# Patient Record
Sex: MALE | Race: OTHER | NOT HISPANIC OR LATINO
[De-identification: names, ages, dates, MRNs, and addresses within clinical notes are randomized per-mention and may not be internally consistent; named-entity substitution may affect disease eponyms.]

---

## 2017-01-06 ENCOUNTER — APPOINTMENT (OUTPATIENT)
Dept: INTERNAL MEDICINE | Facility: CLINIC | Age: 32
End: 2017-01-06

## 2017-01-06 VITALS
DIASTOLIC BLOOD PRESSURE: 60 MMHG | OXYGEN SATURATION: 99 % | HEART RATE: 63 BPM | HEIGHT: 71 IN | BODY MASS INDEX: 26.46 KG/M2 | WEIGHT: 189 LBS | SYSTOLIC BLOOD PRESSURE: 110 MMHG

## 2017-01-09 LAB
ALBUMIN SERPL ELPH-MCNC: 4.9 G/DL
ALP BLD-CCNC: 58 U/L
ALT SERPL-CCNC: 13 U/L
ANION GAP SERPL CALC-SCNC: 12 MMOL/L
AST SERPL-CCNC: 19 U/L
BILIRUB SERPL-MCNC: 0.3 MG/DL
BUN SERPL-MCNC: 16 MG/DL
CALCIUM SERPL-MCNC: 10 MG/DL
CHLORIDE SERPL-SCNC: 100 MMOL/L
CHOLEST SERPL-MCNC: 217 MG/DL
CHOLEST/HDLC SERPL: 4.1 RATIO
CO2 SERPL-SCNC: 26 MMOL/L
CREAT SERPL-MCNC: 0.76 MG/DL
GLUCOSE SERPL-MCNC: 85 MG/DL
HBA1C MFR BLD HPLC: 5.8 %
HDLC SERPL-MCNC: 53 MG/DL
LDLC SERPL CALC-MCNC: 122 MG/DL
POTASSIUM SERPL-SCNC: 4.4 MMOL/L
PROT SERPL-MCNC: 8.5 G/DL
SODIUM SERPL-SCNC: 138 MMOL/L
TRIGL SERPL-MCNC: 209 MG/DL

## 2017-01-18 ENCOUNTER — APPOINTMENT (OUTPATIENT)
Dept: GASTROENTEROLOGY | Facility: CLINIC | Age: 32
End: 2017-01-18

## 2017-02-08 ENCOUNTER — APPOINTMENT (OUTPATIENT)
Dept: GASTROENTEROLOGY | Facility: CLINIC | Age: 32
End: 2017-02-08

## 2017-02-08 VITALS
RESPIRATION RATE: 14 BRPM | DIASTOLIC BLOOD PRESSURE: 80 MMHG | TEMPERATURE: 98.4 F | BODY MASS INDEX: 26.6 KG/M2 | SYSTOLIC BLOOD PRESSURE: 130 MMHG | WEIGHT: 190 LBS | HEART RATE: 82 BPM | OXYGEN SATURATION: 98 % | HEIGHT: 71 IN

## 2017-03-30 ENCOUNTER — RX RENEWAL (OUTPATIENT)
Age: 32
End: 2017-03-30

## 2017-06-06 ENCOUNTER — APPOINTMENT (OUTPATIENT)
Dept: INTERNAL MEDICINE | Facility: CLINIC | Age: 32
End: 2017-06-06

## 2017-06-06 VITALS
HEIGHT: 71 IN | OXYGEN SATURATION: 98 % | BODY MASS INDEX: 25.76 KG/M2 | HEART RATE: 80 BPM | WEIGHT: 184 LBS | DIASTOLIC BLOOD PRESSURE: 60 MMHG | SYSTOLIC BLOOD PRESSURE: 110 MMHG

## 2017-06-12 LAB
ALBUMIN SERPL ELPH-MCNC: 4.9 G/DL
ALP BLD-CCNC: 48 U/L
ALT SERPL-CCNC: 14 U/L
AMYLASE/CREAT SERPL: 73 U/L
ANION GAP SERPL CALC-SCNC: 22 MMOL/L
AST SERPL-CCNC: 18 U/L
BASOPHILS # BLD AUTO: 0.02 K/UL
BASOPHILS NFR BLD AUTO: 0.3 %
BILIRUB SERPL-MCNC: 0.5 MG/DL
BUN SERPL-MCNC: 13 MG/DL
CALCIUM SERPL-MCNC: 10.2 MG/DL
CHLORIDE SERPL-SCNC: 97 MMOL/L
CHOLEST SERPL-MCNC: 255 MG/DL
CHOLEST/HDLC SERPL: 4.5 RATIO
CO2 SERPL-SCNC: 22 MMOL/L
CREAT SERPL-MCNC: 0.86 MG/DL
EOSINOPHIL # BLD AUTO: 0.12 K/UL
EOSINOPHIL NFR BLD AUTO: 2.1 %
GLUCOSE SERPL-MCNC: 87 MG/DL
HBA1C MFR BLD HPLC: 6 %
HCT VFR BLD CALC: 45.3 %
HDLC SERPL-MCNC: 57 MG/DL
HGB BLD-MCNC: 14.8 G/DL
IMM GRANULOCYTES NFR BLD AUTO: 0.2 %
LDLC SERPL CALC-MCNC: 146 MG/DL
LPL SERPL-CCNC: 28 U/L
LYMPHOCYTES # BLD AUTO: 1.52 K/UL
LYMPHOCYTES NFR BLD AUTO: 26.5 %
MAN DIFF?: NORMAL
MCHC RBC-ENTMCNC: 28.6 PG
MCHC RBC-ENTMCNC: 32.7 GM/DL
MCV RBC AUTO: 87.6 FL
MONOCYTES # BLD AUTO: 0.55 K/UL
MONOCYTES NFR BLD AUTO: 9.6 %
NEUTROPHILS # BLD AUTO: 3.51 K/UL
NEUTROPHILS NFR BLD AUTO: 61.3 %
PLATELET # BLD AUTO: 177 K/UL
POTASSIUM SERPL-SCNC: 5.1 MMOL/L
PROT SERPL-MCNC: 8.6 G/DL
RBC # BLD: 5.17 M/UL
RBC # FLD: 14 %
SODIUM SERPL-SCNC: 141 MMOL/L
TRIGL SERPL-MCNC: 261 MG/DL
TSH SERPL-ACNC: 2.06 UIU/ML
WBC # FLD AUTO: 5.73 K/UL

## 2017-06-14 ENCOUNTER — APPOINTMENT (OUTPATIENT)
Dept: INTERNAL MEDICINE | Facility: CLINIC | Age: 32
End: 2017-06-14

## 2017-10-30 ENCOUNTER — APPOINTMENT (OUTPATIENT)
Dept: ENDOCRINOLOGY | Facility: CLINIC | Age: 32
End: 2017-10-30

## 2017-12-26 ENCOUNTER — APPOINTMENT (OUTPATIENT)
Dept: INTERNAL MEDICINE | Facility: CLINIC | Age: 32
End: 2017-12-26
Payer: COMMERCIAL

## 2017-12-26 VITALS
WEIGHT: 194 LBS | DIASTOLIC BLOOD PRESSURE: 60 MMHG | HEART RATE: 82 BPM | BODY MASS INDEX: 27.16 KG/M2 | OXYGEN SATURATION: 98 % | SYSTOLIC BLOOD PRESSURE: 110 MMHG | HEIGHT: 71 IN

## 2017-12-26 PROCEDURE — 90686 IIV4 VACC NO PRSV 0.5 ML IM: CPT

## 2017-12-26 PROCEDURE — G0008: CPT

## 2017-12-26 PROCEDURE — 99395 PREV VISIT EST AGE 18-39: CPT | Mod: 25

## 2017-12-28 ENCOUNTER — RX RENEWAL (OUTPATIENT)
Age: 32
End: 2017-12-28

## 2017-12-29 ENCOUNTER — RX RENEWAL (OUTPATIENT)
Age: 32
End: 2017-12-29

## 2018-01-02 LAB
25(OH)D3 SERPL-MCNC: 19.3 NG/ML
ALBUMIN SERPL ELPH-MCNC: 4.6 G/DL
ALP BLD-CCNC: 47 U/L
ALT SERPL-CCNC: 18 U/L
AMYLASE/CREAT SERPL: 90 U/L
ANION GAP SERPL CALC-SCNC: 12 MMOL/L
AST SERPL-CCNC: 21 U/L
BASOPHILS # BLD AUTO: 0.03 K/UL
BASOPHILS NFR BLD AUTO: 0.6 %
BILIRUB SERPL-MCNC: 0.4 MG/DL
BUN SERPL-MCNC: 17 MG/DL
CALCIUM SERPL-MCNC: 9.9 MG/DL
CANCER AG19-9 SERPL-ACNC: 9.7 U/ML
CHLORIDE SERPL-SCNC: 100 MMOL/L
CHOLEST SERPL-MCNC: 306 MG/DL
CHOLEST/HDLC SERPL: 6.8 RATIO
CO2 SERPL-SCNC: 28 MMOL/L
CREAT SERPL-MCNC: 0.69 MG/DL
EOSINOPHIL # BLD AUTO: 0.15 K/UL
EOSINOPHIL NFR BLD AUTO: 2.8 %
GLUCOSE SERPL-MCNC: 99 MG/DL
HBA1C MFR BLD HPLC: 5.8 %
HBV SURFACE AB SER QL: REACTIVE
HBV SURFACE AG SER QL: NONREACTIVE
HCT VFR BLD CALC: 48.2 %
HCV AB SER QL: NONREACTIVE
HCV S/CO RATIO: 0.18 S/CO
HDLC SERPL-MCNC: 45 MG/DL
HGB BLD-MCNC: 16.1 G/DL
HIV1+2 AB SPEC QL IA.RAPID: NONREACTIVE
IMM GRANULOCYTES NFR BLD AUTO: 0.2 %
LDLC SERPL CALC-MCNC: NORMAL
LPL SERPL-CCNC: 32 U/L
LYMPHOCYTES # BLD AUTO: 1.64 K/UL
LYMPHOCYTES NFR BLD AUTO: 30.9 %
MAN DIFF?: NORMAL
MCHC RBC-ENTMCNC: 28.8 PG
MCHC RBC-ENTMCNC: 33.4 GM/DL
MCV RBC AUTO: 86.1 FL
MONOCYTES # BLD AUTO: 0.56 K/UL
MONOCYTES NFR BLD AUTO: 10.6 %
NEUTROPHILS # BLD AUTO: 2.91 K/UL
NEUTROPHILS NFR BLD AUTO: 54.9 %
PLATELET # BLD AUTO: 187 K/UL
POTASSIUM SERPL-SCNC: 4.7 MMOL/L
PROT SERPL-MCNC: 8.6 G/DL
RBC # BLD: 5.6 M/UL
RBC # FLD: 13.9 %
SODIUM SERPL-SCNC: 140 MMOL/L
TRIGL SERPL-MCNC: 590 MG/DL
TSH SERPL-ACNC: 1.93 UIU/ML
WBC # FLD AUTO: 5.3 K/UL

## 2018-01-14 ENCOUNTER — EMERGENCY (EMERGENCY)
Facility: HOSPITAL | Age: 33
LOS: 1 days | Discharge: HOME | End: 2018-01-14
Attending: EMERGENCY MEDICINE | Admitting: EMERGENCY MEDICINE
Payer: COMMERCIAL

## 2018-01-14 VITALS
DIASTOLIC BLOOD PRESSURE: 89 MMHG | SYSTOLIC BLOOD PRESSURE: 126 MMHG | HEART RATE: 71 BPM | OXYGEN SATURATION: 99 % | RESPIRATION RATE: 18 BRPM

## 2018-01-14 VITALS
HEART RATE: 65 BPM | DIASTOLIC BLOOD PRESSURE: 75 MMHG | TEMPERATURE: 99 F | RESPIRATION RATE: 17 BRPM | OXYGEN SATURATION: 98 % | SYSTOLIC BLOOD PRESSURE: 140 MMHG

## 2018-01-14 PROCEDURE — 93005 ELECTROCARDIOGRAM TRACING: CPT

## 2018-01-14 PROCEDURE — 99284 EMERGENCY DEPT VISIT MOD MDM: CPT | Mod: 25

## 2018-01-14 PROCEDURE — 93010 ELECTROCARDIOGRAM REPORT: CPT

## 2018-01-14 PROCEDURE — 99283 EMERGENCY DEPT VISIT LOW MDM: CPT | Mod: 25

## 2018-01-14 PROCEDURE — 90715 TDAP VACCINE 7 YRS/> IM: CPT

## 2018-01-14 PROCEDURE — 90471 IMMUNIZATION ADMIN: CPT

## 2018-01-14 PROCEDURE — 71046 X-RAY EXAM CHEST 2 VIEWS: CPT | Mod: 26

## 2018-01-14 PROCEDURE — 71046 X-RAY EXAM CHEST 2 VIEWS: CPT

## 2018-01-14 RX ORDER — IBUPROFEN 200 MG
600 TABLET ORAL ONCE
Qty: 0 | Refills: 0 | Status: COMPLETED | OUTPATIENT
Start: 2018-01-14 | End: 2018-01-14

## 2018-01-14 RX ORDER — TETANUS TOXOID, REDUCED DIPHTHERIA TOXOID AND ACELLULAR PERTUSSIS VACCINE, ADSORBED 5; 2.5; 8; 8; 2.5 [IU]/.5ML; [IU]/.5ML; UG/.5ML; UG/.5ML; UG/.5ML
0.5 SUSPENSION INTRAMUSCULAR ONCE
Qty: 0 | Refills: 0 | Status: COMPLETED | OUTPATIENT
Start: 2018-01-14 | End: 2018-01-14

## 2018-01-14 RX ORDER — ACETAMINOPHEN 500 MG
975 TABLET ORAL ONCE
Qty: 0 | Refills: 0 | Status: COMPLETED | OUTPATIENT
Start: 2018-01-14 | End: 2018-01-14

## 2018-01-14 RX ADMIN — Medication 600 MILLIGRAM(S): at 22:59

## 2018-01-14 RX ADMIN — Medication 600 MILLIGRAM(S): at 21:33

## 2018-01-14 RX ADMIN — TETANUS TOXOID, REDUCED DIPHTHERIA TOXOID AND ACELLULAR PERTUSSIS VACCINE, ADSORBED 0.5 MILLILITER(S): 5; 2.5; 8; 8; 2.5 SUSPENSION INTRAMUSCULAR at 22:58

## 2018-01-14 RX ADMIN — Medication 975 MILLIGRAM(S): at 21:33

## 2018-01-14 NOTE — ED PROVIDER NOTE - NS ED ROS FT
Constitutional: no fevers, chills  HEENT: no visual changes, no sore throat, no rhinorrhea  CV: + cp  Resp: no sob  GI: no abd pain, n/v, diarrhea/constipation  : no dysuria, hematuria  MSK: no joint pains  skin: +L thumb pain  neuro: no HA, no confusion  psych: no SI/HI

## 2018-01-14 NOTE — ED PROVIDER NOTE - SKIN [+], MLM
**ATTENDING ADDENDUM (Dr. Sg Manzano): partially-healed laceration of the right thumb (old, NO prior treatment)./LACERATION

## 2018-01-14 NOTE — ED PROVIDER NOTE - PHYSICAL EXAMINATION
Vitals: WNL  Gen: laying comfortably in NAD  Head: NCAT  ENT: sclerae white, anicterus, moist mucous membranes. No exudates. Neck supple  CV: RRR. Audible S1 and S2. No murmurs, rubs, gallops, S3, nor S4, 2+ radial and DP pulses   Pulm: Clear to auscultation bilaterally. No wheezes, rales, or rhonchi  Abd: soft, normoactive BS x4, NTND, no rebound, no guarding, no rashes  Musculoskeletal:  No peripheral edema  Skin: 2cm lac on R ventral aspect of thumb with dried blood, tender to touch  Neurologic: AAOx3

## 2018-01-14 NOTE — ED PROVIDER NOTE - PLAN OF CARE
1) Your chest xray is normal. Please follow-up with your primary care doctor within the next 3 days.  Please call today or tomorrow for an appointment.  If you cannot follow-up with your doctor(s), please return to the ED for any urgent issues.  2) If you have any worsening of symptoms or any other concerns please return to the ED immediately.  3) Please continue taking your home medications as directed.  4) You may have been given a copy of your labs and/or imaging.  Please go over these with your primary care doctor. 1) Your chest xray and EKG are normal. Please follow-up with your primary care doctor within the next 3 days.  Please call today or tomorrow for an appointment.  If you cannot follow-up with your doctor(s), please return to the ED for any urgent issues.  2) If you have any worsening of symptoms or any other concerns please return to the ED immediately.  3) Please continue taking your home medications as directed.  4) You may have been given a copy of your labs and/or imaging.  Please go over these with your primary care doctor.

## 2018-01-14 NOTE — ED ADULT NURSE NOTE - OBJECTIVE STATEMENT
Received patient awake and alert x 4, presenting with chest pain x 3 days, non radiating CP. States pain is sharp in nature and has been on and off for the past 3 days. Took 2 aspirin today which didn't help with the pain. Denies any SOB, no n/v/d. Breathing unlabored with no S/S of distress noted, caregiver at bedside, safety maintained, will continue to monitor.

## 2018-01-14 NOTE — ED PROVIDER NOTE - GASTROINTESTINAL, MLM
Abdomen soft, non-tender, non-distended **ATTENDING ADDENDUM (Dr. Sg Manzano): NO guarding, rebound, or rigidity. NO pulsatile or non-pulsatile masses. NO hernias. NO obvious hepatosplenomegaly.

## 2018-01-14 NOTE — ED PROVIDER NOTE - SKIN, MLM
Skin normal color for race, warm, dry and intact. No evidence of rash. **ATTENDING ADDENDUM (Dr. Sg Manzano): POSITIVE old thumb laceration, approximately 2 cm, on right thumb (partially-healed)

## 2018-01-14 NOTE — ED PROVIDER NOTE - CHPI ED SYMPTOMS NEG
no chills/no fever/no vomiting/no syncope/no back pain/no nausea/no cough/no diaphoresis/no shortness of breath/no dizziness

## 2018-01-14 NOTE — ED PROVIDER NOTE - OBJECTIVE STATEMENT
33yo M, h/o pancreatitis, p/w L CP that started last night. CP is sharp in nature, lasts 1 second, not associated with diaphoresis or sob. Nothing makes it better or worse. Had two episodes yesterday and two episodes today while driving. Took two asa today which didn't' help. Has never had it before. Denies trauma to the area, recent illnesses, f/c, cough, peripheral edema, h/o long travel/plane/car rides, h/o cancer.    Also complaining of pain to R thumb after he cut himself with a blade yesterday which he cleaned and put bacitracin on. Doesn't remember last time he got tdap. 33yo M, h/o pancreatitis, hypertriglyceridemia, p/w L CP that started last night. CP is sharp in nature, lasts 1 second, not associated with diaphoresis or sob. Nothing makes it better or worse. Had two episodes yesterday and two episodes today while driving. Took two asa today which didn't' help. Has never had it before. Denies trauma to the area, recent illnesses, f/c, cough, peripheral edema, h/o long travel/plane/car rides, h/o cancer.    Also complaining of pain to R thumb after he cut himself with a blade yesterday which he cleaned and put bacitracin on. Doesn't remember last time he got tdap. 33yo M, h/o pancreatitis, hypertriglyceridemia, p/w L CP that started last night. CP is sharp in nature, lasts 1 second, not associated with diaphoresis or sob. Nothing makes it better or worse. Had two episodes yesterday and two episodes today while driving. Took two asa today which didn't' help. Has never had it before. Denies trauma to the area, recent illnesses, f/c, cough, peripheral edema, h/o long travel/plane/car rides, h/o cancer.  Also complaining of pain to R thumb after he cut himself with a blade yesterday which he cleaned and put bacitracin on. Doesn't remember last time he got tdap.  **ATTENDING ADDENDUM (Dr. Sg Manzano): I attest that I have directly and personally interviewed and examined this patient and elicited a comparable history of present illness and review of systems as documented, along with my EM resident. I attest that I have made significant contributions to the documentation where necessary and as noted in the EMR.

## 2018-01-14 NOTE — ED PROVIDER NOTE - CHIEF COMPLAINT
The patient is a 32y Male complaining of chest pain. The patient is a 32-year-old man with history of pancreatitis and hyperlipidemia now presenting with two days of intermittent, sharp chest pain without radiation or associated symptoms (nausea, vomiting, neck/arm/shoulder/back radiation, diaphoresis, syncope or near-syncope).

## 2018-01-14 NOTE — ED PROVIDER NOTE - PROGRESS NOTE DETAILS
Nancy Kevin MD PGY1: cleaned out R thumb lac with saline. Still with dried blood. Pt refuses anymore cleaning 2/2 pain. Sutures not indicated as pt has had lac open for over 24h. Dressing applied. Nancy Kevin MD PGY1: cleaned out R thumb lac with saline. Still with dried blood. Pt refuses anymore cleaning 2/2 pain. Sutures not indicated as pt has had lac open for over 24h. Dressing applied.  **ATTENDING ADDENDUM (Dr. Sg Manzano): agree with above notation. Agree with goals/plan of care related to wound. In addition, regarding patient's chest pain, there is NO evidence suggesting acute coronary syndrome, arrhythmia, pulmonary embolism/deep venous thrombosis, pneumothorax, serious bacterial infection or sepsis/severe sepsis e.g. pneumonia, vascular cause e.g. AAA or equivalent, or other worrisome etiology at this time. Anticipatory guidance provided. Agree with discharge home with close outpatient followup with primary care physician/provider and with medications (if appropriate, if clinically indicated, and as prescribed, as noted in EMR).

## 2018-01-14 NOTE — ED PROVIDER NOTE - CARDIOVASCULAR [-], MLM
**ATTENDING ADDENDUM (Dr. Sg Manzano): NO near-syncope./no palpitations/no peripheral edema/no syncope

## 2018-01-14 NOTE — ED PROVIDER NOTE - MUSCULOSKELETAL, MLM
Spine appears normal, range of motion is not limited, no muscle or joint tenderness **ATTENDING ADDENDUM (Dr. Sg Manzano): NO cords, soft-tissue swelling, or calf tenderness in the bilateral lower extremities.

## 2018-01-14 NOTE — ED PROVIDER NOTE - ATTENDING CONTRIBUTION TO CARE
**ATTENDING ADDENDUM (Dr. Sg Manzano): I attest that I have directly examined this patient and reviewed and formulated the diagnostic and therapeutic management plan in collaboration with the EM resident. Please see MDM note and remainder of EMR for findings from CC, HPI, ROS, and PE. (Ho)

## 2018-01-14 NOTE — ED PROVIDER NOTE - MUSCULOSKELETAL NEGATIVE STATEMENT, MLM
no back pain, no gout, no musculoskeletal pain, no neck pain, and no weakness. **ATTENDING ADDENDUM (Dr. Sg Manzano): NO cords, soft-tissue swelling, or calf tenderness in the bilateral lower extremities.

## 2018-01-14 NOTE — ED PROVIDER NOTE - CARE PLAN
Principal Discharge DX:	Chest pain  Instructions for follow-up, activity and diet:	1) Your chest xray is normal. Please follow-up with your primary care doctor within the next 3 days.  Please call today or tomorrow for an appointment.  If you cannot follow-up with your doctor(s), please return to the ED for any urgent issues.  2) If you have any worsening of symptoms or any other concerns please return to the ED immediately.  3) Please continue taking your home medications as directed.  4) You may have been given a copy of your labs and/or imaging.  Please go over these with your primary care doctor. Principal Discharge DX:	Chest pain  Instructions for follow-up, activity and diet:	1) Your chest xray and EKG are normal. Please follow-up with your primary care doctor within the next 3 days.  Please call today or tomorrow for an appointment.  If you cannot follow-up with your doctor(s), please return to the ED for any urgent issues.  2) If you have any worsening of symptoms or any other concerns please return to the ED immediately.  3) Please continue taking your home medications as directed.  4) You may have been given a copy of your labs and/or imaging.  Please go over these with your primary care doctor. Principal Discharge DX:	Chest pain  Assessment and plan of treatment:	1) Your chest xray and EKG are normal. Please follow-up with your primary care doctor within the next 3 days.  Please call today or tomorrow for an appointment.  If you cannot follow-up with your doctor(s), please return to the ED for any urgent issues.  2) If you have any worsening of symptoms or any other concerns please return to the ED immediately.  3) Please continue taking your home medications as directed.  4) You may have been given a copy of your labs and/or imaging.  Please go over these with your primary care doctor.

## 2018-01-14 NOTE — ED PROVIDER NOTE - EYES, MLM
**ATTENDING ADDENDUM (Dr. Sg Manzano): Extraocular muscle movements intact. Clear corneas bilaterally, pupils equal and round.

## 2018-01-14 NOTE — ED PROVIDER NOTE - MEDICAL DECISION MAKING DETAILS
31yo M, h/o pancreatitis, p/w L CP that started last night. Will 31yo M, h/o pancreatitis, hypertriglyceridemia p/w L CP that started last night. Pt low risk for ACS with Heart score 2. CP likely MSK in nature. Will eval with cxr, ekg, pain meds, anticipate d/c with pcp f/u. 33yo M, h/o pancreatitis, hypertriglyceridemia p/w L CP that started last night. Pt low risk for ACS with Heart score 2. CP likely MSK in nature. Will eval with cxr, ekg, pain meds, anticipate d/c with pcp f/u.  **ATTENDING MEDICAL DECISION MAKING/SYNTHESIS (Dr. Sg Manzano): I have reviewed the Chief Complaint, the HPI, the ROS, and have directly performed and confirmed the findings on the Physical Examination. I have reviewed the medical decision making with all providers, as applicable. The PROBLEM REPRESENTATION at this time is: 32-year-old man with history of pancreatitis and hyperlipidemia now presenting with two days of intermittent, sharp left-sided parasternal chest pain without radiation or associated symptoms (nausea, vomiting, neck/arm/shoulder/back radiation, diaphoresis, syncope or near-syncope, shortness of breath, or dyspnea on exertion). The MOST LIKELY DIAGNOSIS, and the LIST OF DIFFERENTIAL DIAGNOSES, includes (but is not limited to) the following: acute coronary syndrome (unlikely), arrhythmia (NO evidence), pulmonary embolism/deep venous thrombosis (NO evidence), serious bacterial infection or sepsis/severe sepsis e.g. pneumonia (NO evidence), musculoskeletal (possible), vascular cause e.g. AAA or equivalent, pneumothorax (unlikely given presentation and clinical findings), inflammatory e.g. pleurisy or equivalent (possible), dehydration, electrolyte-metabolic-endocrine derangements. The likelihood of each of these diagnoses has been appropriately considered in the context of this patient's presentation and my evaluation. PLAN: as described in EMR, including diagnostics, therapeutics and consultation as clinically warranted. I will continue to reevaluate the patient, including the results of all testing, and monitor response to therapy throughout the patient's course in the ED.

## 2018-03-20 ENCOUNTER — FORM ENCOUNTER (OUTPATIENT)
Age: 33
End: 2018-03-20

## 2018-03-21 ENCOUNTER — APPOINTMENT (OUTPATIENT)
Dept: CT IMAGING | Facility: IMAGING CENTER | Age: 33
End: 2018-03-21
Payer: COMMERCIAL

## 2018-03-21 ENCOUNTER — OUTPATIENT (OUTPATIENT)
Dept: OUTPATIENT SERVICES | Facility: HOSPITAL | Age: 33
LOS: 1 days | End: 2018-03-21
Payer: COMMERCIAL

## 2018-03-21 DIAGNOSIS — K86.89 OTHER SPECIFIED DISEASES OF PANCREAS: ICD-10-CM

## 2018-03-21 PROCEDURE — 74177 CT ABD & PELVIS W/CONTRAST: CPT

## 2018-03-21 PROCEDURE — 74177 CT ABD & PELVIS W/CONTRAST: CPT | Mod: 26

## 2018-06-20 ENCOUNTER — APPOINTMENT (OUTPATIENT)
Dept: GASTROENTEROLOGY | Facility: CLINIC | Age: 33
End: 2018-06-20
Payer: COMMERCIAL

## 2018-06-20 VITALS
BODY MASS INDEX: 28.28 KG/M2 | SYSTOLIC BLOOD PRESSURE: 118 MMHG | OXYGEN SATURATION: 98 % | HEIGHT: 71 IN | DIASTOLIC BLOOD PRESSURE: 74 MMHG | RESPIRATION RATE: 15 BRPM | HEART RATE: 62 BPM | WEIGHT: 202 LBS | TEMPERATURE: 97.6 F

## 2018-06-20 PROCEDURE — 99214 OFFICE O/P EST MOD 30 MIN: CPT

## 2018-12-24 ENCOUNTER — RX RENEWAL (OUTPATIENT)
Age: 33
End: 2018-12-24

## 2019-01-28 ENCOUNTER — APPOINTMENT (OUTPATIENT)
Dept: INTERNAL MEDICINE | Facility: CLINIC | Age: 34
End: 2019-01-28

## 2019-05-01 ENCOUNTER — APPOINTMENT (OUTPATIENT)
Dept: INTERNAL MEDICINE | Facility: CLINIC | Age: 34
End: 2019-05-01
Payer: COMMERCIAL

## 2019-05-01 VITALS
BODY MASS INDEX: 29.82 KG/M2 | SYSTOLIC BLOOD PRESSURE: 130 MMHG | DIASTOLIC BLOOD PRESSURE: 78 MMHG | OXYGEN SATURATION: 99 % | HEIGHT: 71 IN | WEIGHT: 213 LBS | HEART RATE: 80 BPM | TEMPERATURE: 98.7 F

## 2019-05-01 PROCEDURE — 99214 OFFICE O/P EST MOD 30 MIN: CPT

## 2019-05-01 NOTE — REVIEW OF SYSTEMS
[Recent Change In Weight] : ~T recent weight change [Negative] : Psychiatric [FreeTextEntry2] : gained 25 lb

## 2019-05-01 NOTE — ASSESSMENT
[FreeTextEntry1] : Probable TMJ on the right side.\par Advised seeing his dentist for possible panorex film;  mouth guard, use tylenol, warm compresses.\par Check labs when fasting.\par advised avoid meat and other fatty foods, lose 25 lb.\par Compliance w Lopid stressed, bid.\par f/u for CPE in the next mo.

## 2019-05-01 NOTE — HISTORY OF PRESENT ILLNESS
[FreeTextEntry1] : f/u\par last here 2 yrs ago [de-identified] : 32 yo man w h/o hypertriglyceridemia (familial) and necrotizing pancreatitis in 2015.\par Chr walled-off pancreatic necrosis, no longer advising regular imaging unless sx.\par He reports feeling well, aware he has gained wt and not always following healthy diet..taking Lopid but not always bid. Last TGs 590, didn't f/u.\par c/o right ear/jaw discomfort w chewing, baylee meats and other hard foods such as bagels.\par Has gone to urg care a few times and " always getting abx". Sees dentist, not having any dental pain/abscess. not awre of teeth grinding.\par

## 2019-05-14 LAB
ALBUMIN SERPL ELPH-MCNC: 4.9 G/DL
ALP BLD-CCNC: 57 U/L
ALT SERPL-CCNC: 17 U/L
ANION GAP SERPL CALC-SCNC: 13 MMOL/L
AST SERPL-CCNC: 26 U/L
BASOPHILS # BLD AUTO: 0.06 K/UL
BASOPHILS NFR BLD AUTO: 1.2 %
BILIRUB SERPL-MCNC: 0.3 MG/DL
BUN SERPL-MCNC: 12 MG/DL
CALCIUM SERPL-MCNC: 10.1 MG/DL
CHLORIDE SERPL-SCNC: 94 MMOL/L
CHOLEST SERPL-MCNC: 619 MG/DL
CHOLEST/HDLC SERPL: 28.1 RATIO
CO2 SERPL-SCNC: 27 MMOL/L
CREAT SERPL-MCNC: 0.76 MG/DL
EOSINOPHIL # BLD AUTO: 0.24 K/UL
EOSINOPHIL NFR BLD AUTO: 4.7 %
ESTIMATED AVERAGE GLUCOSE: 166 MG/DL
GLUCOSE SERPL-MCNC: 117 MG/DL
HBA1C MFR BLD HPLC: 7.4 %
HCT VFR BLD CALC: 48.3 %
HCV AB SER QL: NONREACTIVE
HCV S/CO RATIO: 0.16 S/CO
HDLC SERPL-MCNC: 22 MG/DL
HGB BLD-MCNC: 15.6 G/DL
HIV1+2 AB SPEC QL IA.RAPID: NONREACTIVE
IMM GRANULOCYTES NFR BLD AUTO: 0.4 %
LDLC SERPL CALC-MCNC: NORMAL MG/DL
LPL SERPL-CCNC: 30 U/L
LYMPHOCYTES # BLD AUTO: 1.72 K/UL
LYMPHOCYTES NFR BLD AUTO: 33.5 %
MAN DIFF?: NORMAL
MCHC RBC-ENTMCNC: 27.9 PG
MCHC RBC-ENTMCNC: 32.3 GM/DL
MCV RBC AUTO: 86.4 FL
MONOCYTES # BLD AUTO: 0.52 K/UL
MONOCYTES NFR BLD AUTO: 10.1 %
NEUTROPHILS # BLD AUTO: 2.57 K/UL
NEUTROPHILS NFR BLD AUTO: 50.1 %
PLATELET # BLD AUTO: 194 K/UL
POTASSIUM SERPL-SCNC: 4.3 MMOL/L
PROT SERPL-MCNC: 8.1 G/DL
RBC # BLD: 5.59 M/UL
RBC # FLD: 15.2 %
SODIUM SERPL-SCNC: 134 MMOL/L
TRIGL SERPL-MCNC: 2399 MG/DL
TSH SERPL-ACNC: 2.26 UIU/ML
WBC # FLD AUTO: 5.13 K/UL

## 2019-05-15 ENCOUNTER — TRANSCRIPTION ENCOUNTER (OUTPATIENT)
Age: 34
End: 2019-05-15

## 2019-05-28 LAB
ALBUMIN SERPL ELPH-MCNC: 5 G/DL
ALP BLD-CCNC: 44 U/L
ALT SERPL-CCNC: 25 U/L
ANION GAP SERPL CALC-SCNC: 13 MMOL/L
AST SERPL-CCNC: 19 U/L
BILIRUB SERPL-MCNC: 0.5 MG/DL
BUN SERPL-MCNC: 14 MG/DL
CALCIUM SERPL-MCNC: 9.5 MG/DL
CHLORIDE SERPL-SCNC: 104 MMOL/L
CHOLEST SERPL-MCNC: 185 MG/DL
CHOLEST/HDLC SERPL: 3.5 RATIO
CO2 SERPL-SCNC: 22 MMOL/L
CREAT SERPL-MCNC: 0.9 MG/DL
GLUCOSE SERPL-MCNC: 91 MG/DL
HDLC SERPL-MCNC: 53 MG/DL
LDLC SERPL CALC-MCNC: 111 MG/DL
LPL SERPL-CCNC: 37 U/L
POTASSIUM SERPL-SCNC: 4.3 MMOL/L
PROT SERPL-MCNC: 7.7 G/DL
SODIUM SERPL-SCNC: 139 MMOL/L
TRIGL SERPL-MCNC: 106 MG/DL

## 2019-10-22 ENCOUNTER — RX RENEWAL (OUTPATIENT)
Age: 34
End: 2019-10-22

## 2019-12-23 ENCOUNTER — RX RENEWAL (OUTPATIENT)
Age: 34
End: 2019-12-23

## 2020-02-24 ENCOUNTER — TRANSCRIPTION ENCOUNTER (OUTPATIENT)
Age: 35
End: 2020-02-24

## 2020-03-31 ENCOUNTER — APPOINTMENT (OUTPATIENT)
Dept: INTERNAL MEDICINE | Facility: CLINIC | Age: 35
End: 2020-03-31

## 2020-04-21 ENCOUNTER — APPOINTMENT (OUTPATIENT)
Dept: INTERNAL MEDICINE | Facility: CLINIC | Age: 35
End: 2020-04-21
Payer: COMMERCIAL

## 2020-04-21 PROCEDURE — 99441: CPT

## 2020-04-21 NOTE — HISTORY OF PRESENT ILLNESS
[FreeTextEntry1] : Patient requesting tell a visit regarding his diabetes pancreatitis patient was at home I was in my office in [place] patient instructed this was a billable encounter patient gave verbal permission\par \par Follow-up for overweight and type II diabetes [de-identified] : Patient is a 34-year-old male with history of overweight, hypercholesterol/hypertriglyceridemia with secondary pancreatitis, type II diabetes/prediabetes who asked for tell a visit for follow-up. Patient stop metformin on his own denies polyuria polydipsia abdominal pain nausea vomiting fever chills feels well

## 2020-04-21 NOTE — ASSESSMENT
[FreeTextEntry1] : Patient is a 34-year-old male with history of type II diabetes, hypertriglyceridemia with secondary pancreatitis who presents for tell a visit for follow-up of medical condition\par \par Assessment/plan:\par \par 1. Type II diabetes\par check fasting glucose and hemoglobin A1c city.\par Counseled on diet and exercise\par \par 2 overweight\par counseled on diet and exercise\par \par 3 hypertriglyceridemia\par continue gemfibrozil and lipid\par check lipid profile fasting will check labs at [facility]\par \par 4 history of pancreatitis\par asymptomatic check lipase level.\par \par Follow-up in one week after labs \par appointment started at 329 pm ended at 3:37 PM\par greater than 50% spent counseling on diet exercise and importance of social distancing

## 2020-04-27 LAB
ALBUMIN SERPL ELPH-MCNC: 5.1 G/DL
ALP BLD-CCNC: 48 U/L
ALT SERPL-CCNC: 28 U/L
ANION GAP SERPL CALC-SCNC: 12 MMOL/L
AST SERPL-CCNC: 24 U/L
BASOPHILS # BLD AUTO: 0.04 K/UL
BASOPHILS NFR BLD AUTO: 0.8 %
BILIRUB SERPL-MCNC: 0.4 MG/DL
BUN SERPL-MCNC: 13 MG/DL
CALCIUM SERPL-MCNC: 9.9 MG/DL
CHLORIDE SERPL-SCNC: 100 MMOL/L
CHOLEST SERPL-MCNC: 377 MG/DL
CHOLEST/HDLC SERPL: 10 RATIO
CO2 SERPL-SCNC: 27 MMOL/L
CREAT SERPL-MCNC: 0.85 MG/DL
EOSINOPHIL # BLD AUTO: 0.11 K/UL
EOSINOPHIL NFR BLD AUTO: 2.3 %
ESTIMATED AVERAGE GLUCOSE: 137 MG/DL
GLUCOSE SERPL-MCNC: 97 MG/DL
HBA1C MFR BLD HPLC: 6.4 %
HCT VFR BLD CALC: 49.1 %
HDLC SERPL-MCNC: 38 MG/DL
HGB BLD-MCNC: 15.8 G/DL
IMM GRANULOCYTES NFR BLD AUTO: 0.4 %
LDLC SERPL CALC-MCNC: NORMAL MG/DL
LPL SERPL-CCNC: 25 U/L
LYMPHOCYTES # BLD AUTO: 1.8 K/UL
LYMPHOCYTES NFR BLD AUTO: 37.4 %
MAN DIFF?: NORMAL
MCHC RBC-ENTMCNC: 27.9 PG
MCHC RBC-ENTMCNC: 32.2 GM/DL
MCV RBC AUTO: 86.6 FL
MONOCYTES # BLD AUTO: 0.46 K/UL
MONOCYTES NFR BLD AUTO: 9.6 %
NEUTROPHILS # BLD AUTO: 2.38 K/UL
NEUTROPHILS NFR BLD AUTO: 49.5 %
PLATELET # BLD AUTO: 183 K/UL
POTASSIUM SERPL-SCNC: 4.2 MMOL/L
PROT SERPL-MCNC: 8.1 G/DL
RBC # BLD: 5.67 M/UL
RBC # FLD: 13.8 %
SODIUM SERPL-SCNC: 140 MMOL/L
TRIGL SERPL-MCNC: 741 MG/DL
WBC # FLD AUTO: 4.81 K/UL

## 2020-04-28 ENCOUNTER — APPOINTMENT (OUTPATIENT)
Dept: INTERNAL MEDICINE | Facility: CLINIC | Age: 35
End: 2020-04-28
Payer: COMMERCIAL

## 2020-04-28 DIAGNOSIS — K85.91 ACUTE PANCREATITIS WITH UNINFECTED NECROSIS, UNSPECIFIED: ICD-10-CM

## 2020-04-28 PROCEDURE — 99213 OFFICE O/P EST LOW 20 MIN: CPT | Mod: 95

## 2020-04-28 NOTE — ASSESSMENT
[FreeTextEntry1] : Patient is a 34-year-old male with history of overweight, type II diabetes, hypertriglyceridemia history of pancreatitis secondary to hypertriglyceridemia who presents for follow-up\par \par 1. Type II diabetes\par never started metformin\par hold metformin now counseled on diet and exercise\par hemoglobin A1c down to 6.4\par \par 2 hypertriglyceridemia\par continue gemfibrozil one tablet 600 mg BID\par counseled on diet low called no alcohol exercise\par recheck in 6 to 8 weeks consider adding back omega-3 fatty acids\par \par 3 history of hypertriglyceridemia induced pancreatitis\par counseled on diet and exercise continue gemfibrozil consider adding omega-3 check lipid profile in six weeks\par lipase baseline normal.\par \par 4 overweight\par counseled on diet and exercise\par \par Appointment started at 3:40 PM ended at 3:52 PM\par greater than 50% counseled on diet and exercise.

## 2020-04-28 NOTE — HISTORY OF PRESENT ILLNESS
[FreeTextEntry1] : Patient called requesting a tele-video visit regarding follow-up for his diabetes hypertriglyceridemia. Patient was at home I was in my office in [place]. Patient instructed us a billable visit patient gave verbal consent\par \par Follow-up for type II diabetes and hypertriglyceridemia [de-identified] : The patient is a 34-year-old male with history of type II diabetes formally prescribed metformin never started, pancreatitis secondary to Hydro hypertriglyceridemia, hypertriglyceridemia who presents for follow-up. Patient has been following diet denies chest pain, shortness of breath abdominal pain nausea vomiting has lost several pounds since last seen.

## 2020-07-15 ENCOUNTER — TRANSCRIPTION ENCOUNTER (OUTPATIENT)
Age: 35
End: 2020-07-15

## 2020-08-28 ENCOUNTER — APPOINTMENT (OUTPATIENT)
Dept: INTERNAL MEDICINE | Facility: CLINIC | Age: 35
End: 2020-08-28
Payer: COMMERCIAL

## 2020-08-28 PROCEDURE — 99213 OFFICE O/P EST LOW 20 MIN: CPT | Mod: 95

## 2020-08-28 NOTE — HISTORY OF PRESENT ILLNESS
[Medical Office: ([facility])___] : at the medical office located in  [Other Location: e.g. School (Enter Location, City,State)___] : at [unfilled], at the time of the visit. [Verbal consent obtained from patient] : the patient, [unfilled] [FreeTextEntry1] : f/u [de-identified] : 33 yo man w familial Hypertriglyceridemia, IFG/DM2, overwt\par Necrotizing pancreatitis in '15 due to elev TGs\par currently only taking Gemfibrozil- Tricor was too $$, also d/cd Metf when it ran out.\par Didn't see Dr. Rdz, Pradip re lipids (forgot.)\par Feeling well, trying to stick to the diet although "could be less starchy and more greens".\par Tested Covid neg this wk.

## 2020-08-28 NOTE — ASSESSMENT
[FreeTextEntry1] : Pt w h/o hypertriglyceridemia, DM2\par currently on Gemfibrozil and diet\par Last A1c was improved to 6.4 but lipids were still up.\par P: check fasting labs in am\par renew Gemfib\par Further decisions based on upcoming labwork.

## 2020-11-25 LAB
25(OH)D3 SERPL-MCNC: 22.1 NG/ML
ALBUMIN SERPL ELPH-MCNC: 5.3 G/DL
ALP BLD-CCNC: 61 U/L
ALT SERPL-CCNC: 30 U/L
ANION GAP SERPL CALC-SCNC: 17 MMOL/L
AST SERPL-CCNC: 20 U/L
BASOPHILS # BLD AUTO: 0.04 K/UL
BASOPHILS NFR BLD AUTO: 0.8 %
BILIRUB SERPL-MCNC: 0.3 MG/DL
BUN SERPL-MCNC: 19 MG/DL
CALCIUM SERPL-MCNC: 10.5 MG/DL
CHLORIDE SERPL-SCNC: 100 MMOL/L
CHOLEST SERPL-MCNC: 294 MG/DL
CO2 SERPL-SCNC: 22 MMOL/L
CREAT SERPL-MCNC: 0.77 MG/DL
EOSINOPHIL # BLD AUTO: 0.09 K/UL
EOSINOPHIL NFR BLD AUTO: 1.9 %
ESTIMATED AVERAGE GLUCOSE: 160 MG/DL
GLUCOSE SERPL-MCNC: 135 MG/DL
HBA1C MFR BLD HPLC: 7.2 %
HCT VFR BLD CALC: 48.6 %
HDLC SERPL-MCNC: 51 MG/DL
HGB BLD-MCNC: 15.7 G/DL
IMM GRANULOCYTES NFR BLD AUTO: 0.4 %
LDLC SERPL CALC-MCNC: 177 MG/DL
LPL SERPL-CCNC: 30 U/L
LYMPHOCYTES # BLD AUTO: 1.56 K/UL
LYMPHOCYTES NFR BLD AUTO: 32.8 %
MAN DIFF?: NORMAL
MCHC RBC-ENTMCNC: 28.3 PG
MCHC RBC-ENTMCNC: 32.3 GM/DL
MCV RBC AUTO: 87.7 FL
MONOCYTES # BLD AUTO: 0.41 K/UL
MONOCYTES NFR BLD AUTO: 8.6 %
NEUTROPHILS # BLD AUTO: 2.63 K/UL
NEUTROPHILS NFR BLD AUTO: 55.5 %
NONHDLC SERPL-MCNC: 243 MG/DL
PLATELET # BLD AUTO: 212 K/UL
POTASSIUM SERPL-SCNC: 5.1 MMOL/L
PROT SERPL-MCNC: 8.2 G/DL
RBC # BLD: 5.54 M/UL
RBC # FLD: 13.7 %
SODIUM SERPL-SCNC: 139 MMOL/L
TRIGL SERPL-MCNC: 328 MG/DL
TSH SERPL-ACNC: 1.86 UIU/ML
WBC # FLD AUTO: 4.75 K/UL

## 2021-06-30 NOTE — ED PROVIDER NOTE - RESPIRATORY, MLM
Consent: Written consent obtained, risks reviewed including but not limited to crusting, scabbing, blistering, scarring, darker or lighter pigmentary change, incidental hair removal, bruising, and/or incomplete removal. Breath sounds clear and equal bilaterally. **ATTENDING ADDENDUM (Dr. Sg Manzano): NO wheezing, rales, rhonchi, crackles, stridor, drooling, retractions, nasal flaring, or tripoding.

## 2021-07-03 ENCOUNTER — EMERGENCY (EMERGENCY)
Facility: HOSPITAL | Age: 36
LOS: 1 days | Discharge: HOME | End: 2021-07-03
Attending: STUDENT IN AN ORGANIZED HEALTH CARE EDUCATION/TRAINING PROGRAM
Payer: COMMERCIAL

## 2021-07-03 VITALS
HEIGHT: 71 IN | DIASTOLIC BLOOD PRESSURE: 70 MMHG | OXYGEN SATURATION: 97 % | RESPIRATION RATE: 18 BRPM | SYSTOLIC BLOOD PRESSURE: 116 MMHG | WEIGHT: 205.03 LBS | HEART RATE: 76 BPM | TEMPERATURE: 99 F

## 2021-07-03 PROCEDURE — 99284 EMERGENCY DEPT VISIT MOD MDM: CPT

## 2021-07-03 PROCEDURE — 96372 THER/PROPH/DIAG INJ SC/IM: CPT

## 2021-07-03 PROCEDURE — 99283 EMERGENCY DEPT VISIT LOW MDM: CPT | Mod: 25

## 2021-07-03 RX ORDER — KETOROLAC TROMETHAMINE 30 MG/ML
30 SYRINGE (ML) INJECTION ONCE
Refills: 0 | Status: DISCONTINUED | OUTPATIENT
Start: 2021-07-03 | End: 2021-07-03

## 2021-07-03 RX ORDER — ACETAMINOPHEN 500 MG
975 TABLET ORAL ONCE
Refills: 0 | Status: COMPLETED | OUTPATIENT
Start: 2021-07-03 | End: 2021-07-03

## 2021-07-03 RX ORDER — LIDOCAINE 4 G/100G
1 CREAM TOPICAL ONCE
Refills: 0 | Status: COMPLETED | OUTPATIENT
Start: 2021-07-03 | End: 2021-07-03

## 2021-07-03 RX ADMIN — LIDOCAINE 1 PATCH: 4 CREAM TOPICAL at 22:44

## 2021-07-03 RX ADMIN — Medication 975 MILLIGRAM(S): at 22:45

## 2021-07-03 RX ADMIN — Medication 30 MILLIGRAM(S): at 22:45

## 2021-07-03 NOTE — ED PROVIDER NOTE - PATIENT PORTAL LINK FT
You can access the FollowMyHealth Patient Portal offered by [facility] by registering at the following website: [email or web address]. By joining [employer]’s FollowMyHealth portal, you will also be able to view your health information using other applications (apps) compatible with our system.

## 2021-07-03 NOTE — ED PROVIDER NOTE - PHYSICAL EXAMINATION
Gen: Alert and oriented. Answering questions appropriately  HEENT: extra occular movements intact, no nasal discharge, mucous membranes moist  CV: Regular rate and rhythm, +S1/S2, no murmurs/rubs/gallops,   Resp: Clear to ausculation bilaterally, no wheezes/rhonchi/rales  GI: Abdomen soft non-distended, non tender to palpation, no masses  : No CVA tenderness  MSK: Point Tenderness above spine, Para vertebral tenderness  Neuro:  A&Ox4, following commands,   Pos straight leg raise bl  No saddle anesthesia, rectal tone intact.   No sensory deficits at LEs bilaterally.   Strength: Hip flexors/extendors 5/5 bilaterally, knee flexion/extension 5/5 bilaterally, dosiflexion/plantarflexion of foot 5/5 bilaterally, ambulating independently Gen: Alert and oriented. Answering questions appropriately  HEENT: extra occular movements intact, no nasal discharge, mucous membranes moist  CV: Regular rate and rhythm, +S1/S2, no murmurs/rubs/gallops,   Resp: Clear to ausculation bilaterally, no wheezes/rhonchi/rales  GI: Abdomen soft non-distended, non tender to palpation, no masses  : No CVA tenderness  MSK: TTP lateral to L3. No point tenderness  Neuro:  A&Ox4, following commands,   Pos straight leg raise bl  No saddle anesthesia, rectal tone intact.   No sensory deficits at LEs bilaterally.   Strength: Hip flexors/extendors 5/5 bilaterally, knee flexion/extension 5/5 bilaterally, dosiflexion/plantarflexion of foot 5/5 bilaterally, ambulating independently

## 2021-07-03 NOTE — ED ADULT NURSE NOTE - OBJECTIVE STATEMENT
35 year old male presents to the ED complaining of lumbar back pain radiating more to the left from the center spine that he noticed when waking up, denies trauma or injury, ambulatory, denies numbness or tingling of lower extremities. Pt is A&O x 4, VSS, afebrile, ambulating independently. Pt denies fever, chills, NVD, SOB, or chest pain.

## 2021-07-03 NOTE — ED PROVIDER NOTE - NSFOLLOWUPINSTRUCTIONS_ED_ALL_ED_FT
Back Pain    You were seen in the emergency department (ED) today for back pain. Acute back pain is the second most common reason for a physician visit and affects 80% to 85% of people over their lifetime. Most episodes of back pain are not serious and resolve within weeks with conservative therapy.    There are many causes of back pain. Most of the time, the pain is caused by conditions such as a muscle strain, inflammation or a bulging disc that cannot be identified on an X-ray or CT scan. Diagnostic imaging does not accurately identify the cause of most low back pain and therefore does not help guide therapy or improve the time to recovery.    Back pain is very common in adults. The cause of back pain is rarely dangerous and the pain often gets better over time. The cause of your back pain may not be known and may include strain of muscles or ligaments, degeneration of the spinal disks, or arthritis. Occasionally the pain may radiate down your leg(s). Over-the-counter medicines to reduce pain and inflammation are often the most helpful. Stretching and remaining active frequently helps the healing process.    Your provider today has determined that you are not exhibiting any of these worrisome symptoms or physical exam findings. The American College of Emergency Physicians, American College of Physicians, American Society of Anesthesiologists, and the American College of Radiology have all independently advised that acute imaging studies in patients with musculoskeletal low back pain are usually inappropriate and not necessary.    Your provider today has determined that you do not need an emergent MRI. This does not mean that you may not require an MRI as an outpatient in the future if your pain persists or if you develop additional neurologic symptoms.    It is extremely important for you to follow up with your outpatient provider for further examination and discussion regarding treatment and imaging, if necessary.    If you develop any of the following symptoms, return to the ED immediately for re-evaluation:    •Significant trauma or fall, especially if you are over age 65 or have osteoporosis  •Sudden, acute onset of urinary retention or incontinence  •Fecal incontinence  •Loss of sensation (anesthesia) restricted to the area of the buttocks, perineum and inner surfaces of the thighs  •Weakness in the lower limbs

## 2021-07-03 NOTE — ED PROVIDER NOTE - NS ED ROS FT
Gen: Denies fever, weight loss  CV: Denies chest pain, palpitations  Skin: Denies rash, erythema, color changes  Resp: Denies SOB, cough  GI: Denies constipation, nausea, vomiting, bowel incontinence  Msk: + back pain, denies LE swelling, extremity pain  : Denies dysuria, increased frequency, urinary retention, urinary incontinence  Neuro: Denies LOC, weakness

## 2021-07-03 NOTE — ED PROVIDER NOTE - CLINICAL SUMMARY MEDICAL DECISION MAKING FREE TEXT BOX
Joseph Frankel PGY3: 34 yo M with low back pain. VSS. Patient looks well and is non toxic appearing. PE as above. Most likely radiculopathy. No red flag signs. Will treat symptomatically and most likely dc with ortho follow up. Will reassess.

## 2021-07-03 NOTE — ED PROVIDER NOTE - PRINCIPAL DIAGNOSIS
Discharge Instructions : Care After Your Coiling Procedure Using the Leg Site     Activity   For the next 48 hours: Follow these guidelines related to the puncture site in your leg.   · No heavy lifting (over 10 lbs)   · Avoid pushing or pulling motions, such as vacuuming, mowing, snow blowing or shoveling.   · No exercise, sports, or sexual activity.   · Use stairs only when needed; if using stairs, lead with the unaffected leg.   · Return to your daily routine (except for the restrictions listed above) and do not favor the leg used for the procedure.   · If you do heavy physical work on your job, follow your doctor's instructions about when you may return to work.   · When you cough, sneeze or strain (as with a bowel movement), hold pressure on the leg puncture site to lessen the chance of bleeding.   Care of the procedure site   · Keep the dressing in place for 24 hours.   · You may remove the dressing and shower after 24 hours.   · Cleanse the site gently with soap and water, using a clean washcloth, then pat dry; Apply no lotion, ointments or creams.   · Apply band aid and change daily for 2 days.   · Do not sit in the bathtub, hot tub or a pool of water until the wound has completely healed.   Common side effects you may notice   · Soreness at puncture site.   · Slight bruising at the site for several days to several weeks.   · Lump the size of a pea or marble at the puncture site for a few weeks.   Diet/Fluids   · Resume diet as prior to admission.   · If you had an angiogram, catheterization, or stent, drink plenty of liquids to help flush the dye used for your procedure out of your body (unless you're on a fluid restriction ordered by your physician).   Medication   Take mild pain reliever such as Tylenol/Acetaminophen as needed for pain.   Call your doctor if you have   · Significant bleeding from the procedure site. If bleeding occurs or there is a growing lump at your site, lie down and apply firm    SUBJECTIVE:                                                    Collin Frank is a 70 year old male who presents to clinic today for the following health issues:      Patient has been having a cough for about 2 weeks ago. Does not cough with sputum but feels like there is sputum in there.  Patient has also been having night sweats since Saturday.    Quit smoking     He is not having sob     Father  at 101 in the last week   He was by his bedside and he caught pneumonia from him in the past     Father had lymphoma     Past Medical History:   Diagnosis Date     Long term current use of anticoagulant 10/16/2012       Past Surgical History:   Procedure Laterality Date     COLONOSCOPY  08    Normal. Repeat in 10 years     rt knee ORIF      [facility]       Family History   Problem Relation Age of Onset     Alzheimer Disease Mother      HEART DISEASE Father      Prostate Cancer Father      CANCER Brother 65     pancreatic        Prostate Cancer Brother        Social History   Substance Use Topics     Smoking status: Former Smoker     Types: Cigarettes     Quit date: 2011     Smokeless tobacco: Never Used     Alcohol use No       dvt in the past     Patient  has factor v leiden    Ros: no chest pain, chest tightness   No sob   No leg edema   No abdominal pain     Denies fever or chills   Weight stable               O: /80 (BP Location: Right arm, Patient Position: Chair, Cuff Size: Adult Regular)  Pulse 58  Temp 97.8  F (36.6  C) (Oral)  Wt 199 lb (90.3 kg)  SpO2 98%  BMI 29.82 kg/m2    Head: Normocephalic, atraumatic.  Eyes: Conjunctiva clear, non icteric. PERRLA.  Ears: External ears and TMs normal BL.  Nose: Septum midline, nasal mucosa pink and moist. No discharge.  Mouth / Throat: Normal dentition.  No oral lesions. Pharynx non erythematous, tonsils without hypertrophy.  Neck: Supple, no enlarged LN, trachea midline.    Chest wall normal to inspection and palpation.  Good excursion bilaterally. Lungs clear to auscultation. Good air movement bilaterally without rales, wheezes, or rhonchi.   Regular rate and  rhythm. S1 and S2 normal, no murmurs, clicks, gallops or rubs. No edema or JVD.    The abdomen is soft without tenderness, guarding, mass, rebound or organomegaly. Bowel sounds are normal. No CVA tenderness or inguinal adenopathy noted.    No scattered lymphadenopathy   No unusual rashes     No palpable masses anywhere       ICD-10-CM    1. Night sweats R61 CBC with platelets differential     If patient gets more specific complaints we will follow through   If not better in 1 week follow up   Would start with a chest xray and precede from there    pressure at the site where your dressing is. Call 911 and keep pressure on the site.   · Numbness, tingling or color change in the leg used for puncture site.   · Increasing pain and firmness near the puncture site.   · A temperature greater than 101 degrees.   - Redness or drainage around site.     Back pain

## 2021-07-03 NOTE — ED PROVIDER NOTE - ATTENDING CONTRIBUTION TO CARE
I, Lewis Valdez, performed a history and physical exam of the patient and discussed their management with the resident and/or advanced care provider. I reviewed the resident and/or advanced care provider's note and agree with the documented findings and plan of care. I was present and available for all procedures.    36 yo M with pmh of pancreatitis presenting with 3 days of atraumatic left lower back pain. Shooting in nature. Moderate to severe. Worse with movement. Has been ambulating on his own. Has not taken anything for pain. Denies saddle anesthesia, weakness or sensory changes in lower extremities, bowel/urinary retention or incontinence.     Well appearing and in NAD, head normal appearing atraumatic, trachea midline, no respiratory distress, lungs cta bilaterally, mild tachycardia no murmurs, soft NT ND abdomen, no visible extremity deformities, mild left lower lumbosacral ttp, normal buttocks sensation and normal rom and neurovasc intact to bilateral le's. Alert and oriented, non focal neuro exam, skin warm and dry, normal affect and mood    The patient presents with acute onset of back pain. I believe this patient can be ruled out for serious pathology given there is a completely normal neurological exam, no history of malignancy, immunocompromised state, history of IV drug use, weight loss, saddle anesthesia, bowel or bladder incontinence, trauma to spine, fevers, chills, diaphoresis, acute bony tenderness, morning stiffness lasting >30 minutes. Patient was able to ambulate without assistance. Will give analgesia, reassess. less likely pancreatitis with normal abd exam.

## 2021-07-03 NOTE — ED PROVIDER NOTE - PROGRESS NOTE DETAILS
Patient feels well, tolerating PO, ambulatory without assistance. Discussed findings with patient. Patient feels comfortable going home. Gave home care and follow up instructions. Discussed which symptoms to look out for and when to return to the ED for further evaluation. Patient given opportunity to ask questions about their medical condition and had all questions answered. wife will pickup from ed

## 2021-07-03 NOTE — ED PROVIDER NOTE - OBJECTIVE STATEMENT
36 yo M with pmh of pancreatitis presenting with 3 days of atraumatic left lower back pain. Shooting in nature. Moderate to severe. Worse with movement. Has been ambulating on his own. Has not taken anything for pain. Denies saddle anesthesia, weakness or sensory changes in lower extremities, bowel/urinary retention or incontinence. Denies nausea, vomiting, chills, fevers. Denies urinary frequency, urgency, or dysuria. Denies recent trauma. Denies history of cancer, IV drug use, or immunosuppression. Denies smoking. Denies history of hypocoagulability.

## 2021-07-04 RX ORDER — ACETAMINOPHEN 500 MG
2 TABLET ORAL
Qty: 42 | Refills: 0
Start: 2021-07-04 | End: 2021-07-10

## 2021-07-04 RX ORDER — IBUPROFEN 200 MG
1 TABLET ORAL
Qty: 28 | Refills: 0
Start: 2021-07-04 | End: 2021-07-10

## 2021-07-09 ENCOUNTER — LABORATORY RESULT (OUTPATIENT)
Age: 36
End: 2021-07-09

## 2021-07-09 ENCOUNTER — APPOINTMENT (OUTPATIENT)
Dept: INTERNAL MEDICINE | Facility: CLINIC | Age: 36
End: 2021-07-09
Payer: COMMERCIAL

## 2021-07-09 VITALS
DIASTOLIC BLOOD PRESSURE: 71 MMHG | SYSTOLIC BLOOD PRESSURE: 112 MMHG | WEIGHT: 200 LBS | HEART RATE: 76 BPM | TEMPERATURE: 97 F | BODY MASS INDEX: 28 KG/M2 | OXYGEN SATURATION: 98 % | HEIGHT: 71 IN

## 2021-07-09 DIAGNOSIS — Z23 ENCOUNTER FOR IMMUNIZATION: ICD-10-CM

## 2021-07-09 DIAGNOSIS — M54.5 LOW BACK PAIN: ICD-10-CM

## 2021-07-09 PROCEDURE — 99072 ADDL SUPL MATRL&STAF TM PHE: CPT

## 2021-07-09 PROCEDURE — 90732 PPSV23 VACC 2 YRS+ SUBQ/IM: CPT

## 2021-07-09 PROCEDURE — 99395 PREV VISIT EST AGE 18-39: CPT | Mod: 25

## 2021-07-09 PROCEDURE — G0009: CPT

## 2021-07-12 LAB
25(OH)D3 SERPL-MCNC: 8.8 NG/ML
ALBUMIN SERPL ELPH-MCNC: 4.8 G/DL
ALP BLD-CCNC: 71 U/L
ALT SERPL-CCNC: 40 U/L
ANION GAP SERPL CALC-SCNC: 13 MMOL/L
AST SERPL-CCNC: 26 U/L
BASOPHILS # BLD AUTO: 0.05 K/UL
BASOPHILS NFR BLD AUTO: 1.1 %
BILIRUB SERPL-MCNC: 0.3 MG/DL
BUN SERPL-MCNC: 13 MG/DL
CALCIUM SERPL-MCNC: 9.4 MG/DL
CHLORIDE SERPL-SCNC: 94 MMOL/L
CHOLEST SERPL-MCNC: 765 MG/DL
CO2 SERPL-SCNC: 24 MMOL/L
CREAT SERPL-MCNC: 0.7 MG/DL
EOSINOPHIL # BLD AUTO: 0.1 K/UL
EOSINOPHIL NFR BLD AUTO: 2.1 %
ESTIMATED AVERAGE GLUCOSE: 220 MG/DL
GLUCOSE SERPL-MCNC: 154 MG/DL
HBA1C MFR BLD HPLC: 9.3 %
HCT VFR BLD CALC: 45.9 %
HDLC SERPL-MCNC: 19 MG/DL
HGB BLD-MCNC: 15.8 G/DL
IMM GRANULOCYTES NFR BLD AUTO: 0.4 %
LDLC SERPL CALC-MCNC: NORMAL MG/DL
LPL SERPL-CCNC: 26 U/L
LYMPHOCYTES # BLD AUTO: 1.46 K/UL
LYMPHOCYTES NFR BLD AUTO: 31.1 %
MAN DIFF?: NORMAL
MCHC RBC-ENTMCNC: 29.5 PG
MCHC RBC-ENTMCNC: 34.4 GM/DL
MCV RBC AUTO: 85.6 FL
MONOCYTES # BLD AUTO: 0.42 K/UL
MONOCYTES NFR BLD AUTO: 9 %
NEUTROPHILS # BLD AUTO: 2.64 K/UL
NEUTROPHILS NFR BLD AUTO: 56.3 %
NONHDLC SERPL-MCNC: 745 MG/DL
PLATELET # BLD AUTO: 178 K/UL
POTASSIUM SERPL-SCNC: 4.1 MMOL/L
PROT SERPL-MCNC: 7.7 G/DL
RBC # BLD: 5.36 M/UL
RBC # FLD: 14.6 %
SODIUM SERPL-SCNC: 130 MMOL/L
TRIGL SERPL-MCNC: 3230 MG/DL
TSH SERPL-ACNC: 2.36 UIU/ML
WBC # FLD AUTO: 4.69 K/UL

## 2021-07-12 NOTE — HISTORY OF PRESENT ILLNESS
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

## 2021-07-12 NOTE — ASSESSMENT
[FreeTextEntry1] : Pt w hypertriglyceridemia and DM2, off most of his meds.\par Long discussion about need to control the TGs and glu.\par Switch to PT from Chiro.\par Will check fasting labs\par Pneumovax 23 today.\par f/u one mo

## 2021-07-12 NOTE — HEALTH RISK ASSESSMENT
[] :  [# Of Children ___] : has [unfilled] children [Sexually Active] : sexually active [Fully functional (bathing, dressing, toileting, transferring, walking, feeding)] : Fully functional (bathing, dressing, toileting, transferring, walking, feeding) [Fully functional (using the telephone, shopping, preparing meals, housekeeping, doing laundry, using] : Fully functional and needs no help or supervision to perform IADLs (using the telephone, shopping, preparing meals, housekeeping, doing laundry, using transportation, managing medications and managing finances) [Smoke Detector] : smoke detector [Safety elements used in home] : safety elements used in home [Seat Belt] :  uses seat belt [High Risk Behavior] : no high risk behavior [Reports changes in hearing] : Reports no changes in hearing [Reports changes in vision] : Reports no changes in vision [Reports changes in dental health] : Reports no changes in dental health

## 2021-07-13 ENCOUNTER — APPOINTMENT (OUTPATIENT)
Dept: CARDIOLOGY | Facility: CLINIC | Age: 36
End: 2021-07-13
Payer: COMMERCIAL

## 2021-07-13 VITALS
WEIGHT: 200 LBS | RESPIRATION RATE: 14 BRPM | DIASTOLIC BLOOD PRESSURE: 85 MMHG | HEART RATE: 77 BPM | SYSTOLIC BLOOD PRESSURE: 120 MMHG | HEIGHT: 71 IN | OXYGEN SATURATION: 98 % | BODY MASS INDEX: 28 KG/M2

## 2021-07-13 DIAGNOSIS — E66.3 OVERWEIGHT: ICD-10-CM

## 2021-07-13 PROCEDURE — 99072 ADDL SUPL MATRL&STAF TM PHE: CPT

## 2021-07-13 PROCEDURE — 99205 OFFICE O/P NEW HI 60 MIN: CPT

## 2021-08-13 ENCOUNTER — APPOINTMENT (OUTPATIENT)
Dept: INTERNAL MEDICINE | Facility: CLINIC | Age: 36
End: 2021-08-13
Payer: COMMERCIAL

## 2021-08-13 VITALS
DIASTOLIC BLOOD PRESSURE: 67 MMHG | HEIGHT: 71 IN | SYSTOLIC BLOOD PRESSURE: 105 MMHG | HEART RATE: 69 BPM | BODY MASS INDEX: 27.3 KG/M2 | WEIGHT: 195 LBS | TEMPERATURE: 97.3 F | OXYGEN SATURATION: 98 %

## 2021-08-13 DIAGNOSIS — R73.09 OTHER ABNORMAL GLUCOSE: ICD-10-CM

## 2021-08-13 PROCEDURE — 99213 OFFICE O/P EST LOW 20 MIN: CPT

## 2021-08-13 NOTE — ASSESSMENT
[FreeTextEntry1] : Pt is doing well. Motivated to remain adherent since seeing Dr. Rdz.\par Will repeat labs, fasting today.\par Clarify approval on abdo CT which is due (h/o necr panc)\par f/u 3 mos

## 2021-08-13 NOTE — HISTORY OF PRESENT ILLNESS
[FreeTextEntry1] : f/u [de-identified] : Pt saw Dr. Rdz, Lipidologist, as noted.\par Now on all his meds incl metf bid, vascepa, gemfib, fenofibrate.\par Following good diet.\par Will see nutr thru Dr. CAMPOS office.\par \par Abdo CT- approval pending? I need to clarify

## 2021-08-18 LAB
ALBUMIN SERPL ELPH-MCNC: 5.4 G/DL
ALP BLD-CCNC: 52 U/L
ALT SERPL-CCNC: 40 U/L
ANION GAP SERPL CALC-SCNC: 16 MMOL/L
AST SERPL-CCNC: 33 U/L
BILIRUB SERPL-MCNC: 0.5 MG/DL
BUN SERPL-MCNC: 18 MG/DL
CALCIUM SERPL-MCNC: 10.1 MG/DL
CHLORIDE SERPL-SCNC: 100 MMOL/L
CHOLEST SERPL-MCNC: 202 MG/DL
CO2 SERPL-SCNC: 24 MMOL/L
CREAT SERPL-MCNC: 1.01 MG/DL
ESTIMATED AVERAGE GLUCOSE: 183 MG/DL
GLUCOSE SERPL-MCNC: 117 MG/DL
HBA1C MFR BLD HPLC: 8 %
HDLC SERPL-MCNC: 62 MG/DL
LDLC SERPL CALC-MCNC: 108 MG/DL
LPL SERPL-CCNC: 28 U/L
NONHDLC SERPL-MCNC: 139 MG/DL
POTASSIUM SERPL-SCNC: 4.4 MMOL/L
PROT SERPL-MCNC: 8.1 G/DL
SODIUM SERPL-SCNC: 140 MMOL/L
TRIGL SERPL-MCNC: 157 MG/DL

## 2021-10-15 ENCOUNTER — TRANSCRIPTION ENCOUNTER (OUTPATIENT)
Age: 36
End: 2021-10-15

## 2022-05-05 ENCOUNTER — APPOINTMENT (OUTPATIENT)
Dept: INTERNAL MEDICINE | Facility: CLINIC | Age: 37
End: 2022-05-05

## 2022-05-06 ENCOUNTER — NON-APPOINTMENT (OUTPATIENT)
Age: 37
End: 2022-05-06

## 2022-05-09 LAB
25(OH)D3 SERPL-MCNC: 9.7 NG/ML
ALBUMIN SERPL ELPH-MCNC: 5.2 G/DL
ALP BLD-CCNC: 62 U/L
ALT SERPL-CCNC: 30 U/L
ANION GAP SERPL CALC-SCNC: 14 MMOL/L
AST SERPL-CCNC: 24 U/L
BASOPHILS # BLD AUTO: 0.05 K/UL
BASOPHILS NFR BLD AUTO: 0.8 %
BILIRUB SERPL-MCNC: 0.4 MG/DL
BUN SERPL-MCNC: 15 MG/DL
CALCIUM SERPL-MCNC: 9.8 MG/DL
CHLORIDE SERPL-SCNC: 96 MMOL/L
CHOLEST SERPL-MCNC: 508 MG/DL
CO2 SERPL-SCNC: 24 MMOL/L
CREAT SERPL-MCNC: 0.82 MG/DL
EGFR: 117 ML/MIN/1.73M2
EOSINOPHIL # BLD AUTO: 0.1 K/UL
EOSINOPHIL NFR BLD AUTO: 1.7 %
ESTIMATED AVERAGE GLUCOSE: 269 MG/DL
GLUCOSE SERPL-MCNC: 187 MG/DL
HBA1C MFR BLD HPLC: 11 %
HCT VFR BLD CALC: 48.1 %
HDLC SERPL-MCNC: 34 MG/DL
HGB BLD-MCNC: 15.6 G/DL
IMM GRANULOCYTES NFR BLD AUTO: 0.3 %
LDLC SERPL CALC-MCNC: NORMAL MG/DL
LPL SERPL-CCNC: 28 U/L
LYMPHOCYTES # BLD AUTO: 1.46 K/UL
LYMPHOCYTES NFR BLD AUTO: 24.5 %
MAN DIFF?: NORMAL
MCHC RBC-ENTMCNC: 27.1 PG
MCHC RBC-ENTMCNC: 32.4 GM/DL
MCV RBC AUTO: 83.5 FL
MONOCYTES # BLD AUTO: 0.62 K/UL
MONOCYTES NFR BLD AUTO: 10.4 %
NEUTROPHILS # BLD AUTO: 3.71 K/UL
NEUTROPHILS NFR BLD AUTO: 62.3 %
NONHDLC SERPL-MCNC: 474 MG/DL
PLATELET # BLD AUTO: 200 K/UL
POTASSIUM SERPL-SCNC: 4.3 MMOL/L
PROT SERPL-MCNC: 8 G/DL
RBC # BLD: 5.76 M/UL
RBC # FLD: 14.3 %
SODIUM SERPL-SCNC: 135 MMOL/L
TRIGL SERPL-MCNC: 1101 MG/DL
TSH SERPL-ACNC: 1.78 UIU/ML
WBC # FLD AUTO: 5.96 K/UL

## 2022-05-09 RX ORDER — ICOSAPENT ETHYL 1 G/1
1 CAPSULE ORAL TWICE DAILY
Qty: 360 | Refills: 3 | Status: DISCONTINUED | COMMUNITY
Start: 2019-05-15 | End: 2022-05-09

## 2022-05-15 ENCOUNTER — TRANSCRIPTION ENCOUNTER (OUTPATIENT)
Age: 37
End: 2022-05-15

## 2022-05-16 ENCOUNTER — NON-APPOINTMENT (OUTPATIENT)
Age: 37
End: 2022-05-16

## 2022-05-16 ENCOUNTER — APPOINTMENT (OUTPATIENT)
Dept: INTERNAL MEDICINE | Facility: CLINIC | Age: 37
End: 2022-05-16
Payer: COMMERCIAL

## 2022-05-16 VITALS
TEMPERATURE: 97.7 F | HEIGHT: 71 IN | HEART RATE: 81 BPM | BODY MASS INDEX: 27.44 KG/M2 | OXYGEN SATURATION: 97 % | SYSTOLIC BLOOD PRESSURE: 132 MMHG | DIASTOLIC BLOOD PRESSURE: 83 MMHG | WEIGHT: 196 LBS

## 2022-05-16 PROCEDURE — 99395 PREV VISIT EST AGE 18-39: CPT | Mod: 25

## 2022-05-16 PROCEDURE — 93000 ELECTROCARDIOGRAM COMPLETE: CPT

## 2022-05-16 RX ORDER — METFORMIN ER 500 MG 500 MG/1
500 TABLET ORAL
Qty: 180 | Refills: 3 | Status: DISCONTINUED | COMMUNITY
Start: 2022-05-09 | End: 2022-05-16

## 2022-05-16 NOTE — HISTORY OF PRESENT ILLNESS
[FreeTextEntry1] : 37 yo man for CPE.\par h/o elev TGs/familial, DM2, acute pancreatitis '15\par \par h/o necrotizing pancreatitis in '15 when TGs were 1400 (MICU, intubation, pressors, insulin )\par Walled-off panc necrosis 12 x 12 cm,  also panc divisum nonpainful.\par  Last MRI  was 3/18, last GI visit '18;\par Saw Endo x 1 Dr. Milton. in '15.\par Saw Lipidology Dr. Rdz 7/21 but hasn't f/u.\par f/h Hypertriglyceridemia, brothe marcel same meds.\par \par Ran out of meds about 3 mos ago (Feno, Metf). Missed appointmt last wk  but labs checked and A1c 11, TGs over 1000. Is urinating a lot. Yeast infec x 4urethral, Urg care. (not in system).\par He admits to eating a lot of cheese, butter, puckett. 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

## 2022-05-16 NOTE — ASSESSMENT
[FreeTextEntry1] : Pt as outlined. He is very sens to dietary changes, admits to hi fat diet lately.\par Dietary strategies discussed baylee d/c puckett, butter, cheese.\par Tends to run out of meds, importance of staying on meds stressed.\par Will add back Metformin 500 2 tabs bid, (other formulations not covered) and Trulicity (somehwat amenable..), can't use SGLT2 due to already having yeast infecs.\par Add back Fenofibrate and Gemfib\par check labs today (not a1c)\par needs f/u w Endo and Lipidology\par needs nutirionist thru lipids\par f/u one mo

## 2022-05-16 NOTE — HEALTH RISK ASSESSMENT
[Good] : ~his/her~  mood as  good [No] : No [0] : 2) Feeling down, depressed, or hopeless: Not at all (0) [With Family] : lives with family [Employed] : employed [] :  [Sexually Active] : sexually active [Fully functional (bathing, dressing, toileting, transferring, walking, feeding)] : Fully functional (bathing, dressing, toileting, transferring, walking, feeding) [Fully functional (using the telephone, shopping, preparing meals, housekeeping, doing laundry, using] : Fully functional and needs no help or supervision to perform IADLs (using the telephone, shopping, preparing meals, housekeeping, doing laundry, using transportation, managing medications and managing finances) [Smoke Detector] : smoke detector [Carbon Monoxide Detector] : carbon monoxide detector [Safety elements used in home] : safety elements used in home [Seat Belt] :  uses seat belt [Sunscreen] : uses sunscreen [de-identified] : recently started [Change in mental status noted] : No change in mental status noted [Language] : denies difficulty with language [High Risk Behavior] : no high risk behavior [Reports changes in hearing] : Reports no changes in hearing [Reports changes in vision] : Reports no changes in vision [Reports changes in dental health] : Reports no changes in dental health

## 2022-05-18 LAB
ALBUMIN SERPL ELPH-MCNC: 5.1 G/DL
ALP BLD-CCNC: 60 U/L
ALT SERPL-CCNC: 23 U/L
ANION GAP SERPL CALC-SCNC: 14 MMOL/L
AST SERPL-CCNC: 18 U/L
BILIRUB SERPL-MCNC: 0.4 MG/DL
BUN SERPL-MCNC: 15 MG/DL
CALCIUM SERPL-MCNC: 9.8 MG/DL
CHLORIDE SERPL-SCNC: 102 MMOL/L
CHOLEST SERPL-MCNC: 258 MG/DL
CO2 SERPL-SCNC: 24 MMOL/L
CREAT SERPL-MCNC: 0.71 MG/DL
EGFR: 123 ML/MIN/1.73M2
GLUCOSE SERPL-MCNC: 167 MG/DL
HDLC SERPL-MCNC: 46 MG/DL
LDLC SERPL CALC-MCNC: 132 MG/DL
NONHDLC SERPL-MCNC: 212 MG/DL
POTASSIUM SERPL-SCNC: 3.9 MMOL/L
PROT SERPL-MCNC: 7.7 G/DL
SODIUM SERPL-SCNC: 140 MMOL/L
TRIGL SERPL-MCNC: 396 MG/DL

## 2022-07-12 ENCOUNTER — APPOINTMENT (OUTPATIENT)
Dept: INTERNAL MEDICINE | Facility: CLINIC | Age: 37
End: 2022-07-12

## 2022-12-12 ENCOUNTER — TRANSCRIPTION ENCOUNTER (OUTPATIENT)
Age: 37
End: 2022-12-12

## 2022-12-12 ENCOUNTER — RX RENEWAL (OUTPATIENT)
Age: 37
End: 2022-12-12

## 2023-01-06 ENCOUNTER — NON-APPOINTMENT (OUTPATIENT)
Age: 38
End: 2023-01-06

## 2023-02-07 ENCOUNTER — APPOINTMENT (OUTPATIENT)
Dept: INTERNAL MEDICINE | Facility: CLINIC | Age: 38
End: 2023-02-07

## 2023-07-31 ENCOUNTER — INPATIENT (INPATIENT)
Facility: HOSPITAL | Age: 38
LOS: 7 days | Discharge: HOME | DRG: 439 | End: 2023-08-08
Attending: STUDENT IN AN ORGANIZED HEALTH CARE EDUCATION/TRAINING PROGRAM | Admitting: STUDENT IN AN ORGANIZED HEALTH CARE EDUCATION/TRAINING PROGRAM
Payer: COMMERCIAL

## 2023-07-31 VITALS
HEART RATE: 84 BPM | DIASTOLIC BLOOD PRESSURE: 79 MMHG | TEMPERATURE: 98 F | SYSTOLIC BLOOD PRESSURE: 121 MMHG | WEIGHT: 190.04 LBS | HEIGHT: 71 IN | RESPIRATION RATE: 20 BRPM | OXYGEN SATURATION: 98 %

## 2023-07-31 DIAGNOSIS — K85.90 ACUTE PANCREATITIS WITHOUT NECROSIS OR INFECTION, UNSPECIFIED: ICD-10-CM

## 2023-07-31 LAB
ALBUMIN SERPL ELPH-MCNC: 4.7 G/DL — SIGNIFICANT CHANGE UP (ref 3.3–5)
ALP SERPL-CCNC: 81 U/L — SIGNIFICANT CHANGE UP (ref 40–120)
ALT FLD-CCNC: 16 U/L — SIGNIFICANT CHANGE UP (ref 10–45)
ANION GAP SERPL CALC-SCNC: 21 MMOL/L — HIGH (ref 5–17)
AST SERPL-CCNC: 14 U/L — SIGNIFICANT CHANGE UP (ref 10–40)
BASE EXCESS BLDV CALC-SCNC: -1.4 MMOL/L — SIGNIFICANT CHANGE UP (ref -2–3)
BASOPHILS # BLD AUTO: 0.05 K/UL — SIGNIFICANT CHANGE UP (ref 0–0.2)
BASOPHILS NFR BLD AUTO: 0.4 % — SIGNIFICANT CHANGE UP (ref 0–2)
BILIRUB SERPL-MCNC: 0.7 MG/DL — SIGNIFICANT CHANGE UP (ref 0.2–1.2)
BUN SERPL-MCNC: 11 MG/DL — SIGNIFICANT CHANGE UP (ref 7–23)
CA-I SERPL-SCNC: 1.22 MMOL/L — SIGNIFICANT CHANGE UP (ref 1.15–1.33)
CALCIUM SERPL-MCNC: 9.8 MG/DL — SIGNIFICANT CHANGE UP (ref 8.4–10.5)
CHLORIDE BLDV-SCNC: 100 MMOL/L — SIGNIFICANT CHANGE UP (ref 96–108)
CHLORIDE SERPL-SCNC: 98 MMOL/L — SIGNIFICANT CHANGE UP (ref 96–108)
CHOLEST SERPL-MCNC: 963 MG/DL — HIGH
CO2 BLDV-SCNC: 26 MMOL/L — SIGNIFICANT CHANGE UP (ref 22–26)
CO2 SERPL-SCNC: 19 MMOL/L — LOW (ref 22–31)
CREAT SERPL-MCNC: 0.68 MG/DL — SIGNIFICANT CHANGE UP (ref 0.5–1.3)
EGFR: 124 ML/MIN/1.73M2 — SIGNIFICANT CHANGE UP
EOSINOPHIL # BLD AUTO: 0.02 K/UL — SIGNIFICANT CHANGE UP (ref 0–0.5)
EOSINOPHIL NFR BLD AUTO: 0.2 % — SIGNIFICANT CHANGE UP (ref 0–6)
GAS PNL BLDV: 132 MMOL/L — LOW (ref 136–145)
GAS PNL BLDV: SIGNIFICANT CHANGE UP
GAS PNL BLDV: SIGNIFICANT CHANGE UP
GLUCOSE BLDV-MCNC: 284 MG/DL — HIGH (ref 70–99)
GLUCOSE SERPL-MCNC: 265 MG/DL — HIGH (ref 70–99)
HCO3 BLDV-SCNC: 25 MMOL/L — SIGNIFICANT CHANGE UP (ref 22–29)
HCT VFR BLD CALC: 48.2 % — SIGNIFICANT CHANGE UP (ref 39–50)
HCT VFR BLDA CALC: 50 % — SIGNIFICANT CHANGE UP (ref 39–51)
HGB BLD CALC-MCNC: SIGNIFICANT CHANGE UP G/DL (ref 12.6–17.4)
HGB BLD-MCNC: 17.3 G/DL — HIGH (ref 13–17)
IMM GRANULOCYTES NFR BLD AUTO: 0.6 % — SIGNIFICANT CHANGE UP (ref 0–0.9)
LACTATE BLDV-MCNC: 1.9 MMOL/L — SIGNIFICANT CHANGE UP (ref 0.5–2)
LIDOCAIN IGE QN: 2908 U/L — HIGH (ref 7–60)
LYMPHOCYTES # BLD AUTO: 0.84 K/UL — LOW (ref 1–3.3)
LYMPHOCYTES # BLD AUTO: 6.9 % — LOW (ref 13–44)
MCHC RBC-ENTMCNC: 29.5 PG — SIGNIFICANT CHANGE UP (ref 27–34)
MCHC RBC-ENTMCNC: 35.9 GM/DL — SIGNIFICANT CHANGE UP (ref 32–36)
MCV RBC AUTO: 82.1 FL — SIGNIFICANT CHANGE UP (ref 80–100)
MONOCYTES # BLD AUTO: 0.91 K/UL — HIGH (ref 0–0.9)
MONOCYTES NFR BLD AUTO: 7.5 % — SIGNIFICANT CHANGE UP (ref 2–14)
NEUTROPHILS # BLD AUTO: 10.23 K/UL — HIGH (ref 1.8–7.4)
NEUTROPHILS NFR BLD AUTO: 84.4 % — HIGH (ref 43–77)
NRBC # BLD: 0 /100 WBCS — SIGNIFICANT CHANGE UP (ref 0–0)
OTHER CELLS CSF MANUAL: 5.7 ML/DL — LOW (ref 18–22)
PCO2 BLDV: 46 MMHG — SIGNIFICANT CHANGE UP (ref 42–55)
PH BLDV: 7.34 — SIGNIFICANT CHANGE UP (ref 7.32–7.43)
PLATELET # BLD AUTO: 208 K/UL — SIGNIFICANT CHANGE UP (ref 150–400)
PO2 BLDV: 19 MMHG — LOW (ref 25–45)
POTASSIUM BLDV-SCNC: 4.3 MMOL/L — SIGNIFICANT CHANGE UP (ref 3.5–5.1)
POTASSIUM SERPL-MCNC: 4.7 MMOL/L — SIGNIFICANT CHANGE UP (ref 3.5–5.3)
POTASSIUM SERPL-SCNC: 4.7 MMOL/L — SIGNIFICANT CHANGE UP (ref 3.5–5.3)
PROT SERPL-MCNC: 8.8 G/DL — HIGH (ref 6–8.3)
RBC # BLD: 5.87 M/UL — HIGH (ref 4.2–5.8)
RBC # FLD: 14.5 % — SIGNIFICANT CHANGE UP (ref 10.3–14.5)
SAO2 % BLDV: SIGNIFICANT CHANGE UP % (ref 67–88)
SODIUM SERPL-SCNC: 138 MMOL/L — SIGNIFICANT CHANGE UP (ref 135–145)
TRIGL SERPL-MCNC: 4742 MG/DL — HIGH
WBC # BLD: 12.12 K/UL — HIGH (ref 3.8–10.5)
WBC # FLD AUTO: 12.12 K/UL — HIGH (ref 3.8–10.5)

## 2023-07-31 PROCEDURE — 99291 CRITICAL CARE FIRST HOUR: CPT | Mod: GC

## 2023-07-31 PROCEDURE — 99285 EMERGENCY DEPT VISIT HI MDM: CPT

## 2023-07-31 PROCEDURE — 74177 CT ABD & PELVIS W/CONTRAST: CPT | Mod: 26,MA

## 2023-07-31 RX ORDER — ONDANSETRON 8 MG/1
4 TABLET, FILM COATED ORAL ONCE
Refills: 0 | Status: COMPLETED | OUTPATIENT
Start: 2023-07-31 | End: 2023-07-31

## 2023-07-31 RX ORDER — INSULIN HUMAN 100 [IU]/ML
2 INJECTION, SOLUTION SUBCUTANEOUS
Qty: 100 | Refills: 0 | Status: DISCONTINUED | OUTPATIENT
Start: 2023-07-31 | End: 2023-08-03

## 2023-07-31 RX ORDER — SODIUM CHLORIDE 9 MG/ML
1000 INJECTION, SOLUTION INTRAVENOUS ONCE
Refills: 0 | Status: COMPLETED | OUTPATIENT
Start: 2023-07-31 | End: 2023-07-31

## 2023-07-31 RX ORDER — MORPHINE SULFATE 50 MG/1
4 CAPSULE, EXTENDED RELEASE ORAL ONCE
Refills: 0 | Status: DISCONTINUED | OUTPATIENT
Start: 2023-07-31 | End: 2023-08-01

## 2023-07-31 RX ORDER — INSULIN HUMAN 100 [IU]/ML
6.5 INJECTION, SOLUTION SUBCUTANEOUS
Qty: 50 | Refills: 0 | Status: DISCONTINUED | OUTPATIENT
Start: 2023-07-31 | End: 2023-07-31

## 2023-07-31 RX ORDER — SODIUM CHLORIDE 9 MG/ML
1000 INJECTION INTRAMUSCULAR; INTRAVENOUS; SUBCUTANEOUS ONCE
Refills: 0 | Status: COMPLETED | OUTPATIENT
Start: 2023-07-31 | End: 2023-07-31

## 2023-07-31 RX ORDER — MORPHINE SULFATE 50 MG/1
4 CAPSULE, EXTENDED RELEASE ORAL ONCE
Refills: 0 | Status: DISCONTINUED | OUTPATIENT
Start: 2023-07-31 | End: 2023-07-31

## 2023-07-31 RX ADMIN — SODIUM CHLORIDE 1000 MILLILITER(S): 9 INJECTION INTRAMUSCULAR; INTRAVENOUS; SUBCUTANEOUS at 19:32

## 2023-07-31 RX ADMIN — SODIUM CHLORIDE 1000 MILLILITER(S): 9 INJECTION, SOLUTION INTRAVENOUS at 14:04

## 2023-07-31 RX ADMIN — MORPHINE SULFATE 4 MILLIGRAM(S): 50 CAPSULE, EXTENDED RELEASE ORAL at 19:32

## 2023-07-31 RX ADMIN — INSULIN HUMAN 8.6 UNIT(S)/HR: 100 INJECTION, SOLUTION SUBCUTANEOUS at 23:47

## 2023-07-31 RX ADMIN — ONDANSETRON 4 MILLIGRAM(S): 8 TABLET, FILM COATED ORAL at 14:04

## 2023-07-31 RX ADMIN — ONDANSETRON 4 MILLIGRAM(S): 8 TABLET, FILM COATED ORAL at 19:30

## 2023-07-31 RX ADMIN — MORPHINE SULFATE 4 MILLIGRAM(S): 50 CAPSULE, EXTENDED RELEASE ORAL at 14:04

## 2023-07-31 NOTE — H&P ADULT - ATTENDING COMMENTS
care provided 7/31/23    Patient seen and examined.  Agree with resident note as above.  Patient with hx as noted including hypertriglyceridemia on gemfibrozil and previous severe pancreatitis due to that 2015 requiring long MICU admit with trach/vent/prolonged stay and subsequently decannulated and fully recovered.  Patient is now admitted with n/v/abd pain and found to have acute pancreatitis based on exam/labs/CT.  Patient states that he thought "i'd be OK with just exercise" and has also been grilling more due to summertime.  Patient also found to have triglyceride level of 4700 and labs showing hemoconcentration.  Patient admitted to MICU for insulin gtt and further management of acute pancreatitis.    On arrival to MICU patient is alert with mild sinus tachycardia.  Complains of abd pain, but readily improves with  morphine.  Will start insulin gtt at 0.1U/kg and give hydration with dextrose as well.  Trend TG q12h.  Pain control.  Will alert Dr. Michelle/endo/GI.  Unclear if any management is necessary for enlarging fluid collection.  Lovenox DVT ppx.  FULL CODE.  Patient identifies wife as surrogate decision maker.    Rest of plan as above and I have edited as appropriate.

## 2023-07-31 NOTE — H&P ADULT - NSHPLABSRESULTS_GEN_ALL_CORE
LABS:                           17.3   12.12 )-----------( 208      ( 31 Jul 2023 12:18 )             48.2     07-31    138  |  98  |  11  ----------------------------<  265<H>  4.7   |  19<L>  |  0.68    Ca    9.8      31 Jul 2023 12:18    TPro  8.8<H>  /  Alb  4.7  /  TBili  0.7  /  DBili  x   /  AST  14  /  ALT  16  /  AlkPhos  81  07-31              Urinalysis Basic - ( 31 Jul 2023 12:18 )    Color: x / Appearance: x / SG: x / pH: x  Gluc: 265 mg/dL / Ketone: x  / Bili: x / Urobili: x   Blood: x / Protein: x / Nitrite: x   Leuk Esterase: x / RBC: x / WBC x   Sq Epi: x / Non Sq Epi: x / Bacteria: x        LIVER FUNCTIONS - ( 31 Jul 2023 12:18 )  Alb: 4.7 g/dL / Pro: 8.8 g/dL / ALK PHOS: 81 U/L / ALT: 16 U/L / AST: 14 U/L / GGT: x             Blood Gas Profile w/Lytes - Venous: Performed In Lab (07-31-23 @ 11:57)    Blood Gas Profile w/Lytes - Venous: Performed In Lab (07-31-23 @ 11:57)    I&O's Summary         /     CAPILLARY BLOOD GLUCOSE      POCT Blood Glucose.: 188 mg/dL (01 Aug 2023 00:31)            MICRO:          CT abd IMPRESSION:  Peripancreatic infiltration and confluent ill-defined fluid, compatible   with acute pancreatitis.  Increased size of a chronic peripancreatic tail collection since 2018. LABS:                           17.3   12.12 )-----------( 208      ( 31 Jul 2023 12:18 )             48.2     07-31    138  |  98  |  11  ----------------------------<  265<H>  4.7   |  19<L>  |  0.68    Ca    9.8      31 Jul 2023 12:18    TPro  8.8<H>  /  Alb  4.7  /  TBili  0.7  /  DBili  x   /  AST  14  /  ALT  16  /  AlkPhos  81  07-31              Urinalysis Basic - ( 31 Jul 2023 12:18 )    Color: x / Appearance: x / SG: x / pH: x  Gluc: 265 mg/dL / Ketone: x  / Bili: x / Urobili: x   Blood: x / Protein: x / Nitrite: x   Leuk Esterase: x / RBC: x / WBC x   Sq Epi: x / Non Sq Epi: x / Bacteria: x        LIVER FUNCTIONS - ( 31 Jul 2023 12:18 )  Alb: 4.7 g/dL / Pro: 8.8 g/dL / ALK PHOS: 81 U/L / ALT: 16 U/L / AST: 14 U/L / GGT: x             Blood Gas Profile w/Lytes - Venous: Performed In Lab (07-31-23 @ 11:57)    Blood Gas Profile w/Lytes - Venous: Performed In Lab (07-31-23 @ 11:57)    I&O's Summary         /     CAPILLARY BLOOD GLUCOSE      POCT Blood Glucose.: 188 mg/dL (01 Aug 2023 00:31)          CT abd IMPRESSION:  Peripancreatic infiltration and confluent ill-defined fluid, compatible   with acute pancreatitis.  Increased size of a chronic peripancreatic tail collection since 2018.

## 2023-07-31 NOTE — H&P ADULT - ASSESSMENT
37-year-old male patient PMH of pre-DM, hypertriglyceridemia (follows with Dr. Michelle), multiple admissions of pancreatitis  (last adm 2015 for necrotizing pancreatitis), pancreatic pseudocyst who p/w left sided abdominal pain x 2 days ago associated with nausea and vomiting admitted to MICU for acute pancreatitis.     ===NEURO===  -AOx3   -ctm    ===CARDIAC===  Hypertriglyceridemia  -see plan below     ===PULM===  -no issues at this time    ===RENAL===  -no issues at this time    ===GI===  #acute pancreatitis   -hx of hospitalizations for pancreatitis  -hx of hypertriglyceridemia off gembrozil  -lipase = 2908  -CT abd: Peripancreatic infiltration and confluent ill-defined fluid, compatible with acute pancreatitis  -Plan:             -c/s GI for enlarging pseudocysts             -CMP q4 to monitor lytes             -hourly glucose POCT for goal <180             -IVF: D5 LR 150cc/hr    Diet  -NPO, advance as tolerated    ===HEME/ONC===  Leukocytosis   -afebrile  -likely 2/2 hemoconcentration 2/2 third spacing from pancreatitis   -ctm    ===ID===  -no issues at this time     ===ENDO===  #hypertriglyceridemia  -c/s endo, appreciate recs     pre-DM   -f/u a1c     DVT  -lovenox  37-year-old male patient PMH of pre-DM, hypertriglyceridemia (follows with Dr. Michelle), multiple admissions of pancreatitis  (last adm 2015 for necrotizing pancreatitis), pancreatic pseudocyst who p/w left sided abdominal pain x 2 days ago associated with nausea and vomiting admitted to MICU for acute pancreatitis.     ===NEURO===  -AOx3   -ctm    ===CARDIAC===  Hypertriglyceridemia  -see plan below     ===PULM===  -no issues at this time    ===RENAL===  -no issues at this time    ===GI===  #acute pancreatitis   -hx of hospitalizations for pancreatitis  -hx of hypertriglyceridemia off gembrozil  -lipase = 2908  -CT abd: Peripancreatic infiltration and confluent ill-defined fluid, compatible with acute pancreatitis  -Plan:             -c/s GI for enlarging pseudocysts             -CMP q4 to monitor lytes             -hourly glucose POCT for goal <180             -IVF: D5 LR 150cc/hr    Diet  -NPO for possible GI intervention in the AM     ===HEME/ONC===  Leukocytosis   -afebrile  -likely 2/2 hemoconcentration 2/2 third spacing from pancreatitis   -resolved     ===ID===  -no issues at this time     ===ENDO===  #hypertriglyceridemia  -c/s endo, appreciate recs     pre-DM   -f/u a1c     DVT  -lovenox  37-year-old male patient PMH of pre-DM, hypertriglyceridemia (follows with Dr. Michelle), multiple admissions of pancreatitis  (last adm 2015 for necrotizing pancreatitis), pancreatic pseudocyst who p/w left sided abdominal pain x 2 days ago associated with nausea and vomiting admitted to MICU for acute pancreatitis.     ===NEURO===  -AOx3   -ctm    ===CARDIAC===  Hypertriglyceridemia  -see plan below     ===PULM===  -no issues at this time    ===RENAL===  -no issues at this time    ===GI===  #acute pancreatitis   -hx of hospitalizations for pancreatitis  -hx of hypertriglyceridemia off gembrozil  -lipase = 2908  -CT abd: Peripancreatic infiltration and confluent ill-defined fluid, compatible with acute pancreatitis  -Plan:             -insulin 8.6U gtt              -CMP q4 to monitor lytes             -hourly glucose POCT for goal <180             -IVF: D5 LR 150cc/hr             -c/s GI for enlarging pseudocysts    Diet  -NPO for possible GI intervention in the AM     ===HEME/ONC===  Leukocytosis   -afebrile  -likely 2/2 hemoconcentration 2/2 third spacing from pancreatitis   -resolved     ===ID===  -no issues at this time     ===ENDO===  #hypertriglyceridemia  -c/s endo, appreciate recs     pre-DM   -f/u a1c     DVT  -lovenox  37-year-old male patient PMH of pre-DM, hypertriglyceridemia (follows with Dr. Michelle), multiple admissions of pancreatitis  (last adm 2015 for necrotizing pancreatitis), pancreatic pseudocyst who p/w left sided abdominal pain x 2 days ago associated with nausea and vomiting admitted to MICU for acute pancreatitis.     ===NEURO===  -AOx3   -ctm    ===CARDIAC===  Tachycardia  -HR 110s  -may be reflexive     ===PULM===  -no issues at this time    ===RENAL===  -no issues at this time    ===GI===  #acute pancreatitis   -hx of hospitalizations for pancreatitis  -hx of hypertriglyceridemia off gembrozil  -lipase = 2908  -CT abd: Peripancreatic infiltration and confluent ill-defined fluid, compatible with acute pancreatitis  -Plan:             -insulin 8.6U gtt              -triglycerides q12, will d/c insulin gtt when trigly goes below 1000               -BMP q6 to monitor lytes             -hourly glucose POCT for goal <180             -IVF: D5 LR 150cc/hr             -c/s GI for enlarging pseudocysts    Diet  -NPO for possible GI intervention in the AM     ===HEME/ONC===  Leukocytosis   -afebrile  -likely 2/2 hemoconcentration 2/2 third spacing from pancreatitis   -resolved     ===ID===  -no issues at this time     ===ENDO===  #hypertriglyceridemia  -c/s endo  -triglycerides q12, will d/c insulin gtt when trigly goes below 1000      pre-DM   -f/u a1c     DVT  -lovenox subq 37-year-old male patient PMH of pre-DM, hypertriglyceridemia (follows with Dr. Michelle), multiple admissions of pancreatitis  (last adm 2015 for necrotizing pancreatitis), pancreatic pseudocyst who p/w left sided abdominal pain x 2 days ago associated with nausea and vomiting admitted to MICU for acute pancreatitis.     ===NEURO===  -AOx3   -ctm    ===CARDIAC===  Tachycardia  -HR 110s  -may be reflexive     ===PULM===  -no issues at this time    ===RENAL===  -no issues at this time    ===GI===  #acute pancreatitis   -hx of hospitalizations for pancreatitis  -hx of hypertriglyceridemia off gembrozil  -lipase = 2908  -CT abd: Peripancreatic infiltration and confluent ill-defined fluid, compatible with acute pancreatitis  -Plan:             -insulin 8.6U gtt              -triglycerides q12, will d/c insulin gtt when trigly goes below 1000               -BMP q6 to monitor lytes             -hourly glucose POCT for goal <180             -IVF: D5 LR 150cc/hr             -c/s GI for enlarging pseudocysts, GI e-mailed    Diet  -NPO for possible GI intervention in the AM     ===HEME/ONC===  Leukocytosis   -afebrile  -likely 2/2 hemoconcentration 2/2 third spacing from pancreatitis   -resolved     ===ID===  -no issues at this time     ===ENDO===  #hypertriglyceridemia  -c/s endo  -triglycerides q12, will d/c insulin gtt when trigly goes below 1000  -Endocrine consultation e-mailed    pre-DM   -f/u a1c     DVT  -lovenox subq 37-year-old male patient PMH of pre-DM, hypertriglyceridemia (follows with Dr. Michelle), multiple admissions of pancreatitis  (last adm 2015 for necrotizing pancreatitis), pancreatic pseudocyst who p/w left sided abdominal pain x 2 days ago associated with nausea and vomiting admitted to MICU for acute pancreatitis.     ===NEURO===  -AOx3   -ctm    ===CARDIAC===  Tachycardia  -HR 110s  -may be reflexive     ===PULM===  -no issues at this time    ===RENAL===  -no issues at this time    ===GI===  #acute pancreatitis   -hx of hospitalizations for pancreatitis  -hx of hypertriglyceridemia off gembrozil  -lipase = 2908  -CT abd: Peripancreatic infiltration and confluent ill-defined fluid, compatible with acute pancreatitis  -Plan:             -insulin 8.6U gtt              -triglycerides q12, will d/c insulin gtt when trigly goes below 1000               -BMP q6 to monitor lytes             -hourly glucose POCT for goal <180             -IVF: D5 LR 150cc/hr             -c/s GI for enlarging pseudocysts, GI e-mailed             -Pain regimen in place w/ morphine IV    Diet  -NPO for possible GI intervention in the AM     ===HEME/ONC===  Leukocytosis   -afebrile  -likely 2/2 hemoconcentration 2/2 third spacing from pancreatitis   -resolved     ===ID===  -no issues at this time     ===ENDO===  #hypertriglyceridemia  -c/s endo  -triglycerides q12, will d/c insulin gtt when trigly goes below 1000  -Endocrine consultation e-mailed    pre-DM   -f/u a1c     DVT  -lovenox subq 37-year-old male patient PMH of pre-DM, hypertriglyceridemia (follows with Dr. Michelle), multiple admissions of pancreatitis  (last adm 2015 for necrotizing pancreatitis), pancreatic pseudocyst who p/w left sided abdominal pain x 2 days ago associated with nausea and vomiting admitted to MICU for acute pancreatitis.     ===NEURO===  -AOx3   -ctm    ===CARDIAC===  Tachycardia  -HR 110s  -may be reflexive     ===PULM===  -no issues at this time    ===RENAL===  -no issues at this time    ===GI===  #acute pancreatitis   -hx of hospitalizations for pancreatitis  -hx of hypertriglyceridemia off gembrozil  -lipase = 2908  -CT abd: Peripancreatic infiltration and confluent ill-defined fluid, compatible with acute pancreatitis  -Plan:             -insulin 8.6U gtt              -triglycerides q12, will d/c insulin gtt when trigly goes below 1000               -BMP q6 to monitor lytes             -hourly glucose POCT for goal <180             -IVF: D5 LR 150cc/hr             -c/s GI for enlarging pseudocysts, GI e-mailed             -Pain regimen in place w/ morphine IV    Diet  -NPO for possible GI intervention in the AM     ===HEME/ONC===  Leukocytosis   -afebrile  -likely 2/2 hemoconcentration 2/2 third spacing from pancreatitis   -resolved     ===ID===  -no issues at this time     ===ENDO===  #hypertriglyceridemia  -c/s endo  -triglycerides q12, will d/c insulin gtt when trigly goes below 1000  -Endocrine consultation e-mailed    pre-DM   -f/u a1c     DVT  -lovenox subq    FULL CODE  wife is surrogate decision maker

## 2023-07-31 NOTE — ED ADULT NURSE NOTE - CAS TRG GENERAL NORM CIRC DET
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  We will send the avulsion slots for a second opinion to review it at [facility] otherwise nothing further to do at this time she is healing well I reviewed with her scar management treatment. 
Strong peripheral pulses/Capillary refill less/equal to 2 seconds

## 2023-07-31 NOTE — ED PROVIDER NOTE - CARE PLAN
23 1 Principal Discharge DX:	Pancreatitis  Secondary Diagnosis:	Elevated triglycerides with high cholesterol

## 2023-07-31 NOTE — ED ADULT NURSE NOTE - NSFALLUNIVINTERV_ED_ALL_ED
Bed/Stretcher in lowest position, wheels locked, appropriate side rails in place/Call bell, personal items and telephone in reach/Instruct patient to call for assistance before getting out of bed/chair/stretcher/Non-slip footwear applied when patient is off stretcher/[place] to call system/Physically safe environment - no spills, clutter or unnecessary equipment/Purposeful proactive rounding/Room/bathroom lighting operational, light cord in reach

## 2023-07-31 NOTE — ED PROVIDER NOTE - NSICDXPASTMEDICALHX_GEN_ALL_CORE_FT
PAST MEDICAL HISTORY:  Hypertriglyceridemia     Necrotizing pancreatitis     Pancreatic pseudocyst     Pancreatitis

## 2023-07-31 NOTE — ED PROVIDER NOTE - RAPID ASSESSMENT
Attending MD Sharif:     PMD Dr. Marina Ball (has not seen in years) Dr. Govind Milton    37M with PMH/PSH including preDM (reports 2 mo ago self-discontinued Metformin and Gemfibrozil), pancreatitis, pancreatic pseudocysts, presents to the ED with abdominal pain.  Reports pain started two days, reports initially felt like constipation.  Reports then had a small BM.  Denies fevers.  Reports today started having emesis, "gold, cream".  Reports has had pancreatitis secondary to hypertriglyceridemia in past (from EMR dx'ed 8/2014), reports was hospitalized for two months.  Denies ETOH.  Reports feels similar to previous pancreatitis episodes.  Denies colonoscopy.  Denies bloody or black.  Denies dysuria, hematuria, change in urinary habits including frequency, urgency.       Attending MD Sharif: This patient was seen and orders were placed in the waiting room as per our department's QDoc model.  Patient was to be sent to main ED for full medical evaluation and receiving team was to follow up on any labs, analgesia, clinical imaging ordered.  Any reassessment and disposition decisions were to be made by receiving team as clinically indicated, all decisions regarding the progression of care to be made at their discretion.  I did not perform a comprehensive history and physical on this patient.

## 2023-07-31 NOTE — ED PROVIDER NOTE - OBJECTIVE STATEMENT
37-year-old male patient past medical history of hypertriglyceridemia, pancreatitis who presents to the emergency department for abdominal pain that began 2 days ago now associated with nausea and vomiting since today.  Feels similar to his prior pancreatitis episode.  Denies diarrhea, fevers, alcohol use.  Patient stopped his gemfibrozil and metformin at home since 2 months ago.

## 2023-07-31 NOTE — ED PROVIDER NOTE - SHIFT CHANGE DETAILS
[facility] -- 1530 - received sign out from Dr. Valladares pending triglycerides, CT, and admission acute pancreatitis.    In short, this is a 38yo M with PMHx preDM and familial hypertriglyceridemia, noncompliant with medications found to have acute pancreatitis with lipase elevated >2900 and mild leukocytosis to 12. Pending CT and TG level, if elevated may require insulin drip and MICU consult

## 2023-07-31 NOTE — ED PROVIDER NOTE - NSICDXFAMILYHX_GEN_ALL_CORE_FT
FAMILY HISTORY:  Father  Still living? Unknown  Family history of diabetes mellitus, Age at diagnosis: Age Unknown    Mother  Still living? Unknown  Family history of diabetes mellitus, Age at diagnosis: Age Unknown  Family history of endogenous hypertriglyceridemia, Age at diagnosis: Age Unknown    Sibling  Still living? Unknown  Family history of endogenous hypertriglyceridemia, Age at diagnosis: Age Unknown    Uncle  Still living? Unknown  Family history of endogenous hypertriglyceridemia, Age at diagnosis: Age Unknown

## 2023-07-31 NOTE — H&P ADULT - HISTORY OF PRESENT ILLNESS
37M with PMH/PSH including preDM (reports 2 mo ago self-discontinued Metformin and Gemfibrozil), pancreatitis, pancreatic pseudocysts, presents to the ED with abdominal pain.  Reports pain started two days, reports initially felt like constipation.  Reports then had a small BM.  Denies fevers.  Reports today started having emesis, "gold, cream".  Reports has had pancreatitis secondary to hypertriglyceridemia in past (from EMR dx'ed 8/2014), reports was hospitalized for two months.  Denies ETOH.  Reports feels similar to previous pancreatitis episodes.  Denies colonoscopy.  Denies bloody or black.  Denies dysuria, hematuria, change in urinary habits including frequency, urgency.       37-year-old male patient PMH of hypertriglyceridemia follows with Dr. Michelle, pancreatitis who presents to the emergency department for left sided abdominal pain that began 2 days ago now associated with nausea and vomiting since today.  1 day prior to admission was having yellow bilious vomiting and no BMs since the last 2 days. Not tolerating PO, nauseous when he eats. Feels similar to his prior pancreatitis episode.  Denies diarrhea, fevers, alcohol use.  Patient stopped his gemfibrozil and metformin at home since 2 months ago. Admitted for MICU for hyperglyceridemia induced pancreatitis.    37M with PMH/PSH including preDM (reports 2 mo ago self-discontinued Metformin and Gemfibrozil), pancreatitis, pancreatic pseudocysts, presents to the ED with abdominal pain.  Reports pain started two days, reports initially felt like constipation.  Reports then had a small BM.  Denies fevers.  Reports today started having emesis, "gold, cream".  Reports has had pancreatitis secondary to hypertriglyceridemia in past (from EMR dx'ed 8/2014), reports was hospitalized for two months.  Denies ETOH.  Reports feels similar to previous pancreatitis episodes.  Denies colonoscopy.  Denies bloody or black.  Denies dysuria, hematuria, change in urinary habits including frequency, urgency.       37-year-old male patient PMH of pre-DM, hypertriglyceridemia (follows with Dr. Michelle), multiple admissions of pancreatitis  (last adm 2015 for necrotizing pancreatits), pancreatic pseudocyst who presents to the emergency department for left sided abdominal pain that began 2 days ago now associated with nausea and vomiting since today.  1 day prior to admission was having yellow bilious vomiting and no BMs since the last 2 days. Not tolerating PO, nauseous when he eats. Feels similar to his prior pancreatitis episode.  Denies diarrhea, fevers, alcohol use.  Patient stopped his gemfibrozil and metformin at home since 2 months ago. Admitted for MICU for hyperglyceridemia induced pancreatitis.    37-year-old male patient PMH of pre-DM, hypertriglyceridemia (follows with Dr. Michelle), multiple admissions of pancreatitis  (last adm 2015 for necrotizing pancreatitis), pancreatic pseudocyst who presents to the emergency department for left sided abdominal pain that began 2 days ago now associated with nausea and vomiting since today.  1 day prior to admission was having yellow bilious vomiting and no BMs since the last 2 days. Feels similar to his prior pancreatitis episode. Not tolerating PO, nauseous when he eats.  Denies diarrhea, fevers, alcohol use.  Patient stopped his gemfibrozil and metformin at home since 2 months ago. Admitted for MICU for hyperglyceridemia induced pancreatitis.     ED course significant for , wbc 12.12, triglycerides 4742, cholesterol 963,    37-year-old male patient PMH of pre-DM, hypertriglyceridemia (follows with Dr. Michelle), multiple admissions of pancreatitis  (last adm 2015 for necrotizing pancreatitis), pancreatic pseudocyst who presents to the emergency department for abdominal pain x  2 days. The pain is sharp, started suddenly, is left sided, radiates towards the back b/l, 10/10. Since this morning, pt has been having nausea and unable to tolerate PO. He has vomited 8x since this morning "cortes, creme" yellow in color, no blood.  He has had no BMs in the past 2 days due to decreased PO. Feels similar to his prior pancreatitis episode. Denies HA, CP, palpitations, SOB, diarrhea, fevers, alcohol use. Of note, pt stopped taking gemfibrozil and metformin since 2 mo ago and he tried to control his medical issues with diet alone since he has been well controlled for the past 8 years.   Admitted for MICU for hyperglyceridemia induced pancreatitis.     ED course significant for triglycerides 4742, cholesterol 963,    37-year-old male patient PMH of pre-DM, hypertriglyceridemia (follows with Dr. Michelle), multiple admissions of pancreatitis  (last adm 2015 for necrotizing pancreatitis), pancreatic pseudocyst who presents to the emergency department for abdominal pain x  2 days. The pain is sharp, started suddenly, is left sided, radiates towards the back b/l, 10/10. Since this morning, pt has been having nausea and unable to tolerate PO. He has vomited 8x since this morning "cortes, creme" yellow in color, no blood.  He has had no BMs in the past 2 days due to decreased PO. Feels similar to his prior pancreatitis episode. Denies HA, CP, palpitations, SOB, diarrhea, fevers, alcohol use. Of note, pt stopped taking gemfibrozil and metformin since 2 mo ago and he tried to control his medical issues with diet alone since he has been well controlled for the past 8 years.   Admitted for MICU for hyperglyceridemia induced pancreatitis.     ED course significant for triglycerides 4742, cholesterol 963, lipase 2908. Pt admitted to MICU for management of acute pancreatitis.    37-year-old male patient PMH of pre-DM, hypertriglyceridemia (follows with Dr. Michelle), multiple admissions of pancreatitis  (last adm 2015 for necrotizing pancreatitis), pancreatic pseudocyst who presents to the emergency department for abdominal pain x  2 days. The pain is sharp, started suddenly, is left sided, radiates towards the back b/l, 10/10. Since this morning, pt has been having nausea and unable to tolerate PO. He has vomited 8x since this morning "cortes, creme" yellow in color, no blood.  He has had no BMs in the past 2 days due to decreased PO. Feels similar to his prior pancreatitis episode. Denies HA, CP, palpitations, SOB, diarrhea, fevers, alcohol use. Of note, pt stopped taking gemfibrozil and metformin since 2 mo ago and he tried to control his medical issues with diet alone since he has been well controlled for the past 8 years.      ED course significant for triglycerides 4742, cholesterol 963, lipase 2908. Pt admitted to MICU for management of acute pancreatitis.    37-year-old male patient PMH of pre-DM, hypertriglyceridemia (follows with Dr. Michelle), multiple admissions of pancreatitis  (last adm 2015 for necrotizing pancreatitis), pancreatic pseudocyst who presents to the emergency department for abdominal pain x  2 days. The pain is sharp, started suddenly, is left sided, radiates towards the back b/l, 10/10. Since this morning, pt has been having nausea and unable to tolerate PO. He has vomited 8x since this morning "cortes, creme" yellow in color, no blood. He has had no BMs in the past 2 days due to decreased PO. States the pain feels similar to his prior pancreatitis episode. Denies HA, CP, palpitations, SOB, diarrhea, fevers, alcohol use. Of note, pt stopped taking gemfibrozil and metformin 2 mo ago to try to control his medical issues with diet alone since he has been well controlled for the past 8 years.      ED course significant for triglycerides 4742, cholesterol 963, lipase 2908. Pt admitted to MICU for management of acute pancreatitis.

## 2023-07-31 NOTE — ED ADULT NURSE NOTE - OBJECTIVE STATEMENT
38 yo M presents to ED A+Ox3 c/o abdominal pain. Patient reports worsening LLQ over the last two days. States this morning he had multiple episodes of "vomiting bile." Reports hx of pancreatitis in 2016, states the pain feels similar to that time. Denies fever, chills. Patient appears uncomfortable, sitting up in stretcher. Breathing spontaneous and unlabored on room air. Skin warm pink and dry. Reports 10/10 abdominal pain, did not take any medication for pain prior to arrival. Patient updated on plan of care, pending CT scan.

## 2023-07-31 NOTE — H&P ADULT - NSHPSOCIALHISTORY_GEN_ALL_CORE
lives at home with wife  has 3 kids  no toxic habits  works in [employer] and [employer], has been at home due to strike  frequent exercise

## 2023-07-31 NOTE — ED PROVIDER NOTE - CLINICAL SUMMARY MEDICAL DECISION MAKING FREE TEXT BOX
37M hx of pre DM, high TG, prior pancreatitis episodes here w epigastric abd pain ,radiating to back assoc w [facility] [place]. Pt recently non compliant w his meds, Reporst pain sx are similar to prior bouts of pancreatitis. No CP, SOB. No weakness, numbness, tingling. No fevers. PE signif for uncomfortable appearing M w epigastric TTP no grr. Most likely acute pancreatitis due to elevated TG. No RUQ TTP. However gallstone pancreatitis is a possibility. Denies EtOH use. Given hx of pseudocyst, pt will need CTAP. Will FU addtl labs, CTAP, pain control, IV hydration and plan for admission.

## 2023-07-31 NOTE — ED PROVIDER NOTE - PHYSICAL EXAMINATION
GENERAL: Awake, alert, uncomfortable secondary to pain  HEENT: NC/AT, dry mucous membranes, EOMI  LUNGS: CTAB, no wheezes or crackles   CARDIAC: RRR, no m/r/g  ABDOMEN: Soft, umbilical tenderness to palpation.  Nondistended, no rebound.  EXT: No edema, no calf tenderness, no deformities.  NEURO: A&Ox3. Moving all extremities.  SKIN: Warm and dry. No rash.  PSYCH: Normal affect.

## 2023-07-31 NOTE — H&P ADULT - NSHPPHYSICALEXAM_GEN_ALL_CORE
VITALS:   T(C): 36.9 (07-31-23 @ 23:25), Max: 36.9 (07-31-23 @ 17:37)  HR: 110 (07-31-23 @ 23:25) (84 - 110)  BP: 131/81 (07-31-23 @ 23:25) (121/79 - 136/82)  RR: 17 (07-31-23 @ 23:25) (17 - 20)  SpO2: 95% (07-31-23 @ 23:25) (95% - 99%)    GENERAL: NAD, lying in bed comfortably  HEAD:  Atraumatic, normocephalic  EYES: EOMI, PERRLA, conjunctiva and sclera clear  ENT: Moist mucous membranes  NECK: Supple, no JVD  HEART: Regular rate and rhythm, no murmurs, rubs, or gallops  LUNGS: Unlabored respirations.  Clear to auscultation bilaterally, no crackles, wheezing, or rhonchi  ABDOMEN: Soft, nontender, nondistended, +BS  EXTREMITIES: 2+ peripheral pulses bilaterally. No clubbing, cyanosis, or edema  NERVOUS SYSTEM:  A&Ox3, no focal deficits   SKIN: No rashes or lesions VITALS:   T(C): 36.9 (07-31-23 @ 23:25), Max: 36.9 (07-31-23 @ 17:37)  HR: 110 (07-31-23 @ 23:25) (84 - 110)  BP: 131/81 (07-31-23 @ 23:25) (121/79 - 136/82)  RR: 17 (07-31-23 @ 23:25) (17 - 20)  SpO2: 95% (07-31-23 @ 23:25) (95% - 99%)    GENERAL: NAD, lying in bed comfortably  HEAD:  Atraumatic, normocephalic  EYES: EOMI, PERRLA, conjunctiva and sclera clear  ENT: Moist mucous membranes  NECK: Supple, no JVD  HEART: Regular rate and rhythm, no murmurs, rubs, or gallops  LUNGS: Unlabored respirations.  Clear to auscultation bilaterally, no crackles, wheezing, or rhonchi  ABDOMEN: No guarding. Tenderness to light and deep palpation in all 4 quadrants worst in the LLQ radiates to the back, -fluid wave, -HSM,  +Pryor sign, -CVA tenderness   EXTREMITIES: 2+ peripheral pulses bilaterally. No clubbing, cyanosis, or edema  NERVOUS SYSTEM:  A&Ox3, no focal deficits   SKIN: No rashes or lesions

## 2023-07-31 NOTE — ED ADULT NURSE NOTE - NS ED NURSE TRANSPORT WITH
Insulin drip; transported with provider and RN/Cardiac Monitor/Defib/ACLS/Rescue Kit/O2/BVM/pulse ox Insulin drip; transported with provider, EDT and RN/Cardiac Monitor/Defib/ACLS/Rescue Kit/O2/BVM/pulse ox

## 2023-07-31 NOTE — ED ADULT TRIAGE NOTE - NS ED TRIAGE AVPU SCALE
Discussion/Summary   White blood cell count is back to normal -  no further evaluation is needed !! Verified Results  (1) CBC/PLT/DIFF [id number] 11:39AM Robina Sierra   TW Order Number: [id number]     Test Name Result Flag Reference   WBC COUNT 7 08 Thousand/uL  4 31-10 16   RBC COUNT 4 26 Million/uL  3 88-5 62   HEMOGLOBIN 12 7 g/dL  12 0-17 0   HEMATOCRIT 37 6 %  36 5-49 3   MCV 88 fL  82-98   MCH 29 8 pg  26 8-34 3   MCHC 33 8 g/dL  31 4-37 4   RDW 13 6 %  11 6-15 1   MPV 12 1 fL  8 9-12 7   PLATELET COUNT 757 Thousands/uL  149-390   nRBC AUTOMATED 0 /100 WBCs     NEUTROPHILS RELATIVE PERCENT 54 %  43-75   LYMPHOCYTES RELATIVE PERCENT 32 %  14-44   MONOCYTES RELATIVE PERCENT 9 %  4-12   EOSINOPHILS RELATIVE PERCENT 5 %  0-6   BASOPHILS RELATIVE PERCENT 0 %  0-1   NEUTROPHILS ABSOLUTE COUNT 3 77 Thousands/? ??L  1 85-7 62   LYMPHOCYTES ABSOLUTE COUNT 2 23 Thousands/? ??L  0 60-4 47   MONOCYTES ABSOLUTE COUNT 0 65 Thousand/? ??L  0 17-1 22   EOSINOPHILS ABSOLUTE COUNT 0 38 Thousand/? ??L  0 00-0 61   BASOPHILS ABSOLUTE COUNT 0 03 Thousands/? ??L  0 00-0 10
Alert-The patient is alert, awake and responds to voice. The patient is oriented to time, place, and person. The triage nurse is able to obtain subjective information.

## 2023-08-01 LAB
A1C WITH ESTIMATED AVERAGE GLUCOSE RESULT: 11.6 % — HIGH (ref 4–5.6)
ALBUMIN SERPL ELPH-MCNC: 3.2 G/DL — LOW (ref 3.3–5)
ALBUMIN SERPL ELPH-MCNC: 3.4 G/DL — SIGNIFICANT CHANGE UP (ref 3.3–5)
ALBUMIN SERPL ELPH-MCNC: 3.7 G/DL — SIGNIFICANT CHANGE UP (ref 3.3–5)
ALBUMIN SERPL ELPH-MCNC: 3.8 G/DL — SIGNIFICANT CHANGE UP (ref 3.3–5)
ALP SERPL-CCNC: 46 U/L — SIGNIFICANT CHANGE UP (ref 40–120)
ALP SERPL-CCNC: 47 U/L — SIGNIFICANT CHANGE UP (ref 40–120)
ALP SERPL-CCNC: 51 U/L — SIGNIFICANT CHANGE UP (ref 40–120)
ALP SERPL-CCNC: 52 U/L — SIGNIFICANT CHANGE UP (ref 40–120)
ALT FLD-CCNC: 14 U/L — SIGNIFICANT CHANGE UP (ref 10–45)
ALT FLD-CCNC: 15 U/L — SIGNIFICANT CHANGE UP (ref 10–45)
ALT FLD-CCNC: 8 U/L — LOW (ref 10–45)
ALT FLD-CCNC: <5 U/L — LOW (ref 10–45)
ANION GAP SERPL CALC-SCNC: 12 MMOL/L — SIGNIFICANT CHANGE UP (ref 5–17)
ANION GAP SERPL CALC-SCNC: 12 MMOL/L — SIGNIFICANT CHANGE UP (ref 5–17)
ANION GAP SERPL CALC-SCNC: 13 MMOL/L — SIGNIFICANT CHANGE UP (ref 5–17)
ANION GAP SERPL CALC-SCNC: 14 MMOL/L — SIGNIFICANT CHANGE UP (ref 5–17)
AST SERPL-CCNC: 33 U/L — SIGNIFICANT CHANGE UP (ref 10–40)
AST SERPL-CCNC: 44 U/L — HIGH (ref 10–40)
AST SERPL-CCNC: 7 U/L — LOW (ref 10–40)
AST SERPL-CCNC: 77 U/L — HIGH (ref 10–40)
BASE EXCESS BLDV CALC-SCNC: -3.4 MMOL/L — LOW (ref -2–3)
BASE EXCESS BLDV CALC-SCNC: 1.6 MMOL/L — SIGNIFICANT CHANGE UP (ref -2–3)
BILIRUB SERPL-MCNC: 0.3 MG/DL — SIGNIFICANT CHANGE UP (ref 0.2–1.2)
BILIRUB SERPL-MCNC: 0.3 MG/DL — SIGNIFICANT CHANGE UP (ref 0.2–1.2)
BILIRUB SERPL-MCNC: 0.4 MG/DL — SIGNIFICANT CHANGE UP (ref 0.2–1.2)
BILIRUB SERPL-MCNC: 0.6 MG/DL — SIGNIFICANT CHANGE UP (ref 0.2–1.2)
BUN SERPL-MCNC: 10 MG/DL — SIGNIFICANT CHANGE UP (ref 7–23)
CA-I SERPL-SCNC: 1.19 MMOL/L — SIGNIFICANT CHANGE UP (ref 1.15–1.33)
CA-I SERPL-SCNC: 1.22 MMOL/L — SIGNIFICANT CHANGE UP (ref 1.15–1.33)
CALCIUM SERPL-MCNC: 8.8 MG/DL — SIGNIFICANT CHANGE UP (ref 8.4–10.5)
CALCIUM SERPL-MCNC: 8.9 MG/DL — SIGNIFICANT CHANGE UP (ref 8.4–10.5)
CALCIUM SERPL-MCNC: 9 MG/DL — SIGNIFICANT CHANGE UP (ref 8.4–10.5)
CALCIUM SERPL-MCNC: 9.2 MG/DL — SIGNIFICANT CHANGE UP (ref 8.4–10.5)
CHLORIDE BLDV-SCNC: 104 MMOL/L — SIGNIFICANT CHANGE UP (ref 96–108)
CHLORIDE BLDV-SCNC: 104 MMOL/L — SIGNIFICANT CHANGE UP (ref 96–108)
CHLORIDE SERPL-SCNC: 100 MMOL/L — SIGNIFICANT CHANGE UP (ref 96–108)
CHLORIDE SERPL-SCNC: 98 MMOL/L — SIGNIFICANT CHANGE UP (ref 96–108)
CHLORIDE SERPL-SCNC: 98 MMOL/L — SIGNIFICANT CHANGE UP (ref 96–108)
CHLORIDE SERPL-SCNC: 99 MMOL/L — SIGNIFICANT CHANGE UP (ref 96–108)
CHOLEST SERPL-MCNC: 605 MG/DL — HIGH
CHOLEST SERPL-MCNC: 768 MG/DL — HIGH
CO2 BLDV-SCNC: 21 MMOL/L — LOW (ref 22–26)
CO2 BLDV-SCNC: 27 MMOL/L — HIGH (ref 22–26)
CO2 SERPL-SCNC: 18 MMOL/L — LOW (ref 22–31)
CO2 SERPL-SCNC: 19 MMOL/L — LOW (ref 22–31)
CO2 SERPL-SCNC: 21 MMOL/L — LOW (ref 22–31)
CO2 SERPL-SCNC: 22 MMOL/L — SIGNIFICANT CHANGE UP (ref 22–31)
CREAT SERPL-MCNC: 0.4 MG/DL — LOW (ref 0.5–1.3)
CREAT SERPL-MCNC: 0.49 MG/DL — LOW (ref 0.5–1.3)
CREAT SERPL-MCNC: 0.54 MG/DL — SIGNIFICANT CHANGE UP (ref 0.5–1.3)
CREAT SERPL-MCNC: 0.59 MG/DL — SIGNIFICANT CHANGE UP (ref 0.5–1.3)
EGFR: 129 ML/MIN/1.73M2 — SIGNIFICANT CHANGE UP
EGFR: 132 ML/MIN/1.73M2 — SIGNIFICANT CHANGE UP
EGFR: 136 ML/MIN/1.73M2 — SIGNIFICANT CHANGE UP
EGFR: 145 ML/MIN/1.73M2 — SIGNIFICANT CHANGE UP
ESTIMATED AVERAGE GLUCOSE: 286 MG/DL — HIGH (ref 68–114)
GAS PNL BLDV: 133 MMOL/L — LOW (ref 136–145)
GAS PNL BLDV: 134 MMOL/L — LOW (ref 136–145)
GAS PNL BLDV: SIGNIFICANT CHANGE UP
GAS PNL BLDV: SIGNIFICANT CHANGE UP
GLUCOSE BLDC GLUCOMTR-MCNC: 109 MG/DL — HIGH (ref 70–99)
GLUCOSE BLDC GLUCOMTR-MCNC: 114 MG/DL — HIGH (ref 70–99)
GLUCOSE BLDC GLUCOMTR-MCNC: 115 MG/DL — HIGH (ref 70–99)
GLUCOSE BLDC GLUCOMTR-MCNC: 120 MG/DL — HIGH (ref 70–99)
GLUCOSE BLDC GLUCOMTR-MCNC: 120 MG/DL — HIGH (ref 70–99)
GLUCOSE BLDC GLUCOMTR-MCNC: 123 MG/DL — HIGH (ref 70–99)
GLUCOSE BLDC GLUCOMTR-MCNC: 124 MG/DL — HIGH (ref 70–99)
GLUCOSE BLDC GLUCOMTR-MCNC: 131 MG/DL — HIGH (ref 70–99)
GLUCOSE BLDC GLUCOMTR-MCNC: 142 MG/DL — HIGH (ref 70–99)
GLUCOSE BLDC GLUCOMTR-MCNC: 145 MG/DL — HIGH (ref 70–99)
GLUCOSE BLDC GLUCOMTR-MCNC: 147 MG/DL — HIGH (ref 70–99)
GLUCOSE BLDC GLUCOMTR-MCNC: 150 MG/DL — HIGH (ref 70–99)
GLUCOSE BLDC GLUCOMTR-MCNC: 155 MG/DL — HIGH (ref 70–99)
GLUCOSE BLDC GLUCOMTR-MCNC: 156 MG/DL — HIGH (ref 70–99)
GLUCOSE BLDC GLUCOMTR-MCNC: 172 MG/DL — HIGH (ref 70–99)
GLUCOSE BLDC GLUCOMTR-MCNC: 181 MG/DL — HIGH (ref 70–99)
GLUCOSE BLDC GLUCOMTR-MCNC: 184 MG/DL — HIGH (ref 70–99)
GLUCOSE BLDC GLUCOMTR-MCNC: 188 MG/DL — HIGH (ref 70–99)
GLUCOSE BLDC GLUCOMTR-MCNC: 194 MG/DL — HIGH (ref 70–99)
GLUCOSE BLDC GLUCOMTR-MCNC: 195 MG/DL — HIGH (ref 70–99)
GLUCOSE BLDC GLUCOMTR-MCNC: 209 MG/DL — HIGH (ref 70–99)
GLUCOSE BLDC GLUCOMTR-MCNC: 212 MG/DL — HIGH (ref 70–99)
GLUCOSE BLDC GLUCOMTR-MCNC: 214 MG/DL — HIGH (ref 70–99)
GLUCOSE BLDC GLUCOMTR-MCNC: 228 MG/DL — HIGH (ref 70–99)
GLUCOSE BLDC GLUCOMTR-MCNC: 99 MG/DL — SIGNIFICANT CHANGE UP (ref 70–99)
GLUCOSE BLDC GLUCOMTR-MCNC: 99 MG/DL — SIGNIFICANT CHANGE UP (ref 70–99)
GLUCOSE BLDV-MCNC: 191 MG/DL — HIGH (ref 70–99)
GLUCOSE BLDV-MCNC: 246 MG/DL — HIGH (ref 70–99)
GLUCOSE SERPL-MCNC: 156 MG/DL — HIGH (ref 70–99)
GLUCOSE SERPL-MCNC: 173 MG/DL — HIGH (ref 70–99)
GLUCOSE SERPL-MCNC: 207 MG/DL — HIGH (ref 70–99)
GLUCOSE SERPL-MCNC: 208 MG/DL — HIGH (ref 70–99)
HCO3 BLDV-SCNC: 20 MMOL/L — LOW (ref 22–29)
HCO3 BLDV-SCNC: 26 MMOL/L — SIGNIFICANT CHANGE UP (ref 22–29)
HCT VFR BLD CALC: 44.8 % — SIGNIFICANT CHANGE UP (ref 39–50)
HCT VFR BLD CALC: 46.4 % — SIGNIFICANT CHANGE UP (ref 39–50)
HCT VFR BLDA CALC: 47 % — SIGNIFICANT CHANGE UP (ref 39–51)
HCT VFR BLDA CALC: 48 % — SIGNIFICANT CHANGE UP (ref 39–51)
HGB BLD CALC-MCNC: 15.8 G/DL — SIGNIFICANT CHANGE UP (ref 12.6–17.4)
HGB BLD CALC-MCNC: 15.9 G/DL — SIGNIFICANT CHANGE UP (ref 12.6–17.4)
HGB BLD-MCNC: 15.6 G/DL — SIGNIFICANT CHANGE UP (ref 13–17)
HGB BLD-MCNC: 16.6 G/DL — SIGNIFICANT CHANGE UP (ref 13–17)
HOROWITZ INDEX BLDV+IHG-RTO: 21 — SIGNIFICANT CHANGE UP
HOROWITZ INDEX BLDV+IHG-RTO: 21 — SIGNIFICANT CHANGE UP
LACTATE BLDV-MCNC: 1.3 MMOL/L — SIGNIFICANT CHANGE UP (ref 0.5–2)
LACTATE BLDV-MCNC: 1.7 MMOL/L — SIGNIFICANT CHANGE UP (ref 0.5–2)
LIDOCAIN IGE QN: 735 U/L — HIGH (ref 7–60)
LIDOCAIN IGE QN: 881 U/L — HIGH (ref 7–60)
MAGNESIUM SERPL-MCNC: 1.9 MG/DL — SIGNIFICANT CHANGE UP (ref 1.6–2.6)
MAGNESIUM SERPL-MCNC: 2.1 MG/DL — SIGNIFICANT CHANGE UP (ref 1.6–2.6)
MCHC RBC-ENTMCNC: 28 PG — SIGNIFICANT CHANGE UP (ref 27–34)
MCHC RBC-ENTMCNC: 28.7 PG — SIGNIFICANT CHANGE UP (ref 27–34)
MCHC RBC-ENTMCNC: 34.8 GM/DL — SIGNIFICANT CHANGE UP (ref 32–36)
MCHC RBC-ENTMCNC: 35.8 GM/DL — SIGNIFICANT CHANGE UP (ref 32–36)
MCV RBC AUTO: 80.1 FL — SIGNIFICANT CHANGE UP (ref 80–100)
MCV RBC AUTO: 80.4 FL — SIGNIFICANT CHANGE UP (ref 80–100)
NRBC # BLD: 0 /100 WBCS — SIGNIFICANT CHANGE UP (ref 0–0)
NRBC # BLD: 0 /100 WBCS — SIGNIFICANT CHANGE UP (ref 0–0)
PCO2 BLDV: 33 MMHG — LOW (ref 42–55)
PCO2 BLDV: 39 MMHG — LOW (ref 42–55)
PH BLDV: 7.4 — SIGNIFICANT CHANGE UP (ref 7.32–7.43)
PH BLDV: 7.43 — SIGNIFICANT CHANGE UP (ref 7.32–7.43)
PHOSPHATE SERPL-MCNC: 2.4 MG/DL — LOW (ref 2.5–4.5)
PHOSPHATE SERPL-MCNC: 2.9 MG/DL — SIGNIFICANT CHANGE UP (ref 2.5–4.5)
PHOSPHATE SERPL-MCNC: 3.2 MG/DL — SIGNIFICANT CHANGE UP (ref 2.5–4.5)
PHOSPHATE SERPL-MCNC: 3.9 MG/DL — SIGNIFICANT CHANGE UP (ref 2.5–4.5)
PLATELET # BLD AUTO: 264 K/UL — SIGNIFICANT CHANGE UP (ref 150–400)
PLATELET # BLD AUTO: 267 K/UL — SIGNIFICANT CHANGE UP (ref 150–400)
PO2 BLDV: 59 MMHG — HIGH (ref 25–45)
PO2 BLDV: 75 MMHG — HIGH (ref 25–45)
POTASSIUM BLDV-SCNC: 3.8 MMOL/L — SIGNIFICANT CHANGE UP (ref 3.5–5.1)
POTASSIUM BLDV-SCNC: 4 MMOL/L — SIGNIFICANT CHANGE UP (ref 3.5–5.1)
POTASSIUM SERPL-MCNC: 4 MMOL/L — SIGNIFICANT CHANGE UP (ref 3.5–5.3)
POTASSIUM SERPL-MCNC: 4.3 MMOL/L — SIGNIFICANT CHANGE UP (ref 3.5–5.3)
POTASSIUM SERPL-MCNC: 6.3 MMOL/L — CRITICAL HIGH (ref 3.5–5.3)
POTASSIUM SERPL-MCNC: 6.9 MMOL/L — CRITICAL HIGH (ref 3.5–5.3)
POTASSIUM SERPL-SCNC: 4 MMOL/L — SIGNIFICANT CHANGE UP (ref 3.5–5.3)
POTASSIUM SERPL-SCNC: 4.3 MMOL/L — SIGNIFICANT CHANGE UP (ref 3.5–5.3)
POTASSIUM SERPL-SCNC: 6.3 MMOL/L — CRITICAL HIGH (ref 3.5–5.3)
POTASSIUM SERPL-SCNC: 6.9 MMOL/L — CRITICAL HIGH (ref 3.5–5.3)
PROT SERPL-MCNC: 6.8 G/DL — SIGNIFICANT CHANGE UP (ref 6–8.3)
PROT SERPL-MCNC: 6.9 G/DL — SIGNIFICANT CHANGE UP (ref 6–8.3)
PROT SERPL-MCNC: 7.9 G/DL — SIGNIFICANT CHANGE UP (ref 6–8.3)
PROT SERPL-MCNC: 8 G/DL — SIGNIFICANT CHANGE UP (ref 6–8.3)
RBC # BLD: 5.57 M/UL — SIGNIFICANT CHANGE UP (ref 4.2–5.8)
RBC # BLD: 5.79 M/UL — SIGNIFICANT CHANGE UP (ref 4.2–5.8)
RBC # FLD: 15 % — HIGH (ref 10.3–14.5)
RBC # FLD: 16.1 % — HIGH (ref 10.3–14.5)
SAO2 % BLDV: 89.5 % — HIGH (ref 67–88)
SAO2 % BLDV: 96 % — HIGH (ref 67–88)
SODIUM SERPL-SCNC: 130 MMOL/L — LOW (ref 135–145)
SODIUM SERPL-SCNC: 131 MMOL/L — LOW (ref 135–145)
SODIUM SERPL-SCNC: 133 MMOL/L — LOW (ref 135–145)
SODIUM SERPL-SCNC: 134 MMOL/L — LOW (ref 135–145)
TRIGL SERPL-MCNC: 1263 MG/DL — HIGH
TRIGL SERPL-MCNC: 1611 MG/DL — HIGH
TRIGL SERPL-MCNC: 2515 MG/DL — HIGH
WBC # BLD: 10.04 K/UL — SIGNIFICANT CHANGE UP (ref 3.8–10.5)
WBC # BLD: 8.21 K/UL — SIGNIFICANT CHANGE UP (ref 3.8–10.5)
WBC # FLD AUTO: 10.04 K/UL — SIGNIFICANT CHANGE UP (ref 3.8–10.5)
WBC # FLD AUTO: 8.21 K/UL — SIGNIFICANT CHANGE UP (ref 3.8–10.5)

## 2023-08-01 PROCEDURE — 99291 CRITICAL CARE FIRST HOUR: CPT | Mod: GC

## 2023-08-01 PROCEDURE — 99223 1ST HOSP IP/OBS HIGH 75: CPT | Mod: GC

## 2023-08-01 PROCEDURE — 99223 1ST HOSP IP/OBS HIGH 75: CPT

## 2023-08-01 RX ORDER — ATORVASTATIN CALCIUM 80 MG/1
80 TABLET, FILM COATED ORAL AT BEDTIME
Refills: 0 | Status: DISCONTINUED | OUTPATIENT
Start: 2023-08-01 | End: 2023-08-08

## 2023-08-01 RX ORDER — NALOXONE HYDROCHLORIDE 4 MG/.1ML
0.4 SPRAY NASAL ONCE
Refills: 0 | Status: DISCONTINUED | OUTPATIENT
Start: 2023-08-01 | End: 2023-08-07

## 2023-08-01 RX ORDER — ACETAMINOPHEN 500 MG
1000 TABLET ORAL EVERY 8 HOURS
Refills: 0 | Status: DISCONTINUED | OUTPATIENT
Start: 2023-08-01 | End: 2023-08-08

## 2023-08-01 RX ORDER — MAGNESIUM SULFATE 500 MG/ML
1 VIAL (ML) INJECTION ONCE
Refills: 0 | Status: COMPLETED | OUTPATIENT
Start: 2023-08-01 | End: 2023-08-01

## 2023-08-01 RX ORDER — SIMETHICONE 80 MG/1
80 TABLET, CHEWABLE ORAL ONCE
Refills: 0 | Status: COMPLETED | OUTPATIENT
Start: 2023-08-01 | End: 2023-08-01

## 2023-08-01 RX ORDER — ACETAMINOPHEN 500 MG
1000 TABLET ORAL EVERY 8 HOURS
Refills: 0 | Status: DISCONTINUED | OUTPATIENT
Start: 2023-08-01 | End: 2023-08-01

## 2023-08-01 RX ORDER — ONDANSETRON 8 MG/1
4 TABLET, FILM COATED ORAL ONCE
Refills: 0 | Status: COMPLETED | OUTPATIENT
Start: 2023-08-01 | End: 2023-08-01

## 2023-08-01 RX ORDER — OMEGA-3 ACID ETHYL ESTERS 1 G
2 CAPSULE ORAL
Refills: 0 | Status: DISCONTINUED | OUTPATIENT
Start: 2023-08-01 | End: 2023-08-08

## 2023-08-01 RX ORDER — MORPHINE SULFATE 50 MG/1
4 CAPSULE, EXTENDED RELEASE ORAL EVERY 4 HOURS
Refills: 0 | Status: DISCONTINUED | OUTPATIENT
Start: 2023-08-01 | End: 2023-08-07

## 2023-08-01 RX ORDER — SODIUM CHLORIDE 9 MG/ML
1000 INJECTION, SOLUTION INTRAVENOUS
Refills: 0 | Status: DISCONTINUED | OUTPATIENT
Start: 2023-08-01 | End: 2023-08-02

## 2023-08-01 RX ORDER — ONDANSETRON 8 MG/1
4 TABLET, FILM COATED ORAL ONCE
Refills: 0 | Status: DISCONTINUED | OUTPATIENT
Start: 2023-08-01 | End: 2023-08-08

## 2023-08-01 RX ORDER — SIMETHICONE 80 MG/1
80 TABLET, CHEWABLE ORAL EVERY 6 HOURS
Refills: 0 | Status: DISCONTINUED | OUTPATIENT
Start: 2023-08-01 | End: 2023-08-08

## 2023-08-01 RX ORDER — ENOXAPARIN SODIUM 100 MG/ML
40 INJECTION SUBCUTANEOUS EVERY 24 HOURS
Refills: 0 | Status: DISCONTINUED | OUTPATIENT
Start: 2023-07-31 | End: 2023-08-08

## 2023-08-01 RX ORDER — GEMFIBROZIL 600 MG
600 TABLET ORAL
Refills: 0 | Status: DISCONTINUED | OUTPATIENT
Start: 2023-08-01 | End: 2023-08-01

## 2023-08-01 RX ORDER — CHLORHEXIDINE GLUCONATE 213 G/1000ML
1 SOLUTION TOPICAL
Refills: 0 | Status: DISCONTINUED | OUTPATIENT
Start: 2023-07-31 | End: 2023-08-08

## 2023-08-01 RX ORDER — MORPHINE SULFATE 50 MG/1
2 CAPSULE, EXTENDED RELEASE ORAL EVERY 4 HOURS
Refills: 0 | Status: DISCONTINUED | OUTPATIENT
Start: 2023-08-01 | End: 2023-08-07

## 2023-08-01 RX ORDER — FENOFIBRATE,MICRONIZED 130 MG
145 CAPSULE ORAL DAILY
Refills: 0 | Status: DISCONTINUED | OUTPATIENT
Start: 2023-08-01 | End: 2023-08-08

## 2023-08-01 RX ADMIN — SIMETHICONE 80 MILLIGRAM(S): 80 TABLET, CHEWABLE ORAL at 18:29

## 2023-08-01 RX ADMIN — SIMETHICONE 80 MILLIGRAM(S): 80 TABLET, CHEWABLE ORAL at 14:19

## 2023-08-01 RX ADMIN — Medication 1000 MILLIGRAM(S): at 23:00

## 2023-08-01 RX ADMIN — ATORVASTATIN CALCIUM 80 MILLIGRAM(S): 80 TABLET, FILM COATED ORAL at 21:48

## 2023-08-01 RX ADMIN — Medication 1000 MILLIGRAM(S): at 22:21

## 2023-08-01 RX ADMIN — MORPHINE SULFATE 4 MILLIGRAM(S): 50 CAPSULE, EXTENDED RELEASE ORAL at 00:47

## 2023-08-01 RX ADMIN — INSULIN HUMAN 6 UNIT(S)/HR: 100 INJECTION, SOLUTION SUBCUTANEOUS at 22:21

## 2023-08-01 RX ADMIN — Medication 100 GRAM(S): at 14:19

## 2023-08-01 RX ADMIN — Medication 85 MILLIMOLE(S): at 14:29

## 2023-08-01 RX ADMIN — Medication 145 MILLIGRAM(S): at 18:29

## 2023-08-01 RX ADMIN — CHLORHEXIDINE GLUCONATE 1 APPLICATION(S): 213 SOLUTION TOPICAL at 06:10

## 2023-08-01 RX ADMIN — INSULIN HUMAN 8.6 UNIT(S)/HR: 100 INJECTION, SOLUTION SUBCUTANEOUS at 05:47

## 2023-08-01 RX ADMIN — Medication 600 MILLIGRAM(S): at 09:39

## 2023-08-01 RX ADMIN — ENOXAPARIN SODIUM 40 MILLIGRAM(S): 100 INJECTION SUBCUTANEOUS at 05:46

## 2023-08-01 RX ADMIN — ONDANSETRON 4 MILLIGRAM(S): 8 TABLET, FILM COATED ORAL at 00:47

## 2023-08-01 RX ADMIN — MORPHINE SULFATE 4 MILLIGRAM(S): 50 CAPSULE, EXTENDED RELEASE ORAL at 01:22

## 2023-08-01 RX ADMIN — SODIUM CHLORIDE 150 MILLILITER(S): 9 INJECTION, SOLUTION INTRAVENOUS at 01:00

## 2023-08-01 NOTE — CONSULT NOTE ADULT - SUBJECTIVE AND OBJECTIVE BOX
NOTE INCOMPLETE/ IN PROGRESS  *Please wait for attending attestation for official recommendations.     HPI:  37-year-old male patient PMH of pre-DM (diagnosed 2 years ago), hypertriglyceridemia (follows with Dr. Michelle), multiple admissions of pancreatitis (last adm 2015 for necrotizing pancreatitis), pancreatic cyst/collectio who presents to the emergency department for abdominal pain, nausea x  2 days. Found to have acute pancreatitis secondary to elevated triglyceride. A1c was 11.6%. Patient reports he was prescribed metformin (don't know dose) BID and gemfibrozil 600mg BID but stopped taking in 2 months ago because he felt that he could try to control with his diet.    Consulted for: Hypertriglyceridemia induced pancreatitis    Endocrine History:  Hypertriglyceridemia:  Diagnosed  Has family hx of hyperTG; uncle and brother  Was prescribed        PAST MEDICAL & SURGICAL HISTORY:  Pancreatitis      Pancreatic pseudocyst      Necrotizing pancreatitis      Hypertriglyceridemia      No significant past surgical history          FAMILY HISTORY:  Family history of endogenous hypertriglyceridemia (Mother, Sibling, Uncle)    Family history of diabetes mellitus (Mother, Father)        Social History:    Outpatient Medications:    MEDICATIONS  (STANDING):  chlorhexidine 4% Liquid 1 Application(s) Topical <User Schedule>  dextrose 5% + lactated ringers. 1000 milliLiter(s) (150 mL/Hr) IV Continuous <Continuous>  enoxaparin Injectable 40 milliGRAM(s) SubCutaneous every 24 hours  gemfibrozil 600 milliGRAM(s) Oral two times a day  insulin regular Infusion 8 Unit(s)/Hr (8 mL/Hr) IV Continuous <Continuous>  naloxone Injectable 0.4 milliGRAM(s) IV Push once  ondansetron Injectable 4 milliGRAM(s) IV Push once  sodium phosphate 30 milliMole(s)/500 mL IVPB 30 milliMole(s) IV Intermittent once    MEDICATIONS  (PRN):  acetaminophen     Tablet .. 1000 milliGRAM(s) Oral every 8 hours PRN Mild Pain (1 - 3)  morphine  - Injectable 2 milliGRAM(s) IV Push every 4 hours PRN Moderate Pain (4 - 6)  morphine  - Injectable 4 milliGRAM(s) IV Push every 4 hours PRN Severe Pain (7 - 10)      Allergies    No Known Allergies    Intolerances      Review of Systems:  Constitutional: No fever  Eyes: No blurry vision  Neuro: No tremors  HEENT: No pain  Cardiovascular: No chest pain, palpitations  Respiratory: No SOB, no cough  GI: No nausea, vomiting, abdominal pain  : No dysuria  Skin: no rash  Psych: no depression  Endocrine: no polyuria, polydipsia  Hem/lymph: no swelling  Osteoporosis: no fractures    ALL OTHER SYSTEMS REVIEWED AND NEGATIVE    UNABLE TO OBTAIN    PHYSICAL EXAM:  VITALS: T(C): 36.8 (08-01-23 @ 12:00)  T(F): 98.3 (08-01-23 @ 12:00), Max: 99.5 (08-01-23 @ 00:30)  HR: 113 (08-01-23 @ 14:00) (91 - 121)  BP: 111/78 (08-01-23 @ 14:00) (104/71 - 136/87)  RR:  (16 - 21)  SpO2:  (95% - 98%)  Wt(kg): --  GENERAL: NAD, well-groomed, well-developed  EYES: No proptosis, no lid lag, anicteric  HEENT:  Atraumatic, Normocephalic, moist mucous membranes  THYROID: Normal size, no palpable nodules  RESPIRATORY: Clear to auscultation bilaterally; No rales, rhonchi, wheezing  CARDIOVASCULAR: Regular rate and rhythm; No murmurs; no peripheral edema  GI: Soft, nontender, non distended, normal bowel sounds  SKIN: Dry, intact, No rashes or lesions  MUSCULOSKELETAL: Full range of motion, normal strength  NEURO: sensation intact, extraocular movements intact, no tremor  PSYCH: Alert and oriented x 3, normal affect, normal mood  CUSHING'S SIGNS: no striae      CAPILLARY BLOOD GLUCOSE      POCT Blood Glucose.: 99 mg/dL (01 Aug 2023 15:02)  POCT Blood Glucose.: 99 mg/dL (01 Aug 2023 14:16)  POCT Blood Glucose.: 109 mg/dL (01 Aug 2023 13:10)  POCT Blood Glucose.: 156 mg/dL (01 Aug 2023 12:07)  POCT Blood Glucose.: 214 mg/dL (01 Aug 2023 11:09)  POCT Blood Glucose.: 184 mg/dL (01 Aug 2023 10:00)  POCT Blood Glucose.: 115 mg/dL (01 Aug 2023 09:04)  POCT Blood Glucose.: 131 mg/dL (01 Aug 2023 07:51)  POCT Blood Glucose.: 155 mg/dL (01 Aug 2023 06:52)  POCT Blood Glucose.: 172 mg/dL (01 Aug 2023 06:02)  POCT Blood Glucose.: 181 mg/dL (01 Aug 2023 04:59)  POCT Blood Glucose.: 195 mg/dL (01 Aug 2023 04:01)  POCT Blood Glucose.: 209 mg/dL (01 Aug 2023 02:56)  POCT Blood Glucose.: 212 mg/dL (01 Aug 2023 02:05)  POCT Blood Glucose.: 194 mg/dL (01 Aug 2023 01:01)  POCT Blood Glucose.: 188 mg/dL (01 Aug 2023 00:31)  POCT Blood Glucose.: 228 mg/dL (31 Jul 2023 23:34)                            15.6   8.21  )-----------( 267      ( 01 Aug 2023 06:23 )             44.8       08-01    134<L>  |  100  |  10  ----------------------------<  208<H>  4.3   |  22  |  0.54    eGFR: 132    Ca    9.0      08-01  Mg     1.9     08-01  Phos  2.4     08-01    TPro  6.8  /  Alb  3.2<L>  /  TBili  0.3  /  DBili  x   /  AST  33  /  ALT  15  /  AlkPhos  52  08-01      Thyroid Function Tests:      A1C with Estimated Average Glucose Result: 11.6 % (08-01-23 @ 06:23)      08-01 Chol -- Direct LDL -- LDL calculated -- HDL -- Trig 1263<H>, 08-01 Chol 605<H> Direct LDL -- LDL calculated -- HDL -- Trig 1611<H>, 08-01 Chol 768<H> Direct LDL -- LDL calculated -- HDL -- Trig 2515<H>, 07-31 Chol 963<H> Direct LDL -- LDL calculated -- HDL -- Trig 4742<H>    Radiology:              NOTE INCOMPLETE/ IN PROGRESS  *Please wait for attending attestation for official recommendations.     HPI:  37-year-old male patient PMH of pre-DM (diagnosed 2 years ago), hypertriglyceridemia (follows with Dr. Michelle), multiple admissions of pancreatitis (last adm 2015 for necrotizing pancreatitis), pancreatic cyst/collectio who presents to the emergency department for abdominal pain, nausea x  2 days. Found to have acute pancreatitis secondary to elevated triglyceride. A1c was 11.6%. Patient reports he was prescribed metformin (don't know dose) BID and gemfibrozil 600mg BID but stopped taking in 2 months ago because he felt that he could try to control with his diet.    Consulted for: Hypertriglyceridemia induced pancreatitis    Endocrine History:  Hypertriglyceridemia:  - Has family hx of hyperTG; uncle, mother, brother  - Was prescribed gemfibrozil 600mg BID but stopped taking 2 months ago to try to control with diet.    Diabetes:  - Diagnosed with prediabetes 2 years ago. Has not had check up in a while.  - Was taking metformin (doesn't know dose) BID until he stopped 2 months ago  - Family hx: DM in grandmother, father  - Denies polydipsia, polyuria, blurry vision, numbness/tingling in feet    SHx: no smoking, no alcohol use      PAST MEDICAL & SURGICAL HISTORY:  Pancreatitis      Pancreatic pseudocyst      Necrotizing pancreatitis      Hypertriglyceridemia      No significant past surgical history          FAMILY HISTORY:  Family history of endogenous hypertriglyceridemia (Mother, Sibling, Uncle)    Family history of diabetes mellitus (Mother, Father)        Social History:    Outpatient Medications:    MEDICATIONS  (STANDING):  chlorhexidine 4% Liquid 1 Application(s) Topical <User Schedule>  dextrose 5% + lactated ringers. 1000 milliLiter(s) (150 mL/Hr) IV Continuous <Continuous>  enoxaparin Injectable 40 milliGRAM(s) SubCutaneous every 24 hours  gemfibrozil 600 milliGRAM(s) Oral two times a day  insulin regular Infusion 8 Unit(s)/Hr (8 mL/Hr) IV Continuous <Continuous>  naloxone Injectable 0.4 milliGRAM(s) IV Push once  ondansetron Injectable 4 milliGRAM(s) IV Push once  sodium phosphate 30 milliMole(s)/500 mL IVPB 30 milliMole(s) IV Intermittent once    MEDICATIONS  (PRN):  acetaminophen     Tablet .. 1000 milliGRAM(s) Oral every 8 hours PRN Mild Pain (1 - 3)  morphine  - Injectable 2 milliGRAM(s) IV Push every 4 hours PRN Moderate Pain (4 - 6)  morphine  - Injectable 4 milliGRAM(s) IV Push every 4 hours PRN Severe Pain (7 - 10)      Allergies    No Known Allergies    Intolerances      Review of Systems:  Constitutional: No fever  Eyes: No blurry vision  Neuro: No tremors  HEENT: No pain  Cardiovascular: No chest pain, palpitations  Respiratory: No SOB, no cough  GI: + nausea, vomiting, abdominal pain  : No dysuria  Skin: no rash  Psych: no depression  Endocrine: no polyuria, polydipsia  Hem/lymph: no swelling  Osteoporosis: no fractures    ALL OTHER SYSTEMS REVIEWED AND NEGATIVE    UNABLE TO OBTAIN    PHYSICAL EXAM:  VITALS: T(C): 36.8 (08-01-23 @ 12:00)  T(F): 98.3 (08-01-23 @ 12:00), Max: 99.5 (08-01-23 @ 00:30)  HR: 113 (08-01-23 @ 14:00) (91 - 121)  BP: 111/78 (08-01-23 @ 14:00) (104/71 - 136/87)  RR:  (16 - 21)  SpO2:  (95% - 98%)  Wt(kg): --  GENERAL: NAD, well-groomed, well-developed  EYES: No proptosis, no lid lag, anicteric  HEENT:  Atraumatic, Normocephalic, moist mucous membranes  THYROID: Normal size, no palpable nodules  RESPIRATORY: Clear to auscultation bilaterally; No rales, rhonchi, wheezing  CARDIOVASCULAR: Regular rate and rhythm; No murmurs; no peripheral edema  GI: Soft. Tender mostly in LLQ, RLQ. nondistended.  SKIN: Dry, intact, No rashes or lesions  MUSCULOSKELETAL: Full range of motion, normal strength  NEURO: sensation intact, extraocular movements intact, no tremor  PSYCH: Alert and oriented x 3, normal affect, normal mood  CUSHING'S SIGNS: no striae      CAPILLARY BLOOD GLUCOSE      POCT Blood Glucose.: 99 mg/dL (01 Aug 2023 15:02)  POCT Blood Glucose.: 99 mg/dL (01 Aug 2023 14:16)  POCT Blood Glucose.: 109 mg/dL (01 Aug 2023 13:10)  POCT Blood Glucose.: 156 mg/dL (01 Aug 2023 12:07)  POCT Blood Glucose.: 214 mg/dL (01 Aug 2023 11:09)  POCT Blood Glucose.: 184 mg/dL (01 Aug 2023 10:00)  POCT Blood Glucose.: 115 mg/dL (01 Aug 2023 09:04)  POCT Blood Glucose.: 131 mg/dL (01 Aug 2023 07:51)  POCT Blood Glucose.: 155 mg/dL (01 Aug 2023 06:52)  POCT Blood Glucose.: 172 mg/dL (01 Aug 2023 06:02)  POCT Blood Glucose.: 181 mg/dL (01 Aug 2023 04:59)  POCT Blood Glucose.: 195 mg/dL (01 Aug 2023 04:01)  POCT Blood Glucose.: 209 mg/dL (01 Aug 2023 02:56)  POCT Blood Glucose.: 212 mg/dL (01 Aug 2023 02:05)  POCT Blood Glucose.: 194 mg/dL (01 Aug 2023 01:01)  POCT Blood Glucose.: 188 mg/dL (01 Aug 2023 00:31)  POCT Blood Glucose.: 228 mg/dL (31 Jul 2023 23:34)                            15.6   8.21  )-----------( 267      ( 01 Aug 2023 06:23 )             44.8       08-01    134<L>  |  100  |  10  ----------------------------<  208<H>  4.3   |  22  |  0.54    eGFR: 132    Ca    9.0      08-01  Mg     1.9     08-01  Phos  2.4     08-01    TPro  6.8  /  Alb  3.2<L>  /  TBili  0.3  /  DBili  x   /  AST  33  /  ALT  15  /  AlkPhos  52  08-01      Thyroid Function Tests:      A1C with Estimated Average Glucose Result: 11.6 % (08-01-23 @ 06:23)      08-01 Chol -- Direct LDL -- LDL calculated -- HDL -- Trig 1263<H>, 08-01 Chol 605<H> Direct LDL -- LDL calculated -- HDL -- Trig 1611<H>, 08-01 Chol 768<H> Direct LDL -- LDL calculated -- HDL -- Trig 2515<H>, 07-31 Chol 963<H> Direct LDL -- LDL calculated -- HDL -- Trig 4742<H>    Radiology:

## 2023-08-01 NOTE — PROGRESS NOTE ADULT - ATTENDING COMMENTS
43M w/ hypertriglyceridemia, previous severe pancreatitis w/ prolonged ICU stay required trach/vent subsequently decannulated and known pseudocyst. Admitted w/ hypertriglycedemia and acute pancreatitis w/ slight increase in pseudocyst size. On IVF and insulin gtt.     Pt improving overall. States pain is improved and has an appetite this morning. Remains mildly tachycardic, monitor closely. Continue mgmt of pancreatitis w/ IVF (initially 1.5 mg/kg for 1st 24-48 hrs), insulin gtt and pain control PRN. Start clear liquid diet as pt has an appetite and advance from there as tolerated. GI following for possible drainage of pseudocyst. Monitor BMP q8, lipids q12. Start gemfibrozil since able to take PO. Continue dextrose while on insulin gtt and FS q1. Lovenox for DVT ppx. Full code. Pt remains in ICU on insulin gtt - prognosis guarded.     Plan of care discussed w/ pt and all questions answered

## 2023-08-01 NOTE — PROGRESS NOTE ADULT - SUBJECTIVE AND OBJECTIVE BOX
***************************************************************  [facility] ([facility] PGY2  Internal Medicine MICU  ***************************************************************    VALERIE VALERA  37y  MRN: [id number]    Patient is a 37y old  Male who presents with a chief complaint of Abdominal Pain (31 Jul 2023 23:39)      Subjective: no events ON. Denies fever, CP, SOB, abn pain, N/V, dysuria. Tolerating diet.      MEDICATIONS  (STANDING):  chlorhexidine 4% Liquid 1 Application(s) Topical <User Schedule>  dextrose 5% + lactated ringers. 1000 milliLiter(s) (150 mL/Hr) IV Continuous <Continuous>  enoxaparin Injectable 40 milliGRAM(s) SubCutaneous every 24 hours  insulin regular Infusion 8.6 Unit(s)/Hr (8.6 mL/Hr) IV Continuous <Continuous>  naloxone Injectable 0.4 milliGRAM(s) IV Push once    MEDICATIONS  (PRN):  acetaminophen     Tablet .. 1000 milliGRAM(s) Oral every 8 hours PRN Mild Pain (1 - 3)  morphine  - Injectable 4 milliGRAM(s) IV Push every 4 hours PRN Severe Pain (7 - 10)  morphine  - Injectable 2 milliGRAM(s) IV Push every 4 hours PRN Moderate Pain (4 - 6)      Objective:    Vitals: Vital Signs Last 24 Hrs  T(C): 37.1 (08-01-23 @ 04:00), Max: 37.5 (08-01-23 @ 00:30)  T(F): 98.7 (08-01-23 @ 04:00), Max: 99.5 (08-01-23 @ 00:30)  HR: 117 (08-01-23 @ 04:00) (84 - 117)  BP: 107/73 (08-01-23 @ 04:00) (107/73 - 136/87)  BP(mean): 85 (08-01-23 @ 04:00) (85 - 107)  RR: 19 (08-01-23 @ 04:00) (17 - 20)  SpO2: 96% (08-01-23 @ 04:00) (95% - 99%)            I&O's Summary    31 Jul 2023 07:01  -  01 Aug 2023 05:13  --------------------------------------------------------  IN: 793 mL / OUT: 0 mL / NET: 793 mL        PHYSICAL EXAM:  GENERAL: NAD  HEAD:  Atraumatic, Normocephalic  EYES: EOMI, conjunctiva and sclera clear  CHEST/LUNG: Clear to auscultation bilaterally; No rales, rhonchi, wheezing, or rubs  HEART: Regular rate and rhythm; No murmurs, rubs, or gallops  ABDOMEN: Soft, Nontender, Nondistended;   SKIN: No rashes or lesions  NERVOUS SYSTEM:  Alert & Oriented X3, no focal deficits    LABS:  08-01    130<L>  |  98  |  10  ----------------------------<  207<H>  6.9<HH>   |  18<L>  |  0.40<L>  07-31    138  |  98  |  11  ----------------------------<  265<H>  4.7   |  19<L>  |  0.68    Ca    8.8      01 Aug 2023 00:56  Ca    9.8      31 Jul 2023 12:18  Phos  3.9     08-01  Mg     2.1     08-01    TPro  8.0  /  Alb  3.8  /  TBili  0.6  /  DBili  x   /  AST  77<H>  /  ALT  8<L>  /  AlkPhos  47  08-01  TPro  8.8<H>  /  Alb  4.7  /  TBili  0.7  /  DBili  x   /  AST  14  /  ALT  16  /  AlkPhos  81  07-31                    Urinalysis Basic - ( 01 Aug 2023 00:56 )    Color: x / Appearance: x / SG: x / pH: x  Gluc: 207 mg/dL / Ketone: x  / Bili: x / Urobili: x   Blood: x / Protein: x / Nitrite: x   Leuk Esterase: x / RBC: x / WBC x   Sq Epi: x / Non Sq Epi: x / Bacteria: x                              16.6   10.04 )-----------( 264      ( 01 Aug 2023 00:56 )             46.4                         17.3   12.12 )-----------( 208      ( 31 Jul 2023 12:18 )             48.2     CAPILLARY BLOOD GLUCOSE      POCT Blood Glucose.: 181 mg/dL (01 Aug 2023 04:59)  POCT Blood Glucose.: 195 mg/dL (01 Aug 2023 04:01)  POCT Blood Glucose.: 209 mg/dL (01 Aug 2023 02:56)  POCT Blood Glucose.: 212 mg/dL (01 Aug 2023 02:05)  POCT Blood Glucose.: 194 mg/dL (01 Aug 2023 01:01)  POCT Blood Glucose.: 188 mg/dL (01 Aug 2023 00:31)      RADIOLOGY & ADDITIONAL TESTS:    Imaging Personally Reviewed:  [x ] YES  [ ] NO    Consultants involved in case:   Consultant(s) Notes Reviewed:  [ x] YES  [ ] NO:   Care Discussed with Consultants/Other Providers [x ] YES  [ ] NO

## 2023-08-01 NOTE — CONSULT NOTE ADULT - REASON FOR ADMISSION
Abdominal Pain
Abdominal Pain
Bed/Stretcher in lowest position, wheels locked, appropriate side rails in place/Call bell, personal items and telephone in reach/Instruct patient to call for assistance before getting out of bed/chair/stretcher/Non-slip footwear applied when patient is off stretcher/[place] to call system/Physically safe environment - no spills, clutter or unnecessary equipment/Purposeful proactive rounding/Room/bathroom lighting operational, light cord in reach

## 2023-08-01 NOTE — PROGRESS NOTE ADULT - ASSESSMENT
37-year-old male patient PMH of pre-DM, hypertriglyceridemia (follows with Dr. Michelle), multiple admissions of pancreatitis  (last adm 2015 for necrotizing pancreatitis), pancreatic pseudocyst who p/w left sided abdominal pain x 2 days ago associated with nausea and vomiting admitted to MICU for acute pancreatitis.     ===NEURO===  -AOx3   -ctm    ===CARDIAC===  Tachycardia  -HR 110s  -may be reflexive     ===PULM===  -no issues at this time    ===RENAL===  -no issues at this time    ===GI===  #acute pancreatitis   -hx of hospitalizations for pancreatitis  -hx of hypertriglyceridemia off gembrozil  -lipase = 2908  -CT abd: Peripancreatic infiltration and confluent ill-defined fluid, compatible with acute pancreatitis  -Plan:             -insulin 8.6U gtt              -triglycerides q12, will d/c insulin gtt when trigly goes below 1000               -BMP q6 to monitor lytes             -hourly glucose POCT for goal <180             -IVF: D5 LR 150cc/hr             -c/s GI for enlarging pseudocysts, GI e-mailed             -Pain regimen in place w/ morphine IV    Diet  -NPO for possible GI intervention in the AM     ===HEME/ONC===  Leukocytosis   -afebrile  -likely 2/2 hemoconcentration 2/2 third spacing from pancreatitis   -resolved     ===ID===  -no issues at this time     ===ENDO===  #hypertriglyceridemia  -c/s endo  -triglycerides q12, will d/c insulin gtt when trigly goes below 1000  -Endocrine consultation e-mailed    pre-DM   -f/u a1c     DVT  -lovenox subq

## 2023-08-01 NOTE — CONSULT NOTE ADULT - ASSESSMENT
.37-year-old male patient PMH of pre-DM (diagnosed 2 years ago), hypertriglyceridemia (follows with Dr. Michelle), multiple admissions of pancreatitis (last adm  for necrotizing pancreatitis), pancreatic cyst/collection who presents to the emergency department for abdominal pain, nausea x 2 days. Found to have acute pancreatitis secondary to elevated triglycerides. A1c was 11.6%.    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 Hx of recurrent pancreatitis and pancreatic cyst/collection.  Lipase 2908, T.   TG down to 1200s with insulin gtt.  - Continue insulin gtt until triglyceride level <500  - Check triglyceride level q6h  - Recommend stopping gemfibrozil. Start fenofibrate 145mg daily, atorvastatin 80mg QHS, and lovaza 2mg BID.  - RD consult    #T2DM  Hx of prediabetes.  A1c found to be 11.6%, new diagnosis of T2DM  Home regimen: Metformin ?dose BID, stopped 2 months ago.  - When insulin gtt is discontinued (when TG<500), please transition to Lantus 22 units QHS and Admelog 7 units TIDAC, with low dose correction scale TIDAC and low dose correction QHS  - For discharge, likely basal/bolus insulin vs. basal insulin + metformin. TBD.  - Please begin insulin pen teaching, glucometer teaching  - Please send prescriptions for diabetes supplies (glucometer, test strips, lancets, alcohol swabs, insulin pen needles).    Discussed with Dr. Gaurav Cardenas MD  Endocrine Fellow  Can be reached via teams. For follow up questions, discharge recommendations, or new consults, please call answering service at [phone] (weekdays); [phone] (nights/weekends). .37-year-old male patient PMH of pre-DM (diagnosed 2 years ago), hypertriglyceridemia (follows with Dr. Michelle), multiple admissions of pancreatitis (last adm  for necrotizing pancreatitis), pancreatic cyst/collection who presents to the emergency department for abdominal pain, nausea x 2 days. Found to have acute pancreatitis secondary to elevated triglycerides. A1c was 11.6%.    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 Hx of recurrent pancreatitis and pancreatic cyst/collection.  Lipase 2908, T.   TG down to 1200s with insulin gtt.  - Continue insulin gtt until triglyceride level <500  - Check triglyceride level q6h  - Recommend stopping gemfibrozil. Start fenofibrate 145mg daily, atorvastatin 80mg QHS, and lovaza 2mg BID.  - RD consult    #T2DM  Hx of prediabetes.  A1c found to be 11.6%, new diagnosis of T2DM  Home regimen: Metformin ?dose BID, stopped 2 months ago.  - Please check C-peptide with BMP or CMP tomorrow AM (fasting)  - When insulin gtt is discontinued (when TG<500), please transition to Lantus 22 units QHS and Admelog 7 units TIDAC, with low dose correction scale TIDAC and low dose correction QHS  - For discharge, likely basal/bolus insulin vs. basal insulin + metformin. TBD.  - Please begin insulin pen teaching, glucometer teaching  - Please send prescriptions for diabetes supplies (glucometer, test strips, lancets, alcohol swabs, insulin pen needles).    Discussed with Dr. Gaurav Cardenas MD  Endocrine Fellow  Can be reached via teams. For follow up questions, discharge recommendations, or new consults, please call answering service at [phone] (weekdays); [phone] (nights/weekends). 37-year-old male patient PMH of pre-DM (diagnosed 2 years ago), hypertriglyceridemia (follows with Dr. Michelle), multiple admissions of pancreatitis (last adm  for necrotizing pancreatitis), pancreatic cyst/collection who presents to the emergency department for abdominal pain, nausea x 2 days. Found to have acute pancreatitis secondary to elevated triglycerides. A1c was 11.6%.    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 Hx of recurrent pancreatitis and pancreatic cyst/collection.  Lipase 2908, T.   TG down to 1200s with insulin gtt.  - Continue insulin gtt until triglyceride level <500  - Check triglyceride level q6h  - Recommend stopping gemfibrozil. Start fenofibrate 145mg daily, atorvastatin 80mg QHS, and lovaza 2mg BID.  - RD consult  - Low fat/ CC diet     #T2DM  Hx of prediabetes.  A1c found to be 11.6%, new diagnosis of T2DM  Home regimen: Metformin ?dose BID, stopped 2 months ago.  - Please check C-peptide with BMP or CMP tomorrow AM (fasting)  - When insulin gtt is discontinued (when TG<500), please transition to Lantus 22 units QHS and Admelog 7 units TIDAC, with low dose correction scale TIDAC and low dose correction QHS  - For discharge, likely basal/bolus insulin vs. basal insulin + metformin. TBD.  - Please begin insulin pen teaching, glucometer teaching  - Please send prescriptions for diabetes supplies (glucometer, test strips, lancets, alcohol swabs, insulin pen needles).    Discussed with Dr. Gaurav Cardenas MD  Endocrine Fellow  Can be reached via teams. For follow up questions, discharge recommendations, or new consults, please call answering service at [phone] (weekdays); [phone] (nights/weekends).

## 2023-08-01 NOTE — CONSULT NOTE ADULT - ASSESSMENT
37-year-old male patient PMH of pre-DM, hypertriglyceridemia (follows with Dr. Michelle) c/b multiple admissions of pancreatitis (last adm 2015 for necrotizing pancreatitis) c/b pancreatic pseudocyst who presents to the emergency department for L sided abdominal pain with radiation to back x  2 days a/w N/V and decreased PO. States the pain feels similar to his prior pancreatitis episode. Denies HA, CP, palpitations, SOB, diarrhea, fevers, alcohol use. Of note, pt stopped taking gemfibrozil and metformin 2 mo ago to try to control his medical issues with diet alone since he has been well controlled for the past 8 years. Labs notable for WBC 12, triglycerides 4742, lipase 2908. 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 [facility] consulted for management of chronic peripancreatic fluid collection.    #acute pancreatitis likely 2/2 hyperTG  #interval increase of chronic peripancreatic tail collection with mass effect along stomach (~7.1 x 5.2 cm) w/o e/o pancreatic necrosis      Recs:  - management of hyperTG per primary team/endocrinology  - IVFs for acute pancreatitis 1.5 cc/kg/hr per Waterfall trial  - CLD for now, can advance as tolerated to low fat diet  - avoidance of ETOH for now  - pending review of imaging with radiology will determine if fluid collection is mature enough for EUS - guided drainage    **THIS NOTE IS NOT FINALIZED UNTIL SIGNED BY THE ATTENDING**    Nayeli Cabrera MD  GI Fellow, PGY-6  Available via [employer] Teams    NON-URGENT CONSULTS:  Please email [email or web address]  [email or web address]  AT NIGHT AND ON WEEKENDS:  Contact on-call GI fellow via answering service ([phone]) from 5pm-8am and on weekends/holidays  MONDAY-FRIDAY 8AM-5PM:  Pager# [phone] ([facility]) or [phone] ([facility])   37-year-old male patient PMH of pre-DM, familial hypertriglyceridemia (follows with Dr. Michelle) c/b multiple admissions of pancreatitis (last adm 2015 for necrotizing pancreatitis) c/b pancreatic pseudocyst who presents to the emergency department for L sided abdominal pain with radiation to back x  2 days a/w N/V and decreased PO. States the pain feels similar to his prior pancreatitis episode. Denies HA, CP, palpitations, SOB, diarrhea, fevers, alcohol use. Of note, pt stopped taking gemfibrozil and metformin 2 mo ago to try to control his medical issues with diet alone since he has been well controlled for the past 8 years. Labs notable for WBC 12, triglycerides 4742, lipase 2908. 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 [facility] consulted for management of chronic peripancreatic fluid collection.    #acute pancreatitis likely 2/2 familial hyperTG  #interval increase of chronic peripancreatic tail collection with mass effect along stomach (~7.1 x 5.2 cm) w/o e/o pancreatic necrosis      Recs:  - management of hyperTG per primary team/endocrinology- may potentially need apheresis for severe hyperTG i/s/o pancreatitis  - IVFs for acute pancreatitis 1.5 cc/kg/hr per Waterfall trial  - CLD for now, can advance as tolerated to low fat diet  - avoidance of ETOH for now  - pending review of imaging with radiology will determine if fluid collection is mature enough for EUS - guided drainage    **THIS NOTE IS NOT FINALIZED UNTIL SIGNED BY THE ATTENDING**    Nayeli Cabrera MD  GI Fellow, PGY-6  Available via [employer] Teams    NON-URGENT CONSULTS:  Please email [email or web address]  [email or web address]  AT NIGHT AND ON WEEKENDS:  Contact on-call GI fellow via answering service ([phone]) from 5pm-8am and on weekends/holidays  MONDAY-FRIDAY 8AM-5PM:  Pager# [phone] ([facility]) or [phone] ([facility])

## 2023-08-01 NOTE — CONSULT NOTE ADULT - ATTENDING COMMENTS
Agree with Dr. Cardenas's assessment and plan as outlined above. Reviewed all pertinent labs, and imaging studies. Modifications made as indicated above. 37 M with history of prediabetes, hyperTG presented with hyperTG induced pancreatitis with known pancreatic cyst. Initial TG 4742, started on insulin drip. At home on Gemfibrozil 600 mg BID and metformin, but stopped taking these medications. Most recent TG 1263. Currently on insulin drip with 8 cc/hr on dextrose and also on clear liquid diet. Continue with insulin drip when TG <500. In the mean time start Fenofibrate 145 mg daily, atorvastatin 80 mg QHS and Lovaza 2 gm BID. For DM, A1C 11.6. Once off the insulin drip can start weight based basal/bolus as above. Please make sure patient is on carb consistent and low fat diet. Rest of the plan as above. Patient is high risk with high level decision making due to uncontrolled diabetes which places patient at high risk for cardiovascular and cerebrovascular events. Patient with lability of glucose requiring close monitoring and insulin adjustments.
As above  37M with hypertriglyceridemia induced pancreatitis here with recurrent acute pancreatitis  As well as enlarging walled off fluid collection (present for years) - compressing stomach, formed wall - reviewed with radiology  Improve TG level - plasmapheresis if needed?  Offered patient endoscopic EUS guided drainage of cyst (+/- stent placement) - pt wants to think about it  Continue pancreatitis management - fluids, pain control, etc    Thank you for this interesting consult.  Please call the advanced GI service with any questions or concerns.

## 2023-08-01 NOTE — CONSULT NOTE ADULT - SUBJECTIVE AND OBJECTIVE BOX
Chief Complaint:  Patient is a 37y old  Male who presents with a chief complaint of Abdominal Pain (01 Aug 2023 05:13)      HPI: 37-year-old male patient PMH of pre-DM, hypertriglyceridemia (follows with Dr. Michelle) c/b multiple admissions of pancreatitis (last adm 2015 for necrotizing pancreatitis) c/b pancreatic pseudocyst who presents to the emergency department for L sided abdominal pain with radiation to back x  2 days a/w N/V and decreased PO. States the pain feels similar to his prior pancreatitis episode. Denies HA, CP, palpitations, SOB, diarrhea, fevers, alcohol use. Of note, pt stopped taking gemfibrozil and metformin 2 mo ago to try to control his medical issues with diet alone since he has been well controlled for the past 8 years. Labs notable for WBC 12, triglycerides 4742, lipase 2908. 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 [facility] consulted for management of chronic peripancreatic fluid collection.      Allergies:  No Known Allergies      Home Medications:    Hospital Medications:  acetaminophen     Tablet .. 1000 milliGRAM(s) Oral every 8 hours PRN  chlorhexidine 4% Liquid 1 Application(s) Topical <User Schedule>  dextrose 5% + lactated ringers. 1000 milliLiter(s) IV Continuous <Continuous>  enoxaparin Injectable 40 milliGRAM(s) SubCutaneous every 24 hours  insulin regular Infusion 8.6 Unit(s)/Hr IV Continuous <Continuous>  morphine  - Injectable 4 milliGRAM(s) IV Push every 4 hours PRN  morphine  - Injectable 2 milliGRAM(s) IV Push every 4 hours PRN  naloxone Injectable 0.4 milliGRAM(s) IV Push once      PMHX/PSHX:  Pancreatitis    Pancreatic pseudocyst    Necrotizing pancreatitis    Hypertriglyceridemia    No significant past surgical history        Family history:  No pertinent family history in first degree relatives    Family history of endogenous hypertriglyceridemia (Mother, Sibling, Uncle)    Family history of diabetes mellitus (Mother, Father)     Denies any family history of GI-related disease or cancers.    Social History:   ETOH: denies  Tobacco: denies  Illicit drug use: denies    ROS: 14 point ROS negative unless otherwise stated in HPI      Vital Signs:  Vital Signs Last 24 Hrs  T(C): 37.1 (01 Aug 2023 04:00), Max: 37.5 (01 Aug 2023 00:30)  T(F): 98.7 (01 Aug 2023 04:00), Max: 99.5 (01 Aug 2023 00:30)  HR: 110 (01 Aug 2023 06:00) (84 - 117)  BP: 115/71 (01 Aug 2023 06:00) (107/73 - 136/87)  BP(mean): 88 (01 Aug 2023 06:00) (85 - 107)  RR: 17 (01 Aug 2023 06:00) (17 - 20)  SpO2: 96% (01 Aug 2023 06:00) (95% - 99%)    Parameters below as of 31 Jul 2023 23:25  Patient On (Oxygen Delivery Method): room air      Daily Height in cm: 180.34 (01 Aug 2023 00:30)    Daily     PHYSICAL EXAM:     GENERAL:  Appears stated age, well-groomed, well-nourished, no distress  HEENT:  NC/AT,  conjunctivae clear and pink  CHEST:  Full & symmetric excursion, no increased effort, breath sounds clear  HEART:  Regular rhythm, S1, S2, no murmur/rub/S3/S4  ABDOMEN:  Soft, non-tender, non-distended, normoactive bowel sounds,    EXTREMITIES:  no cyanosis,clubbing or edema  SKIN:  No rash/erythema/ecchymoses/petechiae/wounds/abscess/warm/dry  NEURO:  Alert, oriented      LABS:                        15.6   8.21  )-----------( 267      ( 01 Aug 2023 06:23 )             44.8     08-01    131<L>  |  98  |  10  ----------------------------<  173<H>  6.3<HH>   |  19<L>  |  0.49<L>    Ca    9.2      01 Aug 2023 06:23  Phos  3.2     08-01  Mg     2.1     08-01    TPro  7.9  /  Alb  3.7  /  TBili  0.3  /  DBili  x   /  AST  44<H>  /  ALT  14  /  AlkPhos  46  08-01    LIVER FUNCTIONS - ( 01 Aug 2023 06:23 )  Alb: 3.7 g/dL / Pro: 7.9 g/dL / ALK PHOS: 46 U/L / ALT: 14 U/L / AST: 44 U/L / GGT: x             Urinalysis Basic - ( 01 Aug 2023 06:23 )    Color: x / Appearance: x / SG: x / pH: x  Gluc: 173 mg/dL / Ketone: x  / Bili: x / Urobili: x   Blood: x / Protein: x / Nitrite: x   Leuk Esterase: x / RBC: x / WBC x   Sq Epi: x / Non Sq Epi: x / Bacteria: x      Amylase Serum--      Lipase [id number]       Ammonia--  Amylase Serum--      Lipase [id number]       Ammonia--  Amylase Serum--      Lipase [id number]       Ammonia--      Imaging:       ACC: [id number] EXAM:  CT ABDOMEN AND PELVIS IC   ORDERED BY: ROSSY SANCHEZ     PROCEDURE DATE:  07/31/2023          INTERPRETATION:  CLINICAL INFORMATION: Abdominal pain. Known chronic   peripancreatic collection. Elevated lipase. Evaluate for pancreatitis.    COMPARISON: CT abdomen and pelvis 3/21/2018, MR abdomen 12/23/2016    CONTRAST/COMPLICATIONS:  IV Contrast: Omnipaque 350  90 cc administered   10 cc discarded  Oral Contrast: Water  Complications: None reported at time of study completion    PROCEDURE:  CT of the Abdomen and Pelvis was performed.  Arterial and Portal Venous phases were acquired.  Sagittal and coronal reformats were performed.    FINDINGS:  LOWER CHEST: Minimal right lower lobe subsegmental atelectasis.    LIVER: Diffuse low-attenuation of the liver, which may represent   steatosis.  BILE DUCTS: Normal caliber.  GALLBLADDER: Cholelithiasis.  SPLEEN: Borderline enlarged, measures 13.1 cm.  PANCREAS: Diffuse peripancreatic infiltration and confluent ill-defined   fluid, most prominently about the pancreatic head, concerning for acute   pancreatitis. Homogeneous parenchymal enhancement without definite   evidence for necrosis. No main pancreatic duct dilation. 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 Again seen is a   peripancreatic tail collection that extends along the left anterior   pararenal space into the pelvis, which is increased in size and extent   since 3/21/2018. For example, a component adjacent to the pancreatic tail   that exerts mass effect along the undersurface of the stomach now   measures 7.1 x 5.2 cm (series 3 image 35), previously 4.6 x 4.6 cm. The   collection spans approximately 14.8 cm in craniocaudal dimension,   previously 13.7 cm when measured in similar fashion. There are additional   loculated components extending along the left iliopsoas musculature that   are new since 3/21/2018, with example interposed between iliacus and   psoas muscles measuring 2.8 x 1.3 cm (series 3 image 72). Calcifications   are again noted along the inferior margin of the collection.  ADRENALS: Within normal limits.  KIDNEYS/URETERS: Bilateral subcentimeter hypodense renal foci that are   too small to characterize. No hydronephrosis.    BLADDER: Within normal limits.  REPRODUCTIVE ORGANS: Prostate within normal limits.    BOWEL: Periduodenal fluid, likely reactive. No bowel obstruction.   Appendix is normal.  PERITONEUM: No ascites.  VESSELS: The hepatic and portal veins are patent. Patent superior   mesenteric vein.The splenic vein is not definitively identified, similar   to the previous exam, may be diminutive or chronically thrombosed. Again   seen are prominent perigastric, perisplenic, and gastroepiploic   collateral vessels, similar to prior.  RETROPERITONEUM/LYMPH NODES: No lymphadenopathy.  ABDOMINAL WALL: Within normal limits.  BONES: Within normal limits.    IMPRESSION:  Peripancreatic infiltration and confluent ill-defined fluid, compatible   with acute pancreatitis.    Increased size of a chronic peripancreatic tail collection since 2018.             Chief Complaint:  Patient is a 37y old  Male who presents with a chief complaint of Abdominal Pain (01 Aug 2023 05:13)      HPI: 37-year-old male patient PMH of pre-DM, hypertriglyceridemia (follows with Dr. Michelle) c/b multiple admissions of pancreatitis (last adm 2015 for necrotizing pancreatitis) c/b pancreatic pseudocyst who presents to the emergency department for L sided abdominal pain with radiation to back x  2 days a/w N/V and decreased PO. States the pain feels similar to his prior pancreatitis episode. Denies HA, CP, palpitations, SOB, diarrhea, fevers, alcohol use. Of note, pt stopped taking gemfibrozil and metformin 2 mo ago to try to control his medical issues with diet alone since he has been well controlled for the past 8 years. Labs notable for WBC 12, triglycerides 4742, lipase 2908. 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 [facility] consulted for management of chronic peripancreatic fluid collection.      Allergies:  No Known Allergies      Home Medications:    Hospital Medications:  acetaminophen     Tablet .. 1000 milliGRAM(s) Oral every 8 hours PRN  chlorhexidine 4% Liquid 1 Application(s) Topical <User Schedule>  dextrose 5% + lactated ringers. 1000 milliLiter(s) IV Continuous <Continuous>  enoxaparin Injectable 40 milliGRAM(s) SubCutaneous every 24 hours  insulin regular Infusion 8.6 Unit(s)/Hr IV Continuous <Continuous>  morphine  - Injectable 4 milliGRAM(s) IV Push every 4 hours PRN  morphine  - Injectable 2 milliGRAM(s) IV Push every 4 hours PRN  naloxone Injectable 0.4 milliGRAM(s) IV Push once      PMHX/PSHX:  Pancreatitis    Pancreatic pseudocyst    Necrotizing pancreatitis    Hypertriglyceridemia    No significant past surgical history        Family history:  No pertinent family history in first degree relatives    Family history of endogenous hypertriglyceridemia (Mother, Sibling, Uncle)      Social History:   ETOH: denies  Tobacco: denies  Illicit drug use: denies    ROS: 14 point ROS negative unless otherwise stated in HPI      Vital Signs:  Vital Signs Last 24 Hrs  T(C): 37.1 (01 Aug 2023 04:00), Max: 37.5 (01 Aug 2023 00:30)  T(F): 98.7 (01 Aug 2023 04:00), Max: 99.5 (01 Aug 2023 00:30)  HR: 110 (01 Aug 2023 06:00) (84 - 117)  BP: 115/71 (01 Aug 2023 06:00) (107/73 - 136/87)  BP(mean): 88 (01 Aug 2023 06:00) (85 - 107)  RR: 17 (01 Aug 2023 06:00) (17 - 20)  SpO2: 96% (01 Aug 2023 06:00) (95% - 99%)    Parameters below as of 31 Jul 2023 23:25  Patient On (Oxygen Delivery Method): room air      Daily Height in cm: 180.34 (01 Aug 2023 00:30)    Daily     PHYSICAL EXAM:     GENERAL:  Appears stated age, well-groomed, well-nourished, no distress  HEENT:  NC/AT,  conjunctivae clear and pink  CHEST:  Full & symmetric excursion, no increased effort  HEART:  Regular rhythm, S1, S2, no murmur/rub/S3/S4  ABDOMEN:  Soft, +LUQ/LLQ-tenderness, non-distended  EXTREMITIES:  no cyanosis,clubbing or edema  SKIN:  No rash/erythema/ecchymoses/petechiae/wounds/abscess/warm/dry  NEURO:  Alert, orientedx3      LABS:                        15.6   8.21  )-----------( 267      ( 01 Aug 2023 06:23 )             44.8     08-01    131<L>  |  98  |  10  ----------------------------<  173<H>  6.3<HH>   |  19<L>  |  0.49<L>    Ca    9.2      01 Aug 2023 06:23  Phos  3.2     08-01  Mg     2.1     08-01    TPro  7.9  /  Alb  3.7  /  TBili  0.3  /  DBili  x   /  AST  44<H>  /  ALT  14  /  AlkPhos  46  08-01    LIVER FUNCTIONS - ( 01 Aug 2023 06:23 )  Alb: 3.7 g/dL / Pro: 7.9 g/dL / ALK PHOS: 46 U/L / ALT: 14 U/L / AST: 44 U/L / GGT: x             Urinalysis Basic - ( 01 Aug 2023 06:23 )    Color: x / Appearance: x / SG: x / pH: x  Gluc: 173 mg/dL / Ketone: x  / Bili: x / Urobili: x   Blood: x / Protein: x / Nitrite: x   Leuk Esterase: x / RBC: x / WBC x   Sq Epi: x / Non Sq Epi: x / Bacteria: x      Amylase Serum--      Lipase [id number]       Ammonia--  Amylase Serum--      Lipase [id number]       Ammonia--  Amylase Serum--      Lipase [id number]       Ammonia--      Imaging:       ACC: [id number] EXAM:  CT ABDOMEN AND PELVIS IC   ORDERED BY: ROSSY SANCHEZ     PROCEDURE DATE:  07/31/2023          INTERPRETATION:  CLINICAL INFORMATION: Abdominal pain. Known chronic   peripancreatic collection. Elevated lipase. Evaluate for pancreatitis.    COMPARISON: CT abdomen and pelvis 3/21/2018, MR abdomen 12/23/2016    CONTRAST/COMPLICATIONS:  IV Contrast: Omnipaque 350  90 cc administered   10 cc discarded  Oral Contrast: Water  Complications: None reported at time of study completion    PROCEDURE:  CT of the Abdomen and Pelvis was performed.  Arterial and Portal Venous phases were acquired.  Sagittal and coronal reformats were performed.    FINDINGS:  LOWER CHEST: Minimal right lower lobe subsegmental atelectasis.    LIVER: Diffuse low-attenuation of the liver, which may represent   steatosis.  BILE DUCTS: Normal caliber.  GALLBLADDER: Cholelithiasis.  SPLEEN: Borderline enlarged, measures 13.1 cm.  PANCREAS: Diffuse peripancreatic infiltration and confluent ill-defined   fluid, most prominently about the pancreatic head, concerning for acute   pancreatitis. Homogeneous parenchymal enhancement without definite   evidence for necrosis. No main pancreatic duct dilation. 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 Again seen is a   peripancreatic tail collection that extends along the left anterior   pararenal space into the pelvis, which is increased in size and extent   since 3/21/2018. For example, a component adjacent to the pancreatic tail   that exerts mass effect along the undersurface of the stomach now   measures 7.1 x 5.2 cm (series 3 image 35), previously 4.6 x 4.6 cm. The   collection spans approximately 14.8 cm in craniocaudal dimension,   previously 13.7 cm when measured in similar fashion. There are additional   loculated components extending along the left iliopsoas musculature that   are new since 3/21/2018, with example interposed between iliacus and   psoas muscles measuring 2.8 x 1.3 cm (series 3 image 72). Calcifications   are again noted along the inferior margin of the collection.  ADRENALS: Within normal limits.  KIDNEYS/URETERS: Bilateral subcentimeter hypodense renal foci that are   too small to characterize. No hydronephrosis.    BLADDER: Within normal limits.  REPRODUCTIVE ORGANS: Prostate within normal limits.    BOWEL: Periduodenal fluid, likely reactive. No bowel obstruction.   Appendix is normal.  PERITONEUM: No ascites.  VESSELS: The hepatic and portal veins are patent. Patent superior   mesenteric vein.The splenic vein is not definitively identified, similar   to the previous exam, may be diminutive or chronically thrombosed. Again   seen are prominent perigastric, perisplenic, and gastroepiploic   collateral vessels, similar to prior.  RETROPERITONEUM/LYMPH NODES: No lymphadenopathy.  ABDOMINAL WALL: Within normal limits.  BONES: Within normal limits.    IMPRESSION:  Peripancreatic infiltration and confluent ill-defined fluid, compatible   with acute pancreatitis.    Increased size of a chronic peripancreatic tail collection since 2018.

## 2023-08-01 NOTE — ED ADULT NURSE REASSESSMENT NOTE - NS ED NURSE REASSESS COMMENT FT1
Report received from previous RN. Pt is A&Ox4, follows commands, breathing spontaneous and unlabored on RA, on cardiac monitor. Pt endorsing mild LUQ pain, states " I barely feel like when im laying down". Denies SOB, cp,  N&v, weakness, dizziness, and HA. No new complaints to endorse. Pt accepted to MICU, awaiting bed.

## 2023-08-01 NOTE — CONSULT NOTE ADULT - CONSULT REQUESTED DATE/TIME
01-Aug-2023 08:13
01-Aug-2023 15:11
Otc Regimen: Panoxyl or Neutrogena clear pore
Detail Level: Zone

## 2023-08-02 LAB
ANION GAP SERPL CALC-SCNC: 10 MMOL/L — SIGNIFICANT CHANGE UP (ref 5–17)
ANION GAP SERPL CALC-SCNC: 11 MMOL/L — SIGNIFICANT CHANGE UP (ref 5–17)
ANION GAP SERPL CALC-SCNC: 14 MMOL/L — SIGNIFICANT CHANGE UP (ref 5–17)
BASE EXCESS BLDV CALC-SCNC: 2.7 MMOL/L — SIGNIFICANT CHANGE UP (ref -2–3)
BUN SERPL-MCNC: 10 MG/DL — SIGNIFICANT CHANGE UP (ref 7–23)
BUN SERPL-MCNC: 7 MG/DL — SIGNIFICANT CHANGE UP (ref 7–23)
BUN SERPL-MCNC: 9 MG/DL — SIGNIFICANT CHANGE UP (ref 7–23)
C PEPTIDE SERPL-MCNC: 1.2 NG/ML — SIGNIFICANT CHANGE UP (ref 1.1–4.4)
CA-I SERPL-SCNC: 1.21 MMOL/L — SIGNIFICANT CHANGE UP (ref 1.15–1.33)
CALCIUM SERPL-MCNC: 8.3 MG/DL — LOW (ref 8.4–10.5)
CALCIUM SERPL-MCNC: 8.5 MG/DL — SIGNIFICANT CHANGE UP (ref 8.4–10.5)
CALCIUM SERPL-MCNC: 8.7 MG/DL — SIGNIFICANT CHANGE UP (ref 8.4–10.5)
CHLORIDE BLDV-SCNC: 102 MMOL/L — SIGNIFICANT CHANGE UP (ref 96–108)
CHLORIDE SERPL-SCNC: 100 MMOL/L — SIGNIFICANT CHANGE UP (ref 96–108)
CHLORIDE SERPL-SCNC: 101 MMOL/L — SIGNIFICANT CHANGE UP (ref 96–108)
CHLORIDE SERPL-SCNC: 99 MMOL/L — SIGNIFICANT CHANGE UP (ref 96–108)
CHOLEST SERPL-MCNC: 456 MG/DL — HIGH
CO2 BLDV-SCNC: 29 MMOL/L — HIGH (ref 22–26)
CO2 SERPL-SCNC: 18 MMOL/L — LOW (ref 22–31)
CO2 SERPL-SCNC: 20 MMOL/L — LOW (ref 22–31)
CO2 SERPL-SCNC: 21 MMOL/L — LOW (ref 22–31)
CREAT SERPL-MCNC: 0.5 MG/DL — SIGNIFICANT CHANGE UP (ref 0.5–1.3)
CREAT SERPL-MCNC: 0.51 MG/DL — SIGNIFICANT CHANGE UP (ref 0.5–1.3)
CREAT SERPL-MCNC: 0.52 MG/DL — SIGNIFICANT CHANGE UP (ref 0.5–1.3)
EGFR: 134 ML/MIN/1.73M2 — SIGNIFICANT CHANGE UP
EGFR: 135 ML/MIN/1.73M2 — SIGNIFICANT CHANGE UP
EGFR: 136 ML/MIN/1.73M2 — SIGNIFICANT CHANGE UP
GAS PNL BLDV: 133 MMOL/L — LOW (ref 136–145)
GAS PNL BLDV: SIGNIFICANT CHANGE UP
GLUCOSE BLDC GLUCOMTR-MCNC: 100 MG/DL — HIGH (ref 70–99)
GLUCOSE BLDC GLUCOMTR-MCNC: 112 MG/DL — HIGH (ref 70–99)
GLUCOSE BLDC GLUCOMTR-MCNC: 112 MG/DL — HIGH (ref 70–99)
GLUCOSE BLDC GLUCOMTR-MCNC: 114 MG/DL — HIGH (ref 70–99)
GLUCOSE BLDC GLUCOMTR-MCNC: 116 MG/DL — HIGH (ref 70–99)
GLUCOSE BLDC GLUCOMTR-MCNC: 117 MG/DL — HIGH (ref 70–99)
GLUCOSE BLDC GLUCOMTR-MCNC: 119 MG/DL — HIGH (ref 70–99)
GLUCOSE BLDC GLUCOMTR-MCNC: 119 MG/DL — HIGH (ref 70–99)
GLUCOSE BLDC GLUCOMTR-MCNC: 122 MG/DL — HIGH (ref 70–99)
GLUCOSE BLDC GLUCOMTR-MCNC: 122 MG/DL — HIGH (ref 70–99)
GLUCOSE BLDC GLUCOMTR-MCNC: 128 MG/DL — HIGH (ref 70–99)
GLUCOSE BLDC GLUCOMTR-MCNC: 134 MG/DL — HIGH (ref 70–99)
GLUCOSE BLDC GLUCOMTR-MCNC: 142 MG/DL — HIGH (ref 70–99)
GLUCOSE BLDC GLUCOMTR-MCNC: 164 MG/DL — HIGH (ref 70–99)
GLUCOSE BLDC GLUCOMTR-MCNC: 170 MG/DL — HIGH (ref 70–99)
GLUCOSE BLDC GLUCOMTR-MCNC: 173 MG/DL — HIGH (ref 70–99)
GLUCOSE BLDC GLUCOMTR-MCNC: 183 MG/DL — HIGH (ref 70–99)
GLUCOSE BLDC GLUCOMTR-MCNC: 188 MG/DL — HIGH (ref 70–99)
GLUCOSE BLDC GLUCOMTR-MCNC: 86 MG/DL — SIGNIFICANT CHANGE UP (ref 70–99)
GLUCOSE BLDC GLUCOMTR-MCNC: 91 MG/DL — SIGNIFICANT CHANGE UP (ref 70–99)
GLUCOSE BLDC GLUCOMTR-MCNC: 93 MG/DL — SIGNIFICANT CHANGE UP (ref 70–99)
GLUCOSE BLDC GLUCOMTR-MCNC: 97 MG/DL — SIGNIFICANT CHANGE UP (ref 70–99)
GLUCOSE BLDC GLUCOMTR-MCNC: 99 MG/DL — SIGNIFICANT CHANGE UP (ref 70–99)
GLUCOSE BLDV-MCNC: 137 MG/DL — HIGH (ref 70–99)
GLUCOSE SERPL-MCNC: 111 MG/DL — HIGH (ref 70–99)
GLUCOSE SERPL-MCNC: 191 MG/DL — HIGH (ref 70–99)
GLUCOSE SERPL-MCNC: 90 MG/DL — SIGNIFICANT CHANGE UP (ref 70–99)
HCO3 BLDV-SCNC: 28 MMOL/L — SIGNIFICANT CHANGE UP (ref 22–29)
HCT VFR BLD CALC: 39.8 % — SIGNIFICANT CHANGE UP (ref 39–50)
HCT VFR BLDA CALC: 41 % — SIGNIFICANT CHANGE UP (ref 39–51)
HGB BLD CALC-MCNC: 13.8 G/DL — SIGNIFICANT CHANGE UP (ref 12.6–17.4)
HGB BLD-MCNC: 13.2 G/DL — SIGNIFICANT CHANGE UP (ref 13–17)
HOROWITZ INDEX BLDV+IHG-RTO: 21 — SIGNIFICANT CHANGE UP
LACTATE BLDV-MCNC: 1.2 MMOL/L — SIGNIFICANT CHANGE UP (ref 0.5–2)
LIDOCAIN IGE QN: 533 U/L — HIGH (ref 7–60)
MAGNESIUM SERPL-MCNC: 1.9 MG/DL — SIGNIFICANT CHANGE UP (ref 1.6–2.6)
MAGNESIUM SERPL-MCNC: 2 MG/DL — SIGNIFICANT CHANGE UP (ref 1.6–2.6)
MAGNESIUM SERPL-MCNC: 2.1 MG/DL — SIGNIFICANT CHANGE UP (ref 1.6–2.6)
MCHC RBC-ENTMCNC: 26.9 PG — LOW (ref 27–34)
MCHC RBC-ENTMCNC: 33.2 GM/DL — SIGNIFICANT CHANGE UP (ref 32–36)
MCV RBC AUTO: 81.2 FL — SIGNIFICANT CHANGE UP (ref 80–100)
NRBC # BLD: 0 /100 WBCS — SIGNIFICANT CHANGE UP (ref 0–0)
PCO2 BLDV: 44 MMHG — SIGNIFICANT CHANGE UP (ref 42–55)
PH BLDV: 7.41 — SIGNIFICANT CHANGE UP (ref 7.32–7.43)
PHOSPHATE SERPL-MCNC: 2.2 MG/DL — LOW (ref 2.5–4.5)
PHOSPHATE SERPL-MCNC: 2.3 MG/DL — LOW (ref 2.5–4.5)
PHOSPHATE SERPL-MCNC: 3.1 MG/DL — SIGNIFICANT CHANGE UP (ref 2.5–4.5)
PLATELET # BLD AUTO: 155 K/UL — SIGNIFICANT CHANGE UP (ref 150–400)
PO2 BLDV: 41 MMHG — SIGNIFICANT CHANGE UP (ref 25–45)
POTASSIUM BLDV-SCNC: 3 MMOL/L — LOW (ref 3.5–5.1)
POTASSIUM SERPL-MCNC: 3.3 MMOL/L — LOW (ref 3.5–5.3)
POTASSIUM SERPL-MCNC: 3.7 MMOL/L — SIGNIFICANT CHANGE UP (ref 3.5–5.3)
POTASSIUM SERPL-MCNC: 5.3 MMOL/L — SIGNIFICANT CHANGE UP (ref 3.5–5.3)
POTASSIUM SERPL-SCNC: 3.3 MMOL/L — LOW (ref 3.5–5.3)
POTASSIUM SERPL-SCNC: 3.7 MMOL/L — SIGNIFICANT CHANGE UP (ref 3.5–5.3)
POTASSIUM SERPL-SCNC: 5.3 MMOL/L — SIGNIFICANT CHANGE UP (ref 3.5–5.3)
RBC # BLD: 4.9 M/UL — SIGNIFICANT CHANGE UP (ref 4.2–5.8)
RBC # FLD: 15.2 % — HIGH (ref 10.3–14.5)
SAO2 % BLDV: 72.2 % — SIGNIFICANT CHANGE UP (ref 67–88)
SODIUM SERPL-SCNC: 128 MMOL/L — LOW (ref 135–145)
SODIUM SERPL-SCNC: 133 MMOL/L — LOW (ref 135–145)
SODIUM SERPL-SCNC: 133 MMOL/L — LOW (ref 135–145)
TRIGL SERPL-MCNC: 651 MG/DL — HIGH
TRIGL SERPL-MCNC: 772 MG/DL — HIGH
WBC # BLD: 6.12 K/UL — SIGNIFICANT CHANGE UP (ref 3.8–10.5)
WBC # FLD AUTO: 6.12 K/UL — SIGNIFICANT CHANGE UP (ref 3.8–10.5)

## 2023-08-02 PROCEDURE — 99233 SBSQ HOSP IP/OBS HIGH 50: CPT

## 2023-08-02 PROCEDURE — 99232 SBSQ HOSP IP/OBS MODERATE 35: CPT | Mod: GC

## 2023-08-02 RX ORDER — ACETAMINOPHEN 500 MG
1000 TABLET ORAL ONCE
Refills: 0 | Status: COMPLETED | OUTPATIENT
Start: 2023-08-02 | End: 2023-08-02

## 2023-08-02 RX ORDER — LIDOCAINE 4 G/100G
1 CREAM TOPICAL DAILY
Refills: 0 | Status: DISCONTINUED | OUTPATIENT
Start: 2023-08-02 | End: 2023-08-08

## 2023-08-02 RX ORDER — SODIUM CHLORIDE 9 MG/ML
1000 INJECTION, SOLUTION INTRAVENOUS
Refills: 0 | Status: DISCONTINUED | OUTPATIENT
Start: 2023-08-02 | End: 2023-08-03

## 2023-08-02 RX ORDER — POTASSIUM CHLORIDE 20 MEQ
40 PACKET (EA) ORAL ONCE
Refills: 0 | Status: COMPLETED | OUTPATIENT
Start: 2023-08-02 | End: 2023-08-02

## 2023-08-02 RX ORDER — DEXTROSE 10 % IN WATER 10 %
1000 INTRAVENOUS SOLUTION INTRAVENOUS
Refills: 0 | Status: DISCONTINUED | OUTPATIENT
Start: 2023-08-02 | End: 2023-08-03

## 2023-08-02 RX ORDER — SODIUM,POTASSIUM PHOSPHATES 278-250MG
1 POWDER IN PACKET (EA) ORAL ONCE
Refills: 0 | Status: COMPLETED | OUTPATIENT
Start: 2023-08-02 | End: 2023-08-02

## 2023-08-02 RX ADMIN — ATORVASTATIN CALCIUM 80 MILLIGRAM(S): 80 TABLET, FILM COATED ORAL at 21:08

## 2023-08-02 RX ADMIN — Medication 75 MILLILITER(S): at 02:49

## 2023-08-02 RX ADMIN — Medication 400 MILLIGRAM(S): at 04:12

## 2023-08-02 RX ADMIN — Medication 40 MILLIEQUIVALENT(S): at 18:16

## 2023-08-02 RX ADMIN — Medication 1000 MILLIGRAM(S): at 04:40

## 2023-08-02 RX ADMIN — Medication 2 GRAM(S): at 07:09

## 2023-08-02 RX ADMIN — SODIUM CHLORIDE 50 MILLILITER(S): 9 INJECTION, SOLUTION INTRAVENOUS at 13:38

## 2023-08-02 RX ADMIN — Medication 1 PACKET(S): at 18:16

## 2023-08-02 RX ADMIN — Medication 2 GRAM(S): at 17:52

## 2023-08-02 RX ADMIN — Medication 400 MILLIGRAM(S): at 09:38

## 2023-08-02 RX ADMIN — Medication 400 MILLIGRAM(S): at 14:30

## 2023-08-02 RX ADMIN — LIDOCAINE 1 PATCH: 4 CREAM TOPICAL at 21:08

## 2023-08-02 RX ADMIN — ENOXAPARIN SODIUM 40 MILLIGRAM(S): 100 INJECTION SUBCUTANEOUS at 05:14

## 2023-08-02 RX ADMIN — Medication 1000 MILLIGRAM(S): at 10:00

## 2023-08-02 RX ADMIN — INSULIN HUMAN 5 UNIT(S)/HR: 100 INJECTION, SOLUTION SUBCUTANEOUS at 02:49

## 2023-08-02 RX ADMIN — Medication 145 MILLIGRAM(S): at 13:10

## 2023-08-02 RX ADMIN — Medication 75 MILLILITER(S): at 13:38

## 2023-08-02 RX ADMIN — Medication 1000 MILLIGRAM(S): at 15:00

## 2023-08-02 RX ADMIN — CHLORHEXIDINE GLUCONATE 1 APPLICATION(S): 213 SOLUTION TOPICAL at 05:14

## 2023-08-02 RX ADMIN — SODIUM CHLORIDE 50 MILLILITER(S): 9 INJECTION, SOLUTION INTRAVENOUS at 02:49

## 2023-08-02 RX ADMIN — INSULIN HUMAN 6 UNIT(S)/HR: 100 INJECTION, SOLUTION SUBCUTANEOUS at 13:24

## 2023-08-02 NOTE — DIETITIAN INITIAL EVALUATION ADULT - NSFNSADHERENCEPTAFT_GEN_A_CORE
Hx of prediabetes; previously on metformin but stopped taking ~1 month ago. Recent A1C: 11.6 % (08-01-23 @ 06:23) indicates suboptimal glycemic control.

## 2023-08-02 NOTE — DIETITIAN INITIAL EVALUATION ADULT - PERTINENT MEDS FT
MEDICATIONS  (STANDING):  atorvastatin 80 milliGRAM(s) Oral at bedtime  chlorhexidine 4% Liquid 1 Application(s) Topical <User Schedule>  dextrose 10%. 1000 milliLiter(s) (75 mL/Hr) IV Continuous <Continuous>  enoxaparin Injectable 40 milliGRAM(s) SubCutaneous every 24 hours  fenofibrate Tablet 145 milliGRAM(s) Oral daily  insulin regular Infusion 5 Unit(s)/Hr (5 mL/Hr) IV Continuous <Continuous>  lactated ringers. 1000 milliLiter(s) (50 mL/Hr) IV Continuous <Continuous>  naloxone Injectable 0.4 milliGRAM(s) IV Push once  omega-3-Acid Ethyl Esters 2 Gram(s) Oral two times a day  ondansetron Injectable 4 milliGRAM(s) IV Push once    MEDICATIONS  (PRN):  acetaminophen     Tablet .. 1000 milliGRAM(s) Oral every 8 hours PRN Mild Pain (1 - 3)  morphine  - Injectable 4 milliGRAM(s) IV Push every 4 hours PRN Severe Pain (7 - 10)  morphine  - Injectable 2 milliGRAM(s) IV Push every 4 hours PRN Moderate Pain (4 - 6)  simethicone 80 milliGRAM(s) Chew every 6 hours PRN Gas

## 2023-08-02 NOTE — PROGRESS NOTE ADULT - SUBJECTIVE AND OBJECTIVE BOX
38 y/o male, PMH pre-DM, hypertriglyceridemia (follows with Dr. Michelle), multiple admissions of pancreatitis (last adm 2015 for necrotizing pancreatitis), pancreatic pseudocyst, initially presented to ED on 7/31/23 c/o abdominal pain x2 days. pt stated the pain is sharp, sudden onset, left sided, radiates towards the back b/l, 10/10. Pt also admitted associated nausea, 8 episodes of vomit ("cortes, creme" yellow in color, no blood), and unable to tolerate PO. Pt stated he has not had BMs in the past 2 days due to decreased PO. States the pain feels similar to his prior pancreatitis episode. Denies HA, CP, palpitations, SOB, diarrhea, fevers, alcohol use. Of note, pt stopped taking gemfibrozil and metformin 2 mo ago to try to control his medical issues with diet alone since he has been well controlled for the past 8 years.    ED course significant for triglycerides 4742, cholesterol 963, lipase 2908, WBC 12. Pt admitted to MICU for management of acute pancreatitis, on IVF and insulin gtt.    Overnight events: ***    REVIEW OF SYSTEMS:  CONSTITUTIONAL: No fever, chills, night sweats, or fatigue  EYES: No eye pain, visual disturbances, or discharge  ENMT:  No difficulty hearing, tinnitus, vertigo; No sinus or throat pain  NECK: No pain or stiffness  RESPIRATORY: No cough, wheezing, or hemoptysis; No shortness of breath  CARDIOVASCULAR: No chest pain, palpitations, dizziness, or leg swelling  GASTROINTESTINAL: No abdominal or epigastric pain. No nausea, vomiting, or hematemesis; No diarrhea or constipation. No melena or hematochezia.  GENITOURINARY: No dysuria, frequency, hematuria, or incontinence  NEUROLOGICAL: No headaches, memory loss, loss of strength, numbness, or tremors  SKIN: No itching, burning, rashes, or lesions   LYMPH NODES: No enlarged glands  ENDOCRINE: No heat or cold intolerance; No hair loss  MUSCULOSKELETAL: No joint pain or swelling; No muscle, back, or extremity pain  PSYCHIATRIC: No depression, anxiety, mood swings, or difficulty sleeping  HEME/LYMPH: No easy bruising, or bleeding gums  ALLERGY AND IMMUNOLOGIC: No hives or eczema    OBJECTIVE:  ICU Vital Signs Last 24 Hrs  T(C): 37.2 (02 Aug 2023 04:00), Max: 37.7 (01 Aug 2023 20:00)  T(F): 99 (02 Aug 2023 04:00), Max: 99.9 (01 Aug 2023 20:00)  HR: 99 (02 Aug 2023 04:00) (88 - 121)  BP: 102/56 (02 Aug 2023 04:00) (102/56 - 122/63)  BP(mean): 73 (02 Aug 2023 04:00) (73 - 92)  RR: 16 (02 Aug 2023 04:00) (16 - 28)  SpO2: 94% (02 Aug 2023 04:00) (94% - 97%)    O2 Parameters below as of 01 Aug 2023 20:00  Patient On (Oxygen Delivery Method): room air    07-31 @ 07:01 - 08-01 @ 07:00  --------------------------------------------------------  IN: 1110.2 mL / OUT: 600 mL / NET: 510.2 mL    08-01 @ 07:01  -  08-02 @ 05:32  --------------------------------------------------------  IN: 4712.9 mL / OUT: 2500 mL / NET: 2212.9 mL      CAPILLARY BLOOD GLUCOSE  POCT Blood Glucose.: 122 mg/dL (02 Aug 2023 05:04)      GENERAL: NAD, well-developed  HEAD:  Atraumatic, Normocephalic  EYES: EOMI, PERRLA, conjunctiva and sclera clear  NECK: Supple, No JVD  CHEST/LUNG: Clear to auscultation bilaterally; No wheeze. POCUS ***  HEART: Regular rate and rhythm; No murmurs, rubs, or gallops. POCUS ***  ABDOMEN: Soft, Nontender, Nondistended; Bowel sounds present. POCUS ***  EXTREMITIES:  2+ Peripheral Pulses, No clubbing, cyanosis, or edema  PSYCH: AAOx3, appropriate affect  NEUROLOGY: non-focal, moving all extremities independently  SKIN: No rashes or lesions      HOSPITAL MEDICATIONS:  MEDICATIONS  (STANDING):  atorvastatin 80 milliGRAM(s) Oral at bedtime  chlorhexidine 4% Liquid 1 Application(s) Topical <User Schedule>  dextrose 10%. 1000 milliLiter(s) (75 mL/Hr) IV Continuous <Continuous>  enoxaparin Injectable 40 milliGRAM(s) SubCutaneous every 24 hours  fenofibrate Tablet 145 milliGRAM(s) Oral daily  insulin regular Infusion 5 Unit(s)/Hr (5 mL/Hr) IV Continuous <Continuous>  lactated ringers. 1000 milliLiter(s) (50 mL/Hr) IV Continuous <Continuous>  naloxone Injectable 0.4 milliGRAM(s) IV Push once  omega-3-Acid Ethyl Esters 2 Gram(s) Oral two times a day  ondansetron Injectable 4 milliGRAM(s) IV Push once    MEDICATIONS  (PRN):  acetaminophen     Tablet .. 1000 milliGRAM(s) Oral every 8 hours PRN Mild Pain (1 - 3)  morphine  - Injectable 2 milliGRAM(s) IV Push every 4 hours PRN Moderate Pain (4 - 6)  morphine  - Injectable 4 milliGRAM(s) IV Push every 4 hours PRN Severe Pain (7 - 10)  simethicone 80 milliGRAM(s) Chew every 6 hours PRN Gas      LABS:                        13.2   6.12  )-----------( 155      ( 02 Aug 2023 00:18 )             39.8     Hgb Trend: 13.2<--, 15.6<--, 16.6<--, 17.3<--  08-02    128<L>  |  100  |  10  ----------------------------<  111<H>  5.3   |  18<L>  |  0.50    Ca    8.3<L>      02 Aug 2023 04:20  Phos  3.1     08-02  Mg     2.1     08-02    TPro  6.9  /  Alb  3.4  /  TBili  0.4  /  DBili  x   /  AST  7<L>  /  ALT  <5<L>  /  AlkPhos  51  08-01    Creatinine Trend: 0.50<--, 0.59<--, 0.54<--, 0.49<--, 0.40<--, 0.68<--    Urinalysis Basic - ( 02 Aug 2023 04:20 )    Color: x / Appearance: x / SG: x / pH: x  Gluc: 111 mg/dL / Ketone: x  / Bili: x / Urobili: x   Blood: x / Protein: x / Nitrite: x   Leuk Esterase: x / RBC: x / WBC x   Sq Epi: x / Non Sq Epi: x / Bacteria: x        Venous Blood Gas:  08-02 @ 00:10  7.41/44/41/28/72.2  VBG Lactate: 1.2  Venous Blood Gas:  08-01 @ 06:10  7.43/39/59/26/89.5  VBG Lactate: 1.7  Venous Blood Gas:  08-01 @ 00:41  7.40/33/75/20/96.0  VBG Lactate: 1.3  Venous Blood Gas:  07-31 @ 11:57  7.34/46/19/25/SEE NOTE  VBG Lactate: 1.9      CT Abdomen and Pelvis w/ IV Cont (07.31.23 @ 15:18)   LOWER CHEST: Minimal right lower lobe subsegmental atelectasis.  LIVER: Diffuse low-attenuation of the liver, which may represent steatosis.  BILE DUCTS: Normal caliber.  GALLBLADDER: Cholelithiasis.  SPLEEN: Borderline enlarged, measures 13.1 cm.  PANCREAS: Diffuse peripancreatic infiltration and confluent ill-defined fluid, most prominently about the pancreatic head, concerning for acute pancreatitis. Homogeneous parenchymal enhancement without definite evidence for necrosis. No main pancreatic duct dilation. Pancreatic divisum is better appreciated on the previous MRI. Again seen is a   peripancreatic tail collection that extends along the left anterior pararenal space into the pelvis, which is increased in size and extent since 3/21/2018. For example, a component adjacent to the pancreatic tail that exerts mass effect along the undersurface of the stomach now measures 7.1 x 5.2 cm (series 3 image 35), previously 4.6 x 4.6 cm. The collection spans approximately 14.8 cm in craniocaudal dimension, previously 13.7 cm when measured in similar fashion. There are additional loculated components extending along the left iliopsoas musculature that are new since 3/21/2018, with example interposed between iliacus and psoas muscles measuring 2.8 x 1.3 cm (series 3 image 72). Calcifications   are again noted along the inferior margin of the collection.  ADRENALS: Within normal limits.  KIDNEYS/URETERS: Bilateral subcentimeter hypodense renal foci that are too small to characterize. No hydronephrosis.  BLADDER: Within normal limits.  REPRODUCTIVE ORGANS: Prostate within normal limits.  BOWEL: Periduodenal fluid, likely reactive. No bowel obstruction.   Appendix is normal.  PERITONEUM: No ascites.  VESSELS: The hepatic and portal veins are patent. Patent superior mesenteric vein.The splenic vein is not definitively identified, similar to the previous exam, may be diminutive or chronically thrombosed. Again seen are prominent perigastric, perisplenic, and gastroepiploic collateral vessels, similar to prior.  RETROPERITONEUM/LYMPH NODES: No lymphadenopathy.  ABDOMINAL WALL: Within normal limits.  BONES: Within normal limits.    IMPRESSION:  Peripancreatic infiltration and confluent ill-defined fluid, compatible with acute pancreatitis.  Increased size of a chronic peripancreatic tail collection since 2018.     36 y/o male, PMH pre-DM, hypertriglyceridemia (follows with Dr. Michelle), multiple admissions of pancreatitis (last adm 2015 for necrotizing pancreatitis), pancreatic pseudocyst, initially presented to ED on 7/31/23 c/o abdominal pain x2 days. pt stated the pain is sharp, sudden onset, left sided, radiates towards the back b/l, 10/10. Pt also admitted associated nausea, 8 episodes of vomit ("cortes, creme" yellow in color, no blood), and unable to tolerate PO. Pt stated he has not had BMs in the past 2 days due to decreased PO. States the pain feels similar to his prior pancreatitis episode. Denies HA, CP, palpitations, SOB, diarrhea, fevers, alcohol use. Of note, pt stopped taking gemfibrozil and metformin 2 mo ago to try to control his medical issues with diet alone since he has been well controlled for the past 8 years.    ED course significant for triglycerides 4742, cholesterol 963, lipase 2908, WBC 12. Pt admitted to MICU for management of acute pancreatitis, on IVF and insulin gtt.    Overnight events: ***    REVIEW OF SYSTEMS:  CONSTITUTIONAL: No fever, chills, night sweats, or fatigue  EYES: No eye pain, visual disturbances, or discharge  ENT:  No difficulty hearing, tinnitus, vertigo; No sinus or throat pain  NECK: No pain or stiffness  RESPIRATORY: No cough, wheezing, or hemoptysis; No shortness of breath  CARDIOVASCULAR: No chest pain, palpitations, dizziness, or leg swelling  GASTROINTESTINAL: No abdominal or epigastric pain. No nausea, vomiting, or hematemesis; No diarrhea or constipation. No melena or hematochezia.  GENITOURINARY: No dysuria, frequency, hematuria, or incontinence  NEUROLOGICAL: No headaches, memory loss, loss of strength, numbness, or tremors  SKIN: No itching, burning, rashes, or lesions   LYMPH NODES: No enlarged glands  ENDOCRINE: No heat or cold intolerance; No hair loss  MUSCULOSKELETAL: No joint pain or swelling; No muscle, back, or extremity pain  PSYCHIATRIC: No depression, anxiety, mood swings, or difficulty sleeping  HEME/LYMPH: No easy bruising, or bleeding gums  ALLERGY AND IMMUNOLOGIC: No hives or eczema    OBJECTIVE:  ICU Vital Signs Last 24 Hrs  T(C): 37.2 (02 Aug 2023 04:00), Max: 37.7 (01 Aug 2023 20:00)  T(F): 99 (02 Aug 2023 04:00), Max: 99.9 (01 Aug 2023 20:00)  HR: 99 (02 Aug 2023 04:00) (88 - 121)  BP: 102/56 (02 Aug 2023 04:00) (102/56 - 122/63)  BP(mean): 73 (02 Aug 2023 04:00) (73 - 92)  RR: 16 (02 Aug 2023 04:00) (16 - 28)  SpO2: 94% (02 Aug 2023 04:00) (94% - 97%)    O2 Parameters below as of 01 Aug 2023 20:00  Patient On (Oxygen Delivery Method): room air    07-31 @ 07:01 - 08-01 @ 07:00  --------------------------------------------------------  IN: 1110.2 mL / OUT: 600 mL / NET: 510.2 mL    08-01 @ 07:01  -  08-02 @ 05:32  --------------------------------------------------------  IN: 4712.9 mL / OUT: 2500 mL / NET: 2212.9 mL      CAPILLARY BLOOD GLUCOSE  POCT Blood Glucose.: 122 mg/dL (02 Aug 2023 05:04)      GENERAL: NAD, well-developed  HEAD:  Atraumatic, Normocephalic  EYES: EOMI, PERRLA, conjunctiva and sclera clear  NECK: Supple, No JVD  CHEST/LUNG: Clear to auscultation bilaterally; No wheeze. POCUS ***  HEART: Regular rate and rhythm; No murmurs, rubs, or gallops. POCUS ***  ABDOMEN: Soft, Nontender, Nondistended; Bowel sounds present. POCUS ***  EXTREMITIES:  2+ Peripheral Pulses, No clubbing, cyanosis, or edema  PSYCH: AAOx3, appropriate affect  NEUROLOGY: non-focal, moving all extremities independently  SKIN: No rashes or lesions      HOSPITAL MEDICATIONS:  MEDICATIONS  (STANDING):  atorvastatin 80 milliGRAM(s) Oral at bedtime  chlorhexidine 4% Liquid 1 Application(s) Topical <User Schedule>  dextrose 10%. 1000 milliLiter(s) (75 mL/Hr) IV Continuous <Continuous>  enoxaparin Injectable 40 milliGRAM(s) SubCutaneous every 24 hours  fenofibrate Tablet 145 milliGRAM(s) Oral daily  insulin regular Infusion 5 Unit(s)/Hr (5 mL/Hr) IV Continuous <Continuous>  lactated ringers. 1000 milliLiter(s) (50 mL/Hr) IV Continuous <Continuous>  naloxone Injectable 0.4 milliGRAM(s) IV Push once  omega-3-Acid Ethyl Esters 2 Gram(s) Oral two times a day  ondansetron Injectable 4 milliGRAM(s) IV Push once    MEDICATIONS  (PRN):  acetaminophen     Tablet .. 1000 milliGRAM(s) Oral every 8 hours PRN Mild Pain (1 - 3)  morphine  - Injectable 2 milliGRAM(s) IV Push every 4 hours PRN Moderate Pain (4 - 6)  morphine  - Injectable 4 milliGRAM(s) IV Push every 4 hours PRN Severe Pain (7 - 10)  simethicone 80 milliGRAM(s) Chew every 6 hours PRN Gas      LABS:                        13.2   6.12  )-----------( 155      ( 02 Aug 2023 00:18 )             39.8     Hgb Trend: 13.2<--, 15.6<--, 16.6<--, 17.3<--  08-02    128<L>  |  100  |  10  ----------------------------<  111<H>  5.3   |  18<L>  |  0.50    Ca    8.3<L>      02 Aug 2023 04:20  Phos  3.1     08-02  Mg     2.1     08-02    TPro  6.9  /  Alb  3.4  /  TBili  0.4  /  DBili  x   /  AST  7<L>  /  ALT  <5<L>  /  AlkPhos  51  08-01    Creatinine Trend: 0.50<--, 0.59<--, 0.54<--, 0.49<--, 0.40<--, 0.68<--    Urinalysis Basic - ( 02 Aug 2023 04:20 )    Color: x / Appearance: x / SG: x / pH: x  Gluc: 111 mg/dL / Ketone: x  / Bili: x / Urobili: x   Blood: x / Protein: x / Nitrite: x   Leuk Esterase: x / RBC: x / WBC x   Sq Epi: x / Non Sq Epi: x / Bacteria: x        Venous Blood Gas:  08-02 @ 00:10  7.41/44/41/28/72.2  VBG Lactate: 1.2  Venous Blood Gas:  08-01 @ 06:10  7.43/39/59/26/89.5  VBG Lactate: 1.7  Venous Blood Gas:  08-01 @ 00:41  7.40/33/75/20/96.0  VBG Lactate: 1.3  Venous Blood Gas:  07-31 @ 11:57  7.34/46/19/25/SEE NOTE  VBG Lactate: 1.9      CT Abdomen and Pelvis w/ IV Cont (07.31.23 @ 15:18)   LOWER CHEST: Minimal right lower lobe subsegmental atelectasis.  LIVER: Diffuse low-attenuation of the liver, which may represent steatosis.  BILE DUCTS: Normal caliber.  GALLBLADDER: Cholelithiasis.  SPLEEN: Borderline enlarged, measures 13.1 cm.  PANCREAS: Diffuse peripancreatic infiltration and confluent ill-defined fluid, most prominently about the pancreatic head, concerning for acute pancreatitis. Homogeneous parenchymal enhancement without definite evidence for necrosis. No main pancreatic duct dilation. Pancreatic divisum is better appreciated on the previous MRI. Again seen is a   peripancreatic tail collection that extends along the left anterior pararenal space into the pelvis, which is increased in size and extent since 3/21/2018. For example, a component adjacent to the pancreatic tail that exerts mass effect along the undersurface of the stomach now measures 7.1 x 5.2 cm (series 3 image 35), previously 4.6 x 4.6 cm. The collection spans approximately 14.8 cm in craniocaudal dimension, previously 13.7 cm when measured in similar fashion. There are additional loculated components extending along the left iliopsoas musculature that are new since 3/21/2018, with example interposed between iliacus and psoas muscles measuring 2.8 x 1.3 cm (series 3 image 72). Calcifications   are again noted along the inferior margin of the collection.  ADRENALS: Within normal limits.  KIDNEYS/URETERS: Bilateral subcentimeter hypodense renal foci that are too small to characterize. No hydronephrosis.  BLADDER: Within normal limits.  REPRODUCTIVE ORGANS: Prostate within normal limits.  BOWEL: Periduodenal fluid, likely reactive. No bowel obstruction.   Appendix is normal.  PERITONEUM: No ascites.  VESSELS: The hepatic and portal veins are patent. Patent superior mesenteric vein.The splenic vein is not definitively identified, similar to the previous exam, may be diminutive or chronically thrombosed. Again seen are prominent perigastric, perisplenic, and gastroepiploic collateral vessels, similar to prior.  RETROPERITONEUM/LYMPH NODES: No lymphadenopathy.  ABDOMINAL WALL: Within normal limits.  BONES: Within normal limits.    IMPRESSION:  Peripancreatic infiltration and confluent ill-defined fluid, compatible with acute pancreatitis.  Increased size of a chronic peripancreatic tail collection since 2018.     38 y/o male, PMH pre-DM, hypertriglyceridemia (follows with Dr. Michelle), multiple admissions of pancreatitis (last adm 2015 for necrotizing pancreatitis), pancreatic pseudocyst, initially presented to ED on 7/31/23 c/o abdominal pain x2 days. pt stated the pain is sharp, sudden onset, left sided, radiates towards the back b/l, 10/10. Pt also admitted associated nausea, 8 episodes of vomit ("cortes, creme" yellow in color, no blood), and unable to tolerate PO. Pt stated he has not had BMs in the past 2 days due to decreased PO. States the pain feels similar to his prior pancreatitis episode. Denies HA, CP, palpitations, SOB, diarrhea, fevers, alcohol use. Of note, pt stopped taking gemfibrozil and metformin 2 mo ago to try to control his medical issues with diet alone since he has been well controlled for the past 8 years.    ED course significant for triglycerides 4742, cholesterol 963, lipase 2908, WBC 12. Pt admitted to MICU for management of acute pancreatitis, on IVF and insulin gtt.    Overnight events: Endo recs -- d/c gemfibrozil, start fenofibrate, atorvastatin, lovaza    REVIEW OF SYSTEMS:  CONSTITUTIONAL: No fever, chills, night sweats, or fatigue  EYES: No eye pain, visual disturbances, or discharge  ENMT:  No difficulty hearing, tinnitus, vertigo; No sinus or throat pain  NECK: No pain or stiffness  RESPIRATORY: No cough, wheezing, or hemoptysis; No shortness of breath  CARDIOVASCULAR: No chest pain, palpitations, dizziness, or leg swelling  GASTROINTESTINAL: No abdominal or epigastric pain. No nausea, vomiting, or hematemesis; No diarrhea or constipation. No melena or hematochezia.  GENITOURINARY: No dysuria, frequency, hematuria, or incontinence  NEUROLOGICAL: No headaches, memory loss, loss of strength, numbness, or tremors  SKIN: No itching, burning, rashes, or lesions   LYMPH NODES: No enlarged glands  ENDOCRINE: No heat or cold intolerance; No hair loss  MUSCULOSKELETAL: No joint pain or swelling; No muscle, back, or extremity pain  PSYCHIATRIC: No depression, anxiety, mood swings, or difficulty sleeping  HEME/LYMPH: No easy bruising, or bleeding gums  ALLERGY AND IMMUNOLOGIC: No hives or eczema      OBJECTIVE:  ICU Vital Signs Last 24 Hrs  T(C): 37.2 (02 Aug 2023 04:00), Max: 37.7 (01 Aug 2023 20:00)  T(F): 99 (02 Aug 2023 04:00), Max: 99.9 (01 Aug 2023 20:00)  HR: 99 (02 Aug 2023 04:00) (88 - 121)  BP: 102/56 (02 Aug 2023 04:00) (102/56 - 122/63)  BP(mean): 73 (02 Aug 2023 04:00) (73 - 92)  RR: 16 (02 Aug 2023 04:00) (16 - 28)  SpO2: 94% (02 Aug 2023 04:00) (94% - 97%)    O2 Parameters below as of 01 Aug 2023 20:00  Patient On (Oxygen Delivery Method): room air    07-31 @ 07:01 - 08-01 @ 07:00  --------------------------------------------------------  IN: 1110.2 mL / OUT: 600 mL / NET: 510.2 mL    08-01 @ 07:01  -  08-02 @ 05:32  --------------------------------------------------------  IN: 4712.9 mL / OUT: 2500 mL / NET: 2212.9 mL      CAPILLARY BLOOD GLUCOSE  POCT Blood Glucose.: 122 mg/dL (02 Aug 2023 05:04)      GENERAL: NAD, well-developed  HEAD:  Atraumatic, Normocephalic  EYES: EOMI, PERRLA, conjunctiva and sclera clear  NECK: Supple, No JVD  CHEST/LUNG: Clear to auscultation bilaterally; No wheeze. POCUS ***  HEART: Regular rate and rhythm; No murmurs, rubs, or gallops. POCUS ***  ABDOMEN: Soft, Nontender, Nondistended; Bowel sounds present. POCUS ***  EXTREMITIES:  2+ Peripheral Pulses, No clubbing, cyanosis, or edema  PSYCH: AAOx3, appropriate affect  NEUROLOGY: non-focal, moving all extremities independently  SKIN: No rashes or lesions      HOSPITAL MEDICATIONS:  MEDICATIONS  (STANDING):  atorvastatin 80 milliGRAM(s) Oral at bedtime  chlorhexidine 4% Liquid 1 Application(s) Topical <User Schedule>  dextrose 10%. 1000 milliLiter(s) (75 mL/Hr) IV Continuous <Continuous>  enoxaparin Injectable 40 milliGRAM(s) SubCutaneous every 24 hours  fenofibrate Tablet 145 milliGRAM(s) Oral daily  insulin regular Infusion 5 Unit(s)/Hr (5 mL/Hr) IV Continuous <Continuous>  lactated ringers. 1000 milliLiter(s) (50 mL/Hr) IV Continuous <Continuous>  naloxone Injectable 0.4 milliGRAM(s) IV Push once  omega-3-Acid Ethyl Esters 2 Gram(s) Oral two times a day  ondansetron Injectable 4 milliGRAM(s) IV Push once    MEDICATIONS  (PRN):  acetaminophen     Tablet .. 1000 milliGRAM(s) Oral every 8 hours PRN Mild Pain (1 - 3)  morphine  - Injectable 2 milliGRAM(s) IV Push every 4 hours PRN Moderate Pain (4 - 6)  morphine  - Injectable 4 milliGRAM(s) IV Push every 4 hours PRN Severe Pain (7 - 10)  simethicone 80 milliGRAM(s) Chew every 6 hours PRN Gas      LABS:                        13.2   6.12  )-----------( 155      ( 02 Aug 2023 00:18 )             39.8     Hgb Trend: 13.2<--, 15.6<--, 16.6<--, 17.3<--  08-02    128<L>  |  100  |  10  ----------------------------<  111<H>  5.3   |  18<L>  |  0.50    Ca    8.3<L>      02 Aug 2023 04:20  Phos  3.1     08-02  Mg     2.1     08-02    TPro  6.9  /  Alb  3.4  /  TBili  0.4  /  DBili  x   /  AST  7<L>  /  ALT  <5<L>  /  AlkPhos  51  08-01    Creatinine Trend: 0.50<--, 0.59<--, 0.54<--, 0.49<--, 0.40<--, 0.68<--    Urinalysis Basic - ( 02 Aug 2023 04:20 )    Color: x / Appearance: x / SG: x / pH: x  Gluc: 111 mg/dL / Ketone: x  / Bili: x / Urobili: x   Blood: x / Protein: x / Nitrite: x   Leuk Esterase: x / RBC: x / WBC x   Sq Epi: x / Non Sq Epi: x / Bacteria: x        Venous Blood Gas:  08-02 @ 00:10  7.41/44/41/28/72.2  VBG Lactate: 1.2  Venous Blood Gas:  08-01 @ 06:10  7.43/39/59/26/89.5  VBG Lactate: 1.7  Venous Blood Gas:  08-01 @ 00:41  7.40/33/75/20/96.0  VBG Lactate: 1.3  Venous Blood Gas:  07-31 @ 11:57  7.34/46/19/25/SEE NOTE  VBG Lactate: 1.9      CT Abdomen and Pelvis w/ IV Cont (07.31.23 @ 15:18)   LOWER CHEST: Minimal right lower lobe subsegmental atelectasis.  LIVER: Diffuse low-attenuation of the liver, which may represent steatosis.  BILE DUCTS: Normal caliber.  GALLBLADDER: Cholelithiasis.  SPLEEN: Borderline enlarged, measures 13.1 cm.  PANCREAS: Diffuse peripancreatic infiltration and confluent ill-defined fluid, most prominently about the pancreatic head, concerning for acute pancreatitis. Homogeneous parenchymal enhancement without definite evidence for necrosis. No main pancreatic duct dilation. Pancreatic divisum is better appreciated on the previous MRI. Again seen is a   peripancreatic tail collection that extends along the left anterior pararenal space into the pelvis, which is increased in size and extent since 3/21/2018. For example, a component adjacent to the pancreatic tail that exerts mass effect along the undersurface of the stomach now measures 7.1 x 5.2 cm (series 3 image 35), previously 4.6 x 4.6 cm. The collection spans approximately 14.8 cm in craniocaudal dimension, previously 13.7 cm when measured in similar fashion. There are additional loculated components extending along the left iliopsoas musculature that are new since 3/21/2018, with example interposed between iliacus and psoas muscles measuring 2.8 x 1.3 cm (series 3 image 72). Calcifications   are again noted along the inferior margin of the collection.  ADRENALS: Within normal limits.  KIDNEYS/URETERS: Bilateral subcentimeter hypodense renal foci that are too small to characterize. No hydronephrosis.  BLADDER: Within normal limits.  REPRODUCTIVE ORGANS: Prostate within normal limits.  BOWEL: Periduodenal fluid, likely reactive. No bowel obstruction.   Appendix is normal.  PERITONEUM: No ascites.  VESSELS: The hepatic and portal veins are patent. Patent superior mesenteric vein.The splenic vein is not definitively identified, similar to the previous exam, may be diminutive or chronically thrombosed. Again seen are prominent perigastric, perisplenic, and gastroepiploic collateral vessels, similar to prior.  RETROPERITONEUM/LYMPH NODES: No lymphadenopathy.  ABDOMINAL WALL: Within normal limits.  BONES: Within normal limits.    IMPRESSION:  Peripancreatic infiltration and confluent ill-defined fluid, compatible with acute pancreatitis.  Increased size of a chronic peripancreatic tail collection since 2018.     38 y/o male, PMH pre-DM, hypertriglyceridemia (follows with Dr. Michelle), multiple admissions of pancreatitis (last adm 2015 for necrotizing pancreatitis), pancreatic pseudocyst, initially presented to ED on 7/31/23 c/o abdominal pain x2 days. states pain is sharp, sudden onset, left sided, radiates towards the back b/l, 10/10. Pt also admitted associated nausea, 8 episodes of vomit ("cortes, creme" yellow in color, no blood), and unable to tolerate PO. Pt stated he has not had BMs in the past 2 days due to decreased PO. States the pain feels similar to his prior pancreatitis episode. Denies HA, CP, palpitations, SOB, diarrhea, fevers, alcohol use. Of note, pt stopped taking gemfibrozil and metformin 2 mo ago to try to control his medical issues with diet alone since he has been well controlled for the past 8 years.    ED course significant for triglycerides 4742, cholesterol 963, lipase 2908, WBC 12. Pt admitted to MICU for management of acute pancreatitis, on IVF and insulin gtt.    Overnight events: Endo recs -- d/c gemfibrozil, start fenofibrate, atorvastatin, lovaza    REVIEW OF SYSTEMS:  CONSTITUTIONAL: No fever, chills, night sweats, or fatigue  EYES: No visual disturbances  ENT:  No difficulty hearing; No sinus or throat pain  NECK: No pain or stiffness  RESPIRATORY: No cough, wheezing, or hemoptysis; No shortness of breath  CARDIOVASCULAR: No chest pain, palpitations, dizziness, or leg swelling  GASTROINTESTINAL: +left sided abdominal. No nausea, vomiting, or hematemesis; No diarrhea or constipation. No melena or hematochezia.  GENITOURINARY: No dysuria, frequency, hematuria, or incontinence  NEUROLOGICAL: No headaches, numbness, or tremors  SKIN: No itching, burning, rashes, or lesions   MUSCULOSKELETAL: No joint pain or swelling; No muscle or extremity pain      OBJECTIVE:  ICU Vital Signs Last 24 Hrs  T(C): 37.2 (02 Aug 2023 04:00), Max: 37.7 (01 Aug 2023 20:00)  T(F): 99 (02 Aug 2023 04:00), Max: 99.9 (01 Aug 2023 20:00)  HR: 99 (02 Aug 2023 04:00) (88 - 121)  BP: 102/56 (02 Aug 2023 04:00) (102/56 - 122/63)  BP(mean): 73 (02 Aug 2023 04:00) (73 - 92)  RR: 16 (02 Aug 2023 04:00) (16 - 28)  SpO2: 94% (02 Aug 2023 04:00) (94% - 97%)    O2 Parameters below as of 01 Aug 2023 20:00  Patient On (Oxygen Delivery Method): room air    07-31 @ 07:01  -  08-01 @ 07:00  --------------------------------------------------------  IN: 1110.2 mL / OUT: 600 mL / NET: 510.2 mL    08-01 @ 07:01  -  08-02 @ 05:32  --------------------------------------------------------  IN: 4712.9 mL / OUT: 2500 mL / NET: 2212.9 mL      CAPILLARY BLOOD GLUCOSE  POCT Blood Glucose.: 122 mg/dL (02 Aug 2023 05:04)      GENERAL: NAD, well-developed  HEAD:  Atraumatic, Normocephalic  EYES: EOMI, conjunctiva and sclera clear  NECK: Supple  CHEST/LUNG: Clear to auscultation bilaterally; No wheeze.  HEART: Regular rate and rhythm; No murmurs, rubs, or gallops.  ABDOMEN: +left sided tenderness. Soft, Nondistended; Bowel sounds present.  EXTREMITIES:  2+ Peripheral Pulses, No clubbing, cyanosis, or edema  PSYCH: AOx3, appropriate affect  NEUROLOGY: non-focal, moving all extremities independently  SKIN: No rashes or lesions      HOSPITAL MEDICATIONS:  MEDICATIONS  (STANDING):  atorvastatin 80 milliGRAM(s) Oral at bedtime  chlorhexidine 4% Liquid 1 Application(s) Topical <User Schedule>  dextrose 10%. 1000 milliLiter(s) (75 mL/Hr) IV Continuous <Continuous>  enoxaparin Injectable 40 milliGRAM(s) SubCutaneous every 24 hours  fenofibrate Tablet 145 milliGRAM(s) Oral daily  insulin regular Infusion 5 Unit(s)/Hr (5 mL/Hr) IV Continuous <Continuous>  lactated ringers. 1000 milliLiter(s) (50 mL/Hr) IV Continuous <Continuous>  naloxone Injectable 0.4 milliGRAM(s) IV Push once  omega-3-Acid Ethyl Esters 2 Gram(s) Oral two times a day  ondansetron Injectable 4 milliGRAM(s) IV Push once    MEDICATIONS  (PRN):  acetaminophen     Tablet .. 1000 milliGRAM(s) Oral every 8 hours PRN Mild Pain (1 - 3)  morphine  - Injectable 2 milliGRAM(s) IV Push every 4 hours PRN Moderate Pain (4 - 6)  morphine  - Injectable 4 milliGRAM(s) IV Push every 4 hours PRN Severe Pain (7 - 10)  simethicone 80 milliGRAM(s) Chew every 6 hours PRN Gas      LABS:                        13.2   6.12  )-----------( 155      ( 02 Aug 2023 00:18 )             39.8     Hgb Trend: 13.2<--, 15.6<--, 16.6<--, 17.3<--  08-02    128<L>  |  100  |  10  ----------------------------<  111<H>  5.3   |  18<L>  |  0.50    Ca    8.3<L>      02 Aug 2023 04:20  Phos  3.1     08-02  Mg     2.1     08-02    TPro  6.9  /  Alb  3.4  /  TBili  0.4  /  DBili  x   /  AST  7<L>  /  ALT  <5<L>  /  AlkPhos  51  08-01    Creatinine Trend: 0.50<--, 0.59<--, 0.54<--, 0.49<--, 0.40<--, 0.68<--    Urinalysis Basic - ( 02 Aug 2023 04:20 )    Color: x / Appearance: x / SG: x / pH: x  Gluc: 111 mg/dL / Ketone: x  / Bili: x / Urobili: x   Blood: x / Protein: x / Nitrite: x   Leuk Esterase: x / RBC: x / WBC x   Sq Epi: x / Non Sq Epi: x / Bacteria: x        Venous Blood Gas:  08-02 @ 00:10  7.41/44/41/28/72.2  VBG Lactate: 1.2  Venous Blood Gas:  08-01 @ 06:10  7.43/39/59/26/89.5  VBG Lactate: 1.7  Venous Blood Gas:  08-01 @ 00:41  7.40/33/75/20/96.0  VBG Lactate: 1.3  Venous Blood Gas:  07-31 @ 11:57  7.34/46/19/25/SEE NOTE  VBG Lactate: 1.9      CT Abdomen and Pelvis w/ IV Cont (07.31.23 @ 15:18)   LOWER CHEST: Minimal right lower lobe subsegmental atelectasis.  LIVER: Diffuse low-attenuation of the liver, which may represent steatosis.  BILE DUCTS: Normal caliber.  GALLBLADDER: Cholelithiasis.  SPLEEN: Borderline enlarged, measures 13.1 cm.  PANCREAS: Diffuse peripancreatic infiltration and confluent ill-defined fluid, most prominently about the pancreatic head, concerning for acute pancreatitis. Homogeneous parenchymal enhancement without definite evidence for necrosis. No main pancreatic duct dilation. Pancreatic divisum is better appreciated on the previous MRI. Again seen is a   peripancreatic tail collection that extends along the left anterior pararenal space into the pelvis, which is increased in size and extent since 3/21/2018. For example, a component adjacent to the pancreatic tail that exerts mass effect along the undersurface of the stomach now measures 7.1 x 5.2 cm (series 3 image 35), previously 4.6 x 4.6 cm. The collection spans approximately 14.8 cm in craniocaudal dimension, previously 13.7 cm when measured in similar fashion. There are additional loculated components extending along the left iliopsoas musculature that are new since 3/21/2018, with example interposed between iliacus and psoas muscles measuring 2.8 x 1.3 cm (series 3 image 72). Calcifications   are again noted along the inferior margin of the collection.  ADRENALS: Within normal limits.  KIDNEYS/URETERS: Bilateral subcentimeter hypodense renal foci that are too small to characterize. No hydronephrosis.  BLADDER: Within normal limits.  REPRODUCTIVE ORGANS: Prostate within normal limits.  BOWEL: Periduodenal fluid, likely reactive. No bowel obstruction.   Appendix is normal.  PERITONEUM: No ascites.  VESSELS: The hepatic and portal veins are patent. Patent superior mesenteric vein.The splenic vein is not definitively identified, similar to the previous exam, may be diminutive or chronically thrombosed. Again seen are prominent perigastric, perisplenic, and gastroepiploic collateral vessels, similar to prior.  RETROPERITONEUM/LYMPH NODES: No lymphadenopathy.  ABDOMINAL WALL: Within normal limits.  BONES: Within normal limits.    IMPRESSION:  Peripancreatic infiltration and confluent ill-defined fluid, compatible with acute pancreatitis.  Increased size of a chronic peripancreatic tail collection since 2018.     38 y/o male, PMH pre-DM, hypertriglyceridemia (follows with Dr. Michelle), multiple admissions of pancreatitis (last adm 2015 for necrotizing pancreatitis), pancreatic pseudocyst, initially presented to ED on 7/31/23 c/o abdominal pain x2 days. states pain is sharp, sudden onset, left sided, radiates towards the back b/l, 10/10. Pt also admitted associated nausea, 8 episodes of vomit ("cortes, creme" yellow in color, no blood), and unable to tolerate PO. Pt stated he has not had BMs in the past 2 days due to decreased PO. States the pain feels similar to his prior pancreatitis episode. Denies HA, CP, palpitations, SOB, diarrhea, fevers, alcohol use. Of note, pt stopped taking gemfibrozil and metformin 2 mo ago to try to control his medical issues with diet alone since he has been well controlled for the past 8 years.    ED course significant for triglycerides 4742, cholesterol 963, lipase 2908, WBC 12. Pt admitted to MICU for management of acute pancreatitis, on IVF and insulin gtt.    Overnight events: Endo recs -- d/c gemfibrozil, start fenofibrate, atorvastatin, lovaza. FS down to 93 --> LR / d10 / insulin gtts adjusted -- POCT 119 mg/dL (08.02.23 @ 06:54)    REVIEW OF SYSTEMS:  CONSTITUTIONAL: No fever, chills, night sweats, or fatigue  EYES: No visual disturbances  ENT:  No difficulty hearing; No sinus or throat pain  NECK: No pain or stiffness  RESPIRATORY: No cough, wheezing, or hemoptysis; No shortness of breath  CARDIOVASCULAR: No chest pain, palpitations, dizziness, or leg swelling  GASTROINTESTINAL: +left sided abdominal. No nausea, vomiting, or hematemesis; No diarrhea or constipation. No melena or hematochezia.  GENITOURINARY: No dysuria, frequency, hematuria, or incontinence  NEUROLOGICAL: No headaches, numbness, or tremors  SKIN: No itching, burning, rashes, or lesions   MUSCULOSKELETAL: No joint pain or swelling; No muscle or extremity pain      OBJECTIVE:  ICU Vital Signs Last 24 Hrs  T(C): 37.2 (02 Aug 2023 04:00), Max: 37.7 (01 Aug 2023 20:00)  T(F): 99 (02 Aug 2023 04:00), Max: 99.9 (01 Aug 2023 20:00)  HR: 99 (02 Aug 2023 04:00) (88 - 121)  BP: 102/56 (02 Aug 2023 04:00) (102/56 - 122/63)  BP(mean): 73 (02 Aug 2023 04:00) (73 - 92)  RR: 16 (02 Aug 2023 04:00) (16 - 28)  SpO2: 94% (02 Aug 2023 04:00) (94% - 97%)    O2 Parameters below as of 01 Aug 2023 20:00  Patient On (Oxygen Delivery Method): room air    07-31 @ 07:01  -  08-01 @ 07:00  --------------------------------------------------------  IN: 1110.2 mL / OUT: 600 mL / NET: 510.2 mL    08-01 @ 07:01 - 08-02 @ 05:32  --------------------------------------------------------  IN: 4712.9 mL / OUT: 2500 mL / NET: 2212.9 mL      CAPILLARY BLOOD GLUCOSE  POCT Blood Glucose.: 122 mg/dL (02 Aug 2023 05:04)      GENERAL: NAD, well-developed  HEAD:  Atraumatic, Normocephalic  EYES: EOMI, conjunctiva and sclera clear  NECK: Supple  CHEST/LUNG: Clear to auscultation bilaterally; No wheeze.  HEART: Regular rate and rhythm; No murmurs, rubs, or gallops.  ABDOMEN: +left sided tenderness. Soft, Nondistended; Bowel sounds present.  EXTREMITIES:  2+ Peripheral Pulses, No clubbing, cyanosis, or edema  PSYCH: AOx3, appropriate affect  NEUROLOGY: non-focal, moving all extremities independently  SKIN: No rashes or lesions      HOSPITAL MEDICATIONS:  MEDICATIONS  (STANDING):  atorvastatin 80 milliGRAM(s) Oral at bedtime  chlorhexidine 4% Liquid 1 Application(s) Topical <User Schedule>  dextrose 10%. 1000 milliLiter(s) (75 mL/Hr) IV Continuous <Continuous>  enoxaparin Injectable 40 milliGRAM(s) SubCutaneous every 24 hours  fenofibrate Tablet 145 milliGRAM(s) Oral daily  insulin regular Infusion 5 Unit(s)/Hr (5 mL/Hr) IV Continuous <Continuous>  lactated ringers. 1000 milliLiter(s) (50 mL/Hr) IV Continuous <Continuous>  naloxone Injectable 0.4 milliGRAM(s) IV Push once  omega-3-Acid Ethyl Esters 2 Gram(s) Oral two times a day  ondansetron Injectable 4 milliGRAM(s) IV Push once    MEDICATIONS  (PRN):  acetaminophen     Tablet .. 1000 milliGRAM(s) Oral every 8 hours PRN Mild Pain (1 - 3)  morphine  - Injectable 2 milliGRAM(s) IV Push every 4 hours PRN Moderate Pain (4 - 6)  morphine  - Injectable 4 milliGRAM(s) IV Push every 4 hours PRN Severe Pain (7 - 10)  simethicone 80 milliGRAM(s) Chew every 6 hours PRN Gas      LABS:                        13.2   6.12  )-----------( 155      ( 02 Aug 2023 00:18 )             39.8     Hgb Trend: 13.2<--, 15.6<--, 16.6<--, 17.3<--  08-02    128<L>  |  100  |  10  ----------------------------<  111<H>  5.3   |  18<L>  |  0.50    Ca    8.3<L>      02 Aug 2023 04:20  Phos  3.1     08-02  Mg     2.1     08-02    TPro  6.9  /  Alb  3.4  /  TBili  0.4  /  DBili  x   /  AST  7<L>  /  ALT  <5<L>  /  AlkPhos  51  08-01    Creatinine Trend: 0.50<--, 0.59<--, 0.54<--, 0.49<--, 0.40<--, 0.68<--    Urinalysis Basic - ( 02 Aug 2023 04:20 )    Color: x / Appearance: x / SG: x / pH: x  Gluc: 111 mg/dL / Ketone: x  / Bili: x / Urobili: x   Blood: x / Protein: x / Nitrite: x   Leuk Esterase: x / RBC: x / WBC x   Sq Epi: x / Non Sq Epi: x / Bacteria: x        Venous Blood Gas:  08-02 @ 00:10  7.41/44/41/28/72.2  VBG Lactate: 1.2  Venous Blood Gas:  08-01 @ 06:10  7.43/39/59/26/89.5  VBG Lactate: 1.7  Venous Blood Gas:  08-01 @ 00:41  7.40/33/75/20/96.0  VBG Lactate: 1.3  Venous Blood Gas:  07-31 @ 11:57  7.34/46/19/25/SEE NOTE  VBG Lactate: 1.9      CT Abdomen and Pelvis w/ IV Cont (07.31.23 @ 15:18)   LOWER CHEST: Minimal right lower lobe subsegmental atelectasis.  LIVER: Diffuse low-attenuation of the liver, which may represent steatosis.  BILE DUCTS: Normal caliber.  GALLBLADDER: Cholelithiasis.  SPLEEN: Borderline enlarged, measures 13.1 cm.  PANCREAS: Diffuse peripancreatic infiltration and confluent ill-defined fluid, most prominently about the pancreatic head, concerning for acute pancreatitis. Homogeneous parenchymal enhancement without definite evidence for necrosis. No main pancreatic duct dilation. Pancreatic divisum is better appreciated on the previous MRI. Again seen is a   peripancreatic tail collection that extends along the left anterior pararenal space into the pelvis, which is increased in size and extent since 3/21/2018. For example, a component adjacent to the pancreatic tail that exerts mass effect along the undersurface of the stomach now measures 7.1 x 5.2 cm (series 3 image 35), previously 4.6 x 4.6 cm. The collection spans approximately 14.8 cm in craniocaudal dimension, previously 13.7 cm when measured in similar fashion. There are additional loculated components extending along the left iliopsoas musculature that are new since 3/21/2018, with example interposed between iliacus and psoas muscles measuring 2.8 x 1.3 cm (series 3 image 72). Calcifications   are again noted along the inferior margin of the collection.  ADRENALS: Within normal limits.  KIDNEYS/URETERS: Bilateral subcentimeter hypodense renal foci that are too small to characterize. No hydronephrosis.  BLADDER: Within normal limits.  REPRODUCTIVE ORGANS: Prostate within normal limits.  BOWEL: Periduodenal fluid, likely reactive. No bowel obstruction.   Appendix is normal.  PERITONEUM: No ascites.  VESSELS: The hepatic and portal veins are patent. Patent superior mesenteric vein.The splenic vein is not definitively identified, similar to the previous exam, may be diminutive or chronically thrombosed. Again seen are prominent perigastric, perisplenic, and gastroepiploic collateral vessels, similar to prior.  RETROPERITONEUM/LYMPH NODES: No lymphadenopathy.  ABDOMINAL WALL: Within normal limits.  BONES: Within normal limits.    IMPRESSION:  Peripancreatic infiltration and confluent ill-defined fluid, compatible with acute pancreatitis.  Increased size of a chronic peripancreatic tail collection since 2018.     36 y/o male, PMH pre-DM, hypertriglyceridemia (follows with Dr. Michelle), multiple admissions of pancreatitis (last adm 2015 for necrotizing pancreatitis), pancreatic pseudocyst, initially presented to ED on 7/31/23 c/o abdominal pain x2 days. described pain as sharp, sudden onset, left sided, radiates towards the back b/l, 10/10. Pt also admitted associated nausea, 8 episodes of vomit ("cortes, creme" yellow in color, no blood), and unable to tolerate PO. Pt stated he has not had BMs in the past 2 days due to decreased PO. States the pain feels similar to his prior pancreatitis episode. Denies HA, CP, palpitations, SOB, diarrhea, fevers. Denies alcohol use. Of note, pt stopped taking gemfibrozil and metformin 2 mo ago to try to control his medical issues with diet alone since he has been well controlled for the past 8 years.    ED course significant for triglycerides 4742, cholesterol 963, lipase 2908, WBC 12. Pt admitted to MICU for management of acute pancreatitis, on IVF and insulin gtt.    Overnight events: Endo recs -- d/c gemfibrozil, start fenofibrate, atorvastatin, lovaza. FS down to 93 --> LR / d10 / insulin gtts adjusted -- POCT 119 mg/dL (08.02.23 @ 06:54)    REVIEW OF SYSTEMS:  CONSTITUTIONAL: No fever, chills, night sweats, or fatigue  EYES: No visual disturbances  ENT:  No difficulty hearing; No sinus or throat pain  NECK: No pain or stiffness  RESPIRATORY: No cough, wheezing, or hemoptysis; No shortness of breath  CARDIOVASCULAR: No chest pain, palpitations, dizziness, or leg swelling  GASTROINTESTINAL: +left sided abdominal. No nausea, vomiting, or hematemesis; No diarrhea or constipation. No melena or hematochezia.  GENITOURINARY: No dysuria, frequency, hematuria, or incontinence  NEUROLOGICAL: No headaches, numbness, or tremors  SKIN: No itching, burning, rashes, or lesions   MUSCULOSKELETAL: No joint pain or swelling; No muscle or extremity pain      OBJECTIVE:  ICU Vital Signs Last 24 Hrs  T(C): 37.2 (02 Aug 2023 04:00), Max: 37.7 (01 Aug 2023 20:00)  T(F): 99 (02 Aug 2023 04:00), Max: 99.9 (01 Aug 2023 20:00)  HR: 99 (02 Aug 2023 04:00) (88 - 121)  BP: 102/56 (02 Aug 2023 04:00) (102/56 - 122/63)  BP(mean): 73 (02 Aug 2023 04:00) (73 - 92)  RR: 16 (02 Aug 2023 04:00) (16 - 28)  SpO2: 94% (02 Aug 2023 04:00) (94% - 97%)    O2 Parameters below as of 01 Aug 2023 20:00  Patient On (Oxygen Delivery Method): room air    07-31 @ 07:01  -  08-01 @ 07:00  --------------------------------------------------------  IN: 1110.2 mL / OUT: 600 mL / NET: 510.2 mL    08-01 @ 07:01  -  08-02 @ 05:32  --------------------------------------------------------  IN: 4712.9 mL / OUT: 2500 mL / NET: 2212.9 mL      CAPILLARY BLOOD GLUCOSE  POCT Blood Glucose.: 122 mg/dL (02 Aug 2023 05:04)      GENERAL: NAD, well-developed  HEAD:  Atraumatic, Normocephalic  EYES: EOMI, conjunctiva and sclera clear  NECK: Supple  CHEST/LUNG: Clear to auscultation bilaterally; No wheeze.  HEART: Regular rate and rhythm; No murmurs, rubs, or gallops.  ABDOMEN: +left sided tenderness. Soft, Nondistended; Bowel sounds present.  EXTREMITIES:  2+ Peripheral Pulses, No clubbing, cyanosis, or edema  PSYCH: AOx3, appropriate affect  NEUROLOGY: non-focal, moving all extremities independently  SKIN: No rashes or lesions      HOSPITAL MEDICATIONS:  MEDICATIONS  (STANDING):  atorvastatin 80 milliGRAM(s) Oral at bedtime  chlorhexidine 4% Liquid 1 Application(s) Topical <User Schedule>  dextrose 10%. 1000 milliLiter(s) (75 mL/Hr) IV Continuous <Continuous>  enoxaparin Injectable 40 milliGRAM(s) SubCutaneous every 24 hours  fenofibrate Tablet 145 milliGRAM(s) Oral daily  insulin regular Infusion 5 Unit(s)/Hr (5 mL/Hr) IV Continuous <Continuous>  lactated ringers. 1000 milliLiter(s) (50 mL/Hr) IV Continuous <Continuous>  naloxone Injectable 0.4 milliGRAM(s) IV Push once  omega-3-Acid Ethyl Esters 2 Gram(s) Oral two times a day  ondansetron Injectable 4 milliGRAM(s) IV Push once    MEDICATIONS  (PRN):  acetaminophen     Tablet .. 1000 milliGRAM(s) Oral every 8 hours PRN Mild Pain (1 - 3)  morphine  - Injectable 2 milliGRAM(s) IV Push every 4 hours PRN Moderate Pain (4 - 6)  morphine  - Injectable 4 milliGRAM(s) IV Push every 4 hours PRN Severe Pain (7 - 10)  simethicone 80 milliGRAM(s) Chew every 6 hours PRN Gas      LABS:                        13.2   6.12  )-----------( 155      ( 02 Aug 2023 00:18 )             39.8     Hgb Trend: 13.2<--, 15.6<--, 16.6<--, 17.3<--  08-02    128<L>  |  100  |  10  ----------------------------<  111<H>  5.3   |  18<L>  |  0.50    Ca    8.3<L>      02 Aug 2023 04:20  Phos  3.1     08-02  Mg     2.1     08-02    TPro  6.9  /  Alb  3.4  /  TBili  0.4  /  DBili  x   /  AST  7<L>  /  ALT  <5<L>  /  AlkPhos  51  08-01    Creatinine Trend: 0.50<--, 0.59<--, 0.54<--, 0.49<--, 0.40<--, 0.68<--    Urinalysis Basic - ( 02 Aug 2023 04:20 )    Color: x / Appearance: x / SG: x / pH: x  Gluc: 111 mg/dL / Ketone: x  / Bili: x / Urobili: x   Blood: x / Protein: x / Nitrite: x   Leuk Esterase: x / RBC: x / WBC x   Sq Epi: x / Non Sq Epi: x / Bacteria: x        Venous Blood Gas:  08-02 @ 00:10  7.41/44/41/28/72.2  VBG Lactate: 1.2  Venous Blood Gas:  08-01 @ 06:10  7.43/39/59/26/89.5  VBG Lactate: 1.7  Venous Blood Gas:  08-01 @ 00:41  7.40/33/75/20/96.0  VBG Lactate: 1.3  Venous Blood Gas:  07-31 @ 11:57  7.34/46/19/25/SEE NOTE  VBG Lactate: 1.9      CT Abdomen and Pelvis w/ IV Cont (07.31.23 @ 15:18)   LOWER CHEST: Minimal right lower lobe subsegmental atelectasis.  LIVER: Diffuse low-attenuation of the liver, which may represent steatosis.  BILE DUCTS: Normal caliber.  GALLBLADDER: Cholelithiasis.  SPLEEN: Borderline enlarged, measures 13.1 cm.  PANCREAS: Diffuse peripancreatic infiltration and confluent ill-defined fluid, most prominently about the pancreatic head, concerning for acute pancreatitis. Homogeneous parenchymal enhancement without definite evidence for necrosis. No main pancreatic duct dilation. Pancreatic divisum is better appreciated on the previous MRI. Again seen is a   peripancreatic tail collection that extends along the left anterior pararenal space into the pelvis, which is increased in size and extent since 3/21/2018. For example, a component adjacent to the pancreatic tail that exerts mass effect along the undersurface of the stomach now measures 7.1 x 5.2 cm (series 3 image 35), previously 4.6 x 4.6 cm. The collection spans approximately 14.8 cm in craniocaudal dimension, previously 13.7 cm when measured in similar fashion. There are additional loculated components extending along the left iliopsoas musculature that are new since 3/21/2018, with example interposed between iliacus and psoas muscles measuring 2.8 x 1.3 cm (series 3 image 72). Calcifications   are again noted along the inferior margin of the collection.  ADRENALS: Within normal limits.  KIDNEYS/URETERS: Bilateral subcentimeter hypodense renal foci that are too small to characterize. No hydronephrosis.  BLADDER: Within normal limits.  REPRODUCTIVE ORGANS: Prostate within normal limits.  BOWEL: Periduodenal fluid, likely reactive. No bowel obstruction.   Appendix is normal.  PERITONEUM: No ascites.  VESSELS: The hepatic and portal veins are patent. Patent superior mesenteric vein.The splenic vein is not definitively identified, similar to the previous exam, may be diminutive or chronically thrombosed. Again seen are prominent perigastric, perisplenic, and gastroepiploic collateral vessels, similar to prior.  RETROPERITONEUM/LYMPH NODES: No lymphadenopathy.  ABDOMINAL WALL: Within normal limits.  BONES: Within normal limits.    IMPRESSION:  Peripancreatic infiltration and confluent ill-defined fluid, compatible with acute pancreatitis.  Increased size of a chronic peripancreatic tail collection since 2018.     38 y/o male, PMH pre-DM, hypertriglyceridemia (follows with Dr. Michelle), multiple admissions of pancreatitis (last adm 2015 for necrotizing pancreatitis), pancreatic pseudocyst, initially presented to ED on 7/31/23 c/o abdominal pain x2 days. described pain as sharp, sudden onset, left sided, radiates towards the back b/l, 10/10. Pt also admitted associated nausea, 8 episodes of vomit ("cortes, creme" yellow in color, no blood), and unable to tolerate PO. Pt stated he has not had BMs in the past 2 days due to decreased PO. States the pain feels similar to his prior pancreatitis episode. Denies HA, CP, palpitations, SOB, diarrhea, fevers. Denies alcohol use. Pt admits stopping gemfibrozil and metformin 2 mo ago to try to control his medical issues with diet alone since he has been well controlled for the past 8 years.    ED course significant for triglycerides 4742, cholesterol 963, lipase 2908, WBC 12. Pt admitted to MICU for management of acute pancreatitis, on IVF and insulin gtt.    Overnight events: Endo recs -- d/c gemfibrozil, start fenofibrate, atorvastatin, lovaza. FS down to 93 --> LR / d10 / insulin gtts adjusted -- POCT 119 mg/dL (08.02.23 @ 06:54)    REVIEW OF SYSTEMS:  CONSTITUTIONAL: No fever, chills, night sweats, or fatigue  EYES: No visual disturbances  ENT:  No difficulty hearing; No sinus or throat pain  NECK: No pain or stiffness  RESPIRATORY: No cough, wheezing, or hemoptysis; No shortness of breath  CARDIOVASCULAR: No chest pain, palpitations, dizziness, or leg swelling  GASTROINTESTINAL: +left sided abdominal. No nausea, vomiting, or hematemesis; No diarrhea or constipation. No melena or hematochezia.  GENITOURINARY: No dysuria, frequency, hematuria, or incontinence  NEUROLOGICAL: No headaches, numbness, or tremors  SKIN: No itching, burning, rashes, or lesions   MUSCULOSKELETAL: No joint pain or swelling; No muscle or extremity pain      OBJECTIVE:  ICU Vital Signs Last 24 Hrs  T(C): 37.2 (02 Aug 2023 04:00), Max: 37.7 (01 Aug 2023 20:00)  T(F): 99 (02 Aug 2023 04:00), Max: 99.9 (01 Aug 2023 20:00)  HR: 99 (02 Aug 2023 04:00) (88 - 121)  BP: 102/56 (02 Aug 2023 04:00) (102/56 - 122/63)  BP(mean): 73 (02 Aug 2023 04:00) (73 - 92)  RR: 16 (02 Aug 2023 04:00) (16 - 28)  SpO2: 94% (02 Aug 2023 04:00) (94% - 97%)    O2 Parameters below as of 01 Aug 2023 20:00  Patient On (Oxygen Delivery Method): room air    07-31 @ 07:01  -  08-01 @ 07:00  --------------------------------------------------------  IN: 1110.2 mL / OUT: 600 mL / NET: 510.2 mL    08-01 @ 07:01 - 08-02 @ 05:32  --------------------------------------------------------  IN: 4712.9 mL / OUT: 2500 mL / NET: 2212.9 mL      CAPILLARY BLOOD GLUCOSE  POCT Blood Glucose.: 122 mg/dL (02 Aug 2023 05:04)      GENERAL: NAD, well-developed  HEAD:  Atraumatic, Normocephalic  EYES: EOMI, conjunctiva and sclera clear  NECK: Supple  CHEST/LUNG: Clear to auscultation bilaterally; No wheeze.  HEART: Regular rate and rhythm; No murmurs, rubs, or gallops.  ABDOMEN: +left sided tenderness. Soft, Nondistended; Bowel sounds present.  EXTREMITIES:  2+ Peripheral Pulses, No clubbing, cyanosis, or edema  PSYCH: AOx3, appropriate affect  NEUROLOGY: non-focal, moving all extremities independently  SKIN: No rashes or lesions      HOSPITAL MEDICATIONS:  MEDICATIONS  (STANDING):  atorvastatin 80 milliGRAM(s) Oral at bedtime  chlorhexidine 4% Liquid 1 Application(s) Topical <User Schedule>  dextrose 10%. 1000 milliLiter(s) (75 mL/Hr) IV Continuous <Continuous>  enoxaparin Injectable 40 milliGRAM(s) SubCutaneous every 24 hours  fenofibrate Tablet 145 milliGRAM(s) Oral daily  insulin regular Infusion 5 Unit(s)/Hr (5 mL/Hr) IV Continuous <Continuous>  lactated ringers. 1000 milliLiter(s) (50 mL/Hr) IV Continuous <Continuous>  naloxone Injectable 0.4 milliGRAM(s) IV Push once  omega-3-Acid Ethyl Esters 2 Gram(s) Oral two times a day  ondansetron Injectable 4 milliGRAM(s) IV Push once    MEDICATIONS  (PRN):  acetaminophen     Tablet .. 1000 milliGRAM(s) Oral every 8 hours PRN Mild Pain (1 - 3)  morphine  - Injectable 2 milliGRAM(s) IV Push every 4 hours PRN Moderate Pain (4 - 6)  morphine  - Injectable 4 milliGRAM(s) IV Push every 4 hours PRN Severe Pain (7 - 10)  simethicone 80 milliGRAM(s) Chew every 6 hours PRN Gas      LABS:                        13.2   6.12  )-----------( 155      ( 02 Aug 2023 00:18 )             39.8     Hgb Trend: 13.2<--, 15.6<--, 16.6<--, 17.3<--  08-02    128<L>  |  100  |  10  ----------------------------<  111<H>  5.3   |  18<L>  |  0.50    Ca    8.3<L>      02 Aug 2023 04:20  Phos  3.1     08-02  Mg     2.1     08-02    TPro  6.9  /  Alb  3.4  /  TBili  0.4  /  DBili  x   /  AST  7<L>  /  ALT  <5<L>  /  AlkPhos  51  08-01    Creatinine Trend: 0.50<--, 0.59<--, 0.54<--, 0.49<--, 0.40<--, 0.68<--    Urinalysis Basic - ( 02 Aug 2023 04:20 )    Color: x / Appearance: x / SG: x / pH: x  Gluc: 111 mg/dL / Ketone: x  / Bili: x / Urobili: x   Blood: x / Protein: x / Nitrite: x   Leuk Esterase: x / RBC: x / WBC x   Sq Epi: x / Non Sq Epi: x / Bacteria: x        Venous Blood Gas:  08-02 @ 00:10  7.41/44/41/28/72.2  VBG Lactate: 1.2  Venous Blood Gas:  08-01 @ 06:10  7.43/39/59/26/89.5  VBG Lactate: 1.7  Venous Blood Gas:  08-01 @ 00:41  7.40/33/75/20/96.0  VBG Lactate: 1.3  Venous Blood Gas:  07-31 @ 11:57  7.34/46/19/25/SEE NOTE  VBG Lactate: 1.9      CT Abdomen and Pelvis w/ IV Cont (07.31.23 @ 15:18)   LOWER CHEST: Minimal right lower lobe subsegmental atelectasis.  LIVER: Diffuse low-attenuation of the liver, which may represent steatosis.  BILE DUCTS: Normal caliber.  GALLBLADDER: Cholelithiasis.  SPLEEN: Borderline enlarged, measures 13.1 cm.  PANCREAS: Diffuse peripancreatic infiltration and confluent ill-defined fluid, most prominently about the pancreatic head, concerning for acute pancreatitis. Homogeneous parenchymal enhancement without definite evidence for necrosis. No main pancreatic duct dilation. Pancreatic divisum is better appreciated on the previous MRI. Again seen is a   peripancreatic tail collection that extends along the left anterior pararenal space into the pelvis, which is increased in size and extent since 3/21/2018. For example, a component adjacent to the pancreatic tail that exerts mass effect along the undersurface of the stomach now measures 7.1 x 5.2 cm (series 3 image 35), previously 4.6 x 4.6 cm. The collection spans approximately 14.8 cm in craniocaudal dimension, previously 13.7 cm when measured in similar fashion. There are additional loculated components extending along the left iliopsoas musculature that are new since 3/21/2018, with example interposed between iliacus and psoas muscles measuring 2.8 x 1.3 cm (series 3 image 72). Calcifications   are again noted along the inferior margin of the collection.  ADRENALS: Within normal limits.  KIDNEYS/URETERS: Bilateral subcentimeter hypodense renal foci that are too small to characterize. No hydronephrosis.  BLADDER: Within normal limits.  REPRODUCTIVE ORGANS: Prostate within normal limits.  BOWEL: Periduodenal fluid, likely reactive. No bowel obstruction.   Appendix is normal.  PERITONEUM: No ascites.  VESSELS: The hepatic and portal veins are patent. Patent superior mesenteric vein.The splenic vein is not definitively identified, similar to the previous exam, may be diminutive or chronically thrombosed. Again seen are prominent perigastric, perisplenic, and gastroepiploic collateral vessels, similar to prior.  RETROPERITONEUM/LYMPH NODES: No lymphadenopathy.  ABDOMINAL WALL: Within normal limits.  BONES: Within normal limits.    IMPRESSION:  Peripancreatic infiltration and confluent ill-defined fluid, compatible with acute pancreatitis.  Increased size of a chronic peripancreatic tail collection since 2018.

## 2023-08-02 NOTE — PROGRESS NOTE ADULT - SUBJECTIVE AND OBJECTIVE BOX
Interval Events:   No acute events overnight.  Patient without acute symptoms at this time.  Spoke with patient and family at bedside.    ROS:   12 point review of systems performed and negative except otherwise noted in HPI.    Hospital Medications:  acetaminophen     Tablet .. 1000 milliGRAM(s) Oral every 8 hours PRN  acetaminophen   IVPB .. 1000 milliGRAM(s) IV Intermittent once  atorvastatin 80 milliGRAM(s) Oral at bedtime  chlorhexidine 4% Liquid 1 Application(s) Topical <User Schedule>  dextrose 10%. 1000 milliLiter(s) IV Continuous <Continuous>  enoxaparin Injectable 40 milliGRAM(s) SubCutaneous every 24 hours  fenofibrate Tablet 145 milliGRAM(s) Oral daily  insulin regular Infusion 6 Unit(s)/Hr IV Continuous <Continuous>  lactated ringers. 1000 milliLiter(s) IV Continuous <Continuous>  morphine  - Injectable 4 milliGRAM(s) IV Push every 4 hours PRN  morphine  - Injectable 2 milliGRAM(s) IV Push every 4 hours PRN  naloxone Injectable 0.4 milliGRAM(s) IV Push once  omega-3-Acid Ethyl Esters 2 Gram(s) Oral two times a day  ondansetron Injectable 4 milliGRAM(s) IV Push once  simethicone 80 milliGRAM(s) Chew every 6 hours PRN      PHYSICAL EXAM:   Vital Signs:  Vital Signs Last 24 Hrs  T(C): 36.7 (02 Aug 2023 15:00), Max: 37.7 (01 Aug 2023 20:00)  T(F): 98.1 (02 Aug 2023 15:00), Max: 99.9 (01 Aug 2023 20:00)  HR: 81 (02 Aug 2023 16:00) (81 - 116)  BP: 108/61 (02 Aug 2023 16:00) (96/58 - 122/63)  BP(mean): 79 (02 Aug 2023 16:00) (71 - 88)  RR: 20 (02 Aug 2023 16:00) (14 - 28)  SpO2: 97% (02 Aug 2023 16:00) (94% - 97%)    Parameters below as of 01 Aug 2023 20:00  Patient On (Oxygen Delivery Method): room air      Daily     Daily     GENERAL: no acute distress  NEURO: alert  HEENT: NCAT, no conjunctival pallor appreciated  CHEST: no respiratory distress, no accessory muscle use  CARDIAC: regular rate, +S1/S2  ABDOMEN: soft, nontender, no rebound or guarding  EXTREMITIES: warm, well perfused  SKIN: no lesions noted    LABS: reviewed                        13.2   6.12  )-----------( 155      ( 02 Aug 2023 00:18 )             39.8     08-02    133<L>  |  99  |  9   ----------------------------<  191<H>  3.7   |  20<L>  |  0.52    Ca    8.7      02 Aug 2023 11:03  Phos  2.2     08-02  Mg     1.9     08-02    TPro  6.9  /  Alb  3.4  /  TBili  0.4  /  DBili  x   /  AST  7<L>  /  ALT  <5<L>  /  AlkPhos  51  08-01    LIVER FUNCTIONS - ( 01 Aug 2023 20:28 )  Alb: 3.4 g/dL / Pro: 6.9 g/dL / ALK PHOS: 51 U/L / ALT: <5 U/L / AST: 7 U/L / GGT: x             Interval Diagnostic Studies: see sunrise for full report

## 2023-08-02 NOTE — DIETITIAN INITIAL EVALUATION ADULT - ORAL INTAKE PTA/DIET HISTORY
Pt was eating well with no changes in appetite. Pt reports low fat & low concentrated sweets diet; reports overall eats healthy. Denies oral nutrient supplement use; took multivitamin. Denies Hx of chewing or swallowing issues. Confirms no known food allergies.

## 2023-08-02 NOTE — PROGRESS NOTE ADULT - ASSESSMENT
37-year-old male patient PMH of pre-DM (diagnosed 2 years ago), hypertriglyceridemia (follows with Dr. Michelle), multiple admissions of pancreatitis (last adm  for necrotizing pancreatitis), pancreatic cyst/collection who presents to the emergency department for abdominal pain, nausea x 2 days. Found to have acute pancreatitis secondary to elevated triglycerides. A1c was 11.6%.    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 Hx of recurrent pancreatitis and pancreatic cyst/collection.  Lipase 2908, T.   TG down to 1200s with insulin gtt.  - Continue insulin gtt until triglyceride level <500  - Check triglyceride level q6h  - continue fenofibrate 145mg daily, atorvastatin 80mg QHS, and lovaza 2mg BID.  - RD consult  - Low fat/ CC diet     #T2DM  Hx of prediabetes.  A1c found to be 11.6%, new diagnosis of T2DM  Home regimen: Metformin ?dose BID, stopped 2 months ago.  - Please recheck C-peptide WITH serum glucose (BMP or CMP) tomorrow AM (around 6am, so that it is true fasting)  - When insulin gtt is discontinued (when TG<500), please transition to Lantus 22 units QHS and Admelog 7 units TIDAC, with low dose correction scale TIDAC and low dose correction QHS  - For discharge, likely basal/bolus insulin vs. basal insulin + metformin. TBD.  - Please begin insulin pen teaching, glucometer teaching  - Please send prescriptions for diabetes supplies (glucometer, test strips, lancets, alcohol swabs, insulin pen needles).    Discussed with Dr. Gaurav Cardenas MD  Endocrine Fellow  Can be reached via teams. For follow up questions, discharge recommendations, or new consults, please call answering service at [phone] (weekdays); [phone] (nights/weekends).

## 2023-08-02 NOTE — PROGRESS NOTE ADULT - SUBJECTIVE AND OBJECTIVE BOX
Admitted for: Acute pancreatitis without infection or necrosis        Following for: HyperTG induced pancreatitis     Subjective:   - Remains on insulin gtt  - Reports abdominal pain, but no n/v      MEDICATIONS  (STANDING):  acetaminophen   IVPB .. 1000 milliGRAM(s) IV Intermittent once  atorvastatin 80 milliGRAM(s) Oral at bedtime  chlorhexidine 4% Liquid 1 Application(s) Topical <User Schedule>  dextrose 10%. 1000 milliLiter(s) (75 mL/Hr) IV Continuous <Continuous>  enoxaparin Injectable 40 milliGRAM(s) SubCutaneous every 24 hours  fenofibrate Tablet 145 milliGRAM(s) Oral daily  insulin regular Infusion 6 Unit(s)/Hr (6 mL/Hr) IV Continuous <Continuous>  lactated ringers. 1000 milliLiter(s) (50 mL/Hr) IV Continuous <Continuous>  naloxone Injectable 0.4 milliGRAM(s) IV Push once  omega-3-Acid Ethyl Esters 2 Gram(s) Oral two times a day  ondansetron Injectable 4 milliGRAM(s) IV Push once    MEDICATIONS  (PRN):  acetaminophen     Tablet .. 1000 milliGRAM(s) Oral every 8 hours PRN Mild Pain (1 - 3)  morphine  - Injectable 2 milliGRAM(s) IV Push every 4 hours PRN Moderate Pain (4 - 6)  morphine  - Injectable 4 milliGRAM(s) IV Push every 4 hours PRN Severe Pain (7 - 10)  simethicone 80 milliGRAM(s) Chew every 6 hours PRN Gas      Allergies    No Known Allergies    Intolerances          PHYSICAL EXAM:  VITALS: T(C): 36.8 (08-02-23 @ 11:00)  T(F): 98.2 (08-02-23 @ 11:00), Max: 99.9 (08-01-23 @ 20:00)  HR: 102 (08-02-23 @ 12:00) (87 - 116)  BP: 114/69 (08-02-23 @ 12:00) (96/58 - 122/63)  RR:  (14 - 28)  SpO2:  (94% - 97%)  Wt(kg): --  GENERAL: NAD  EYES: No proptosis, no lid lag, anicteric  RESPIRATORY: No respiratory distress  CARDIOVASCULAR: No peripheral edema.  GI: soft, non distended. TTP in LLQ RLQ  EXT: b/l feet without wounds, 2+ pulses  PSYCH: Alert and oriented x 3, reactive affect      A1C with Estimated Average Glucose Result: 11.6 % (08-01-23 @ 06:23)      POCT Blood Glucose.: 134 mg/dL (08-02-23 @ 13:01)  POCT Blood Glucose.: 164 mg/dL (08-02-23 @ 12:06)  POCT Blood Glucose.: 188 mg/dL (08-02-23 @ 11:01)  POCT Blood Glucose.: 183 mg/dL (08-02-23 @ 10:24)  POCT Blood Glucose.: 100 mg/dL (08-02-23 @ 09:07)  POCT Blood Glucose.: 122 mg/dL (08-02-23 @ 08:02)  POCT Blood Glucose.: 119 mg/dL (08-02-23 @ 06:54)  POCT Blood Glucose.: 117 mg/dL (08-02-23 @ 06:22)  POCT Blood Glucose.: 122 mg/dL (08-02-23 @ 05:04)  POCT Blood Glucose.: 114 mg/dL (08-02-23 @ 04:09)  POCT Blood Glucose.: 99 mg/dL (08-02-23 @ 03:13)  POCT Blood Glucose.: 93 mg/dL (08-02-23 @ 02:25)  POCT Blood Glucose.: 112 mg/dL (08-02-23 @ 01:06)  POCT Blood Glucose.: 120 mg/dL (08-01-23 @ 23:57)  POCT Blood Glucose.: 120 mg/dL (08-01-23 @ 23:09)  POCT Blood Glucose.: 142 mg/dL (08-01-23 @ 22:01)  POCT Blood Glucose.: 147 mg/dL (08-01-23 @ 20:58)  POCT Blood Glucose.: 145 mg/dL (08-01-23 @ 19:56)  POCT Blood Glucose.: 150 mg/dL (08-01-23 @ 18:51)  POCT Blood Glucose.: 123 mg/dL (08-01-23 @ 18:04)  POCT Blood Glucose.: 124 mg/dL (08-01-23 @ 17:02)  POCT Blood Glucose.: 114 mg/dL (08-01-23 @ 16:04)  POCT Blood Glucose.: 99 mg/dL (08-01-23 @ 15:02)  POCT Blood Glucose.: 99 mg/dL (08-01-23 @ 14:16)  POCT Blood Glucose.: 109 mg/dL (08-01-23 @ 13:10)  POCT Blood Glucose.: 156 mg/dL (08-01-23 @ 12:07)  POCT Blood Glucose.: 214 mg/dL (08-01-23 @ 11:09)  POCT Blood Glucose.: 184 mg/dL (08-01-23 @ 10:00)  POCT Blood Glucose.: 115 mg/dL (08-01-23 @ 09:04)  POCT Blood Glucose.: 131 mg/dL (08-01-23 @ 07:51)  POCT Blood Glucose.: 155 mg/dL (08-01-23 @ 06:52)  POCT Blood Glucose.: 172 mg/dL (08-01-23 @ 06:02)  POCT Blood Glucose.: 181 mg/dL (08-01-23 @ 04:59)  POCT Blood Glucose.: 195 mg/dL (08-01-23 @ 04:01)  POCT Blood Glucose.: 209 mg/dL (08-01-23 @ 02:56)  POCT Blood Glucose.: 212 mg/dL (08-01-23 @ 02:05)  POCT Blood Glucose.: 194 mg/dL (08-01-23 @ 01:01)  POCT Blood Glucose.: 188 mg/dL (08-01-23 @ 00:31)  POCT Blood Glucose.: 228 mg/dL (07-31-23 @ 23:34)      08-02    133<L>  |  99  |  9   ----------------------------<  191<H>  3.7   |  20<L>  |  0.52    eGFR: 134    Ca    8.7      08-02  Mg     1.9     08-02  Phos  2.2     08-02    TPro  6.9  /  Alb  3.4  /  TBili  0.4  /  DBili  x   /  AST  7<L>  /  ALT  <5<L>  /  AlkPhos  51  08-01      Thyroid Function Tests:

## 2023-08-02 NOTE — PROGRESS NOTE ADULT - ATTENDING COMMENTS
As above.    Pt seen/examined in the early evening of 8/2/23.  Discussed with GI fellow earlier in the day.    Impression:    #1.  Asymptomatic large walled-off peripancreatic collection (5 x 7 cm) without signs of infection.     #2.  Recurrent pancreatitis, thought due to hypertriglyceridemia, currently on insulin drip.    Recommendations:    #1.  Treatment of hypertriglyceridemia  per endocrine and MICU    #2.  Await MRI/MRCP for better characterization of the pancreas and peripancreatic fluid.    #3.  Will consider EUS with possible drainage procedure based on above results and clinical course.

## 2023-08-02 NOTE — PROGRESS NOTE ADULT - ATTENDING COMMENTS
Agree with above  37M with hereditary hypertriglyceridemia a/w recurrent pancreatitis and pseudocyst  Now p/w hypertriglyceridemic pancreatitis (TG ~5000) started on insulin gtt with D10LR  Now 772, c/w insulin gtt until =500, on fenofibrate  Clear liquid diet with analgesia (does not want morphine, so using IV acetaminophen)  HbA1c =11, likely diabetes mellitus. Will need insulin sliding scale ± Lantus after off gtt  OOB to chair, ambulating

## 2023-08-02 NOTE — PROGRESS NOTE ADULT - ASSESSMENT
ASSESSMENT:  36 y/o male, PMH pre-DM, hypertriglyceridemia (follows with Dr. Michelle), multiple admissions of pancreatitis (last adm 2015 for necrotizing pancreatitis), pancreatic pseudocyst, initially presented to ED on 7/31/23 c/o abdominal pain x2 days. ED course significant for triglycerides 4742, cholesterol 963, lipase 2908, WBC 12. Pt admitted to MICU for management of acute pancreatitis, on IVF and insulin gtt.    =====NEUROLOGIC=====  #***  -baseline mental status --- AOx3, lives at home, functional, ambulatory  -c/w neuro checks q2h and PRN for changes -intubated for *** (date)  -sedation w/ ***, titrate to RASS 0 to -2 -- wean as tolerated  -activity as tolerated  -trend ammonia q8h ***  -c/w rifaximin and lactulose ***  -imaging ***  -neuro consult ***  -PT/OT when pt stable      =====CARDIOVASCULAR=====  #***  -c/w *** -- maintain MAP >65 - 70mmHg -- wean as tolerated  -c/w diuresis ***  -trend hsTrop / CE / proBNP q8h  -imaging ***  -cardio consult ***      =====RESPIRATORY=====  #***  -intubated for *** (date)  -Mechanical Vent therapy - titrate to maintain ph 7.35-7.45; PCO2 35-45; O2sat >/= 92%  -aspiration precautions  -lung protective ventilation  -oral care  -chest PT q2h  -spontaneous breathing trials  -duoneb q6h ***  -c/w diuresis  -monitor ABG PRN  -imaging ***  -pulm consult ***      =====RENAL=====  #***  -c/w diuresis  -trend renal function / electrolytes q8h ***  -strict I&O's  -clement cath inserted (date) *** -- c/w catheter care  -renally dose drugs  -avoid nephrotoxins  -correct electrolytes PRN  -imaging ***  -nephro consult ***    =====GI=====  #Oropharyngeal dysphagia s/p NG tube  -NPO w/ TF  -TF goal 80, now at ***    #***  -c/w ***  -c/w lactulose, BM goal 3-4/day***  -trend Hg / coags / LFTs / lactate q8h ***  -imaging ***  -hep consult ***      =====HEME/ONC=====  #***  -c/w ***  -trend coags / d-dimer / fibrinogen *** q8h  -transfuse to maintain Hg >7, platelet count >15 when afebrile, >20 if febrile, >50 for procedures  -transfuse FFP and cryo to maintain fibrinogen >150  -imaging ***  -heme consult ***      =====INFECTIOUS DISEASE=====  #***  -c/w abx (day ***/***)  -start abx (date) ***  -deescalate abx (date -- date) ***  -escalate abx (date -- date) ***  -s/p abx (date -- date) ***  -trend lactate / procal / UA / blood cultures / MRSA / RVP / fungitell -- ordered (date) *** q8h  -trend temp curve  -stool GI PCR, O&P ordered (date)***  -imaging ***  -ID consult ***    =====ENDO=====  #***  -c/w ***  -goal FS ***  -FS q4h ***    # SKIN:  Lines: *** (date)  Decubiti:    =====PPX=====  -DVT ppx -- c/w *** (SCD)  -GI ppx -- c/w ***    =====GOC=====  -FULL code  -d/w family re: current status  -palliative consulted      FOLLOW-UP   []  []  []  [] ASSESSMENT:  36 y/o male, PMH pre-DM, hypertriglyceridemia (follows with Dr. Michelle), multiple admissions of pancreatitis (last adm 2015 for necrotizing pancreatitis), pancreatic pseudocyst, initially presented to ED on 7/31/23 c/o abdominal pain x2 days. ED course significant for triglycerides 4742, cholesterol 963, lipase 2908, WBC 12. Pt admitted to MICU for management of acute pancreatitis, on IVF and insulin gtt.    =====NEUROLOGIC=====  #no active issues  -baseline mental status --- AOx3, lives at home, functional, ambulatory  -c/w neuro checks q4h and PRN for changes  -PT/OT    =====CARDIOVASCULAR=====  #tachycardia up to 120's  -continue to monitor -- may be 2/2 pain    =====RESPIRATORY=====  #no active issues    =====RENAL=====  #no active issues  -I&O's  -correct electrolytes PRN    =====GI=====  #Acute pancreatitis  -hx of hospitalizations for pancreatitis  -hx of hypertriglyceridemia off gembrozil  -GI recs appreciated  -CLD for now, can advance as tolerated to low fat diet  -IVFs for acute pancreatitis 1.5 cc/kg/hr per Waterfall trial  -pain control w/ tylenol and morphine PRN  -trig downtrending 772 <-- 4742  -lipase downtrending 456 <-- 963  -ALT and AST both downtrending  -continue trending trig / lipase / LFTs  -c/w simethicone for abd gas/bloating    =====HEME/ONC=====  #Leukocytosis -- resolved  -WBC 12.12 --> 6.12  -afebrile    =====INFECTIOUS DISEASE=====  #no active issues    =====ENDO=====  #Hypertrigliceridemia  -trig downtrending 772 <-- 4742  -lipase downtrending 456 <-- 963  -c/w insulin gtt until triglyceride level <500  -c/w D10  -FS q4h  -Endo recs appreciated  -d/c gemfibrozil  -Start fenofibrate 145mg daily, atorvastatin 80mg QHS, and lovaza 2mg BID.    #T2DM  -hx pre-dm  -A1c 11.6 -- new diagnosis of T2DM  -Endo recs appreciated  -C-peptide sent  -When insulin gtt is discontinued (when TG<500), please transition to Lantus 22 units QHS and Admelog 7 units TIDAC, with low dose correction scale TIDAC and low dose correction QHS  -For discharge, likely basal/bolus insulin vs. basal insulin + metformin. TBD  -Please begin insulin pen teaching, glucometer teaching  -Please send prescriptions for diabetes supplies (glucometer, test strips, lancets, alcohol swabs, insulin pen needles).    =====SKIN=====  Lines: NONE)  Decubiti: NONE    =====PPX=====  -DVT ppx -- c/w Lovenox    =====GOC=====  -FULL code      FOLLOW-UP   []  []  []  [] ASSESSMENT:  36 y/o male, PMH pre-DM, hypertriglyceridemia (follows with Dr. Michelle), multiple admissions of pancreatitis (last adm 2015 for necrotizing pancreatitis), pancreatic pseudocyst, initially presented to ED on 7/31/23 c/o abdominal pain x2 days. ED course significant for triglycerides 4742, cholesterol 963, lipase 2908, WBC 12. Pt admitted to MICU for management of acute pancreatitis, on IVF and insulin gtt.    =====NEUROLOGIC=====  #no active issues  -baseline mental status --- AOx3, lives at home, functional, ambulatory  -c/w neuro checks q4h and PRN for changes  -PT/OT    =====CARDIOVASCULAR=====  #tachycardia up to 120's  -continue to monitor -- may be 2/2 pain    =====RESPIRATORY=====  #no active issues    =====RENAL=====  #no active issues  -I&O's  -correct electrolytes PRN    =====GI=====  #Acute pancreatitis  -hx of hospitalizations for pancreatitis  -hx of hypertriglyceridemia  -GI recs appreciated  -CLD for now, can advance as tolerated to low fat diet  -IVFs for acute pancreatitis 1.5 cc/kg/hr per Waterfall trial  -pain control w/ tylenol and morphine PRN  -trig downtrending 772 <-- 4742  -lipase downtrending 456 <-- 963  -ALT and AST both downtrending  -continue trending trig / lipase / LFTs  -c/w simethicone for abd gas/bloating    =====HEME/ONC=====  #Leukocytosis -- resolved  -WBC 12.12 --> 6.12  -afebrile    =====INFECTIOUS DISEASE=====  #no active issues    =====ENDO=====  #Hypertriglyceridemia  -trig downtrending 772 <-- 4742  -lipase downtrending 456 <-- 963  -c/w insulin gtt until triglyceride level <500  -c/w D10 and LR  -FS q4h  -Endo recs appreciated  -d/c gemfibrozil  -Start fenofibrate 145mg daily, atorvastatin 80mg QHS, and lovaza 2mg BID  -continue trending trig / lipase / BMP    #T2DM  -hx pre-dm  -A1c 11.6 -- new diagnosis of T2DM  -Endo recs appreciated  -C-peptide sent  -When insulin gtt is discontinued (when TG<500), please transition to Lantus 22 units QHS and Admelog 7 units TIDAC, with low dose correction scale TIDAC and low dose correction QHS  -For discharge, likely basal/bolus insulin vs. basal insulin + metformin. TBD  -Please begin insulin pen teaching, glucometer teaching  -Please send prescriptions for diabetes supplies (glucometer, test strips, lancets, alcohol swabs, insulin pen needles).    =====SKIN=====  Lines: NONE)  Decubiti: NONE    =====PPX=====  -DVT ppx -- c/w Lovenox    =====GOC=====  -FULL code      FOLLOW-UP   []monitor trig  []  []  [] ASSESSMENT:  38 y/o male, PMH pre-DM, hypertriglyceridemia (follows with Dr. Michelle), multiple admissions of pancreatitis (last adm 2015 for necrotizing pancreatitis), pancreatic pseudocyst, initially presented to ED on 7/31/23 c/o abdominal pain x2 days. ED course significant for triglycerides 4742, cholesterol 963, lipase 2908, WBC 12. Pt admitted to MICU for management of acute pancreatitis, on IVF and insulin gtt.    =====NEUROLOGIC=====  #no active issues  -baseline mental status --- AOx3, lives at home, functional, ambulatory  -c/w neuro checks q4h and PRN for changes  -PT/OT    =====CARDIOVASCULAR=====  #tachycardia up to 120's  -continue to monitor -- may be 2/2 pain    =====RESPIRATORY=====  #no active issues    =====RENAL=====  #no active issues  -monitor I&O's  -correct electrolytes PRN    =====GI=====  #Acute pancreatitis  -hx of hospitalizations for pancreatitis  -hx of hypertriglyceridemia  -GI recs appreciated  -CLD for now, can advance as tolerated to low fat diet  -pain control w/ tylenol and morphine PRN -- IV tylenol patient preference  -trig downtrending 772 <-- 4742  -lipase downtrending 456 <-- 963  -ALT and AST both downtrending  -continue trending trig / lipase / LFTs  -c/w simethicone for abd gas/bloating  -c/w fenofibrate, atorvastatin, lovaza  -c/w LR    =====HEME/ONC=====  #Leukocytosis -- resolved  -WBC 12.12 --> 6.12  -afebrile    =====INFECTIOUS DISEASE=====  #no active issues    =====ENDO=====  #Hypertriglyceridemia  -trig downtrending 772 <-- 4742  -lipase downtrending 456 <-- 963  -c/w insulin gtt until triglyceride level <500  -c/w D10 and LR  -FS q4h  -Endo recs appreciated  -d/c gemfibrozil  -c/w fenofibrate 145mg daily, atorvastatin 80mg QHS, and lovaza 2mg BID  -continue trending trig / lipase / BMP    #T2DM  -hx pre-dm  -A1c 11.6 -- new diagnosis of T2DM  -Endo recs appreciated  -C-peptide sent  -When insulin gtt is discontinued (when TG<500), please transition to Lantus 22 units QHS and Admelog 7 units TIDAC, with low dose correction scale TIDAC and low dose correction QHS    =====SKIN=====  Lines: NONE)  Decubiti: NONE    =====PPX=====  -DVT ppx -- c/w Lovenox    =====GOC=====  -FULL code      FOLLOW-UP   [] monitor trig <500  [] once trig <500 -- transition to Lantus 22 units QHS and Admelog 7 units TIDAC, with low dose correction scale TIDAC and low dose correction QHS  []  [] ASSESSMENT:  36 y/o male, PMH pre-DM, hypertriglyceridemia (follows with Dr. Michelle), multiple admissions of pancreatitis (last adm 2015 for necrotizing pancreatitis), pancreatic pseudocyst, initially presented to ED on 7/31/23 c/o abdominal pain x2 days. ED course significant for triglycerides 4742, cholesterol 963, lipase 2908, WBC 12. Pt admitted to MICU for management of acute pancreatitis, on IVF and insulin gtt.    =====NEUROLOGIC=====  #no active issues  -baseline mental status --- AOx3, lives at home, functional, ambulatory  -c/w neuro checks q4h and PRN for changes  -PT/OT    =====CARDIOVASCULAR=====  #tachycardia up to 120's  -continue to monitor -- may be 2/2 pain    =====RESPIRATORY=====  #no active issues    =====RENAL=====  #no active issues  -monitor I&O's   -urinating on own -- 600-700ml overnight  01 Aug 2023 07:01  -  02 Aug 2023 06:59 -- IN: 4712.9 mL / OUT: 2500 mL / NET: 2212.9 mL  -correct electrolytes PRN    =====GI=====  #Acute pancreatitis  -hx of hospitalizations for pancreatitis  -hx of hypertriglyceridemia  -GI recs appreciated  -CLD for now, can advance as tolerated to low fat diet  -pain control w/ tylenol and morphine PRN -- IV tylenol patient preference  -trig downtrending 772 <-- 4742  -lipase downtrending 456 <-- 963  -ALT and AST both downtrending  -continue trending trig / lipase / LFTs  -c/w simethicone for abd gas/bloating  -c/w fenofibrate, atorvastatin, lovaza  -c/w LR  -3 BMs overnight    =====HEME/ONC=====  #Leukocytosis -- resolved  -WBC 12.12 --> 6.12  -afebrile    =====INFECTIOUS DISEASE=====  #no active issues    =====ENDO=====  #Hypertriglyceridemia  -trig downtrending 772 <-- 4742  -lipase downtrending 456 <-- 963  -c/w insulin gtt until triglyceride level <500  -c/w D10 and LR  -FS q4h  -Endo recs appreciated  -d/c gemfibrozil  -c/w fenofibrate 145mg daily, atorvastatin 80mg QHS, and lovaza 2mg BID  -continue trending trig / lipase / BMP    #T2DM  -hx pre-dm  -A1c 11.6 -- new diagnosis of T2DM  -Endo recs appreciated  -C-peptide sent  -When insulin gtt is discontinued (when TG<500), please transition to Lantus 22 units QHS and Admelog 7 units TIDAC, with low dose correction scale TIDAC and low dose correction QHS    =====SKIN=====  Lines: NONE)  Decubiti: NONE    =====PPX=====  -DVT ppx -- c/w Lovenox    =====GOC=====  -FULL code      FOLLOW-UP   [] monitor trig <500  [] once trig <500 -- transition to Lantus 22 units QHS and Admelog 7 units TIDAC, with low dose correction scale TIDAC and low dose correction QHS  []  [] ASSESSMENT:  36 y/o male, PMH pre-DM, hypertriglyceridemia (follows with Dr. Michelle), multiple admissions of pancreatitis (last adm 2015 for necrotizing pancreatitis), pancreatic pseudocyst, initially presented to ED on 7/31/23 c/o abdominal pain x2 days. ED course significant for triglycerides 4742, cholesterol 963, lipase 2908, WBC 12. Pt admitted to MICU for management of acute pancreatitis, on IVF and insulin gtt.    =====NEUROLOGIC=====  #no active issues  -baseline mental status --- AOx3, lives at home, functional, ambulatory  -c/w neuro checks q4h and PRN for changes  -PT/OT    =====CARDIOVASCULAR=====  #tachycardia up to 120's  -continue to monitor -- may be 2/2 pain    =====RESPIRATORY=====  #no active issues    =====RENAL=====  #no active issues  -monitor I&O's   -urinating on own -- 600-700ml overnight  01 Aug 2023 07:01  -  02 Aug 2023 06:59 -- IN: 4712.9 mL / OUT: 2500 mL / NET: 2212.9 mL  -correct electrolytes PRN    =====GI=====  #Acute pancreatitis  -hx of hospitalizations for pancreatitis  -hx of hypertriglyceridemia  -GI recs appreciated  -CLD for now, can advance as tolerated to low fat diet  -pain control w/ tylenol and morphine PRN -- IV tylenol patient preference  -trig downtrending 772 <-- 4742  -lipase downtrending 456 <-- 963  -ALT and AST both downtrending  -continue trending trig / lipase / LFTs  -c/w simethicone for abd gas/bloating  -c/w fenofibrate, atorvastatin, lovaza  -c/w LR  -3 BMs overnight    =====HEME/ONC=====  #Leukocytosis -- resolved  -WBC 12.12 --> 6.12  -afebrile    =====INFECTIOUS DISEASE=====  #no active issues    =====ENDO=====  #Hypertriglyceridemia  -trig downtrending 772 <-- 4742  -lipase downtrending 456 <-- 963  -c/w insulin gtt until triglyceride level <500  -c/w D10 and LR  -FS q4h  -Endo recs appreciated  -d/c gemfibrozil  -c/w fenofibrate 145mg daily, atorvastatin 80mg QHS, and lovaza 2mg BID  -continue trending trig / lipase / BMP    #T2DM  -hx pre-dm  -A1c 11.6 -- new diagnosis of T2DM  -Endo recs appreciated  -C-peptide sent  -When insulin gtt is discontinued (when TG<500), please transition to Lantus 22 units QHS and Admelog 7 units TIDAC, with low dose correction scale QAC/QHS    =====SKIN=====  Lines: NONE)  Decubiti: NONE    =====PPX=====  -DVT ppx -- c/w Lovenox    =====GOC=====  -FULL code      FOLLOW-UP   [] monitor trig <500  [] once trig <500 -- transition to Lantus 22 units QHS and Admelog 7 units TIDAC, with low dose correction scale TIDAC and low dose correction QHS  []  []

## 2023-08-02 NOTE — DIETITIAN INITIAL EVALUATION ADULT - EDUCATION DIETARY MODIFICATIONS
Provided verbal and written education on Nutrition Therapy for Type 2 Diabetes. Emphasis on pairing carbohydrates with protein for glycemic control; portion sizes; limiting refined sugars. RD also reviewed low fat diet for pancreatitis. Good comprehension noted. Pt made aware RD to remain available for any questions./(2) meets goals/outcomes/verbalization

## 2023-08-02 NOTE — DIETITIAN INITIAL EVALUATION ADULT - OTHER INFO
Wt Hx:   Dosing wt 86.8 kG/191.3 lbs.  UBW ~190 lbs and denies any significant changes in wt PTA.   Ht per pt: 71 inches    IBW: 172 lbs   IBW%: 111%    Nutrition-Related Concerns:   - Acute pancreatitis  - A1c 11.6%, new diabetes diagnosis. On infusion of insulin  - Ordered for lactated ringers and dextrose 10%

## 2023-08-02 NOTE — DIETITIAN INITIAL EVALUATION ADULT - ENTER TO (CAL/KG)
detailed exam
27

## 2023-08-02 NOTE — PROGRESS NOTE ADULT - ASSESSMENT
37-year-old male patient PMH of pre-DM, familial hypertriglyceridemia (follows with Dr. Michelle) c/b multiple admissions of pancreatitis (last adm 2015 for necrotizing pancreatitis) c/b pancreatic pseudocyst who presents to the emergency department for L sided abdominal pain with radiation to back x  2 days a/w N/V and decreased PO. States the pain feels similar to his prior pancreatitis episode. Denies HA, CP, palpitations, SOB, diarrhea, fevers, alcohol use. Of note, pt stopped taking gemfibrozil and metformin 2 mo ago to try to control his medical issues with diet alone since he has been well controlled for the past 8 years. Labs notable for WBC 12, triglycerides 4742, lipase 2908. 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 [facility] consulted for management of chronic peripancreatic fluid collection.    # Acute pancreatitis likely 2/2 familial hypertriglyceridemia  # Interval increase of chronic peripancreatic tail collection with mass effect along stomach (~7.1 x 5.2 cm) w/o e/o pancreatic necrosis    Recommendations:  - management of hyperTG per primary team/endocrinology- may potentially need apheresis for severe hyperTG i/s/o pancreatitis  - IVFs for acute pancreatitis 1.5 cc/kg/hr per Waterfall trial  - CLD for now, can advance as tolerated to low fat diet  - avoidance of ETOH  - would consider endoscopic drainage once stabilized, pancreatitis improves, and off insulin gtt; patient still undecided about endoscopic drainage    Note incomplete until finalized by attending signature/attestation.    Perico Casillas  GI/Hepatology Fellow    MONDAY-FRIDAY 8AM-5PM:  Pager# [phone] ([facility]) or [phone] ([facility])    NON-URGENT CONSULTS:  Please email [email or web address]  AT NIGHT AND ON WEEKENDS:  Contact on-call GI fellow via answering service ([phone]) from 5pm-8am and on weekends/holidays

## 2023-08-02 NOTE — DIETITIAN INITIAL EVALUATION ADULT - NS FNS DIET ORDER
Diet, Clear Liquid:   Consistent Carbohydrate {Evening Snack} (CSTCHOSN)  Low Fat (LOWFAT) (08-01-23 @ 11:15)

## 2023-08-02 NOTE — PROGRESS NOTE ADULT - ATTENDING COMMENTS
Agree with Dr. Cardenas's assessment and plan as outlined above. Reviewed all pertinent labs, and imaging studies. Modifications made as indicated above. 37 M with history of prediabetes, hyperTG presented with hyperTG induced pancreatitis with known pancreatic cyst. Initial TG 4742, started on insulin drip. At home on Gemfibrozil 600 mg BID and metformin, but stopped taking these medications. Most recent TG 600s. Currently on insulin drip with 5-6 cc/hr on dextrose and also on clear liquid diet and D10. Continue with insulin drip when TG <500. Continue with Fenofibrate 145 mg daily, atorvastatin 80 mg QHS and Lovaza 2 gm BID. For DM, A1C 11.6. Once off the insulin drip can start weight based basal/bolus as above. Please make sure patient is on carb consistent and low fat diet. Rest of the plan as above. Patient is high risk with high level decision making due to uncontrolled diabetes which places patient at high risk for cardiovascular and cerebrovascular events. Patient with lability of glucose requiring close monitoring and insulin adjustments.

## 2023-08-02 NOTE — DIETITIAN INITIAL EVALUATION ADULT - PERTINENT LABORATORY DATA
08-02    128<L>  |  100  |  10  ----------------------------<  111<H>  5.3   |  18<L>  |  0.50    Ca    8.3<L>      02 Aug 2023 04:20  Phos  3.1     08-02  Mg     2.1     08-02    TPro  6.9  /  Alb  3.4  /  TBili  0.4  /  DBili  x   /  AST  7<L>  /  ALT  <5<L>  /  AlkPhos  51  08-01  POCT Blood Glucose.: 100 mg/dL (08-02-23 @ 09:07)  A1C with Estimated Average Glucose Result: 11.6 % (08-01-23 @ 06:23)

## 2023-08-03 DIAGNOSIS — E78.1 PURE HYPERGLYCERIDEMIA: ICD-10-CM

## 2023-08-03 DIAGNOSIS — E11.65 TYPE 2 DIABETES MELLITUS WITH HYPERGLYCEMIA: ICD-10-CM

## 2023-08-03 LAB
ALBUMIN SERPL ELPH-MCNC: 3.1 G/DL — LOW (ref 3.3–5)
ALBUMIN SERPL ELPH-MCNC: 3.2 G/DL — LOW (ref 3.3–5)
ALP SERPL-CCNC: 57 U/L — SIGNIFICANT CHANGE UP (ref 40–120)
ALP SERPL-CCNC: 59 U/L — SIGNIFICANT CHANGE UP (ref 40–120)
ALT FLD-CCNC: 16 U/L — SIGNIFICANT CHANGE UP (ref 10–45)
ALT FLD-CCNC: 17 U/L — SIGNIFICANT CHANGE UP (ref 10–45)
ANION GAP SERPL CALC-SCNC: 11 MMOL/L — SIGNIFICANT CHANGE UP (ref 5–17)
ANION GAP SERPL CALC-SCNC: 12 MMOL/L — SIGNIFICANT CHANGE UP (ref 5–17)
ANION GAP SERPL CALC-SCNC: 12 MMOL/L — SIGNIFICANT CHANGE UP (ref 5–17)
AST SERPL-CCNC: 21 U/L — SIGNIFICANT CHANGE UP (ref 10–40)
AST SERPL-CCNC: 26 U/L — SIGNIFICANT CHANGE UP (ref 10–40)
BILIRUB SERPL-MCNC: 0.3 MG/DL — SIGNIFICANT CHANGE UP (ref 0.2–1.2)
BILIRUB SERPL-MCNC: 0.4 MG/DL — SIGNIFICANT CHANGE UP (ref 0.2–1.2)
BUN SERPL-MCNC: 5 MG/DL — LOW (ref 7–23)
BUN SERPL-MCNC: 6 MG/DL — LOW (ref 7–23)
BUN SERPL-MCNC: 7 MG/DL — SIGNIFICANT CHANGE UP (ref 7–23)
C PEPTIDE SERPL-MCNC: 1.6 NG/ML — SIGNIFICANT CHANGE UP (ref 1.1–4.4)
CALCIUM SERPL-MCNC: 8.4 MG/DL — SIGNIFICANT CHANGE UP (ref 8.4–10.5)
CALCIUM SERPL-MCNC: 8.7 MG/DL — SIGNIFICANT CHANGE UP (ref 8.4–10.5)
CALCIUM SERPL-MCNC: 9 MG/DL — SIGNIFICANT CHANGE UP (ref 8.4–10.5)
CHLORIDE SERPL-SCNC: 101 MMOL/L — SIGNIFICANT CHANGE UP (ref 96–108)
CHLORIDE SERPL-SCNC: 101 MMOL/L — SIGNIFICANT CHANGE UP (ref 96–108)
CHLORIDE SERPL-SCNC: 103 MMOL/L — SIGNIFICANT CHANGE UP (ref 96–108)
CHOLEST SERPL-MCNC: 399 MG/DL — HIGH
CO2 SERPL-SCNC: 19 MMOL/L — LOW (ref 22–31)
CO2 SERPL-SCNC: 20 MMOL/L — LOW (ref 22–31)
CO2 SERPL-SCNC: 20 MMOL/L — LOW (ref 22–31)
CREAT SERPL-MCNC: 0.5 MG/DL — SIGNIFICANT CHANGE UP (ref 0.5–1.3)
CREAT SERPL-MCNC: 0.53 MG/DL — SIGNIFICANT CHANGE UP (ref 0.5–1.3)
CREAT SERPL-MCNC: 0.54 MG/DL — SIGNIFICANT CHANGE UP (ref 0.5–1.3)
EGFR: 132 ML/MIN/1.73M2 — SIGNIFICANT CHANGE UP
EGFR: 133 ML/MIN/1.73M2 — SIGNIFICANT CHANGE UP
EGFR: 136 ML/MIN/1.73M2 — SIGNIFICANT CHANGE UP
GAS PNL BLDA: SIGNIFICANT CHANGE UP
GLUCOSE BLDC GLUCOMTR-MCNC: 120 MG/DL — HIGH (ref 70–99)
GLUCOSE BLDC GLUCOMTR-MCNC: 131 MG/DL — HIGH (ref 70–99)
GLUCOSE BLDC GLUCOMTR-MCNC: 135 MG/DL — HIGH (ref 70–99)
GLUCOSE BLDC GLUCOMTR-MCNC: 137 MG/DL — HIGH (ref 70–99)
GLUCOSE BLDC GLUCOMTR-MCNC: 138 MG/DL — HIGH (ref 70–99)
GLUCOSE BLDC GLUCOMTR-MCNC: 143 MG/DL — HIGH (ref 70–99)
GLUCOSE BLDC GLUCOMTR-MCNC: 145 MG/DL — HIGH (ref 70–99)
GLUCOSE BLDC GLUCOMTR-MCNC: 157 MG/DL — HIGH (ref 70–99)
GLUCOSE BLDC GLUCOMTR-MCNC: 162 MG/DL — HIGH (ref 70–99)
GLUCOSE BLDC GLUCOMTR-MCNC: 164 MG/DL — HIGH (ref 70–99)
GLUCOSE BLDC GLUCOMTR-MCNC: 168 MG/DL — HIGH (ref 70–99)
GLUCOSE BLDC GLUCOMTR-MCNC: 170 MG/DL — HIGH (ref 70–99)
GLUCOSE BLDC GLUCOMTR-MCNC: 180 MG/DL — HIGH (ref 70–99)
GLUCOSE SERPL-MCNC: 146 MG/DL — HIGH (ref 70–99)
GLUCOSE SERPL-MCNC: 162 MG/DL — HIGH (ref 70–99)
GLUCOSE SERPL-MCNC: 183 MG/DL — HIGH (ref 70–99)
HCT VFR BLD CALC: 35.7 % — LOW (ref 39–50)
HGB BLD-MCNC: 12 G/DL — LOW (ref 13–17)
LIDOCAIN IGE QN: 206 U/L — HIGH (ref 7–60)
MAGNESIUM SERPL-MCNC: 1.8 MG/DL — SIGNIFICANT CHANGE UP (ref 1.6–2.6)
MAGNESIUM SERPL-MCNC: 2 MG/DL — SIGNIFICANT CHANGE UP (ref 1.6–2.6)
MAGNESIUM SERPL-MCNC: 2 MG/DL — SIGNIFICANT CHANGE UP (ref 1.6–2.6)
MCHC RBC-ENTMCNC: 27 PG — SIGNIFICANT CHANGE UP (ref 27–34)
MCHC RBC-ENTMCNC: 33.6 GM/DL — SIGNIFICANT CHANGE UP (ref 32–36)
MCV RBC AUTO: 80.4 FL — SIGNIFICANT CHANGE UP (ref 80–100)
NRBC # BLD: 0 /100 WBCS — SIGNIFICANT CHANGE UP (ref 0–0)
PHOSPHATE SERPL-MCNC: 2.2 MG/DL — LOW (ref 2.5–4.5)
PHOSPHATE SERPL-MCNC: 2.7 MG/DL — SIGNIFICANT CHANGE UP (ref 2.5–4.5)
PHOSPHATE SERPL-MCNC: 3 MG/DL — SIGNIFICANT CHANGE UP (ref 2.5–4.5)
PLATELET # BLD AUTO: 164 K/UL — SIGNIFICANT CHANGE UP (ref 150–400)
POTASSIUM SERPL-MCNC: 3.3 MMOL/L — LOW (ref 3.5–5.3)
POTASSIUM SERPL-MCNC: 3.7 MMOL/L — SIGNIFICANT CHANGE UP (ref 3.5–5.3)
POTASSIUM SERPL-MCNC: 3.7 MMOL/L — SIGNIFICANT CHANGE UP (ref 3.5–5.3)
POTASSIUM SERPL-SCNC: 3.3 MMOL/L — LOW (ref 3.5–5.3)
POTASSIUM SERPL-SCNC: 3.7 MMOL/L — SIGNIFICANT CHANGE UP (ref 3.5–5.3)
POTASSIUM SERPL-SCNC: 3.7 MMOL/L — SIGNIFICANT CHANGE UP (ref 3.5–5.3)
PROT SERPL-MCNC: 6.3 G/DL — SIGNIFICANT CHANGE UP (ref 6–8.3)
PROT SERPL-MCNC: 6.6 G/DL — SIGNIFICANT CHANGE UP (ref 6–8.3)
RBC # BLD: 4.44 M/UL — SIGNIFICANT CHANGE UP (ref 4.2–5.8)
RBC # FLD: 15.3 % — HIGH (ref 10.3–14.5)
SODIUM SERPL-SCNC: 132 MMOL/L — LOW (ref 135–145)
SODIUM SERPL-SCNC: 133 MMOL/L — LOW (ref 135–145)
SODIUM SERPL-SCNC: 134 MMOL/L — LOW (ref 135–145)
TRIGL SERPL-MCNC: 499 MG/DL — HIGH
TRIGL SERPL-MCNC: 509 MG/DL — HIGH
WBC # BLD: 6.25 K/UL — SIGNIFICANT CHANGE UP (ref 3.8–10.5)
WBC # FLD AUTO: 6.25 K/UL — SIGNIFICANT CHANGE UP (ref 3.8–10.5)

## 2023-08-03 PROCEDURE — 99232 SBSQ HOSP IP/OBS MODERATE 35: CPT | Mod: GC

## 2023-08-03 PROCEDURE — 99233 SBSQ HOSP IP/OBS HIGH 50: CPT

## 2023-08-03 RX ORDER — INSULIN LISPRO 100/ML
VIAL (ML) SUBCUTANEOUS
Refills: 0 | Status: DISCONTINUED | OUTPATIENT
Start: 2023-08-03 | End: 2023-08-07

## 2023-08-03 RX ORDER — MAGNESIUM SULFATE 500 MG/ML
1 VIAL (ML) INJECTION ONCE
Refills: 0 | Status: COMPLETED | OUTPATIENT
Start: 2023-08-03 | End: 2023-08-03

## 2023-08-03 RX ORDER — INSULIN LISPRO 100/ML
VIAL (ML) SUBCUTANEOUS
Refills: 0 | Status: DISCONTINUED | OUTPATIENT
Start: 2023-08-03 | End: 2023-08-03

## 2023-08-03 RX ORDER — INSULIN LISPRO 100/ML
7 VIAL (ML) SUBCUTANEOUS
Refills: 0 | Status: DISCONTINUED | OUTPATIENT
Start: 2023-08-03 | End: 2023-08-04

## 2023-08-03 RX ORDER — INSULIN LISPRO 100/ML
VIAL (ML) SUBCUTANEOUS AT BEDTIME
Refills: 0 | Status: DISCONTINUED | OUTPATIENT
Start: 2023-08-03 | End: 2023-08-07

## 2023-08-03 RX ORDER — SODIUM,POTASSIUM PHOSPHATES 278-250MG
1 POWDER IN PACKET (EA) ORAL EVERY 6 HOURS
Refills: 0 | Status: COMPLETED | OUTPATIENT
Start: 2023-08-03 | End: 2023-08-03

## 2023-08-03 RX ORDER — INSULIN GLARGINE 100 [IU]/ML
22 INJECTION, SOLUTION SUBCUTANEOUS AT BEDTIME
Refills: 0 | Status: DISCONTINUED | OUTPATIENT
Start: 2023-08-03 | End: 2023-08-08

## 2023-08-03 RX ORDER — POTASSIUM CHLORIDE 20 MEQ
40 PACKET (EA) ORAL ONCE
Refills: 0 | Status: COMPLETED | OUTPATIENT
Start: 2023-08-03 | End: 2023-08-03

## 2023-08-03 RX ORDER — SODIUM,POTASSIUM PHOSPHATES 278-250MG
1 POWDER IN PACKET (EA) ORAL ONCE
Refills: 0 | Status: COMPLETED | OUTPATIENT
Start: 2023-08-03 | End: 2023-08-03

## 2023-08-03 RX ADMIN — Medication 7 UNIT(S): at 17:24

## 2023-08-03 RX ADMIN — INSULIN GLARGINE 22 UNIT(S): 100 INJECTION, SOLUTION SUBCUTANEOUS at 22:05

## 2023-08-03 RX ADMIN — Medication 1: at 17:25

## 2023-08-03 RX ADMIN — Medication 1 PACKET(S): at 02:44

## 2023-08-03 RX ADMIN — Medication 1 PACKET(S): at 17:23

## 2023-08-03 RX ADMIN — ATORVASTATIN CALCIUM 80 MILLIGRAM(S): 80 TABLET, FILM COATED ORAL at 21:41

## 2023-08-03 RX ADMIN — Medication 2 GRAM(S): at 17:24

## 2023-08-03 RX ADMIN — Medication 100 GRAM(S): at 02:42

## 2023-08-03 RX ADMIN — LIDOCAINE 1 PATCH: 4 CREAM TOPICAL at 07:30

## 2023-08-03 RX ADMIN — Medication 7 UNIT(S): at 13:46

## 2023-08-03 RX ADMIN — Medication 145 MILLIGRAM(S): at 12:56

## 2023-08-03 RX ADMIN — SIMETHICONE 80 MILLIGRAM(S): 80 TABLET, CHEWABLE ORAL at 14:20

## 2023-08-03 RX ADMIN — Medication 40 MILLIEQUIVALENT(S): at 09:44

## 2023-08-03 RX ADMIN — Medication 1 PACKET(S): at 05:04

## 2023-08-03 RX ADMIN — CHLORHEXIDINE GLUCONATE 1 APPLICATION(S): 213 SOLUTION TOPICAL at 05:05

## 2023-08-03 RX ADMIN — Medication 2 GRAM(S): at 05:05

## 2023-08-03 RX ADMIN — Medication 1000 MILLIGRAM(S): at 21:41

## 2023-08-03 RX ADMIN — ENOXAPARIN SODIUM 40 MILLIGRAM(S): 100 INJECTION SUBCUTANEOUS at 05:04

## 2023-08-03 RX ADMIN — Medication 1000 MILLIGRAM(S): at 22:34

## 2023-08-03 RX ADMIN — LIDOCAINE 1 PATCH: 4 CREAM TOPICAL at 08:45

## 2023-08-03 RX ADMIN — Medication 7 UNIT(S): at 08:41

## 2023-08-03 NOTE — PROGRESS NOTE ADULT - SUBJECTIVE AND OBJECTIVE BOX
Admitted for: Acute pancreatitis without infection or necrosis        Following for:    Subjective:       MEDICATIONS  (STANDING):  atorvastatin 80 milliGRAM(s) Oral at bedtime  chlorhexidine 4% Liquid 1 Application(s) Topical <User Schedule>  enoxaparin Injectable 40 milliGRAM(s) SubCutaneous every 24 hours  fenofibrate Tablet 145 milliGRAM(s) Oral daily  insulin glargine Injectable (LANTUS) 22 Unit(s) SubCutaneous at bedtime  insulin lispro (ADMELOG) corrective regimen sliding scale   SubCutaneous three times a day before meals  insulin lispro (ADMELOG) corrective regimen sliding scale   SubCutaneous at bedtime  insulin lispro Injectable (ADMELOG) 7 Unit(s) SubCutaneous before breakfast  insulin lispro Injectable (ADMELOG) 7 Unit(s) SubCutaneous before lunch  insulin lispro Injectable (ADMELOG) 7 Unit(s) SubCutaneous before dinner  lidocaine   4% Patch 1 Patch Transdermal daily  naloxone Injectable 0.4 milliGRAM(s) IV Push once  omega-3-Acid Ethyl Esters 2 Gram(s) Oral two times a day  ondansetron Injectable 4 milliGRAM(s) IV Push once    MEDICATIONS  (PRN):  acetaminophen     Tablet .. 1000 milliGRAM(s) Oral every 8 hours PRN Mild Pain (1 - 3)  morphine  - Injectable 2 milliGRAM(s) IV Push every 4 hours PRN Moderate Pain (4 - 6)  morphine  - Injectable 4 milliGRAM(s) IV Push every 4 hours PRN Severe Pain (7 - 10)  simethicone 80 milliGRAM(s) Chew every 6 hours PRN Gas      Allergies    No Known Allergies    Intolerances          PHYSICAL EXAM:  VITALS: T(C): 37.2 (08-03-23 @ 08:00)  T(F): 98.9 (08-03-23 @ 08:00), Max: 99.1 (08-03-23 @ 00:00)  HR: 83 (08-03-23 @ 11:00) (78 - 101)  BP: 108/70 (08-03-23 @ 11:00) (106/60 - 125/72)  RR:  (14 - 24)  SpO2:  (94% - 98%)  Wt(kg): --  GENERAL: NAD  EYES: No proptosis, no lid lag, anicteric  RESPIRATORY: Clear to auscultation bilaterally  CARDIOVASCULAR: Regular rate and rhythm  GI: Soft, nontender, non distended  EXT: b/l feet without wounds, 2+ pulses  PSYCH: Alert and oriented x 3, reactive affect      A1C with Estimated Average Glucose Result: 11.6 % (08-01-23 @ 06:23)      POCT Blood Glucose.: 145 mg/dL (08-03-23 @ 12:21)  POCT Blood Glucose.: 138 mg/dL (08-03-23 @ 08:40)  POCT Blood Glucose.: 135 mg/dL (08-03-23 @ 07:59)  POCT Blood Glucose.: 120 mg/dL (08-03-23 @ 07:01)  POCT Blood Glucose.: 137 mg/dL (08-03-23 @ 06:17)  POCT Blood Glucose.: 157 mg/dL (08-03-23 @ 05:03)  POCT Blood Glucose.: 168 mg/dL (08-03-23 @ 04:07)  POCT Blood Glucose.: 143 mg/dL (08-03-23 @ 03:10)  POCT Blood Glucose.: 162 mg/dL (08-03-23 @ 02:09)  POCT Blood Glucose.: 164 mg/dL (08-03-23 @ 01:09)  POCT Blood Glucose.: 170 mg/dL (08-03-23 @ 00:20)  POCT Blood Glucose.: 170 mg/dL (08-02-23 @ 23:05)  POCT Blood Glucose.: 173 mg/dL (08-02-23 @ 22:07)  POCT Blood Glucose.: 142 mg/dL (08-02-23 @ 21:06)  POCT Blood Glucose.: 116 mg/dL (08-02-23 @ 20:02)  POCT Blood Glucose.: 112 mg/dL (08-02-23 @ 19:00)  POCT Blood Glucose.: 91 mg/dL (08-02-23 @ 18:10)  POCT Blood Glucose.: 86 mg/dL (08-02-23 @ 17:11)  POCT Blood Glucose.: 97 mg/dL (08-02-23 @ 16:01)  POCT Blood Glucose.: 128 mg/dL (08-02-23 @ 15:03)  POCT Blood Glucose.: 119 mg/dL (08-02-23 @ 14:15)  POCT Blood Glucose.: 134 mg/dL (08-02-23 @ 13:01)  POCT Blood Glucose.: 164 mg/dL (08-02-23 @ 12:06)  POCT Blood Glucose.: 188 mg/dL (08-02-23 @ 11:01)  POCT Blood Glucose.: 183 mg/dL (08-02-23 @ 10:24)  POCT Blood Glucose.: 100 mg/dL (08-02-23 @ 09:07)  POCT Blood Glucose.: 122 mg/dL (08-02-23 @ 08:02)  POCT Blood Glucose.: 119 mg/dL (08-02-23 @ 06:54)  POCT Blood Glucose.: 117 mg/dL (08-02-23 @ 06:22)  POCT Blood Glucose.: 122 mg/dL (08-02-23 @ 05:04)  POCT Blood Glucose.: 114 mg/dL (08-02-23 @ 04:09)  POCT Blood Glucose.: 99 mg/dL (08-02-23 @ 03:13)  POCT Blood Glucose.: 93 mg/dL (08-02-23 @ 02:25)  POCT Blood Glucose.: 112 mg/dL (08-02-23 @ 01:06)  POCT Blood Glucose.: 120 mg/dL (08-01-23 @ 23:57)  POCT Blood Glucose.: 120 mg/dL (08-01-23 @ 23:09)  POCT Blood Glucose.: 142 mg/dL (08-01-23 @ 22:01)  POCT Blood Glucose.: 147 mg/dL (08-01-23 @ 20:58)  POCT Blood Glucose.: 145 mg/dL (08-01-23 @ 19:56)  POCT Blood Glucose.: 150 mg/dL (08-01-23 @ 18:51)  POCT Blood Glucose.: 123 mg/dL (08-01-23 @ 18:04)  POCT Blood Glucose.: 124 mg/dL (08-01-23 @ 17:02)  POCT Blood Glucose.: 114 mg/dL (08-01-23 @ 16:04)  POCT Blood Glucose.: 99 mg/dL (08-01-23 @ 15:02)  POCT Blood Glucose.: 99 mg/dL (08-01-23 @ 14:16)  POCT Blood Glucose.: 109 mg/dL (08-01-23 @ 13:10)  POCT Blood Glucose.: 156 mg/dL (08-01-23 @ 12:07)  POCT Blood Glucose.: 214 mg/dL (08-01-23 @ 11:09)  POCT Blood Glucose.: 184 mg/dL (08-01-23 @ 10:00)  POCT Blood Glucose.: 115 mg/dL (08-01-23 @ 09:04)  POCT Blood Glucose.: 131 mg/dL (08-01-23 @ 07:51)  POCT Blood Glucose.: 155 mg/dL (08-01-23 @ 06:52)  POCT Blood Glucose.: 172 mg/dL (08-01-23 @ 06:02)  POCT Blood Glucose.: 181 mg/dL (08-01-23 @ 04:59)  POCT Blood Glucose.: 195 mg/dL (08-01-23 @ 04:01)  POCT Blood Glucose.: 209 mg/dL (08-01-23 @ 02:56)  POCT Blood Glucose.: 212 mg/dL (08-01-23 @ 02:05)  POCT Blood Glucose.: 194 mg/dL (08-01-23 @ 01:01)  POCT Blood Glucose.: 188 mg/dL (08-01-23 @ 00:31)  POCT Blood Glucose.: 228 mg/dL (07-31-23 @ 23:34)      08-03    133<L>  |  101  |  5<L>  ----------------------------<  146<H>  3.3<L>   |  20<L>  |  0.54    eGFR: 132    Ca    8.4      08-03  Mg     2.0     08-03  Phos  2.7     08-03    TPro  6.3  /  Alb  3.1<L>  /  TBili  0.3  /  DBili  x   /  AST  21  /  ALT  16  /  AlkPhos  57  08-03      Thyroid Function Tests:                         Admitted for: Acute pancreatitis without infection or necrosis      Following for: HyperTG induced pancreatitis     Subjective:   - insulin gtt discontinued this AM.  - Patient tolerating solid diet.      MEDICATIONS  (STANDING):  atorvastatin 80 milliGRAM(s) Oral at bedtime  chlorhexidine 4% Liquid 1 Application(s) Topical <User Schedule>  enoxaparin Injectable 40 milliGRAM(s) SubCutaneous every 24 hours  fenofibrate Tablet 145 milliGRAM(s) Oral daily  insulin glargine Injectable (LANTUS) 22 Unit(s) SubCutaneous at bedtime  insulin lispro (ADMELOG) corrective regimen sliding scale   SubCutaneous three times a day before meals  insulin lispro (ADMELOG) corrective regimen sliding scale   SubCutaneous at bedtime  insulin lispro Injectable (ADMELOG) 7 Unit(s) SubCutaneous before breakfast  insulin lispro Injectable (ADMELOG) 7 Unit(s) SubCutaneous before lunch  insulin lispro Injectable (ADMELOG) 7 Unit(s) SubCutaneous before dinner  lidocaine   4% Patch 1 Patch Transdermal daily  naloxone Injectable 0.4 milliGRAM(s) IV Push once  omega-3-Acid Ethyl Esters 2 Gram(s) Oral two times a day  ondansetron Injectable 4 milliGRAM(s) IV Push once    MEDICATIONS  (PRN):  acetaminophen     Tablet .. 1000 milliGRAM(s) Oral every 8 hours PRN Mild Pain (1 - 3)  morphine  - Injectable 2 milliGRAM(s) IV Push every 4 hours PRN Moderate Pain (4 - 6)  morphine  - Injectable 4 milliGRAM(s) IV Push every 4 hours PRN Severe Pain (7 - 10)  simethicone 80 milliGRAM(s) Chew every 6 hours PRN Gas      Allergies    No Known Allergies    Intolerances          PHYSICAL EXAM:  VITALS: T(C): 37.2 (08-03-23 @ 08:00)  T(F): 98.9 (08-03-23 @ 08:00), Max: 99.1 (08-03-23 @ 00:00)  HR: 83 (08-03-23 @ 11:00) (78 - 101)  BP: 108/70 (08-03-23 @ 11:00) (106/60 - 125/72)  RR:  (14 - 24)  SpO2:  (94% - 98%)  Wt(kg): --  GENERAL: NAD  EYES: No proptosis, no lid lag, anicteric  RESPIRATORY: No respiratory distress  CARDIOVASCULAR: No peripheral edema.  GI: soft, non distended. TTP in LLQ RLQ  EXT: No edema  PSYCH: Alert and oriented x 3, reactive affect      A1C with Estimated Average Glucose Result: 11.6 % (08-01-23 @ 06:23)      POCT Blood Glucose.: 145 mg/dL (08-03-23 @ 12:21)  POCT Blood Glucose.: 138 mg/dL (08-03-23 @ 08:40)  POCT Blood Glucose.: 135 mg/dL (08-03-23 @ 07:59)  POCT Blood Glucose.: 120 mg/dL (08-03-23 @ 07:01)  POCT Blood Glucose.: 137 mg/dL (08-03-23 @ 06:17)  POCT Blood Glucose.: 157 mg/dL (08-03-23 @ 05:03)  POCT Blood Glucose.: 168 mg/dL (08-03-23 @ 04:07)  POCT Blood Glucose.: 143 mg/dL (08-03-23 @ 03:10)  POCT Blood Glucose.: 162 mg/dL (08-03-23 @ 02:09)  POCT Blood Glucose.: 164 mg/dL (08-03-23 @ 01:09)  POCT Blood Glucose.: 170 mg/dL (08-03-23 @ 00:20)  POCT Blood Glucose.: 170 mg/dL (08-02-23 @ 23:05)  POCT Blood Glucose.: 173 mg/dL (08-02-23 @ 22:07)  POCT Blood Glucose.: 142 mg/dL (08-02-23 @ 21:06)  POCT Blood Glucose.: 116 mg/dL (08-02-23 @ 20:02)  POCT Blood Glucose.: 112 mg/dL (08-02-23 @ 19:00)  POCT Blood Glucose.: 91 mg/dL (08-02-23 @ 18:10)  POCT Blood Glucose.: 86 mg/dL (08-02-23 @ 17:11)  POCT Blood Glucose.: 97 mg/dL (08-02-23 @ 16:01)  POCT Blood Glucose.: 128 mg/dL (08-02-23 @ 15:03)  POCT Blood Glucose.: 119 mg/dL (08-02-23 @ 14:15)  POCT Blood Glucose.: 134 mg/dL (08-02-23 @ 13:01)  POCT Blood Glucose.: 164 mg/dL (08-02-23 @ 12:06)  POCT Blood Glucose.: 188 mg/dL (08-02-23 @ 11:01)  POCT Blood Glucose.: 183 mg/dL (08-02-23 @ 10:24)  POCT Blood Glucose.: 100 mg/dL (08-02-23 @ 09:07)  POCT Blood Glucose.: 122 mg/dL (08-02-23 @ 08:02)  POCT Blood Glucose.: 119 mg/dL (08-02-23 @ 06:54)  POCT Blood Glucose.: 117 mg/dL (08-02-23 @ 06:22)  POCT Blood Glucose.: 122 mg/dL (08-02-23 @ 05:04)  POCT Blood Glucose.: 114 mg/dL (08-02-23 @ 04:09)  POCT Blood Glucose.: 99 mg/dL (08-02-23 @ 03:13)  POCT Blood Glucose.: 93 mg/dL (08-02-23 @ 02:25)  POCT Blood Glucose.: 112 mg/dL (08-02-23 @ 01:06)  POCT Blood Glucose.: 120 mg/dL (08-01-23 @ 23:57)  POCT Blood Glucose.: 120 mg/dL (08-01-23 @ 23:09)  POCT Blood Glucose.: 142 mg/dL (08-01-23 @ 22:01)  POCT Blood Glucose.: 147 mg/dL (08-01-23 @ 20:58)  POCT Blood Glucose.: 145 mg/dL (08-01-23 @ 19:56)  POCT Blood Glucose.: 150 mg/dL (08-01-23 @ 18:51)  POCT Blood Glucose.: 123 mg/dL (08-01-23 @ 18:04)  POCT Blood Glucose.: 124 mg/dL (08-01-23 @ 17:02)  POCT Blood Glucose.: 114 mg/dL (08-01-23 @ 16:04)  POCT Blood Glucose.: 99 mg/dL (08-01-23 @ 15:02)  POCT Blood Glucose.: 99 mg/dL (08-01-23 @ 14:16)  POCT Blood Glucose.: 109 mg/dL (08-01-23 @ 13:10)  POCT Blood Glucose.: 156 mg/dL (08-01-23 @ 12:07)  POCT Blood Glucose.: 214 mg/dL (08-01-23 @ 11:09)  POCT Blood Glucose.: 184 mg/dL (08-01-23 @ 10:00)  POCT Blood Glucose.: 115 mg/dL (08-01-23 @ 09:04)  POCT Blood Glucose.: 131 mg/dL (08-01-23 @ 07:51)  POCT Blood Glucose.: 155 mg/dL (08-01-23 @ 06:52)  POCT Blood Glucose.: 172 mg/dL (08-01-23 @ 06:02)  POCT Blood Glucose.: 181 mg/dL (08-01-23 @ 04:59)  POCT Blood Glucose.: 195 mg/dL (08-01-23 @ 04:01)  POCT Blood Glucose.: 209 mg/dL (08-01-23 @ 02:56)  POCT Blood Glucose.: 212 mg/dL (08-01-23 @ 02:05)  POCT Blood Glucose.: 194 mg/dL (08-01-23 @ 01:01)  POCT Blood Glucose.: 188 mg/dL (08-01-23 @ 00:31)  POCT Blood Glucose.: 228 mg/dL (07-31-23 @ 23:34)      08-03    133<L>  |  101  |  5<L>  ----------------------------<  146<H>  3.3<L>   |  20<L>  |  0.54    eGFR: 132    Ca    8.4      08-03  Mg     2.0     08-03  Phos  2.7     08-03    TPro  6.3  /  Alb  3.1<L>  /  TBili  0.3  /  DBili  x   /  AST  21  /  ALT  16  /  AlkPhos  57  08-03      Thyroid Function Tests:

## 2023-08-03 NOTE — PROGRESS NOTE ADULT - NS ATTEND AMEND GEN_ALL_CORE FT
43M w/ hypertriglyceridemia, previous severe pancreatitis w/ prolonged ICU stay required trach/vent subsequently decannulated and known pseudocyst. Admitted w/ hypertriglycedemia and acute pancreatitis w/ slight increase in pseudocyst size. On IVF and insulin gtt.     Pt is improving, pain has decreased and is tolerating PO diet, which has been advanced. TG have decreased <500 and transitioned off insulin gtt. IVF d/c'ed as well since pt is tolerating PO. Pt now has DM w/ hgba1c 11.6 so started on lantus and premeal insulin. Will need diabetic teaching as pt has never been on insulin prior. Tachycardia has resolved. GI following for pseuduocyst mgmt - pt is not sure if he wants to move forward with drainage but will obtain MRCP anyway so it is available if drainage is pursued. Check BMP this afternoon to ensure electrolytes are acceptable, hypoK repleted. OOB to chair, mobile on unit. DVT ppx w/ lovenox. Full code. Likely downgrade to medical floors this afternoon.     Plan of care discussed w/ pt and all questions answered . 43M w/ hypertriglyceridemia, previous severe pancreatitis w/ prolonged ICU stay required trach/vent subsequently decannulated and known pseudocyst. Admitted w/ hypertriglycedemia and acute pancreatitis w/ slight increase in pseudocyst size. On IVF and insulin gtt.     Pt is improving, pain has decreased and is tolerating PO diet, which has been advanced. TG have decreased <500 and transitioned off insulin gtt. Continue gemfibrozil and lovaza for maintenance of hypertriglyceridemia. IVF d/c'ed as well since pt is tolerating PO. Pt now has DM w/ hgba1c 11.6 so started on lantus and premeal insulin. Will need diabetic teaching as pt has never been on insulin prior. Tachycardia has resolved. GI following for pseuduocyst mgmt - pt is not sure if he wants to move forward with drainage but will obtain MRCP anyway so it is available if drainage is pursued. Check BMP this afternoon to ensure electrolytes are acceptable, hypoK repleted. OOB to chair, mobile on unit. DVT ppx w/ lovenox. Full code. Likely downgrade to medical floors this afternoon.     Plan of care discussed w/ pt and all questions answered .

## 2023-08-03 NOTE — PROGRESS NOTE ADULT - NS ATTEST RISK PROBLEM GEN_ALL_CORE FT
hypertriglyceridemia, pancreatitis, new onset DM hypertriglyceridemia, pancreatitis, newly diagnosis diabetes

## 2023-08-03 NOTE — CHART NOTE - [id number]_GEN_A_CORE
MICU Transfer Note    Transfer from: MICU    Transfer to: ( X ) Medicine    (  ) Telemetry     (   ) RCU        (    ) Palliative         (   ) Stroke Unit          (   ) __________________    Accepting physician:      MICU COURSE:  38 y/o male, PMH pre-DM, hypertriglyceridemia (follows with Dr. Michelle), multiple admissions of pancreatitis (last adm 2015 for necrotizing pancreatitis), pancreatic pseudocyst, initially presented to ED on 7/31/23 c/o abdominal pain x2 days. described pain as sharp, sudden onset, left sided, radiates towards the back b/l, 10/10. Pt also admitted associated nausea, 8 episodes of vomit ("cortes, creme" yellow in color, no blood), and unable to tolerate PO. Pt stated he has not had BMs in the past 2 days due to decreased PO. States the pain feels similar to his prior pancreatitis episode. Denies HA, CP, palpitations, SOB, diarrhea, fevers. Denies alcohol use. Pt admits stopping gemfibrozil and metformin 2 mo ago to try to control his medical issues with diet alone since he has been well controlled for the past 8 years.    ED course significant for triglycerides 4742, cholesterol 963, lipase 2908, WBC 12. Pt admitted to MICU for management of acute pancreatitis, on IVF and insulin gtt.    In MICU, Insulin gtt, D10 dc'd 8/3/23. transitioned to Lantus and Admelog. -170's since insulin dc'd. Trig 499. Lipase 206. C-peptide 1.6, 1.2. DM management and diet education.    Pt now stable for transfer to medicine floor.    ASSESSMENT & PLAN:   38 y/o male, PMH pre-DM, hypertriglyceridemia (follows with Dr. Michelle), multiple admissions of pancreatitis (last adm 2015 for necrotizing pancreatitis), pancreatic pseudocyst, initially presented to ED on 7/31/23 c/o abdominal pain x2 days. ED course significant for triglycerides 4742, cholesterol 963, lipase 2908, WBC 12. Pt admitted to MICU for management of acute pancreatitis, on IVF and insulin gtt.    =====NEUROLOGIC=====  #no active issues  -baseline mental status --- AOx3, lives at home, functional, ambulatory  -c/w neuro checks q4h and PRN for changes  -PT/OT    =====CARDIOVASCULAR=====  #tachycardia  -resolved -- HR b/t   -continue to monitor -- may be 2/2 pain    =====RESPIRATORY=====  #no active issues    =====RENAL=====  #no active issues  -monitor I&O's   -correct electrolytes PRN    =====GI=====  #Acute pancreatitis  -hx of hospitalizations for pancreatitis  -hx of hypertriglyceridemia  -GI following -- to discuss EUS with possible drainage procedure based on above results and clinical course  -MRI/MRCP ordered -- for better characterization of the pancreas and peripancreatic fluid  -tolerating PO low fat diet  -multiple BMs overnight  -pain control w/ IV tylenol  -trig downtrending 499 <-- 4742  -lipase downtrending 206 <-- 963  -continue trending trig / lipase / LFTs  -c/w simethicone for abd gas/bloating  -c/w fenofibrate, atorvastatin, lovaza    =====HEME/ONC=====  #Leukocytosis -- resolved  -WBC 12.12 --> 6.25  -afebrile    =====INFECTIOUS DISEASE=====  #no active issues    =====ENDO=====  #Hypertriglyceridemia  #T2DM  -hx pre-dm  -A1c 11.6 -- new diagnosis of T2DM  -trig downtrending 499 <-- 4742  -lipase downtrending 206 <-- 963  -C-peptide 1.6  -Endo recs appreciated  -c/w fenofibrate 145mg daily, atorvastatin 80mg QHS, and lovaza 2mg BID  -dc'd insulin gtt this AM -- transition to Lantus 22 units QHS and Admelog 7 units TID AC, with low dose correction scale TID AC and low dose correction QHS  -continue trending trig / lipase / BMP / POCT glucose  -insulin pen teaching, glucometer teaching, diet education    =====SKIN=====  Lines: NONE)  Decubiti: NONE    =====PPX=====  -DVT ppx -- c/w Lovenox    =====GOC=====  -FULL code      FOLLOW-UP   [] transition to Lantus 22 units QHS and Admelog 7 units TID AC, with low dose correction scale TID AC and low dose correction QHS  [] begin insulin pen teaching, glucometer teaching  [] MRI/MRCP ordered   [] send prescriptions for diabetes supplies (glucometer, test strips, lancets, alcohol swabs, insulin pen needles)        Vital Signs Last 24 Hrs  T(C): 36.8 (03 Aug 2023 12:00), Max: 37.3 (03 Aug 2023 00:00)  T(F): 98.3 (03 Aug 2023 12:00), Max: 99.1 (03 Aug 2023 00:00)  HR: 91 (03 Aug 2023 13:00) (78 - 101)  BP: 92/61 (03 Aug 2023 13:00) (92/61 - 125/72)  BP(mean): 73 (03 Aug 2023 13:00) (73 - 94)  RR: 22 (03 Aug 2023 13:00) (14 - 24)  SpO2: 97% (03 Aug 2023 13:00) (94% - 98%)    Parameters below as of 03 Aug 2023 08:00  Patient On (Oxygen Delivery Method): room air    O2 Concentration (%): 21  I&O's Summary    02 Aug 2023 07:01  -  03 Aug 2023 07:00  --------------------------------------------------------  IN: 3290 mL / OUT: 2900 mL / NET: 390 mL    03 Aug 2023 07:01  -  03 Aug 2023 13:43  --------------------------------------------------------  IN: 472 mL / OUT: 200 mL / NET: 272 mL        MEDICATIONS  (STANDING):  atorvastatin 80 milliGRAM(s) Oral at bedtime  chlorhexidine 4% Liquid 1 Application(s) Topical <User Schedule>  enoxaparin Injectable 40 milliGRAM(s) SubCutaneous every 24 hours  fenofibrate Tablet 145 milliGRAM(s) Oral daily  insulin glargine Injectable (LANTUS) 22 Unit(s) SubCutaneous at bedtime  insulin lispro (ADMELOG) corrective regimen sliding scale   SubCutaneous three times a day before meals  insulin lispro (ADMELOG) corrective regimen sliding scale   SubCutaneous at bedtime  insulin lispro Injectable (ADMELOG) 7 Unit(s) SubCutaneous before breakfast  insulin lispro Injectable (ADMELOG) 7 Unit(s) SubCutaneous before lunch  insulin lispro Injectable (ADMELOG) 7 Unit(s) SubCutaneous before dinner  lidocaine   4% Patch 1 Patch Transdermal daily  naloxone Injectable 0.4 milliGRAM(s) IV Push once  omega-3-Acid Ethyl Esters 2 Gram(s) Oral two times a day  ondansetron Injectable 4 milliGRAM(s) IV Push once    MEDICATIONS  (PRN):  acetaminophen     Tablet .. 1000 milliGRAM(s) Oral every 8 hours PRN Mild Pain (1 - 3)  morphine  - Injectable 2 milliGRAM(s) IV Push every 4 hours PRN Moderate Pain (4 - 6)  morphine  - Injectable 4 milliGRAM(s) IV Push every 4 hours PRN Severe Pain (7 - 10)  simethicone 80 milliGRAM(s) Chew every 6 hours PRN Gas        LABS                                            12.0                  Neurophils% (auto):   x      (08-03 @ 00:24):    6.25 )-----------(164          Lymphocytes% (auto):  x                                             35.7                   Eosinphils% (auto):   x        Manual%: Neutrophils x    ; Lymphocytes x    ; Eosinophils x    ; Bands%: x    ; Blasts x                                    134    |  103    |  7                   Calcium: 9.0   / iCa: x      (08-03 @ 12:20)    ----------------------------<  162       Magnesium: 2.0                              3.7     |  20     |  0.50             Phosphorous: 3.0      TPro  6.3    /  Alb  3.1    /  TBili  0.3    /  DBili  x      /  AST  21     /  ALT  16     /  AlkPhos  57     03 Aug 2023 06:36 MICU Transfer Note    Transfer from: MICU    Transfer to: ( X ) Medicine    (  ) Telemetry     (   ) RCU        (    ) Palliative         (   ) Stroke Unit          (   ) __________________    Accepting physician:      MICU COURSE:  36 y/o male, PMH pre-DM, hypertriglyceridemia (follows with Dr. Michelle), multiple admissions of pancreatitis (last adm 2015 for necrotizing pancreatitis), pancreatic pseudocyst, initially presented to ED on 7/31/23 c/o abdominal pain x2 days. described pain as sharp, sudden onset, left sided, radiates towards the back b/l, 10/10. Pt also admitted associated nausea, 8 episodes of vomit ("cortes, creme" yellow in color, no blood), and unable to tolerate PO. Pt stated he has not had BMs in the past 2 days due to decreased PO. States the pain feels similar to his prior pancreatitis episode. Denies HA, CP, palpitations, SOB, diarrhea, fevers. Denies alcohol use. Pt admits stopping gemfibrozil and metformin 2 mo ago to try to control his medical issues with diet alone since he has been well controlled for the past 8 years.    ED course significant for triglycerides 4742, cholesterol 963, lipase 2908, WBC 12. Pt admitted to MICU for management of acute pancreatitis, on IVF and insulin gtt.    In MICU, Insulin gtt, D10 dc'd 8/3/23. transitioned to Lantus and Admelog. -170's since insulin dc'd. Trig 499. Lipase 206. C-peptide 1.6, 1.2. DM management and diet education.    Pt now stable for transfer to medicine floor.    ASSESSMENT & PLAN:   36 y/o male, PMH pre-DM, hypertriglyceridemia (follows with Dr. Michelle), multiple admissions of pancreatitis (last adm 2015 for necrotizing pancreatitis), pancreatic pseudocyst, initially presented to ED on 7/31/23 c/o abdominal pain x2 days. ED course significant for triglycerides 4742, cholesterol 963, lipase 2908, WBC 12. Pt admitted to MICU for management of acute pancreatitis, on IVF and insulin gtt.    =====NEUROLOGIC=====  #no active issues  -baseline mental status --- AOx3, lives at home, functional, ambulatory  -c/w neuro checks q4h and PRN for changes  -PT/OT    =====CARDIOVASCULAR=====  #tachycardia  -resolved -- HR b/t   -continue to monitor -- may be 2/2 pain    =====RESPIRATORY=====  #no active issues    =====RENAL=====  #no active issues  -monitor I&O's   -correct electrolytes PRN    =====GI=====  #Acute pancreatitis  -hx of hospitalizations for pancreatitis  -hx of hypertriglyceridemia  -GI following -- will discuss EUS with possible drainage procedure based on above results and clinical course  -MRI/MRCP ordered -- for better characterization of the pancreas and peripancreatic fluid  -tolerating PO low fat diet  -multiple BMs overnight  -pain control w/ IV tylenol  -trig downtrending 499 <-- 4742  -lipase downtrending 206 <-- 963  -continue trending trig / lipase / LFTs  -c/w simethicone for abd gas/bloating  -c/w fenofibrate, atorvastatin, lovaza    =====HEME/ONC=====  #Leukocytosis -- resolved  -WBC 12.12 --> 6.25  -afebrile    =====INFECTIOUS DISEASE=====  #no active issues    =====ENDO=====  #Hypertriglyceridemia  #T2DM  -hx pre-dm  -A1c 11.6 -- new diagnosis of T2DM  -trig downtrending 499 <-- 4742  -lipase downtrending 206 <-- 963  -C-peptide 1.6  -Endo recs appreciated  -c/w fenofibrate 145mg daily, atorvastatin 80mg QHS, and lovaza 2mg BID  -dc'd insulin gtt this AM -- transition to Lantus 22 units QHS and Admelog 7 units TID AC, with low dose correction scale TID AC and low dose correction QHS  -continue trending trig / lipase / BMP / POCT glucose  -insulin pen teaching, glucometer teaching, diet education    =====SKIN=====  Lines: NONE)  Decubiti: NONE    =====PPX=====  -DVT ppx -- c/w Lovenox    =====GOC=====  -FULL code      FOLLOW-UP   [] transition to Lantus 22 units QHS and Admelog 7 units TID AC, with low dose correction scale TID AC and low dose correction QHS  [] begin insulin pen teaching, glucometer teaching  [] MRI/MRCP ordered   [] send prescriptions for diabetes supplies (glucometer, test strips, lancets, alcohol swabs, insulin pen needles)        Vital Signs Last 24 Hrs  T(C): 36.8 (03 Aug 2023 12:00), Max: 37.3 (03 Aug 2023 00:00)  T(F): 98.3 (03 Aug 2023 12:00), Max: 99.1 (03 Aug 2023 00:00)  HR: 91 (03 Aug 2023 13:00) (78 - 101)  BP: 92/61 (03 Aug 2023 13:00) (92/61 - 125/72)  BP(mean): 73 (03 Aug 2023 13:00) (73 - 94)  RR: 22 (03 Aug 2023 13:00) (14 - 24)  SpO2: 97% (03 Aug 2023 13:00) (94% - 98%)    Parameters below as of 03 Aug 2023 08:00  Patient On (Oxygen Delivery Method): room air    O2 Concentration (%): 21  I&O's Summary    02 Aug 2023 07:01  -  03 Aug 2023 07:00  --------------------------------------------------------  IN: 3290 mL / OUT: 2900 mL / NET: 390 mL    03 Aug 2023 07:01  -  03 Aug 2023 13:43  --------------------------------------------------------  IN: 472 mL / OUT: 200 mL / NET: 272 mL        MEDICATIONS  (STANDING):  atorvastatin 80 milliGRAM(s) Oral at bedtime  chlorhexidine 4% Liquid 1 Application(s) Topical <User Schedule>  enoxaparin Injectable 40 milliGRAM(s) SubCutaneous every 24 hours  fenofibrate Tablet 145 milliGRAM(s) Oral daily  insulin glargine Injectable (LANTUS) 22 Unit(s) SubCutaneous at bedtime  insulin lispro (ADMELOG) corrective regimen sliding scale   SubCutaneous three times a day before meals  insulin lispro (ADMELOG) corrective regimen sliding scale   SubCutaneous at bedtime  insulin lispro Injectable (ADMELOG) 7 Unit(s) SubCutaneous before breakfast  insulin lispro Injectable (ADMELOG) 7 Unit(s) SubCutaneous before lunch  insulin lispro Injectable (ADMELOG) 7 Unit(s) SubCutaneous before dinner  lidocaine   4% Patch 1 Patch Transdermal daily  naloxone Injectable 0.4 milliGRAM(s) IV Push once  omega-3-Acid Ethyl Esters 2 Gram(s) Oral two times a day  ondansetron Injectable 4 milliGRAM(s) IV Push once    MEDICATIONS  (PRN):  acetaminophen     Tablet .. 1000 milliGRAM(s) Oral every 8 hours PRN Mild Pain (1 - 3)  morphine  - Injectable 2 milliGRAM(s) IV Push every 4 hours PRN Moderate Pain (4 - 6)  morphine  - Injectable 4 milliGRAM(s) IV Push every 4 hours PRN Severe Pain (7 - 10)  simethicone 80 milliGRAM(s) Chew every 6 hours PRN Gas        LABS                                            12.0                  Neurophils% (auto):   x      (08-03 @ 00:24):    6.25 )-----------(164          Lymphocytes% (auto):  x                                             35.7                   Eosinphils% (auto):   x        Manual%: Neutrophils x    ; Lymphocytes x    ; Eosinophils x    ; Bands%: x    ; Blasts x                                    134    |  103    |  7                   Calcium: 9.0   / iCa: x      (08-03 @ 12:20)    ----------------------------<  162       Magnesium: 2.0                              3.7     |  20     |  0.50             Phosphorous: 3.0      TPro  6.3    /  Alb  3.1    /  TBili  0.3    /  DBili  x      /  AST  21     /  ALT  16     /  AlkPhos  57     03 Aug 2023 06:36 MICU Transfer Note    Transfer from: MICU    Transfer to: ( X ) Medicine    (  ) Telemetry     (   ) RCU        (    ) Palliative         (   ) Stroke Unit          (   ) __________________    Accepting physician: Dr. Michael      MICU COURSE:  38 y/o male, PMH pre-DM, hypertriglyceridemia (follows with Dr. Michelle), multiple admissions of pancreatitis (last adm 2015 for necrotizing pancreatitis), pancreatic pseudocyst, initially presented to ED on 7/31/23 c/o abdominal pain x2 days. described pain as sharp, sudden onset, left sided, radiates towards the back b/l, 10/10. Pt also admitted associated nausea, 8 episodes of vomit ("cortes, creme" yellow in color, no blood), and unable to tolerate PO. Pt stated he has not had BMs in the past 2 days due to decreased PO. States the pain feels similar to his prior pancreatitis episode. Denies HA, CP, palpitations, SOB, diarrhea, fevers. Denies alcohol use. Pt admits stopping gemfibrozil and metformin 2 mo ago to try to control his medical issues with diet alone since he has been well controlled for the past 8 years.    ED course significant for triglycerides 4742, cholesterol 963, lipase 2908, WBC 12. Pt admitted to MICU for management of acute pancreatitis, on IVF and insulin gtt.    In MICU, Insulin gtt, D10 dc'd 8/3/23. transitioned to Lantus and Admelog. -170's since insulin dc'd. Trig 499. Lipase 206. C-peptide 1.6, 1.2. DM management and diet education.    Pt now stable for transfer to medicine floor.    ASSESSMENT & PLAN:   38 y/o male, PMH pre-DM, hypertriglyceridemia (follows with Dr. Michelle), multiple admissions of pancreatitis (last adm 2015 for necrotizing pancreatitis), pancreatic pseudocyst, initially presented to ED on 7/31/23 c/o abdominal pain x2 days. ED course significant for triglycerides 4742, cholesterol 963, lipase 2908, WBC 12. Pt admitted to MICU for management of acute pancreatitis, on IVF and insulin gtt.    =====NEUROLOGIC=====  #no active issues  -baseline mental status --- AOx3, lives at home, functional, ambulatory  -c/w neuro checks q4h and PRN for changes  -PT/OT    =====CARDIOVASCULAR=====  #tachycardia  -resolved -- HR b/t   -continue to monitor -- may be 2/2 pain    =====RESPIRATORY=====  #no active issues    =====RENAL=====  #no active issues  -monitor I&O's   -correct electrolytes PRN    =====GI=====  #Acute pancreatitis  -hx of hospitalizations for pancreatitis  -hx of hypertriglyceridemia  -GI following -- will discuss EUS with possible drainage procedure based on above results and clinical course  -MRI/MRCP ordered -- for better characterization of the pancreas and peripancreatic fluid  -tolerating PO low fat diet  -multiple BMs overnight  -pain control w/ IV tylenol  -trig downtrending 499 <-- 4742  -lipase downtrending 206 <-- 963  -continue trending trig / lipase / LFTs  -c/w simethicone for abd gas/bloating  -c/w fenofibrate, atorvastatin, lovaza    =====HEME/ONC=====  #Leukocytosis -- resolved  -WBC 12.12 --> 6.25  -afebrile    =====INFECTIOUS DISEASE=====  #no active issues    =====ENDO=====  #Hypertriglyceridemia  #T2DM  -hx pre-dm  -A1c 11.6 -- new diagnosis of T2DM  -trig downtrending 499 <-- 4742  -lipase downtrending 206 <-- 963  -C-peptide 1.6  -Endo recs appreciated  -c/w fenofibrate 145mg daily, atorvastatin 80mg QHS, and lovaza 2mg BID  -dc'd insulin gtt this AM -- transition to Lantus 22 units QHS and Admelog 7 units TID AC, with low dose correction scale TID AC and low dose correction QHS  -continue trending trig / lipase / BMP / POCT glucose  -insulin pen teaching, glucometer teaching, diet education    =====SKIN=====  Lines: NONE)  Decubiti: NONE    =====PPX=====  -DVT ppx -- c/w Lovenox    =====GOC=====  -FULL code      FOLLOW-UP   [] transition to Lantus 22 units QHS and Admelog 7 units TID AC, with low dose correction scale TID AC and low dose correction QHS  [] begin insulin pen teaching, glucometer teaching  [] MRI/MRCP ordered   [] send prescriptions for diabetes supplies (glucometer, test strips, lancets, alcohol swabs, insulin pen needles)        Vital Signs Last 24 Hrs  T(C): 36.8 (03 Aug 2023 12:00), Max: 37.3 (03 Aug 2023 00:00)  T(F): 98.3 (03 Aug 2023 12:00), Max: 99.1 (03 Aug 2023 00:00)  HR: 91 (03 Aug 2023 13:00) (78 - 101)  BP: 92/61 (03 Aug 2023 13:00) (92/61 - 125/72)  BP(mean): 73 (03 Aug 2023 13:00) (73 - 94)  RR: 22 (03 Aug 2023 13:00) (14 - 24)  SpO2: 97% (03 Aug 2023 13:00) (94% - 98%)    Parameters below as of 03 Aug 2023 08:00  Patient On (Oxygen Delivery Method): room air    O2 Concentration (%): 21  I&O's Summary    02 Aug 2023 07:01  -  03 Aug 2023 07:00  --------------------------------------------------------  IN: 3290 mL / OUT: 2900 mL / NET: 390 mL    03 Aug 2023 07:01  -  03 Aug 2023 13:43  --------------------------------------------------------  IN: 472 mL / OUT: 200 mL / NET: 272 mL        MEDICATIONS  (STANDING):  atorvastatin 80 milliGRAM(s) Oral at bedtime  chlorhexidine 4% Liquid 1 Application(s) Topical <User Schedule>  enoxaparin Injectable 40 milliGRAM(s) SubCutaneous every 24 hours  fenofibrate Tablet 145 milliGRAM(s) Oral daily  insulin glargine Injectable (LANTUS) 22 Unit(s) SubCutaneous at bedtime  insulin lispro (ADMELOG) corrective regimen sliding scale   SubCutaneous three times a day before meals  insulin lispro (ADMELOG) corrective regimen sliding scale   SubCutaneous at bedtime  insulin lispro Injectable (ADMELOG) 7 Unit(s) SubCutaneous before breakfast  insulin lispro Injectable (ADMELOG) 7 Unit(s) SubCutaneous before lunch  insulin lispro Injectable (ADMELOG) 7 Unit(s) SubCutaneous before dinner  lidocaine   4% Patch 1 Patch Transdermal daily  naloxone Injectable 0.4 milliGRAM(s) IV Push once  omega-3-Acid Ethyl Esters 2 Gram(s) Oral two times a day  ondansetron Injectable 4 milliGRAM(s) IV Push once    MEDICATIONS  (PRN):  acetaminophen     Tablet .. 1000 milliGRAM(s) Oral every 8 hours PRN Mild Pain (1 - 3)  morphine  - Injectable 2 milliGRAM(s) IV Push every 4 hours PRN Moderate Pain (4 - 6)  morphine  - Injectable 4 milliGRAM(s) IV Push every 4 hours PRN Severe Pain (7 - 10)  simethicone 80 milliGRAM(s) Chew every 6 hours PRN Gas        LABS                                            12.0                  Neurophils% (auto):   x      (08-03 @ 00:24):    6.25 )-----------(164          Lymphocytes% (auto):  x                                             35.7                   Eosinphils% (auto):   x        Manual%: Neutrophils x    ; Lymphocytes x    ; Eosinophils x    ; Bands%: x    ; Blasts x                                    134    |  103    |  7                   Calcium: 9.0   / iCa: x      (08-03 @ 12:20)    ----------------------------<  162       Magnesium: 2.0                              3.7     |  20     |  0.50             Phosphorous: 3.0      TPro  6.3    /  Alb  3.1    /  TBili  0.3    /  DBili  x      /  AST  21     /  ALT  16     /  AlkPhos  57     03 Aug 2023 06:36 MICU Transfer Note    Transfer from: MICU    Transfer to: ( X ) Medicine    (  ) Telemetry     (   ) RCU        (    ) Palliative         (   ) Stroke Unit          (   ) __________________    Accepting physician: Dr. Michael      MICU COURSE:  36 y/o male, PMH pre-DM, hypertriglyceridemia (follows with Dr. Michelle), multiple admissions of pancreatitis (last adm 2015 for necrotizing pancreatitis), pancreatic pseudocyst, initially presented to ED on 7/31/23 c/o abdominal pain x2 days. described pain as sharp, sudden onset, left sided, radiates towards the back b/l, 10/10. Pt also admitted associated nausea, 8 episodes of vomit ("cortes, creme" yellow in color, no blood), and unable to tolerate PO. Pt stated he has not had BMs in the past 2 days due to decreased PO. States the pain feels similar to his prior pancreatitis episode. Denies HA, CP, palpitations, SOB, diarrhea, fevers. Denies alcohol use. Pt admits stopping gemfibrozil and metformin 2 mo ago to try to control his medical issues with diet alone since he has been well controlled for the past 8 years.    ED course significant for triglycerides 4742, cholesterol 963, lipase 2908, WBC 12. Pt admitted to MICU for management of acute pancreatitis, on IVF and insulin gtt.    In MICU, Insulin gtt, D10 dc'd 8/3/23. transitioned to Lantus and Admelog. -170's since insulin dc'd. Trig 499. Lipase 206. C-peptide 1.6, 1.2. DM management and diet education.    Pt now stable for transfer to medicine floor.    ASSESSMENT & PLAN:   36 y/o male, PMH pre-DM, hypertriglyceridemia (follows with Dr. Michelle), multiple admissions of pancreatitis (last adm 2015 for necrotizing pancreatitis), pancreatic pseudocyst, initially presented to ED on 7/31/23 c/o abdominal pain x2 days. ED course significant for triglycerides 4742, cholesterol 963, lipase 2908, WBC 12. Pt admitted to MICU for management of acute pancreatitis, on IVF and insulin gtt.    =====NEUROLOGIC=====  #no active issues  -baseline mental status --- AOx3, lives at home, functional, ambulatory  -c/w neuro checks q4h and PRN for changes  -PT/OT    =====CARDIOVASCULAR=====  #tachycardia  -resolved -- HR b/t   -continue to monitor -- may be 2/2 pain    =====RESPIRATORY=====  #no active issues    =====RENAL=====  #no active issues  -monitor I&O's   -correct electrolytes PRN    =====GI=====  #Acute pancreatitis  -hx of hospitalizations for pancreatitis  -hx of hypertriglyceridemia  -GI following -- will discuss EUS with possible drainage procedure based on above results and clinical course  -MRI/MRCP ordered -- for better characterization of the pancreas and peripancreatic fluid  -tolerating PO low fat diet  -multiple BMs overnight  -pain control w/ IV tylenol  -trig downtrending 499 <-- 4742  -lipase downtrending 206 <-- 963  -continue trending trig / lipase / LFTs  -c/w simethicone for abd gas/bloating  -c/w fenofibrate, atorvastatin, lovaza    =====HEME/ONC=====  #Leukocytosis -- resolved  -WBC 12.12 --> 6.25  -afebrile    =====INFECTIOUS DISEASE=====  #no active issues    =====ENDO=====  #Hypertriglyceridemia  #T2DM  -hx pre-dm  -A1c 11.6 -- new diagnosis of T2DM  -trig downtrending 499 <-- 4742  -lipase downtrending 206 <-- 963  -C-peptide 1.6  -Endo recs appreciated  -c/w fenofibrate 145mg daily, atorvastatin 80mg QHS, and lovaza 2mg BID  -dc'd insulin gtt this AM -- transition to Lantus 22 units QHS and Admelog 7 units TID AC, with low dose correction scale TID AC and low dose correction QHS  -continue trending trig / lipase / BMP / POCT glucose  -insulin pen teaching, glucometer teaching, diet education    =====SKIN=====  Lines: NONE)  Decubiti: NONE    =====PPX=====  -DVT ppx -- c/w Lovenox    =====GOC=====  -FULL code      FOLLOW-UP   [] transition to Lantus 22 units QHS and Admelog 7 units TID AC, with low dose correction scale TID AC and low dose correction QHS  [] begin insulin pen teaching, glucometer teaching  [] MRI/MRCP for 8/4 to assess pseudocyst   [] send prescriptions for diabetes supplies (glucometer, test strips, lancets, alcohol swabs, insulin pen needles)        Vital Signs Last 24 Hrs  T(C): 36.8 (03 Aug 2023 12:00), Max: 37.3 (03 Aug 2023 00:00)  T(F): 98.3 (03 Aug 2023 12:00), Max: 99.1 (03 Aug 2023 00:00)  HR: 91 (03 Aug 2023 13:00) (78 - 101)  BP: 92/61 (03 Aug 2023 13:00) (92/61 - 125/72)  BP(mean): 73 (03 Aug 2023 13:00) (73 - 94)  RR: 22 (03 Aug 2023 13:00) (14 - 24)  SpO2: 97% (03 Aug 2023 13:00) (94% - 98%)    Parameters below as of 03 Aug 2023 08:00  Patient On (Oxygen Delivery Method): room air    O2 Concentration (%): 21  I&O's Summary    02 Aug 2023 07:01  -  03 Aug 2023 07:00  --------------------------------------------------------  IN: 3290 mL / OUT: 2900 mL / NET: 390 mL    03 Aug 2023 07:01  -  03 Aug 2023 13:43  --------------------------------------------------------  IN: 472 mL / OUT: 200 mL / NET: 272 mL        MEDICATIONS  (STANDING):  atorvastatin 80 milliGRAM(s) Oral at bedtime  chlorhexidine 4% Liquid 1 Application(s) Topical <User Schedule>  enoxaparin Injectable 40 milliGRAM(s) SubCutaneous every 24 hours  fenofibrate Tablet 145 milliGRAM(s) Oral daily  insulin glargine Injectable (LANTUS) 22 Unit(s) SubCutaneous at bedtime  insulin lispro (ADMELOG) corrective regimen sliding scale   SubCutaneous three times a day before meals  insulin lispro (ADMELOG) corrective regimen sliding scale   SubCutaneous at bedtime  insulin lispro Injectable (ADMELOG) 7 Unit(s) SubCutaneous before breakfast  insulin lispro Injectable (ADMELOG) 7 Unit(s) SubCutaneous before lunch  insulin lispro Injectable (ADMELOG) 7 Unit(s) SubCutaneous before dinner  lidocaine   4% Patch 1 Patch Transdermal daily  naloxone Injectable 0.4 milliGRAM(s) IV Push once  omega-3-Acid Ethyl Esters 2 Gram(s) Oral two times a day  ondansetron Injectable 4 milliGRAM(s) IV Push once    MEDICATIONS  (PRN):  acetaminophen     Tablet .. 1000 milliGRAM(s) Oral every 8 hours PRN Mild Pain (1 - 3)  morphine  - Injectable 2 milliGRAM(s) IV Push every 4 hours PRN Moderate Pain (4 - 6)  morphine  - Injectable 4 milliGRAM(s) IV Push every 4 hours PRN Severe Pain (7 - 10)  simethicone 80 milliGRAM(s) Chew every 6 hours PRN Gas        LABS                                            12.0                  Neurophils% (auto):   x      (08-03 @ 00:24):    6.25 )-----------(164          Lymphocytes% (auto):  x                                             35.7                   Eosinphils% (auto):   x        Manual%: Neutrophils x    ; Lymphocytes x    ; Eosinophils x    ; Bands%: x    ; Blasts x                                    134    |  103    |  7                   Calcium: 9.0   / iCa: x      (08-03 @ 12:20)    ----------------------------<  162       Magnesium: 2.0                              3.7     |  20     |  0.50             Phosphorous: 3.0      TPro  6.3    /  Alb  3.1    /  TBili  0.3    /  DBili  x      /  AST  21     /  ALT  16     /  AlkPhos  57     03 Aug 2023 06:36

## 2023-08-03 NOTE — OCCUPATIONAL THERAPY INITIAL EVALUATION ADULT - PERTINENT HX OF CURRENT PROBLEM, REHAB EVAL
36 y/o male, PMH pre-DM, hypertriglyceridemia (follows with Dr. Michelle), multiple admissions of pancreatitis (last adm 2015 for necrotizing pancreatitis), pancreatic pseudocyst, initially presented to ED on 7/31/23 c/o abdominal pain x2 days. described pain as sharp, sudden onset, left sided, radiates towards the back b/l, 10/10. Pt also admitted associated nausea, 8 episodes of vomit ("cortes, creme" yellow in color, no blood), and unable to tolerate PO. Pt stated he has not had BMs in the past 2 days due to decreased PO. States the pain feels similar to his prior pancreatitis episode. Denies HA, CP, palpitations, SOB, diarrhea, fevers. Denies alcohol use. Pt admits stopping gemfibrozil and metformin 2 mo ago to try to control his medical issues with diet alone since he has been well controlled for the past 8 years.    ED course significant for triglycerides 4742, cholesterol 963, lipase 2908, WBC 12. Pt admitted to MICU for management of acute pancreatitis, on IVF and insulin gtt.

## 2023-08-03 NOTE — PROGRESS NOTE ADULT - SUBJECTIVE AND OBJECTIVE BOX
38 y/o male, PMH pre-DM, hypertriglyceridemia (follows with Dr. Michelle), multiple admissions of pancreatitis (last adm 2015 for necrotizing pancreatitis), pancreatic pseudocyst, initially presented to ED on 7/31/23 c/o abdominal pain x2 days. described pain as sharp, sudden onset, left sided, radiates towards the back b/l, 10/10. Pt also admitted associated nausea, 8 episodes of vomit ("cortes, creme" yellow in color, no blood), and unable to tolerate PO. Pt stated he has not had BMs in the past 2 days due to decreased PO. States the pain feels similar to his prior pancreatitis episode. Denies HA, CP, palpitations, SOB, diarrhea, fevers. Denies alcohol use. Pt admits stopping gemfibrozil and metformin 2 mo ago to try to control his medical issues with diet alone since he has been well controlled for the past 8 years.    ED course significant for triglycerides 4742, cholesterol 963, lipase 2908, WBC 12. Pt admitted to MICU for management of acute pancreatitis, on IVF and insulin gtt.      Overnight events: ***    REVIEW OF SYSTEMS:  CONSTITUTIONAL: No fever, chills, night sweats, or fatigue  EYES: No eye pain, visual disturbances, or discharge  ENMT:  No difficulty hearing, tinnitus, vertigo; No sinus or throat pain  NECK: No pain or stiffness  RESPIRATORY: No cough, wheezing, or hemoptysis; No shortness of breath  CARDIOVASCULAR: No chest pain, palpitations, dizziness, or leg swelling  GASTROINTESTINAL: No abdominal or epigastric pain. No nausea, vomiting, or hematemesis; No diarrhea or constipation. No melena or hematochezia.  GENITOURINARY: No dysuria, frequency, hematuria, or incontinence  NEUROLOGICAL: No headaches, memory loss, loss of strength, numbness, or tremors  SKIN: No itching, burning, rashes, or lesions   LYMPH NODES: No enlarged glands  ENDOCRINE: No heat or cold intolerance; No hair loss  MUSCULOSKELETAL: No joint pain or swelling; No muscle, back, or extremity pain  PSYCHIATRIC: No depression, anxiety, mood swings, or difficulty sleeping  HEME/LYMPH: No easy bruising, or bleeding gums  ALLERGY AND IMMUNOLOGIC: No hives or eczema    OBJECTIVE:  ICU Vital Signs Last 24 Hrs  T(C): 37.3 (03 Aug 2023 00:00), Max: 37.3 (03 Aug 2023 00:00)  T(F): 99.1 (03 Aug 2023 00:00), Max: 99.1 (03 Aug 2023 00:00)  HR: 80 (03 Aug 2023 04:00) (80 - 102)  BP: 108/62 (03 Aug 2023 04:00) (96/58 - 125/72)  BP(mean): 79 (03 Aug 2023 04:00) (71 - 94)  ABP: --  ABP(mean): --  RR: 23 (03 Aug 2023 04:00) (14 - 24)  SpO2: 96% (03 Aug 2023 04:00) (94% - 98%)    O2 Parameters below as of 02 Aug 2023 20:00  Patient On (Oxygen Delivery Method): room air              08-01 @ 07:01  -  08-02 @ 07:00  --------------------------------------------------------  IN: 5102.9 mL / OUT: 2500 mL / NET: 2602.9 mL    08-02 @ 07:01  -  08-03 @ 05:31  --------------------------------------------------------  IN: 3124 mL / OUT: 2700 mL / NET: 424 mL      CAPILLARY BLOOD GLUCOSE      POCT Blood Glucose.: 157 mg/dL (03 Aug 2023 05:03)      GENERAL: NAD, well-developed  HEAD:  Atraumatic, Normocephalic  EYES: EOMI, PERRLA, conjunctiva and sclera clear  NECK: Supple, No JVD  CHEST/LUNG: Clear to auscultation bilaterally; No wheeze. POCUS ***  HEART: Regular rate and rhythm; No murmurs, rubs, or gallops. POCUS ***  ABDOMEN: Soft, Nontender, Nondistended; Bowel sounds present. POCUS ***  EXTREMITIES:  2+ Peripheral Pulses, No clubbing, cyanosis, or edema  PSYCH: AAOx3, appropriate affect  NEUROLOGY: non-focal, moving all extremities independently  SKIN: No rashes or lesions      HOSPITAL MEDICATIONS:  MEDICATIONS  (STANDING):  atorvastatin 80 milliGRAM(s) Oral at bedtime  chlorhexidine 4% Liquid 1 Application(s) Topical <User Schedule>  dextrose 10%. 1000 milliLiter(s) (30 mL/Hr) IV Continuous <Continuous>  enoxaparin Injectable 40 milliGRAM(s) SubCutaneous every 24 hours  fenofibrate Tablet 145 milliGRAM(s) Oral daily  insulin regular Infusion 2 Unit(s)/Hr (2 mL/Hr) IV Continuous <Continuous>  lactated ringers. 1000 milliLiter(s) (50 mL/Hr) IV Continuous <Continuous>  lidocaine   4% Patch 1 Patch Transdermal daily  naloxone Injectable 0.4 milliGRAM(s) IV Push once  omega-3-Acid Ethyl Esters 2 Gram(s) Oral two times a day  ondansetron Injectable 4 milliGRAM(s) IV Push once    MEDICATIONS  (PRN):  acetaminophen     Tablet .. 1000 milliGRAM(s) Oral every 8 hours PRN Mild Pain (1 - 3)  morphine  - Injectable 2 milliGRAM(s) IV Push every 4 hours PRN Moderate Pain (4 - 6)  morphine  - Injectable 4 milliGRAM(s) IV Push every 4 hours PRN Severe Pain (7 - 10)  simethicone 80 milliGRAM(s) Chew every 6 hours PRN Gas      LABS:                        12.0   6.25  )-----------( 164      ( 03 Aug 2023 00:24 )             35.7     Hgb Trend: 12.0<--, 13.2<--, 15.6<--, 16.6<--, 17.3<--  08-03    132<L>  |  101  |  6<L>  ----------------------------<  183<H>  3.7   |  19<L>  |  0.53    Ca    8.7      03 Aug 2023 00:24  Phos  2.2     08-03  Mg     1.8     08-03    TPro  6.6  /  Alb  3.2<L>  /  TBili  0.4  /  DBili  x   /  AST  26  /  ALT  17  /  AlkPhos  59  08-03    Creatinine Trend: 0.53<--, 0.51<--, 0.52<--, 0.50<--, 0.59<--, 0.54<--    Urinalysis Basic - ( 03 Aug 2023 00:24 )    Color: x / Appearance: x / SG: x / pH: x  Gluc: 183 mg/dL / Ketone: x  / Bili: x / Urobili: x   Blood: x / Protein: x / Nitrite: x   Leuk Esterase: x / RBC: x / WBC x   Sq Epi: x / Non Sq Epi: x / Bacteria: x      Arterial Blood Gas:  08-03 @ 00:10  7.43/33/92/22/98.4/-1.7  ABG lactate: --    Venous Blood Gas:  08-02 @ 00:10  7.41/44/41/28/72.2  VBG Lactate: 1.2  Venous Blood Gas:  08-01 @ 06:10  7.43/39/59/26/89.5  VBG Lactate: 1.7      EKG:    MICROBIOLOGY:     RADIOLOGY:    POCUS:     36 y/o male, PMH pre-DM, hypertriglyceridemia (follows with Dr. Michelle), multiple admissions of pancreatitis (last adm 2015 for necrotizing pancreatitis), pancreatic pseudocyst, initially presented to ED on 7/31/23 c/o abdominal pain x2 days. described pain as sharp, sudden onset, left sided, radiates towards the back b/l, 10/10. Pt also admitted associated nausea, 8 episodes of vomit ("cortes, creme" yellow in color, no blood), and unable to tolerate PO. Pt stated he has not had BMs in the past 2 days due to decreased PO. States the pain feels similar to his prior pancreatitis episode. Denies HA, CP, palpitations, SOB, diarrhea, fevers. Denies alcohol use. Pt admits stopping gemfibrozil and metformin 2 mo ago to try to control his medical issues with diet alone since he has been well controlled for the past 8 years.    ED course significant for triglycerides 4742, cholesterol 963, lipase 2908, WBC 12. Pt admitted to MICU for management of acute pancreatitis, on IVF and insulin gtt.      Overnight events: ***    REVIEW OF SYSTEMS:  CONSTITUTIONAL: No fever, chills, night sweats, or fatigue  EYES: No visual disturbances  ENT:  No difficulty hearing; No sinus or throat pain  NECK: No pain or stiffness  RESPIRATORY: No cough, wheezing, or hemoptysis; No shortness of breath  CARDIOVASCULAR: No chest pain, palpitations, dizziness, or leg swelling  GASTROINTESTINAL: +left sided abdominal. No nausea, vomiting, or hematemesis; No diarrhea or constipation. No melena or hematochezia.  GENITOURINARY: No dysuria, frequency, hematuria, or incontinence  NEUROLOGICAL: No headaches, numbness, or tremors  SKIN: No itching, burning, rashes, or lesions   MUSCULOSKELETAL: No joint pain or swelling; No muscle or extremity pain    OBJECTIVE:  ICU Vital Signs Last 24 Hrs  T(C): 37.3 (03 Aug 2023 00:00), Max: 37.3 (03 Aug 2023 00:00)  T(F): 99.1 (03 Aug 2023 00:00), Max: 99.1 (03 Aug 2023 00:00)  HR: 80 (03 Aug 2023 04:00) (80 - 102)  BP: 108/62 (03 Aug 2023 04:00) (96/58 - 125/72)  BP(mean): 79 (03 Aug 2023 04:00) (71 - 94)  ABP: --  ABP(mean): --  RR: 23 (03 Aug 2023 04:00) (14 - 24)  SpO2: 96% (03 Aug 2023 04:00) (94% - 98%)    O2 Parameters below as of 02 Aug 2023 20:00  Patient On (Oxygen Delivery Method): room air              08-01 @ 07:01  -  08-02 @ 07:00  --------------------------------------------------------  IN: 5102.9 mL / OUT: 2500 mL / NET: 2602.9 mL    08-02 @ 07:01  -  08-03 @ 05:31  --------------------------------------------------------  IN: 3124 mL / OUT: 2700 mL / NET: 424 mL      CAPILLARY BLOOD GLUCOSE      POCT Blood Glucose.: 157 mg/dL (03 Aug 2023 05:03)      GENERAL: NAD, well-developed  HEAD:  Atraumatic, Normocephalic  EYES: EOMI, conjunctiva and sclera clear  NECK: Supple  CHEST/LUNG: Clear to auscultation bilaterally; No wheeze.  HEART: Regular rate and rhythm; No murmurs, rubs, or gallops.  ABDOMEN: +left sided tenderness. Soft, Nondistended; Bowel sounds present.  EXTREMITIES:  2+ Peripheral Pulses, No clubbing, cyanosis, or edema  PSYCH: AOx3, appropriate affect  NEUROLOGY: non-focal, moving all extremities independently  SKIN: No rashes or lesions      HOSPITAL MEDICATIONS:  MEDICATIONS  (STANDING):  atorvastatin 80 milliGRAM(s) Oral at bedtime  chlorhexidine 4% Liquid 1 Application(s) Topical <User Schedule>  dextrose 10%. 1000 milliLiter(s) (30 mL/Hr) IV Continuous <Continuous>  enoxaparin Injectable 40 milliGRAM(s) SubCutaneous every 24 hours  fenofibrate Tablet 145 milliGRAM(s) Oral daily  insulin regular Infusion 2 Unit(s)/Hr (2 mL/Hr) IV Continuous <Continuous>  lactated ringers. 1000 milliLiter(s) (50 mL/Hr) IV Continuous <Continuous>  lidocaine   4% Patch 1 Patch Transdermal daily  naloxone Injectable 0.4 milliGRAM(s) IV Push once  omega-3-Acid Ethyl Esters 2 Gram(s) Oral two times a day  ondansetron Injectable 4 milliGRAM(s) IV Push once    MEDICATIONS  (PRN):  acetaminophen     Tablet .. 1000 milliGRAM(s) Oral every 8 hours PRN Mild Pain (1 - 3)  morphine  - Injectable 2 milliGRAM(s) IV Push every 4 hours PRN Moderate Pain (4 - 6)  morphine  - Injectable 4 milliGRAM(s) IV Push every 4 hours PRN Severe Pain (7 - 10)  simethicone 80 milliGRAM(s) Chew every 6 hours PRN Gas      LABS:                        12.0   6.25  )-----------( 164      ( 03 Aug 2023 00:24 )             35.7     Hgb Trend: 12.0<--, 13.2<--, 15.6<--, 16.6<--, 17.3<--  08-03    132<L>  |  101  |  6<L>  ----------------------------<  183<H>  3.7   |  19<L>  |  0.53    Ca    8.7      03 Aug 2023 00:24  Phos  2.2     08-03  Mg     1.8     08-03    TPro  6.6  /  Alb  3.2<L>  /  TBili  0.4  /  DBili  x   /  AST  26  /  ALT  17  /  AlkPhos  59  08-03    Creatinine Trend: 0.53<--, 0.51<--, 0.52<--, 0.50<--, 0.59<--, 0.54<--    Urinalysis Basic - ( 03 Aug 2023 00:24 )    Color: x / Appearance: x / SG: x / pH: x  Gluc: 183 mg/dL / Ketone: x  / Bili: x / Urobili: x   Blood: x / Protein: x / Nitrite: x   Leuk Esterase: x / RBC: x / WBC x   Sq Epi: x / Non Sq Epi: x / Bacteria: x      Arterial Blood Gas:  08-03 @ 00:10  7.43/33/92/22/98.4/-1.7  ABG lactate: --    Venous Blood Gas:  08-02 @ 00:10  7.41/44/41/28/72.2  VBG Lactate: 1.2    CT Abdomen and Pelvis w/ IV Cont (07.31.23 @ 15:18)   LOWER CHEST: Minimal right lower lobe subsegmental atelectasis.  LIVER: Diffuse low-attenuation of the liver, which may represent steatosis.  BILE DUCTS: Normal caliber.  GALLBLADDER: Cholelithiasis.  SPLEEN: Borderline enlarged, measures 13.1 cm.  PANCREAS: Diffuse peripancreatic infiltration and confluent ill-defined fluid, most prominently about the pancreatic head, concerning for acute pancreatitis. Homogeneous parenchymal enhancement without definite evidence for necrosis. No main pancreatic duct dilation. Pancreatic divisum is better appreciated on the previous MRI. Again seen is a   peripancreatic tail collection that extends along the left anterior pararenal space into the pelvis, which is increased in size and extent since 3/21/2018. For example, a component adjacent to the pancreatic tail that exerts mass effect along the undersurface of the stomach now measures 7.1 x 5.2 cm (series 3 image 35), previously 4.6 x 4.6 cm. The collection spans approximately 14.8 cm in craniocaudal dimension, previously 13.7 cm when measured in similar fashion. There are additional loculated components extending along the left iliopsoas musculature that are new since 3/21/2018, with example interposed between iliacus and psoas muscles measuring 2.8 x 1.3 cm (series 3 image 72). Calcifications   are again noted along the inferior margin of the collection.  ADRENALS: Within normal limits.  KIDNEYS/URETERS: Bilateral subcentimeter hypodense renal foci that are too small to characterize. No hydronephrosis.  BLADDER: Within normal limits.  REPRODUCTIVE ORGANS: Prostate within normal limits.  BOWEL: Periduodenal fluid, likely reactive. No bowel obstruction.   Appendix is normal.  PERITONEUM: No ascites.  VESSELS: The hepatic and portal veins are patent. Patent superior mesenteric vein.The splenic vein is not definitively identified, similar to the previous exam, may be diminutive or chronically thrombosed. Again seen are prominent perigastric, perisplenic, and gastroepiploic collateral vessels, similar to prior.  RETROPERITONEUM/LYMPH NODES: No lymphadenopathy.  ABDOMINAL WALL: Within normal limits.  BONES: Within normal limits.    IMPRESSION:  Peripancreatic infiltration and confluent ill-defined fluid, compatible with acute pancreatitis.  Increased size of a chronic peripancreatic tail collection since 2018.   36 y/o male, PMH pre-DM, hypertriglyceridemia (follows with Dr. Michelle), multiple admissions of pancreatitis (last adm 2015 for necrotizing pancreatitis), pancreatic pseudocyst, initially presented to ED on 7/31/23 c/o abdominal pain x2 days. described pain as sharp, sudden onset, left sided, radiates towards the back b/l, 10/10. Pt also admitted associated nausea, 8 episodes of vomit ("cortes, creme" yellow in color, no blood), and unable to tolerate PO. Pt stated he has not had BMs in the past 2 days due to decreased PO. States the pain feels similar to his prior pancreatitis episode. Denies HA, CP, palpitations, SOB, diarrhea, fevers. Denies alcohol use. Pt admits stopping gemfibrozil and metformin 2 mo ago to try to control his medical issues with diet alone since he has been well controlled for the past 8 years.    ED course significant for triglycerides 4742, cholesterol 963, lipase 2908, WBC 12. Pt admitted to MICU for management of acute pancreatitis, on IVF and insulin gtt.      Overnight events: insulin gtt down to 2, down to d20, -170's, trig 499, per endo recheck C-peptide WITH serum glucose (BMP or CMP) tomorrow AM (around 6am, so that it is true fasting)    REVIEW OF SYSTEMS:  CONSTITUTIONAL: No fever, chills, night sweats, or fatigue  EYES: No visual disturbances  ENT:  No difficulty hearing; No sinus or throat pain  NECK: No pain or stiffness  RESPIRATORY: No cough, wheezing, or hemoptysis; No shortness of breath  CARDIOVASCULAR: No chest pain, palpitations, dizziness, or leg swelling  GASTROINTESTINAL: +left sided abdominal. No nausea, vomiting, or hematemesis; No diarrhea or constipation. No melena or hematochezia.  GENITOURINARY: No dysuria, frequency, hematuria, or incontinence  NEUROLOGICAL: No headaches, numbness, or tremors  SKIN: No itching, burning, rashes, or lesions   MUSCULOSKELETAL: No joint pain or swelling; No muscle or extremity pain    OBJECTIVE:  ICU Vital Signs Last 24 Hrs  T(C): 37.3 (03 Aug 2023 00:00), Max: 37.3 (03 Aug 2023 00:00)  T(F): 99.1 (03 Aug 2023 00:00), Max: 99.1 (03 Aug 2023 00:00)  HR: 80 (03 Aug 2023 04:00) (80 - 102)  BP: 108/62 (03 Aug 2023 04:00) (96/58 - 125/72)  BP(mean): 79 (03 Aug 2023 04:00) (71 - 94)  ABP: --  ABP(mean): --  RR: 23 (03 Aug 2023 04:00) (14 - 24)  SpO2: 96% (03 Aug 2023 04:00) (94% - 98%)    O2 Parameters below as of 02 Aug 2023 20:00  Patient On (Oxygen Delivery Method): room air              08-01 @ 07:01  -  08-02 @ 07:00  --------------------------------------------------------  IN: 5102.9 mL / OUT: 2500 mL / NET: 2602.9 mL    08-02 @ 07:01  -  08-03 @ 05:31  --------------------------------------------------------  IN: 3124 mL / OUT: 2700 mL / NET: 424 mL      CAPILLARY BLOOD GLUCOSE      POCT Blood Glucose.: 157 mg/dL (03 Aug 2023 05:03)      GENERAL: NAD, well-developed  HEAD:  Atraumatic, Normocephalic  EYES: EOMI, conjunctiva and sclera clear  NECK: Supple  CHEST/LUNG: Clear to auscultation bilaterally; No wheeze.  HEART: Regular rate and rhythm; No murmurs, rubs, or gallops.  ABDOMEN: +left sided tenderness. Soft, Nondistended; Bowel sounds present.  EXTREMITIES:  2+ Peripheral Pulses, No clubbing, cyanosis, or edema  PSYCH: AOx3, appropriate affect  NEUROLOGY: non-focal, moving all extremities independently  SKIN: No rashes or lesions      HOSPITAL MEDICATIONS:  MEDICATIONS  (STANDING):  atorvastatin 80 milliGRAM(s) Oral at bedtime  chlorhexidine 4% Liquid 1 Application(s) Topical <User Schedule>  dextrose 10%. 1000 milliLiter(s) (30 mL/Hr) IV Continuous <Continuous>  enoxaparin Injectable 40 milliGRAM(s) SubCutaneous every 24 hours  fenofibrate Tablet 145 milliGRAM(s) Oral daily  insulin regular Infusion 2 Unit(s)/Hr (2 mL/Hr) IV Continuous <Continuous>  lactated ringers. 1000 milliLiter(s) (50 mL/Hr) IV Continuous <Continuous>  lidocaine   4% Patch 1 Patch Transdermal daily  naloxone Injectable 0.4 milliGRAM(s) IV Push once  omega-3-Acid Ethyl Esters 2 Gram(s) Oral two times a day  ondansetron Injectable 4 milliGRAM(s) IV Push once    MEDICATIONS  (PRN):  acetaminophen     Tablet .. 1000 milliGRAM(s) Oral every 8 hours PRN Mild Pain (1 - 3)  morphine  - Injectable 2 milliGRAM(s) IV Push every 4 hours PRN Moderate Pain (4 - 6)  morphine  - Injectable 4 milliGRAM(s) IV Push every 4 hours PRN Severe Pain (7 - 10)  simethicone 80 milliGRAM(s) Chew every 6 hours PRN Gas      LABS:                        12.0   6.25  )-----------( 164      ( 03 Aug 2023 00:24 )             35.7     Hgb Trend: 12.0<--, 13.2<--, 15.6<--, 16.6<--, 17.3<--  08-03    132<L>  |  101  |  6<L>  ----------------------------<  183<H>  3.7   |  19<L>  |  0.53    Ca    8.7      03 Aug 2023 00:24  Phos  2.2     08-03  Mg     1.8     08-03    TPro  6.6  /  Alb  3.2<L>  /  TBili  0.4  /  DBili  x   /  AST  26  /  ALT  17  /  AlkPhos  59  08-03    Creatinine Trend: 0.53<--, 0.51<--, 0.52<--, 0.50<--, 0.59<--, 0.54<--    Urinalysis Basic - ( 03 Aug 2023 00:24 )    Color: x / Appearance: x / SG: x / pH: x  Gluc: 183 mg/dL / Ketone: x  / Bili: x / Urobili: x   Blood: x / Protein: x / Nitrite: x   Leuk Esterase: x / RBC: x / WBC x   Sq Epi: x / Non Sq Epi: x / Bacteria: x      Arterial Blood Gas:  08-03 @ 00:10  7.43/33/92/22/98.4/-1.7  ABG lactate: --    Venous Blood Gas:  08-02 @ 00:10  7.41/44/41/28/72.2  VBG Lactate: 1.2    CT Abdomen and Pelvis w/ IV Cont (07.31.23 @ 15:18)   LOWER CHEST: Minimal right lower lobe subsegmental atelectasis.  LIVER: Diffuse low-attenuation of the liver, which may represent steatosis.  BILE DUCTS: Normal caliber.  GALLBLADDER: Cholelithiasis.  SPLEEN: Borderline enlarged, measures 13.1 cm.  PANCREAS: Diffuse peripancreatic infiltration and confluent ill-defined fluid, most prominently about the pancreatic head, concerning for acute pancreatitis. Homogeneous parenchymal enhancement without definite evidence for necrosis. No main pancreatic duct dilation. Pancreatic divisum is better appreciated on the previous MRI. Again seen is a   peripancreatic tail collection that extends along the left anterior pararenal space into the pelvis, which is increased in size and extent since 3/21/2018. For example, a component adjacent to the pancreatic tail that exerts mass effect along the undersurface of the stomach now measures 7.1 x 5.2 cm (series 3 image 35), previously 4.6 x 4.6 cm. The collection spans approximately 14.8 cm in craniocaudal dimension, previously 13.7 cm when measured in similar fashion. There are additional loculated components extending along the left iliopsoas musculature that are new since 3/21/2018, with example interposed between iliacus and psoas muscles measuring 2.8 x 1.3 cm (series 3 image 72). Calcifications   are again noted along the inferior margin of the collection.  ADRENALS: Within normal limits.  KIDNEYS/URETERS: Bilateral subcentimeter hypodense renal foci that are too small to characterize. No hydronephrosis.  BLADDER: Within normal limits.  REPRODUCTIVE ORGANS: Prostate within normal limits.  BOWEL: Periduodenal fluid, likely reactive. No bowel obstruction.   Appendix is normal.  PERITONEUM: No ascites.  VESSELS: The hepatic and portal veins are patent. Patent superior mesenteric vein.The splenic vein is not definitively identified, similar to the previous exam, may be diminutive or chronically thrombosed. Again seen are prominent perigastric, perisplenic, and gastroepiploic collateral vessels, similar to prior.  RETROPERITONEUM/LYMPH NODES: No lymphadenopathy.  ABDOMINAL WALL: Within normal limits.  BONES: Within normal limits.    IMPRESSION:  Peripancreatic infiltration and confluent ill-defined fluid, compatible with acute pancreatitis.  Increased size of a chronic peripancreatic tail collection since 2018.   38 y/o male, PMH pre-DM, hypertriglyceridemia (follows with Dr. Michelle), multiple admissions of pancreatitis (last adm 2015 for necrotizing pancreatitis), pancreatic pseudocyst, initially presented to ED on 7/31/23 c/o abdominal pain x2 days. described pain as sharp, sudden onset, left sided, radiates towards the back b/l, 10/10. Pt also admitted associated nausea, 8 episodes of vomit ("cortes, creme" yellow in color, no blood), and unable to tolerate PO. Pt stated he has not had BMs in the past 2 days due to decreased PO. States the pain feels similar to his prior pancreatitis episode. Denies HA, CP, palpitations, SOB, diarrhea, fevers. Denies alcohol use. Pt admits stopping gemfibrozil and metformin 2 mo ago to try to control his medical issues with diet alone since he has been well controlled for the past 8 years.    ED course significant for triglycerides 4742, cholesterol 963, lipase 2908, WBC 12. Pt admitted to MICU for management of acute pancreatitis, on IVF and insulin gtt.    Overnight events: insulin gtt down to 2, down to d20, -170's, trig 499, per endo recheck C-peptide WITH serum glucose (BMP or CMP) tomorrow AM (around 6am, so that it is true fasting). Phos down to 2.2 -- replenished     REVIEW OF SYSTEMS:  CONSTITUTIONAL: No fever, chills, night sweats, or fatigue  EYES: No visual disturbances  ENT:  No difficulty hearing; No sinus or throat pain  NECK: No pain or stiffness  RESPIRATORY: No cough, wheezing, or hemoptysis; No shortness of breath  CARDIOVASCULAR: No chest pain, palpitations, dizziness, or leg swelling  GASTROINTESTINAL: +left sided abdominal. No nausea, vomiting, or hematemesis; No diarrhea or constipation. No melena or hematochezia.  GENITOURINARY: No dysuria, frequency, hematuria, or incontinence  NEUROLOGICAL: No headaches, numbness, or tremors  SKIN: No itching, burning, rashes, or lesions   MUSCULOSKELETAL: No joint pain or swelling; No muscle or extremity pain    OBJECTIVE:  ICU Vital Signs Last 24 Hrs  T(C): 37.3 (03 Aug 2023 00:00), Max: 37.3 (03 Aug 2023 00:00)  T(F): 99.1 (03 Aug 2023 00:00), Max: 99.1 (03 Aug 2023 00:00)  HR: 80 (03 Aug 2023 04:00) (80 - 102)  BP: 108/62 (03 Aug 2023 04:00) (96/58 - 125/72)  BP(mean): 79 (03 Aug 2023 04:00) (71 - 94)  ABP: --  ABP(mean): --  RR: 23 (03 Aug 2023 04:00) (14 - 24)  SpO2: 96% (03 Aug 2023 04:00) (94% - 98%)    O2 Parameters below as of 02 Aug 2023 20:00  Patient On (Oxygen Delivery Method): room air              08-01 @ 07:01  -  08-02 @ 07:00  --------------------------------------------------------  IN: 5102.9 mL / OUT: 2500 mL / NET: 2602.9 mL    08-02 @ 07:01  -  08-03 @ 05:31  --------------------------------------------------------  IN: 3124 mL / OUT: 2700 mL / NET: 424 mL      CAPILLARY BLOOD GLUCOSE      POCT Blood Glucose.: 157 mg/dL (03 Aug 2023 05:03)      GENERAL: NAD, well-developed  HEAD:  Atraumatic, Normocephalic  EYES: EOMI, conjunctiva and sclera clear  NECK: Supple  CHEST/LUNG: Clear to auscultation bilaterally; No wheeze.  HEART: Regular rate and rhythm; No murmurs, rubs, or gallops.  ABDOMEN: +left sided tenderness. Soft, Nondistended; Bowel sounds present.  EXTREMITIES:  2+ Peripheral Pulses, No clubbing, cyanosis, or edema  PSYCH: AOx3, appropriate affect  NEUROLOGY: non-focal, moving all extremities independently  SKIN: No rashes or lesions      HOSPITAL MEDICATIONS:  MEDICATIONS  (STANDING):  atorvastatin 80 milliGRAM(s) Oral at bedtime  chlorhexidine 4% Liquid 1 Application(s) Topical <User Schedule>  dextrose 10%. 1000 milliLiter(s) (30 mL/Hr) IV Continuous <Continuous>  enoxaparin Injectable 40 milliGRAM(s) SubCutaneous every 24 hours  fenofibrate Tablet 145 milliGRAM(s) Oral daily  insulin regular Infusion 2 Unit(s)/Hr (2 mL/Hr) IV Continuous <Continuous>  lactated ringers. 1000 milliLiter(s) (50 mL/Hr) IV Continuous <Continuous>  lidocaine   4% Patch 1 Patch Transdermal daily  naloxone Injectable 0.4 milliGRAM(s) IV Push once  omega-3-Acid Ethyl Esters 2 Gram(s) Oral two times a day  ondansetron Injectable 4 milliGRAM(s) IV Push once    MEDICATIONS  (PRN):  acetaminophen     Tablet .. 1000 milliGRAM(s) Oral every 8 hours PRN Mild Pain (1 - 3)  morphine  - Injectable 2 milliGRAM(s) IV Push every 4 hours PRN Moderate Pain (4 - 6)  morphine  - Injectable 4 milliGRAM(s) IV Push every 4 hours PRN Severe Pain (7 - 10)  simethicone 80 milliGRAM(s) Chew every 6 hours PRN Gas      LABS:                        12.0   6.25  )-----------( 164      ( 03 Aug 2023 00:24 )             35.7     Hgb Trend: 12.0<--, 13.2<--, 15.6<--, 16.6<--, 17.3<--  08-03    132<L>  |  101  |  6<L>  ----------------------------<  183<H>  3.7   |  19<L>  |  0.53    Ca    8.7      03 Aug 2023 00:24  Phos  2.2     08-03  Mg     1.8     08-03    TPro  6.6  /  Alb  3.2<L>  /  TBili  0.4  /  DBili  x   /  AST  26  /  ALT  17  /  AlkPhos  59  08-03    Creatinine Trend: 0.53<--, 0.51<--, 0.52<--, 0.50<--, 0.59<--, 0.54<--    Urinalysis Basic - ( 03 Aug 2023 00:24 )    Color: x / Appearance: x / SG: x / pH: x  Gluc: 183 mg/dL / Ketone: x  / Bili: x / Urobili: x   Blood: x / Protein: x / Nitrite: x   Leuk Esterase: x / RBC: x / WBC x   Sq Epi: x / Non Sq Epi: x / Bacteria: x      Arterial Blood Gas:  08-03 @ 00:10  7.43/33/92/22/98.4/-1.7  ABG lactate: --    Venous Blood Gas:  08-02 @ 00:10  7.41/44/41/28/72.2  VBG Lactate: 1.2    CT Abdomen and Pelvis w/ IV Cont (07.31.23 @ 15:18)   LOWER CHEST: Minimal right lower lobe subsegmental atelectasis.  LIVER: Diffuse low-attenuation of the liver, which may represent steatosis.  BILE DUCTS: Normal caliber.  GALLBLADDER: Cholelithiasis.  SPLEEN: Borderline enlarged, measures 13.1 cm.  PANCREAS: Diffuse peripancreatic infiltration and confluent ill-defined fluid, most prominently about the pancreatic head, concerning for acute pancreatitis. Homogeneous parenchymal enhancement without definite evidence for necrosis. No main pancreatic duct dilation. Pancreatic divisum is better appreciated on the previous MRI. Again seen is a   peripancreatic tail collection that extends along the left anterior pararenal space into the pelvis, which is increased in size and extent since 3/21/2018. For example, a component adjacent to the pancreatic tail that exerts mass effect along the undersurface of the stomach now measures 7.1 x 5.2 cm (series 3 image 35), previously 4.6 x 4.6 cm. The collection spans approximately 14.8 cm in craniocaudal dimension, previously 13.7 cm when measured in similar fashion. There are additional loculated components extending along the left iliopsoas musculature that are new since 3/21/2018, with example interposed between iliacus and psoas muscles measuring 2.8 x 1.3 cm (series 3 image 72). Calcifications   are again noted along the inferior margin of the collection.  ADRENALS: Within normal limits.  KIDNEYS/URETERS: Bilateral subcentimeter hypodense renal foci that are too small to characterize. No hydronephrosis.  BLADDER: Within normal limits.  REPRODUCTIVE ORGANS: Prostate within normal limits.  BOWEL: Periduodenal fluid, likely reactive. No bowel obstruction.   Appendix is normal.  PERITONEUM: No ascites.  VESSELS: The hepatic and portal veins are patent. Patent superior mesenteric vein.The splenic vein is not definitively identified, similar to the previous exam, may be diminutive or chronically thrombosed. Again seen are prominent perigastric, perisplenic, and gastroepiploic collateral vessels, similar to prior.  RETROPERITONEUM/LYMPH NODES: No lymphadenopathy.  ABDOMINAL WALL: Within normal limits.  BONES: Within normal limits.    IMPRESSION:  Peripancreatic infiltration and confluent ill-defined fluid, compatible with acute pancreatitis.  Increased size of a chronic peripancreatic tail collection since 2018.   36 y/o male, PMH pre-DM, hypertriglyceridemia (follows with Dr. Michelle), multiple admissions of pancreatitis (last adm 2015 for necrotizing pancreatitis), pancreatic pseudocyst, initially presented to ED on 7/31/23 c/o abdominal pain x2 days. described pain as sharp, sudden onset, left sided, radiates towards the back b/l, 10/10. Pt also admitted associated nausea, 8 episodes of vomit ("cortes, creme" yellow in color, no blood), and unable to tolerate PO. Pt stated he has not had BMs in the past 2 days due to decreased PO. States the pain feels similar to his prior pancreatitis episode. Denies HA, CP, palpitations, SOB, diarrhea, fevers. Denies alcohol use. Pt admits stopping gemfibrozil and metformin 2 mo ago to try to control his medical issues with diet alone since he has been well controlled for the past 8 years.    ED course significant for triglycerides 4742, cholesterol 963, lipase 2908, WBC 12. Pt admitted to MICU for management of acute pancreatitis, on IVF and insulin gtt.    Overnight events: insulin gtt down to 2, down to d20, -170's, trig 499, per endo recheck C-peptide WITH serum glucose (BMP or CMP) tomorrow AM (around 6am, so that it is true fasting). Phos down to 2.2 -- replenished. c/o generalized abd pain -- pt thinks it is gas.    REVIEW OF SYSTEMS:  CONSTITUTIONAL: No fever, chills, night sweats, or fatigue  EYES: No visual disturbances  ENT:  No difficulty hearing; No sinus or throat pain  NECK: No pain or stiffness  RESPIRATORY: No cough, wheezing, or hemoptysis; No shortness of breath  CARDIOVASCULAR: No chest pain, palpitations, dizziness, or leg swelling  GASTROINTESTINAL: +left sided abdominal. No nausea, vomiting, or hematemesis; No diarrhea or constipation. No melena or hematochezia.  GENITOURINARY: No dysuria, frequency, hematuria, or incontinence  NEUROLOGICAL: No headaches, numbness, or tremors  SKIN: No itching, burning, rashes, or lesions   MUSCULOSKELETAL: No joint pain or swelling; No muscle or extremity pain    OBJECTIVE:  ICU Vital Signs Last 24 Hrs  T(C): 37.3 (03 Aug 2023 00:00), Max: 37.3 (03 Aug 2023 00:00)  T(F): 99.1 (03 Aug 2023 00:00), Max: 99.1 (03 Aug 2023 00:00)  HR: 80 (03 Aug 2023 04:00) (80 - 102)  BP: 108/62 (03 Aug 2023 04:00) (96/58 - 125/72)  BP(mean): 79 (03 Aug 2023 04:00) (71 - 94)  ABP: --  ABP(mean): --  RR: 23 (03 Aug 2023 04:00) (14 - 24)  SpO2: 96% (03 Aug 2023 04:00) (94% - 98%)    O2 Parameters below as of 02 Aug 2023 20:00  Patient On (Oxygen Delivery Method): room air              08-01 @ 07:01  -  08-02 @ 07:00  --------------------------------------------------------  IN: 5102.9 mL / OUT: 2500 mL / NET: 2602.9 mL    08-02 @ 07:01  -  08-03 @ 05:31  --------------------------------------------------------  IN: 3124 mL / OUT: 2700 mL / NET: 424 mL      CAPILLARY BLOOD GLUCOSE      POCT Blood Glucose.: 157 mg/dL (03 Aug 2023 05:03)      GENERAL: NAD, well-developed  HEAD:  Atraumatic, Normocephalic  EYES: EOMI, conjunctiva and sclera clear  NECK: Supple  CHEST/LUNG: Clear to auscultation bilaterally; No wheeze.  HEART: Regular rate and rhythm; No murmurs, rubs, or gallops.  ABDOMEN: +left sided tenderness. Soft, Nondistended; Bowel sounds present.  EXTREMITIES:  2+ Peripheral Pulses, No clubbing, cyanosis, or edema  PSYCH: AOx3, appropriate affect  NEUROLOGY: non-focal, moving all extremities independently  SKIN: No rashes or lesions      HOSPITAL MEDICATIONS:  MEDICATIONS  (STANDING):  atorvastatin 80 milliGRAM(s) Oral at bedtime  chlorhexidine 4% Liquid 1 Application(s) Topical <User Schedule>  dextrose 10%. 1000 milliLiter(s) (30 mL/Hr) IV Continuous <Continuous>  enoxaparin Injectable 40 milliGRAM(s) SubCutaneous every 24 hours  fenofibrate Tablet 145 milliGRAM(s) Oral daily  insulin regular Infusion 2 Unit(s)/Hr (2 mL/Hr) IV Continuous <Continuous>  lactated ringers. 1000 milliLiter(s) (50 mL/Hr) IV Continuous <Continuous>  lidocaine   4% Patch 1 Patch Transdermal daily  naloxone Injectable 0.4 milliGRAM(s) IV Push once  omega-3-Acid Ethyl Esters 2 Gram(s) Oral two times a day  ondansetron Injectable 4 milliGRAM(s) IV Push once    MEDICATIONS  (PRN):  acetaminophen     Tablet .. 1000 milliGRAM(s) Oral every 8 hours PRN Mild Pain (1 - 3)  morphine  - Injectable 2 milliGRAM(s) IV Push every 4 hours PRN Moderate Pain (4 - 6)  morphine  - Injectable 4 milliGRAM(s) IV Push every 4 hours PRN Severe Pain (7 - 10)  simethicone 80 milliGRAM(s) Chew every 6 hours PRN Gas      LABS:                        12.0   6.25  )-----------( 164      ( 03 Aug 2023 00:24 )             35.7     Hgb Trend: 12.0<--, 13.2<--, 15.6<--, 16.6<--, 17.3<--  08-03    132<L>  |  101  |  6<L>  ----------------------------<  183<H>  3.7   |  19<L>  |  0.53    Ca    8.7      03 Aug 2023 00:24  Phos  2.2     08-03  Mg     1.8     08-03    TPro  6.6  /  Alb  3.2<L>  /  TBili  0.4  /  DBili  x   /  AST  26  /  ALT  17  /  AlkPhos  59  08-03    Creatinine Trend: 0.53<--, 0.51<--, 0.52<--, 0.50<--, 0.59<--, 0.54<--    Urinalysis Basic - ( 03 Aug 2023 00:24 )    Color: x / Appearance: x / SG: x / pH: x  Gluc: 183 mg/dL / Ketone: x  / Bili: x / Urobili: x   Blood: x / Protein: x / Nitrite: x   Leuk Esterase: x / RBC: x / WBC x   Sq Epi: x / Non Sq Epi: x / Bacteria: x      Arterial Blood Gas:  08-03 @ 00:10  7.43/33/92/22/98.4/-1.7  ABG lactate: --    Venous Blood Gas:  08-02 @ 00:10  7.41/44/41/28/72.2  VBG Lactate: 1.2    CT Abdomen and Pelvis w/ IV Cont (07.31.23 @ 15:18)   LOWER CHEST: Minimal right lower lobe subsegmental atelectasis.  LIVER: Diffuse low-attenuation of the liver, which may represent steatosis.  BILE DUCTS: Normal caliber.  GALLBLADDER: Cholelithiasis.  SPLEEN: Borderline enlarged, measures 13.1 cm.  PANCREAS: Diffuse peripancreatic infiltration and confluent ill-defined fluid, most prominently about the pancreatic head, concerning for acute pancreatitis. Homogeneous parenchymal enhancement without definite evidence for necrosis. No main pancreatic duct dilation. Pancreatic divisum is better appreciated on the previous MRI. Again seen is a   peripancreatic tail collection that extends along the left anterior pararenal space into the pelvis, which is increased in size and extent since 3/21/2018. For example, a component adjacent to the pancreatic tail that exerts mass effect along the undersurface of the stomach now measures 7.1 x 5.2 cm (series 3 image 35), previously 4.6 x 4.6 cm. The collection spans approximately 14.8 cm in craniocaudal dimension, previously 13.7 cm when measured in similar fashion. There are additional loculated components extending along the left iliopsoas musculature that are new since 3/21/2018, with example interposed between iliacus and psoas muscles measuring 2.8 x 1.3 cm (series 3 image 72). Calcifications   are again noted along the inferior margin of the collection.  ADRENALS: Within normal limits.  KIDNEYS/URETERS: Bilateral subcentimeter hypodense renal foci that are too small to characterize. No hydronephrosis.  BLADDER: Within normal limits.  REPRODUCTIVE ORGANS: Prostate within normal limits.  BOWEL: Periduodenal fluid, likely reactive. No bowel obstruction.   Appendix is normal.  PERITONEUM: No ascites.  VESSELS: The hepatic and portal veins are patent. Patent superior mesenteric vein.The splenic vein is not definitively identified, similar to the previous exam, may be diminutive or chronically thrombosed. Again seen are prominent perigastric, perisplenic, and gastroepiploic collateral vessels, similar to prior.  RETROPERITONEUM/LYMPH NODES: No lymphadenopathy.  ABDOMINAL WALL: Within normal limits.  BONES: Within normal limits.    IMPRESSION:  Peripancreatic infiltration and confluent ill-defined fluid, compatible with acute pancreatitis.  Increased size of a chronic peripancreatic tail collection since 2018.   38 y/o male, PMH pre-DM, hypertriglyceridemia (follows with Dr. Michelle), multiple admissions of pancreatitis (last adm 2015 for necrotizing pancreatitis), pancreatic pseudocyst, initially presented to ED on 7/31/23 c/o abdominal pain x2 days. described pain as sharp, sudden onset, left sided, radiates towards the back b/l, 10/10. Pt also admitted associated nausea, 8 episodes of vomit ("cortes, creme" yellow in color, no blood), and unable to tolerate PO. Pt stated he has not had BMs in the past 2 days due to decreased PO. States the pain feels similar to his prior pancreatitis episode. Denies HA, CP, palpitations, SOB, diarrhea, fevers. Denies alcohol use. Pt admits stopping gemfibrozil and metformin 2 mo ago to try to control his medical issues with diet alone since he has been well controlled for the past 8 years.    ED course significant for triglycerides 4742, cholesterol 963, lipase 2908, WBC 12. Pt admitted to MICU for management of acute pancreatitis, on IVF and insulin gtt.    Overnight events: insulin gtt down to 2, down to d20, -170's, trig 499, per endo recheck C-peptide WITH serum glucose (BMP or CMP) tomorrow AM (around 6am, so that it is true fasting). Phos down to 2.2 -- replenished. c/o generalized abd pain -- pt thinks it is gas.    REVIEW OF SYSTEMS:  CONSTITUTIONAL: No fever, chills, night sweats, or fatigue  EYES: No visual disturbances  ENT:  No difficulty hearing; No sinus or throat pain  NECK: No pain or stiffness  RESPIRATORY: No cough, wheezing, or hemoptysis; No shortness of breath  CARDIOVASCULAR: No chest pain, palpitations, dizziness, or leg swelling  GASTROINTESTINAL: generalized abdominal pain. No nausea, vomiting, or hematemesis; No diarrhea or constipation. No melena or hematochezia.  GENITOURINARY: No dysuria, frequency, hematuria, or incontinence  NEUROLOGICAL: No headaches, numbness, or tremors  SKIN: No itching, burning, rashes, or lesions   MUSCULOSKELETAL: No joint pain or swelling; No muscle or extremity pain    OBJECTIVE:  ICU Vital Signs Last 24 Hrs  T(C): 37.3 (03 Aug 2023 00:00), Max: 37.3 (03 Aug 2023 00:00)  T(F): 99.1 (03 Aug 2023 00:00), Max: 99.1 (03 Aug 2023 00:00)  HR: 80 (03 Aug 2023 04:00) (80 - 102)  BP: 108/62 (03 Aug 2023 04:00) (96/58 - 125/72)  BP(mean): 79 (03 Aug 2023 04:00) (71 - 94)  ABP: --  ABP(mean): --  RR: 23 (03 Aug 2023 04:00) (14 - 24)  SpO2: 96% (03 Aug 2023 04:00) (94% - 98%)    O2 Parameters below as of 02 Aug 2023 20:00  Patient On (Oxygen Delivery Method): room air              08-01 @ 07:01  -  08-02 @ 07:00  --------------------------------------------------------  IN: 5102.9 mL / OUT: 2500 mL / NET: 2602.9 mL    08-02 @ 07:01  -  08-03 @ 05:31  --------------------------------------------------------  IN: 3124 mL / OUT: 2700 mL / NET: 424 mL      CAPILLARY BLOOD GLUCOSE      POCT Blood Glucose.: 157 mg/dL (03 Aug 2023 05:03)      GENERAL: NAD, well-developed  HEAD:  Atraumatic, Normocephalic  EYES: EOMI, conjunctiva and sclera clear  NECK: Supple  CHEST/LUNG: Clear to auscultation bilaterally; No wheeze.  HEART: Regular rate and rhythm; No murmurs, rubs, or gallops.  ABDOMEN: +left sided tenderness. Soft, Nondistended; Bowel sounds present.  EXTREMITIES:  2+ Peripheral Pulses, No clubbing, cyanosis, or edema  PSYCH: AOx3, appropriate affect  NEUROLOGY: non-focal, moving all extremities independently  SKIN: No rashes or lesions      HOSPITAL MEDICATIONS:  MEDICATIONS  (STANDING):  atorvastatin 80 milliGRAM(s) Oral at bedtime  chlorhexidine 4% Liquid 1 Application(s) Topical <User Schedule>  dextrose 10%. 1000 milliLiter(s) (30 mL/Hr) IV Continuous <Continuous>  enoxaparin Injectable 40 milliGRAM(s) SubCutaneous every 24 hours  fenofibrate Tablet 145 milliGRAM(s) Oral daily  insulin regular Infusion 2 Unit(s)/Hr (2 mL/Hr) IV Continuous <Continuous>  lactated ringers. 1000 milliLiter(s) (50 mL/Hr) IV Continuous <Continuous>  lidocaine   4% Patch 1 Patch Transdermal daily  naloxone Injectable 0.4 milliGRAM(s) IV Push once  omega-3-Acid Ethyl Esters 2 Gram(s) Oral two times a day  ondansetron Injectable 4 milliGRAM(s) IV Push once    MEDICATIONS  (PRN):  acetaminophen     Tablet .. 1000 milliGRAM(s) Oral every 8 hours PRN Mild Pain (1 - 3)  morphine  - Injectable 2 milliGRAM(s) IV Push every 4 hours PRN Moderate Pain (4 - 6)  morphine  - Injectable 4 milliGRAM(s) IV Push every 4 hours PRN Severe Pain (7 - 10)  simethicone 80 milliGRAM(s) Chew every 6 hours PRN Gas      LABS:                        12.0   6.25  )-----------( 164      ( 03 Aug 2023 00:24 )             35.7     Hgb Trend: 12.0<--, 13.2<--, 15.6<--, 16.6<--, 17.3<--  08-03    132<L>  |  101  |  6<L>  ----------------------------<  183<H>  3.7   |  19<L>  |  0.53    Ca    8.7      03 Aug 2023 00:24  Phos  2.2     08-03  Mg     1.8     08-03    TPro  6.6  /  Alb  3.2<L>  /  TBili  0.4  /  DBili  x   /  AST  26  /  ALT  17  /  AlkPhos  59  08-03    Creatinine Trend: 0.53<--, 0.51<--, 0.52<--, 0.50<--, 0.59<--, 0.54<--    Urinalysis Basic - ( 03 Aug 2023 00:24 )    Color: x / Appearance: x / SG: x / pH: x  Gluc: 183 mg/dL / Ketone: x  / Bili: x / Urobili: x   Blood: x / Protein: x / Nitrite: x   Leuk Esterase: x / RBC: x / WBC x   Sq Epi: x / Non Sq Epi: x / Bacteria: x      Arterial Blood Gas:  08-03 @ 00:10  7.43/33/92/22/98.4/-1.7  ABG lactate: --    Venous Blood Gas:  08-02 @ 00:10  7.41/44/41/28/72.2  VBG Lactate: 1.2    CT Abdomen and Pelvis w/ IV Cont (07.31.23 @ 15:18)   LOWER CHEST: Minimal right lower lobe subsegmental atelectasis.  LIVER: Diffuse low-attenuation of the liver, which may represent steatosis.  BILE DUCTS: Normal caliber.  GALLBLADDER: Cholelithiasis.  SPLEEN: Borderline enlarged, measures 13.1 cm.  PANCREAS: Diffuse peripancreatic infiltration and confluent ill-defined fluid, most prominently about the pancreatic head, concerning for acute pancreatitis. Homogeneous parenchymal enhancement without definite evidence for necrosis. No main pancreatic duct dilation. Pancreatic divisum is better appreciated on the previous MRI. Again seen is a   peripancreatic tail collection that extends along the left anterior pararenal space into the pelvis, which is increased in size and extent since 3/21/2018. For example, a component adjacent to the pancreatic tail that exerts mass effect along the undersurface of the stomach now measures 7.1 x 5.2 cm (series 3 image 35), previously 4.6 x 4.6 cm. The collection spans approximately 14.8 cm in craniocaudal dimension, previously 13.7 cm when measured in similar fashion. There are additional loculated components extending along the left iliopsoas musculature that are new since 3/21/2018, with example interposed between iliacus and psoas muscles measuring 2.8 x 1.3 cm (series 3 image 72). Calcifications   are again noted along the inferior margin of the collection.  ADRENALS: Within normal limits.  KIDNEYS/URETERS: Bilateral subcentimeter hypodense renal foci that are too small to characterize. No hydronephrosis.  BLADDER: Within normal limits.  REPRODUCTIVE ORGANS: Prostate within normal limits.  BOWEL: Periduodenal fluid, likely reactive. No bowel obstruction.   Appendix is normal.  PERITONEUM: No ascites.  VESSELS: The hepatic and portal veins are patent. Patent superior mesenteric vein.The splenic vein is not definitively identified, similar to the previous exam, may be diminutive or chronically thrombosed. Again seen are prominent perigastric, perisplenic, and gastroepiploic collateral vessels, similar to prior.  RETROPERITONEUM/LYMPH NODES: No lymphadenopathy.  ABDOMINAL WALL: Within normal limits.  BONES: Within normal limits.    IMPRESSION:  Peripancreatic infiltration and confluent ill-defined fluid, compatible with acute pancreatitis.  Increased size of a chronic peripancreatic tail collection since 2018.   36 y/o male, PMH pre-DM, hypertriglyceridemia (follows with Dr. Michelle), multiple admissions of pancreatitis (last adm 2015 for necrotizing pancreatitis), pancreatic pseudocyst, initially presented to ED on 7/31/23 c/o abdominal pain x2 days. described pain as sharp, sudden onset, left sided, radiates towards the back b/l, 10/10. Pt also admitted associated nausea, 8 episodes of vomit ("cortes, creme" yellow in color, no blood), and unable to tolerate PO. Pt stated he has not had BMs in the past 2 days due to decreased PO. States the pain feels similar to his prior pancreatitis episode. Denies HA, CP, palpitations, SOB, diarrhea, fevers. Denies alcohol use. Pt admits stopping gemfibrozil and metformin 2 mo ago to try to control his medical issues with diet alone since he has been well controlled for the past 8 years.    ED course significant for triglycerides 4742, cholesterol 963, lipase 2908, WBC 12. Pt admitted to MICU for management of acute pancreatitis, on IVF and insulin gtt.    Overnight events: insulin gtt down to 2, down to d10, -170's, trig 499, per endo recheck C-peptide WITH serum glucose (BMP or CMP) tomorrow AM (around 6am, so that it is true fasting). Phos down to 2.2 -- replenished. c/o generalized abd pain -- pt thinks it is gas.    REVIEW OF SYSTEMS:  CONSTITUTIONAL: No fever, chills, night sweats, or fatigue  EYES: No visual disturbances  ENT:  No difficulty hearing; No sinus or throat pain  NECK: No pain or stiffness  RESPIRATORY: No cough, wheezing, or hemoptysis; No shortness of breath  CARDIOVASCULAR: No chest pain, palpitations, dizziness, or leg swelling  GASTROINTESTINAL: generalized abdominal pain. No nausea, vomiting, or hematemesis; No diarrhea or constipation. No melena or hematochezia.  GENITOURINARY: No dysuria, frequency, hematuria, or incontinence  NEUROLOGICAL: No headaches, numbness, or tremors  SKIN: No itching, burning, rashes, or lesions   MUSCULOSKELETAL: No joint pain or swelling; No muscle or extremity pain    OBJECTIVE:  ICU Vital Signs Last 24 Hrs  T(C): 37.3 (03 Aug 2023 00:00), Max: 37.3 (03 Aug 2023 00:00)  T(F): 99.1 (03 Aug 2023 00:00), Max: 99.1 (03 Aug 2023 00:00)  HR: 80 (03 Aug 2023 04:00) (80 - 102)  BP: 108/62 (03 Aug 2023 04:00) (96/58 - 125/72)  BP(mean): 79 (03 Aug 2023 04:00) (71 - 94)  ABP: --  ABP(mean): --  RR: 23 (03 Aug 2023 04:00) (14 - 24)  SpO2: 96% (03 Aug 2023 04:00) (94% - 98%)    O2 Parameters below as of 02 Aug 2023 20:00  Patient On (Oxygen Delivery Method): room air              08-01 @ 07:01  -  08-02 @ 07:00  --------------------------------------------------------  IN: 5102.9 mL / OUT: 2500 mL / NET: 2602.9 mL    08-02 @ 07:01  -  08-03 @ 05:31  --------------------------------------------------------  IN: 3124 mL / OUT: 2700 mL / NET: 424 mL      CAPILLARY BLOOD GLUCOSE      POCT Blood Glucose.: 157 mg/dL (03 Aug 2023 05:03)      GENERAL: NAD, well-developed  HEAD:  Atraumatic, Normocephalic  EYES: EOMI, conjunctiva and sclera clear  NECK: Supple  CHEST/LUNG: Clear to auscultation bilaterally; No wheeze.  HEART: Regular rate and rhythm; No murmurs, rubs, or gallops.  ABDOMEN: +left sided tenderness. Soft, Nondistended; Bowel sounds present.  EXTREMITIES:  2+ Peripheral Pulses, No clubbing, cyanosis, or edema  PSYCH: AOx3, appropriate affect  NEUROLOGY: non-focal, moving all extremities independently  SKIN: No rashes or lesions      HOSPITAL MEDICATIONS:  MEDICATIONS  (STANDING):  atorvastatin 80 milliGRAM(s) Oral at bedtime  chlorhexidine 4% Liquid 1 Application(s) Topical <User Schedule>  dextrose 10%. 1000 milliLiter(s) (30 mL/Hr) IV Continuous <Continuous>  enoxaparin Injectable 40 milliGRAM(s) SubCutaneous every 24 hours  fenofibrate Tablet 145 milliGRAM(s) Oral daily  insulin regular Infusion 2 Unit(s)/Hr (2 mL/Hr) IV Continuous <Continuous>  lactated ringers. 1000 milliLiter(s) (50 mL/Hr) IV Continuous <Continuous>  lidocaine   4% Patch 1 Patch Transdermal daily  naloxone Injectable 0.4 milliGRAM(s) IV Push once  omega-3-Acid Ethyl Esters 2 Gram(s) Oral two times a day  ondansetron Injectable 4 milliGRAM(s) IV Push once    MEDICATIONS  (PRN):  acetaminophen     Tablet .. 1000 milliGRAM(s) Oral every 8 hours PRN Mild Pain (1 - 3)  morphine  - Injectable 2 milliGRAM(s) IV Push every 4 hours PRN Moderate Pain (4 - 6)  morphine  - Injectable 4 milliGRAM(s) IV Push every 4 hours PRN Severe Pain (7 - 10)  simethicone 80 milliGRAM(s) Chew every 6 hours PRN Gas      LABS:                        12.0   6.25  )-----------( 164      ( 03 Aug 2023 00:24 )             35.7     Hgb Trend: 12.0<--, 13.2<--, 15.6<--, 16.6<--, 17.3<--  08-03    132<L>  |  101  |  6<L>  ----------------------------<  183<H>  3.7   |  19<L>  |  0.53    Ca    8.7      03 Aug 2023 00:24  Phos  2.2     08-03  Mg     1.8     08-03    TPro  6.6  /  Alb  3.2<L>  /  TBili  0.4  /  DBili  x   /  AST  26  /  ALT  17  /  AlkPhos  59  08-03    Creatinine Trend: 0.53<--, 0.51<--, 0.52<--, 0.50<--, 0.59<--, 0.54<--    Urinalysis Basic - ( 03 Aug 2023 00:24 )    Color: x / Appearance: x / SG: x / pH: x  Gluc: 183 mg/dL / Ketone: x  / Bili: x / Urobili: x   Blood: x / Protein: x / Nitrite: x   Leuk Esterase: x / RBC: x / WBC x   Sq Epi: x / Non Sq Epi: x / Bacteria: x      Arterial Blood Gas:  08-03 @ 00:10  7.43/33/92/22/98.4/-1.7  ABG lactate: --    Venous Blood Gas:  08-02 @ 00:10  7.41/44/41/28/72.2  VBG Lactate: 1.2    CT Abdomen and Pelvis w/ IV Cont (07.31.23 @ 15:18)   LOWER CHEST: Minimal right lower lobe subsegmental atelectasis.  LIVER: Diffuse low-attenuation of the liver, which may represent steatosis.  BILE DUCTS: Normal caliber.  GALLBLADDER: Cholelithiasis.  SPLEEN: Borderline enlarged, measures 13.1 cm.  PANCREAS: Diffuse peripancreatic infiltration and confluent ill-defined fluid, most prominently about the pancreatic head, concerning for acute pancreatitis. Homogeneous parenchymal enhancement without definite evidence for necrosis. No main pancreatic duct dilation. Pancreatic divisum is better appreciated on the previous MRI. Again seen is a   peripancreatic tail collection that extends along the left anterior pararenal space into the pelvis, which is increased in size and extent since 3/21/2018. For example, a component adjacent to the pancreatic tail that exerts mass effect along the undersurface of the stomach now measures 7.1 x 5.2 cm (series 3 image 35), previously 4.6 x 4.6 cm. The collection spans approximately 14.8 cm in craniocaudal dimension, previously 13.7 cm when measured in similar fashion. There are additional loculated components extending along the left iliopsoas musculature that are new since 3/21/2018, with example interposed between iliacus and psoas muscles measuring 2.8 x 1.3 cm (series 3 image 72). Calcifications   are again noted along the inferior margin of the collection.  ADRENALS: Within normal limits.  KIDNEYS/URETERS: Bilateral subcentimeter hypodense renal foci that are too small to characterize. No hydronephrosis.  BLADDER: Within normal limits.  REPRODUCTIVE ORGANS: Prostate within normal limits.  BOWEL: Periduodenal fluid, likely reactive. No bowel obstruction.   Appendix is normal.  PERITONEUM: No ascites.  VESSELS: The hepatic and portal veins are patent. Patent superior mesenteric vein.The splenic vein is not definitively identified, similar to the previous exam, may be diminutive or chronically thrombosed. Again seen are prominent perigastric, perisplenic, and gastroepiploic collateral vessels, similar to prior.  RETROPERITONEUM/LYMPH NODES: No lymphadenopathy.  ABDOMINAL WALL: Within normal limits.  BONES: Within normal limits.    IMPRESSION:  Peripancreatic infiltration and confluent ill-defined fluid, compatible with acute pancreatitis.  Increased size of a chronic peripancreatic tail collection since 2018.   36 y/o male, PMH pre-DM, hypertriglyceridemia (follows with Dr. Michelle), multiple admissions of pancreatitis (last adm 2015 for necrotizing pancreatitis), pancreatic pseudocyst, initially presented to ED on 7/31/23 c/o abdominal pain x2 days. described pain as sharp, sudden onset, left sided, radiates towards the back b/l, 10/10. Pt also admitted associated nausea, 8 episodes of vomit ("cortes, creme" yellow in color, no blood), and unable to tolerate PO. Pt stated he has not had BMs in the past 2 days due to decreased PO. States the pain feels similar to his prior pancreatitis episode. Denies HA, CP, palpitations, SOB, diarrhea, fevers. Denies alcohol use. Pt admits stopping gemfibrozil and metformin 2 mo ago to try to control his medical issues with diet alone since he has been well controlled for the past 8 years.    ED course significant for triglycerides 4742, cholesterol 963, lipase 2908, WBC 12. Pt admitted to MICU for management of acute pancreatitis, on IVF and insulin gtt.    Overnight events: insulin gtt down to 2, -170's, trig 499, per endo recheck C-peptide WITH serum glucose (BMP or CMP) tomorrow AM (around 6am, so that it is true fasting). Phos down to 2.2 -- replenished. c/o generalized abd pain -- pt thinks it is gas.    REVIEW OF SYSTEMS:  CONSTITUTIONAL: No fever, chills, night sweats, or fatigue  EYES: No visual disturbances  ENT:  No difficulty hearing; No sinus or throat pain  NECK: No pain or stiffness  RESPIRATORY: No cough, wheezing, or hemoptysis; No shortness of breath  CARDIOVASCULAR: No chest pain, palpitations, dizziness, or leg swelling  GASTROINTESTINAL: generalized abdominal pain. No nausea, vomiting, or hematemesis; No diarrhea or constipation. No melena or hematochezia.  GENITOURINARY: No dysuria, frequency, hematuria, or incontinence  NEUROLOGICAL: No headaches, numbness, or tremors  SKIN: No itching, burning, rashes, or lesions   MUSCULOSKELETAL: No joint pain or swelling; No muscle or extremity pain    OBJECTIVE:  ICU Vital Signs Last 24 Hrs  T(C): 37.3 (03 Aug 2023 00:00), Max: 37.3 (03 Aug 2023 00:00)  T(F): 99.1 (03 Aug 2023 00:00), Max: 99.1 (03 Aug 2023 00:00)  HR: 80 (03 Aug 2023 04:00) (80 - 102)  BP: 108/62 (03 Aug 2023 04:00) (96/58 - 125/72)  BP(mean): 79 (03 Aug 2023 04:00) (71 - 94)  ABP: --  ABP(mean): --  RR: 23 (03 Aug 2023 04:00) (14 - 24)  SpO2: 96% (03 Aug 2023 04:00) (94% - 98%)    O2 Parameters below as of 02 Aug 2023 20:00  Patient On (Oxygen Delivery Method): room air        08-01 @ 07:01  -  08-02 @ 07:00  --------------------------------------------------------  IN: 5102.9 mL / OUT: 2500 mL / NET: 2602.9 mL    08-02 @ 07:01  -  08-03 @ 05:31  --------------------------------------------------------  IN: 3124 mL / OUT: 2700 mL / NET: 424 mL      CAPILLARY BLOOD GLUCOSE      POCT Blood Glucose.: 157 mg/dL (03 Aug 2023 05:03)      GENERAL: NAD, well-developed  HEAD:  Atraumatic, Normocephalic  EYES: EOMI, conjunctiva and sclera clear  NECK: Supple  CHEST/LUNG: Clear to auscultation bilaterally; No wheeze.  HEART: Regular rate and rhythm; No murmurs, rubs, or gallops.  ABDOMEN: +left sided tenderness. Soft, Nondistended; Bowel sounds present.  EXTREMITIES:  2+ Peripheral Pulses, No clubbing, cyanosis, or edema  PSYCH: AOx3, appropriate affect  NEUROLOGY: non-focal, moving all extremities independently  SKIN: No rashes or lesions      HOSPITAL MEDICATIONS:  MEDICATIONS  (STANDING):  atorvastatin 80 milliGRAM(s) Oral at bedtime  chlorhexidine 4% Liquid 1 Application(s) Topical <User Schedule>  dextrose 10%. 1000 milliLiter(s) (30 mL/Hr) IV Continuous <Continuous>  enoxaparin Injectable 40 milliGRAM(s) SubCutaneous every 24 hours  fenofibrate Tablet 145 milliGRAM(s) Oral daily  insulin regular Infusion 2 Unit(s)/Hr (2 mL/Hr) IV Continuous <Continuous>  lactated ringers. 1000 milliLiter(s) (50 mL/Hr) IV Continuous <Continuous>  lidocaine   4% Patch 1 Patch Transdermal daily  naloxone Injectable 0.4 milliGRAM(s) IV Push once  omega-3-Acid Ethyl Esters 2 Gram(s) Oral two times a day  ondansetron Injectable 4 milliGRAM(s) IV Push once    MEDICATIONS  (PRN):  acetaminophen     Tablet .. 1000 milliGRAM(s) Oral every 8 hours PRN Mild Pain (1 - 3)  morphine  - Injectable 2 milliGRAM(s) IV Push every 4 hours PRN Moderate Pain (4 - 6)  morphine  - Injectable 4 milliGRAM(s) IV Push every 4 hours PRN Severe Pain (7 - 10)  simethicone 80 milliGRAM(s) Chew every 6 hours PRN Gas      LABS:                        12.0   6.25  )-----------( 164      ( 03 Aug 2023 00:24 )             35.7     Hgb Trend: 12.0<--, 13.2<--, 15.6<--, 16.6<--, 17.3<--  08-03    132<L>  |  101  |  6<L>  ----------------------------<  183<H>  3.7   |  19<L>  |  0.53    Ca    8.7      03 Aug 2023 00:24  Phos  2.2     08-03  Mg     1.8     08-03    TPro  6.6  /  Alb  3.2<L>  /  TBili  0.4  /  DBili  x   /  AST  26  /  ALT  17  /  AlkPhos  59  08-03    Creatinine Trend: 0.53<--, 0.51<--, 0.52<--, 0.50<--, 0.59<--, 0.54<--    Urinalysis Basic - ( 03 Aug 2023 00:24 )    Color: x / Appearance: x / SG: x / pH: x  Gluc: 183 mg/dL / Ketone: x  / Bili: x / Urobili: x   Blood: x / Protein: x / Nitrite: x   Leuk Esterase: x / RBC: x / WBC x   Sq Epi: x / Non Sq Epi: x / Bacteria: x      Arterial Blood Gas:  08-03 @ 00:10  7.43/33/92/22/98.4/-1.7  ABG lactate: --    Venous Blood Gas:  08-02 @ 00:10  7.41/44/41/28/72.2  VBG Lactate: 1.2    CT Abdomen and Pelvis w/ IV Cont (07.31.23 @ 15:18)   LOWER CHEST: Minimal right lower lobe subsegmental atelectasis.  LIVER: Diffuse low-attenuation of the liver, which may represent steatosis.  BILE DUCTS: Normal caliber.  GALLBLADDER: Cholelithiasis.  SPLEEN: Borderline enlarged, measures 13.1 cm.  PANCREAS: Diffuse peripancreatic infiltration and confluent ill-defined fluid, most prominently about the pancreatic head, concerning for acute pancreatitis. Homogeneous parenchymal enhancement without definite evidence for necrosis. No main pancreatic duct dilation. Pancreatic divisum is better appreciated on the previous MRI. Again seen is a   peripancreatic tail collection that extends along the left anterior pararenal space into the pelvis, which is increased in size and extent since 3/21/2018. For example, a component adjacent to the pancreatic tail that exerts mass effect along the undersurface of the stomach now measures 7.1 x 5.2 cm (series 3 image 35), previously 4.6 x 4.6 cm. The collection spans approximately 14.8 cm in craniocaudal dimension, previously 13.7 cm when measured in similar fashion. There are additional loculated components extending along the left iliopsoas musculature that are new since 3/21/2018, with example interposed between iliacus and psoas muscles measuring 2.8 x 1.3 cm (series 3 image 72). Calcifications   are again noted along the inferior margin of the collection.  ADRENALS: Within normal limits.  KIDNEYS/URETERS: Bilateral subcentimeter hypodense renal foci that are too small to characterize. No hydronephrosis.  BLADDER: Within normal limits.  REPRODUCTIVE ORGANS: Prostate within normal limits.  BOWEL: Periduodenal fluid, likely reactive. No bowel obstruction.   Appendix is normal.  PERITONEUM: No ascites.  VESSELS: The hepatic and portal veins are patent. Patent superior mesenteric vein.The splenic vein is not definitively identified, similar to the previous exam, may be diminutive or chronically thrombosed. Again seen are prominent perigastric, perisplenic, and gastroepiploic collateral vessels, similar to prior.  RETROPERITONEUM/LYMPH NODES: No lymphadenopathy.  ABDOMINAL WALL: Within normal limits.  BONES: Within normal limits.    IMPRESSION:  Peripancreatic infiltration and confluent ill-defined fluid, compatible with acute pancreatitis.  Increased size of a chronic peripancreatic tail collection since 2018.

## 2023-08-03 NOTE — PROGRESS NOTE ADULT - ASSESSMENT
ASSESSMENT:  36 y/o male, PMH pre-DM, hypertriglyceridemia (follows with Dr. Michelle), multiple admissions of pancreatitis (last adm 2015 for necrotizing pancreatitis), pancreatic pseudocyst, initially presented to ED on 7/31/23 c/o abdominal pain x2 days. ED course significant for triglycerides 4742, cholesterol 963, lipase 2908, WBC 12. Pt admitted to MICU for management of acute pancreatitis, on IVF and insulin gtt.    =====NEUROLOGIC=====  #no active issues  -baseline mental status --- AOx3, lives at home, functional, ambulatory  -c/w neuro checks q4h and PRN for changes  -PT/OT    =====CARDIOVASCULAR=====  #tachycardia  -resolved -- HR b/t   -continue to monitor -- may be 2/2 pain    =====RESPIRATORY=====  #no active issues    =====RENAL=====  #no active issues  -monitor I&O's   -urinating on own -- 600-700ml overnight  02 Aug 2023 07:01  -  03 Aug 2023 05:39 --IN: 3124 mL / OUT: 2700 mL / NET: 424 mL  -correct electrolytes PRN    =====GI=====  #Acute pancreatitis  -hx of hospitalizations for pancreatitis  -hx of hypertriglyceridemia  -GI recs appreciated  -Await MRI/MRCP for better characterization of the pancreas and peripancreatic fluid.  -Will consider EUS with possible drainage procedure based on above results and clinical course.  -CLD for now, can advance as tolerated to low fat diet  -pain control w/ tylenol and morphine PRN -- IV tylenol patient preference  -trig downtrending 499 <-- 4742  -lipase downtrending 206 <-- 963  -continue trending trig / lipase / LFTs  -c/w simethicone for abd gas/bloating  -c/w fenofibrate, atorvastatin, lovaza  -c/w LR  -3 BMs overnight    =====HEME/ONC=====  #Leukocytosis -- resolved  -WBC 12.12 --> 6.25  -afebrile    =====INFECTIOUS DISEASE=====  #no active issues    =====ENDO=====  #Hypertriglyceridemia  -trig downtrending 499 <-- 4742  -lipase downtrending 206 <-- 963  -c/w insulin gtt until triglyceride level <500  -c/w D10 and LR  -FS q4h  -Endo recs appreciated  -d/c gemfibrozil  -c/w fenofibrate 145mg daily, atorvastatin 80mg QHS, and lovaza 2mg BID  -continue trending trig / lipase / BMP    #T2DM  -hx pre-dm  -A1c 11.6 -- new diagnosis of T2DM  -Endo recs appreciated  -C-peptide sent  -When insulin gtt is discontinued (when TG<500), please transition to Lantus 22 units QHS and Admelog 7 units TIDAC, with low dose correction scale TIDAC and low dose correction QHS    =====SKIN=====  Lines: NONE)  Decubiti: NONE    =====PPX=====  -DVT ppx -- c/w Lovenox    =====GOC=====  -FULL code      FOLLOW-UP   [] monitor trig <500  [] once trig <500 -- transition to Lantus 22 units QHS and Admelog 7 units TIDAC, with low dose correction scale TIDAC and low dose correction QHS ASSESSMENT:  38 y/o male, PMH pre-DM, hypertriglyceridemia (follows with Dr. Michelle), multiple admissions of pancreatitis (last adm 2015 for necrotizing pancreatitis), pancreatic pseudocyst, initially presented to ED on 7/31/23 c/o abdominal pain x2 days. ED course significant for triglycerides 4742, cholesterol 963, lipase 2908, WBC 12. Pt admitted to MICU for management of acute pancreatitis, on IVF and insulin gtt.    =====NEUROLOGIC=====  #no active issues  -baseline mental status --- AOx3, lives at home, functional, ambulatory  -c/w neuro checks q4h and PRN for changes  -PT/OT    =====CARDIOVASCULAR=====  #tachycardia  -resolved -- HR b/t   -continue to monitor -- may be 2/2 pain    =====RESPIRATORY=====  #no active issues    =====RENAL=====  #no active issues  -monitor I&O's   -urinating on own -- 600-700ml overnight  02 Aug 2023 07:01  -  03 Aug 2023 05:39 --IN: 3124 mL / OUT: 2700 mL / NET: 424 mL  -correct electrolytes PRN    =====GI=====  #Acute pancreatitis  -hx of hospitalizations for pancreatitis  -hx of hypertriglyceridemia  -GI recs appreciated  -Await MRI/MRCP for better characterization of the pancreas and peripancreatic fluid.  -Will consider EUS with possible drainage procedure based on above results and clinical course.  -CLD for now, can advance as tolerated to low fat diet  -pain control w/ tylenol and morphine PRN -- IV tylenol patient preference  -trig downtrending 499 <-- 4742  -lipase downtrending 206 <-- 963  -continue trending trig / lipase / LFTs  -c/w simethicone for abd gas/bloating  -c/w fenofibrate, atorvastatin, lovaza  -c/w LR  -3 BMs overnight    =====HEME/ONC=====  #Leukocytosis -- resolved  -WBC 12.12 --> 6.25  -afebrile    =====INFECTIOUS DISEASE=====  #no active issues    =====ENDO=====  #Hypertriglyceridemia  #T2DM  -hx pre-dm  -A1c 11.6 -- new diagnosis of T2DM  -trig downtrending 499 <-- 4742  -lipase downtrending 206 <-- 963  -triglyceride level <500  -C-peptide 1.2 -- per endo -- recheck C-peptide WITH serum glucose (BMP or CMP) tomorrow AM (around 6am, so that it is true fasting)  -FS q4h  -Endo recs appreciated  -c/w fenofibrate 145mg daily, atorvastatin 80mg QHS, and lovaza 2mg BID  -dc insulin gtt -- transition to Lantus 22 units QHS and Admelog 7 units TIDAC, with low dose correction scale TIDAC and low dose correction QHS  -continue trending trig / lipase / BMP    =====SKIN=====  Lines: NONE)  Decubiti: NONE    =====PPX=====  -DVT ppx -- c/w Lovenox    =====GOC=====  -FULL code      FOLLOW-UP   [] d/c insulin gtt -- transition to Lantus 22 units QHS and Admelog 7 units TIDAC, with low dose correction scale TIDAC and low dose correction QHS ASSESSMENT:  38 y/o male, PMH pre-DM, hypertriglyceridemia (follows with Dr. Michelle), multiple admissions of pancreatitis (last adm 2015 for necrotizing pancreatitis), pancreatic pseudocyst, initially presented to ED on 7/31/23 c/o abdominal pain x2 days. ED course significant for triglycerides 4742, cholesterol 963, lipase 2908, WBC 12. Pt admitted to MICU for management of acute pancreatitis, on IVF and insulin gtt.    =====NEUROLOGIC=====  #no active issues  -baseline mental status --- AOx3, lives at home, functional, ambulatory  -c/w neuro checks q4h and PRN for changes  -PT/OT    =====CARDIOVASCULAR=====  #tachycardia  -resolved -- HR b/t   -continue to monitor -- may be 2/2 pain    =====RESPIRATORY=====  #no active issues    =====RENAL=====  #no active issues  -monitor I&O's   -urinating on own -- 600-700ml overnight  02 Aug 2023 07:01  -  03 Aug 2023 05:39 --IN: 3124 mL / OUT: 2700 mL / NET: 424 mL  -correct electrolytes PRN    =====GI=====  #Acute pancreatitis  -hx of hospitalizations for pancreatitis  -hx of hypertriglyceridemia  -GI recs appreciated  -Await MRI/MRCP for better characterization of the pancreas and peripancreatic fluid.  -Will consider EUS with possible drainage procedure based on above results and clinical course.  -CLD for now, can advance as tolerated to low fat diet  -pain control w/ tylenol and morphine PRN -- IV tylenol patient preference  -trig downtrending 499 <-- 4742  -lipase downtrending 206 <-- 963  -continue trending trig / lipase / LFTs  -c/w simethicone for abd gas/bloating  -c/w fenofibrate, atorvastatin, lovaza  -c/w LR  -3 BMs overnight    =====HEME/ONC=====  #Leukocytosis -- resolved  -WBC 12.12 --> 6.25  -afebrile    =====INFECTIOUS DISEASE=====  #no active issues    =====ENDO=====  #Hypertriglyceridemia  #T2DM  -hx pre-dm  -A1c 11.6 -- new diagnosis of T2DM  -trig downtrending 499 <-- 4742  -lipase downtrending 206 <-- 963  -triglyceride level <500  -C-peptide 1.2 -- per endo -- recheck C-peptide WITH serum glucose (BMP or CMP) tomorrow AM (around 6am, so that it is true fasting)  -FS q4h  -Endo recs appreciated  -c/w fenofibrate 145mg daily, atorvastatin 80mg QHS, and lovaza 2mg BID  -dc insulin gtt -- transition to Lantus 22 units QHS and Admelog 7 units TIDAC, with low dose correction scale TIDAC and low dose correction QHS  -continue trending trig / lipase / BMP    =====SKIN=====  Lines: NONE)  Decubiti: NONE    =====PPX=====  -DVT ppx -- c/w Lovenox    =====GOC=====  -FULL code      FOLLOW-UP   [] d/c insulin gtt -- transition to Lantus 22 units QHS and Admelog 7 units TIDAC, with low dose correction scale TIDAC and low dose correction QHS  [] bedboard ASSESSMENT:  38 y/o male, PMH pre-DM, hypertriglyceridemia (follows with Dr. Michelle), multiple admissions of pancreatitis (last adm 2015 for necrotizing pancreatitis), pancreatic pseudocyst, initially presented to ED on 7/31/23 c/o abdominal pain x2 days. ED course significant for triglycerides 4742, cholesterol 963, lipase 2908, WBC 12. Pt admitted to MICU for management of acute pancreatitis, on IVF and insulin gtt.    =====NEUROLOGIC=====  #no active issues  -baseline mental status --- AOx3, lives at home, functional, ambulatory  -c/w neuro checks q4h and PRN for changes  -PT/OT    =====CARDIOVASCULAR=====  #tachycardia  -resolved -- HR b/t   -continue to monitor -- may be 2/2 pain    =====RESPIRATORY=====  #no active issues    =====RENAL=====  #no active issues  -monitor I&O's   -urinating on own  02 Aug 2023 07:01  -  03 Aug 2023 05:39 --IN: 3124 mL / OUT: 2700 mL / NET: 424 mL  -correct electrolytes PRN    =====GI=====  #Acute pancreatitis  -hx of hospitalizations for pancreatitis  -hx of hypertriglyceridemia  -GI recs appreciated  -Await MRI/MRCP for better characterization of the pancreas and peripancreatic fluid.  -Will consider EUS with possible drainage procedure based on above results and clinical course.  -CLD for now, can advance as tolerated to low fat diet  -pain control w/ tylenol and morphine PRN -- IV tylenol patient preference  -trig downtrending 499 <-- 4742  -lipase downtrending 206 <-- 963  -continue trending trig / lipase / LFTs  -c/w simethicone for abd gas/bloating  -c/w fenofibrate, atorvastatin, lovaza  -c/w LR  -multiple BMs overnight    =====HEME/ONC=====  #Leukocytosis -- resolved  -WBC 12.12 --> 6.25  -afebrile    =====INFECTIOUS DISEASE=====  #no active issues    =====ENDO=====  #Hypertriglyceridemia  #T2DM  -hx pre-dm  -A1c 11.6 -- new diagnosis of T2DM  -trig downtrending 499 <-- 4742  -lipase downtrending 206 <-- 963  -triglyceride level <500  -C-peptide 1.2 -- per endo -- recheck C-peptide WITH serum glucose (BMP or CMP) tomorrow AM (around 6am, so that it is true fasting)  -FS q4h  -Endo recs appreciated  -c/w fenofibrate 145mg daily, atorvastatin 80mg QHS, and lovaza 2mg BID  -dc insulin gtt -- transition to Lantus 22 units QHS and Admelog 7 units TIDAC, with low dose correction scale TIDAC and low dose correction QHS  -continue trending trig / lipase / BMP    =====SKIN=====  Lines: NONE)  Decubiti: NONE    =====PPX=====  -DVT ppx -- c/w Lovenox    =====GOC=====  -FULL code      FOLLOW-UP   [] d/c insulin gtt -- transition to Lantus 22 units QHS and Admelog 7 units TIDAC, with low dose correction scale TIDAC and low dose correction QHS  [] bedboard ASSESSMENT:  38 y/o male, PMH pre-DM, hypertriglyceridemia (follows with Dr. Michelle), multiple admissions of pancreatitis (last adm 2015 for necrotizing pancreatitis), pancreatic pseudocyst, initially presented to ED on 7/31/23 c/o abdominal pain x2 days. ED course significant for triglycerides 4742, cholesterol 963, lipase 2908, WBC 12. Pt admitted to MICU for management of acute pancreatitis, on IVF and insulin gtt.    =====NEUROLOGIC=====  #no active issues  -baseline mental status --- AOx3, lives at home, functional, ambulatory  -c/w neuro checks q4h and PRN for changes  -PT/OT    =====CARDIOVASCULAR=====  #tachycardia  -resolved -- HR b/t   -continue to monitor -- may be 2/2 pain    =====RESPIRATORY=====  #no active issues    =====RENAL=====  #no active issues  -monitor I&O's   -urinating on own  02 Aug 2023 07:01  -  03 Aug 2023 05:39 --IN: 3124 mL / OUT: 2700 mL / NET: 424 mL  -correct electrolytes PRN    =====GI=====  #Acute pancreatitis  -hx of hospitalizations for pancreatitis  -hx of hypertriglyceridemia  -GI following  -Await MRI/MRCP for better characterization of the pancreas and peripancreatic fluid.  -Will consider EUS with possible drainage procedure based on above results and clinical course.  -Advance to low fat diet  -pain control w/ tylenol and morphine PRN -- IV tylenol patient preference  -trig downtrending 499 <-- 4742  -lipase downtrending 206 <-- 963  -continue trending trig / lipase / LFTs  -c/w simethicone for abd gas/bloating  -c/w fenofibrate, atorvastatin, lovaza  -c/w LR  -multiple BMs overnight    =====HEME/ONC=====  #Leukocytosis -- resolved  -WBC 12.12 --> 6.25  -afebrile    =====INFECTIOUS DISEASE=====  #no active issues    =====ENDO=====  #Hypertriglyceridemia  #T2DM  -hx pre-dm  -A1c 11.6 -- new diagnosis of T2DM  -trig downtrending 499 <-- 4742  -lipase downtrending 206 <-- 963  -triglyceride level <500  -C-peptide 1.2 -- per endo -- recheck C-peptide WITH serum glucose (BMP or CMP) tomorrow AM (around 6am, so that it is true fasting)  -FS q4h  -Endo recs appreciated  -c/w fenofibrate 145mg daily, atorvastatin 80mg QHS, and lovaza 2mg BID  -dc insulin gtt -- transition to Lantus 22 units QHS (start this evening) and Admelog 7 units TID AC, with low dose correction scale TID AC and low dose correction QHS  -continue trending trig / lipase / BMP    =====SKIN=====  Lines: NONE)  Decubiti: NONE    =====PPX=====  -DVT ppx -- c/w Lovenox    =====GOC=====  -FULL code      FOLLOW-UP   [] d/c insulin gtt -- transition to Lantus 22 units QHS and Admelog 7 units TID AC, with low dose correction scale TID AC and low dose correction QHS  [] plan to bed board tmrw ASSESSMENT:  38 y/o male, PMH pre-DM, hypertriglyceridemia (follows with Dr. Michelle), multiple admissions of pancreatitis (last adm 2015 for necrotizing pancreatitis), pancreatic pseudocyst, initially presented to ED on 7/31/23 c/o abdominal pain x2 days. ED course significant for triglycerides 4742, cholesterol 963, lipase 2908, WBC 12. Pt admitted to MICU for management of acute pancreatitis, on IVF and insulin gtt.    =====NEUROLOGIC=====  #no active issues  -baseline mental status --- AOx3, lives at home, functional, ambulatory  -c/w neuro checks q4h and PRN for changes  -PT/OT    =====CARDIOVASCULAR=====  #tachycardia  -resolved -- HR b/t   -continue to monitor -- may be 2/2 pain    =====RESPIRATORY=====  #no active issues    =====RENAL=====  #no active issues  -monitor I&O's   -urinating on own  02 Aug 2023 07:01  -  03 Aug 2023 05:39 --IN: 3124 mL / OUT: 2700 mL / NET: 424 mL  -correct electrolytes PRN    =====GI=====  #Acute pancreatitis  -hx of hospitalizations for pancreatitis  -hx of hypertriglyceridemia  -GI following -- to discuss EUS with possible drainage procedure based on above results and clinical course  -MRI/MRCP ordered -- for better characterization of the pancreas and peripancreatic fluid  -Advance to low fat diet  -pain control w/ tylenol and morphine PRN -- IV tylenol patient preference  -trig downtrending 499 <-- 4742  -lipase downtrending 206 <-- 963  -continue trending trig / lipase / LFTs  -c/w simethicone for abd gas/bloating  -c/w fenofibrate, atorvastatin, lovaza  -c/w LR  -multiple BMs overnight    =====HEME/ONC=====  #Leukocytosis -- resolved  -WBC 12.12 --> 6.25  -afebrile    =====INFECTIOUS DISEASE=====  #no active issues    =====ENDO=====  #Hypertriglyceridemia  #T2DM  -hx pre-dm  -A1c 11.6 -- new diagnosis of T2DM  -trig downtrending 499 <-- 4742  -lipase downtrending 206 <-- 963  -triglyceride level <500  -C-peptide 1.2 -- per endo -- recheck C-peptide WITH serum glucose (BMP or CMP) tomorrow AM (around 6am, so that it is true fasting)  -FS q4h  -Endo recs appreciated  -c/w fenofibrate 145mg daily, atorvastatin 80mg QHS, and lovaza 2mg BID  -dc insulin gtt -- transition to Lantus 22 units QHS (start this evening) and Admelog 7 units TID AC, with low dose correction scale TID AC and low dose correction QHS  -continue trending trig / lipase / BMP    =====SKIN=====  Lines: NONE)  Decubiti: NONE    =====PPX=====  -DVT ppx -- c/w Lovenox    =====GOC=====  -FULL code      FOLLOW-UP   [] d/c insulin gtt -- transition to Lantus 22 units QHS and Admelog 7 units TID AC, with low dose correction scale TID AC and low dose correction QHS  [] plan to bed board  [] MRI/MRCP ordered  ASSESSMENT:  38 y/o male, PMH pre-DM, hypertriglyceridemia (follows with Dr. Michelle), multiple admissions of pancreatitis (last adm 2015 for necrotizing pancreatitis), pancreatic pseudocyst, initially presented to ED on 7/31/23 c/o abdominal pain x2 days. ED course significant for triglycerides 4742, cholesterol 963, lipase 2908, WBC 12. Pt admitted to MICU for management of acute pancreatitis, on IVF and insulin gtt.    =====NEUROLOGIC=====  #no active issues  -baseline mental status --- AOx3, lives at home, functional, ambulatory  -c/w neuro checks q4h and PRN for changes  -PT/OT    =====CARDIOVASCULAR=====  #tachycardia  -resolved -- HR b/t   -continue to monitor -- may be 2/2 pain    =====RESPIRATORY=====  #no active issues    =====RENAL=====  #no active issues  -monitor I&O's   -urinating on own  02 Aug 2023 07:01  -  03 Aug 2023 05:39 --IN: 3124 mL / OUT: 2700 mL / NET: 424 mL  -correct electrolytes PRN    =====GI=====  #Acute pancreatitis  -hx of hospitalizations for pancreatitis  -hx of hypertriglyceridemia  -GI following -- to discuss EUS with possible drainage procedure based on above results and clinical course  -MRI/MRCP ordered -- for better characterization of the pancreas and peripancreatic fluid  -Advance to low fat diet  -pain control w/ tylenol and morphine PRN -- IV tylenol patient preference  -trig downtrending 499 <-- 4742  -lipase downtrending 206 <-- 963  -continue trending trig / lipase / LFTs  -c/w simethicone for abd gas/bloating  -c/w fenofibrate, atorvastatin, lovaza  -c/w LR  -multiple BMs overnight  -check electrolytes / trig this afternoon -- if stable, change labs to qAM    =====HEME/ONC=====  #Leukocytosis -- resolved  -WBC 12.12 --> 6.25  -afebrile    =====INFECTIOUS DISEASE=====  #no active issues    =====ENDO=====  #Hypertriglyceridemia  #T2DM  -hx pre-dm  -A1c 11.6 -- new diagnosis of T2DM  -trig downtrending 499 <-- 4742  -lipase downtrending 206 <-- 963  -triglyceride level <500  -C-peptide 1.2 -- per endo -- recheck C-peptide WITH serum glucose (BMP or CMP) tomorrow AM (around 6am, so that it is true fasting)  -FS q4h  -Endo recs appreciated  -c/w fenofibrate 145mg daily, atorvastatin 80mg QHS, and lovaza 2mg BID  -dc insulin gtt this AM -- transition to Lantus 22 units QHS (start this evening) and Admelog 7 units TID AC, with low dose correction scale TID AC and low dose correction QHS  -continue trending trig / lipase / BMP  -begin insulin pen teaching, glucometer teaching    =====SKIN=====  Lines: NONE)  Decubiti: NONE    =====PPX=====  -DVT ppx -- c/w Lovenox    =====GOC=====  -FULL code      FOLLOW-UP   [] d/c insulin gtt -- transition to Lantus 22 units QHS and Admelog 7 units TID AC, with low dose correction scale TID AC and low dose correction QHS  [] check labs this afternoon -- if stable, change to qAM  [] begin insulin pen teaching, glucometer teaching  [] MRI/MRCP ordered   [] plan to bed board

## 2023-08-03 NOTE — PROGRESS NOTE ADULT - CRITICAL CARE ATTENDING COMMENT
43M w/ hypertriglyceridemia, previous severe pancreatitis w/ prolonged ICU stay required trach/vent subsequently decannulated and known pseudocyst. Admitted w/ hypertriglycedemia and acute pancreatitis w/ slight increase in pseudocyst size. On IVF and insulin gtt.     Pt is improving, pain has decreased and is tolerating PO diet, which has been advanced. TG have decreased <500 and transitioned off insulin gtt. IVF d/c'ed as well since pt is tolerating PO. Pt now has DM w/ hgba1c 11.6 so started on lantus and premeal insulin. Will need diabetic teaching as pt has never been on insulin prior. Tachycardia has resolved. GI following for pseuduocyst mgmt - pt is not sure if he wants to move forward with drainage but will obtain MRCP anyway so it is available if drainage is pursued. Check BMP this afternoon to ensure electrolytes are acceptable, hypoK repleted. OOB to chair, mobile on unit. DVT ppx w/ lovenox. Full code. Likely downgrade to medical floors this afternoon.     Plan of care discussed w/ pt and all questions answered .

## 2023-08-03 NOTE — PROGRESS NOTE ADULT - ATTENDING COMMENTS
Agree with assessment and plan as above by Dr. Cardenas. Reviewed all pertinent labs, glucose values, and imaging studies. Modifications made as indicated above. Pt. with improving glycemic control on basal bolus insulin and improving TG levels. Can monitor on current insulin regimen for now with plans to d/c on basal bolus regimen. Monitor TG level now back on hyperlipidemia regimen.    Ambrocio Milton D.O  [phone]

## 2023-08-03 NOTE — PROGRESS NOTE ADULT - ASSESSMENT
37-year-old male patient PMH of pre-DM (diagnosed 2 years ago), hypertriglyceridemia (follows with Dr. Michelle), multiple admissions of pancreatitis (last adm  for necrotizing pancreatitis), pancreatic cyst/collection who presents to the emergency department for abdominal pain, nausea x 2 days. Found to have acute pancreatitis secondary to elevated triglycerides. A1c was 11.6%.    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 Hx of recurrent pancreatitis and pancreatic cyst/collection.  Lipase 2908, T --> down to 499. Insulin gtt discontinued this AM.  - c/w continue fenofibrate 145mg daily, atorvastatin 80mg QHS, and lovaza 2mg BID.  - Appreciate RD consult   - Low fat/ CC diet     #T2DM  Hx of prediabetes.  A1c found to be 11.6%, new diagnosis of T2DM  Home regimen: Metformin ?dose BID, stopped 2 months ago.  C-peptide 1.6 with glc 162, not concerning for HORTENCIA or T1DM.  - Lantus 22 units QHS  - Admelog 7 units TIDAC  - low dose correction scale TIDAC and low dose correction QHS  For discharge:  - Likely will need basal/bolus insulin vs. basal insulin + metformin. TBD.  - Please begin insulin pen teaching, glucometer teaching.  - Please send prescriptions for diabetes supplies (glucometer, test strips, lancets, alcohol swabs, insulin pen needles). Please send prescriptions for Freestyle Luz 2 sensor x2 and reader.   - Patient will need follow up with Endocrinology.       Robbie Cardenas MD  Endocrine Fellow  Can be reached via teams. For follow up questions, discharge recommendations, or new consults, please call answering service at [phone] (weekdays); [phone] (nights/weekends).

## 2023-08-04 ENCOUNTER — TRANSCRIPTION ENCOUNTER (OUTPATIENT)
Age: 38
End: 2023-08-04

## 2023-08-04 DIAGNOSIS — Z29.9 ENCOUNTER FOR PROPHYLACTIC MEASURES, UNSPECIFIED: ICD-10-CM

## 2023-08-04 DIAGNOSIS — K85.90 ACUTE PANCREATITIS WITHOUT NECROSIS OR INFECTION, UNSPECIFIED: ICD-10-CM

## 2023-08-04 LAB
ALBUMIN SERPL ELPH-MCNC: 3.2 G/DL — LOW (ref 3.3–5)
ALP SERPL-CCNC: 70 U/L — SIGNIFICANT CHANGE UP (ref 40–120)
ALT FLD-CCNC: 21 U/L — SIGNIFICANT CHANGE UP (ref 10–45)
ANION GAP SERPL CALC-SCNC: 12 MMOL/L — SIGNIFICANT CHANGE UP (ref 5–17)
AST SERPL-CCNC: 22 U/L — SIGNIFICANT CHANGE UP (ref 10–40)
BASE EXCESS BLDV CALC-SCNC: -0.3 MMOL/L — SIGNIFICANT CHANGE UP (ref -2–3)
BILIRUB SERPL-MCNC: 0.3 MG/DL — SIGNIFICANT CHANGE UP (ref 0.2–1.2)
BUN SERPL-MCNC: 11 MG/DL — SIGNIFICANT CHANGE UP (ref 7–23)
CA-I SERPL-SCNC: 1.22 MMOL/L — SIGNIFICANT CHANGE UP (ref 1.15–1.33)
CALCIUM SERPL-MCNC: 8.8 MG/DL — SIGNIFICANT CHANGE UP (ref 8.4–10.5)
CHLORIDE BLDV-SCNC: 104 MMOL/L — SIGNIFICANT CHANGE UP (ref 96–108)
CHLORIDE SERPL-SCNC: 102 MMOL/L — SIGNIFICANT CHANGE UP (ref 96–108)
CHOLEST SERPL-MCNC: 360 MG/DL — HIGH
CO2 BLDV-SCNC: 27 MMOL/L — HIGH (ref 22–26)
CO2 SERPL-SCNC: 21 MMOL/L — LOW (ref 22–31)
CREAT SERPL-MCNC: 0.72 MG/DL — SIGNIFICANT CHANGE UP (ref 0.5–1.3)
EGFR: 121 ML/MIN/1.73M2 — SIGNIFICANT CHANGE UP
GAS PNL BLDV: 134 MMOL/L — LOW (ref 136–145)
GAS PNL BLDV: SIGNIFICANT CHANGE UP
GAS PNL BLDV: SIGNIFICANT CHANGE UP
GLUCOSE BLDC GLUCOMTR-MCNC: 143 MG/DL — HIGH (ref 70–99)
GLUCOSE BLDC GLUCOMTR-MCNC: 146 MG/DL — HIGH (ref 70–99)
GLUCOSE BLDC GLUCOMTR-MCNC: 163 MG/DL — HIGH (ref 70–99)
GLUCOSE BLDC GLUCOMTR-MCNC: 177 MG/DL — HIGH (ref 70–99)
GLUCOSE BLDC GLUCOMTR-MCNC: 185 MG/DL — HIGH (ref 70–99)
GLUCOSE BLDV-MCNC: 177 MG/DL — HIGH (ref 70–99)
GLUCOSE SERPL-MCNC: 165 MG/DL — HIGH (ref 70–99)
HCO3 BLDV-SCNC: 25 MMOL/L — SIGNIFICANT CHANGE UP (ref 22–29)
HCT VFR BLD CALC: 35.9 % — LOW (ref 39–50)
HCT VFR BLDA CALC: 36 % — LOW (ref 39–51)
HGB BLD CALC-MCNC: 11.9 G/DL — LOW (ref 12.6–17.4)
HGB BLD-MCNC: 11.7 G/DL — LOW (ref 13–17)
HOROWITZ INDEX BLDV+IHG-RTO: 21 — SIGNIFICANT CHANGE UP
LACTATE BLDV-MCNC: 0.9 MMOL/L — SIGNIFICANT CHANGE UP (ref 0.5–2)
LIDOCAIN IGE QN: 217 U/L — HIGH (ref 7–60)
MAGNESIUM SERPL-MCNC: 1.9 MG/DL — SIGNIFICANT CHANGE UP (ref 1.6–2.6)
MCHC RBC-ENTMCNC: 27 PG — SIGNIFICANT CHANGE UP (ref 27–34)
MCHC RBC-ENTMCNC: 32.6 GM/DL — SIGNIFICANT CHANGE UP (ref 32–36)
MCV RBC AUTO: 82.9 FL — SIGNIFICANT CHANGE UP (ref 80–100)
NRBC # BLD: 0 /100 WBCS — SIGNIFICANT CHANGE UP (ref 0–0)
PCO2 BLDV: 45 MMHG — SIGNIFICANT CHANGE UP (ref 42–55)
PH BLDV: 7.36 — SIGNIFICANT CHANGE UP (ref 7.32–7.43)
PHOSPHATE SERPL-MCNC: 3.1 MG/DL — SIGNIFICANT CHANGE UP (ref 2.5–4.5)
PLATELET # BLD AUTO: 173 K/UL — SIGNIFICANT CHANGE UP (ref 150–400)
PO2 BLDV: 24 MMHG — LOW (ref 25–45)
POTASSIUM BLDV-SCNC: 3.9 MMOL/L — SIGNIFICANT CHANGE UP (ref 3.5–5.1)
POTASSIUM SERPL-MCNC: 4 MMOL/L — SIGNIFICANT CHANGE UP (ref 3.5–5.3)
POTASSIUM SERPL-SCNC: 4 MMOL/L — SIGNIFICANT CHANGE UP (ref 3.5–5.3)
PROT SERPL-MCNC: 6.6 G/DL — SIGNIFICANT CHANGE UP (ref 6–8.3)
RBC # BLD: 4.33 M/UL — SIGNIFICANT CHANGE UP (ref 4.2–5.8)
RBC # FLD: 15.5 % — HIGH (ref 10.3–14.5)
SAO2 % BLDV: 35.9 % — LOW (ref 67–88)
SODIUM SERPL-SCNC: 135 MMOL/L — SIGNIFICANT CHANGE UP (ref 135–145)
TRIGL SERPL-MCNC: 464 MG/DL — HIGH
WBC # BLD: 5.78 K/UL — SIGNIFICANT CHANGE UP (ref 3.8–10.5)
WBC # FLD AUTO: 5.78 K/UL — SIGNIFICANT CHANGE UP (ref 3.8–10.5)

## 2023-08-04 PROCEDURE — 74183 MRI ABD W/O CNTR FLWD CNTR: CPT | Mod: 26

## 2023-08-04 PROCEDURE — 99232 SBSQ HOSP IP/OBS MODERATE 35: CPT | Mod: GC

## 2023-08-04 RX ORDER — INSULIN LISPRO 100/ML
8 VIAL (ML) SUBCUTANEOUS
Refills: 0 | Status: DISCONTINUED | OUTPATIENT
Start: 2023-08-04 | End: 2023-08-08

## 2023-08-04 RX ORDER — INSULIN LISPRO 100/ML
7 VIAL (ML) SUBCUTANEOUS
Qty: 3 | Refills: 0
Start: 2023-08-04

## 2023-08-04 RX ORDER — INSULIN GLARGINE 100 [IU]/ML
22 INJECTION, SOLUTION SUBCUTANEOUS
Qty: 5 | Refills: 0
Start: 2023-08-04

## 2023-08-04 RX ORDER — ISOPROPYL ALCOHOL, BENZOCAINE .7; .06 ML/ML; ML/ML
0 SWAB TOPICAL
Qty: 100 | Refills: 1
Start: 2023-08-04

## 2023-08-04 RX ADMIN — ENOXAPARIN SODIUM 40 MILLIGRAM(S): 100 INJECTION SUBCUTANEOUS at 05:32

## 2023-08-04 RX ADMIN — INSULIN GLARGINE 22 UNIT(S): 100 INJECTION, SOLUTION SUBCUTANEOUS at 21:20

## 2023-08-04 RX ADMIN — Medication 7 UNIT(S): at 08:55

## 2023-08-04 RX ADMIN — Medication 1: at 12:58

## 2023-08-04 RX ADMIN — Medication 7 UNIT(S): at 12:58

## 2023-08-04 RX ADMIN — ATORVASTATIN CALCIUM 80 MILLIGRAM(S): 80 TABLET, FILM COATED ORAL at 21:20

## 2023-08-04 RX ADMIN — Medication 2 GRAM(S): at 05:31

## 2023-08-04 RX ADMIN — CHLORHEXIDINE GLUCONATE 1 APPLICATION(S): 213 SOLUTION TOPICAL at 06:03

## 2023-08-04 RX ADMIN — Medication 145 MILLIGRAM(S): at 12:04

## 2023-08-04 RX ADMIN — Medication 1: at 18:00

## 2023-08-04 NOTE — PROGRESS NOTE ADULT - ATTENDING COMMENTS
37M w/pmh hypertriglyceridemia, triglyceride-induced pancreatitis, diabetes, multiple admissions of pancreatitis (last adm 2015 for necrotizing pancreatitis), pancreatic pseudocyst, initially admitted to MICU for hypertriglyceridemia requiring insulin drip for stabilization. Pancreatitis is now resolved and patient is tolerating regular consistency diet.     Appreciate endocrinology recs for uncontrolled diabetes with hyperglycemia. Will send test prescriptions to verify insurance coverage and make sure he gets insulin teaching prior to discharge.    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

## 2023-08-04 NOTE — DISCHARGE NOTE PROVIDER - NSDCCPCAREPLAN_GEN_ALL_CORE_FT
PRINCIPAL DISCHARGE DIAGNOSIS  Diagnosis: Pancreatitis  Assessment and Plan of Treatment: You were diagnsed with Pancreatitis caused by your high levels of triglycerides. You may need to have an endoscopic ultrasound for something found on the MRPC. Follow up with a gastroenterologist after discharge. If you have recurrent severe abdominal pain, nausea, and vomiting return to the ED.      SECONDARY DISCHARGE DIAGNOSES  Diagnosis: Elevated triglycerides with high cholesterol  Assessment and Plan of Treatment: You were found to have elevated triglycerides, which can lead to pancreatitis. Please continue to take your medications _______________ and follow up with PCP for further management.    Diagnosis: Diabetes mellitus  Assessment and Plan of Treatment: You were found to have an A1C of 11.6, indicating that your blood sugars have been very high for the past 3 months. You were started on insulin in the hospital, which you should continue at home according to _______. Please follow up with an endocrinologist and monitor your blood sugars regularly at home. if your levels become very high and / or you experience fatigue, confusion, increased urination, increased thirst, and abdominal cramping return to the ED.     PRINCIPAL DISCHARGE DIAGNOSIS  Diagnosis: Pancreatitis  Assessment and Plan of Treatment: You were diagnsed with Pancreatitis caused by your high levels of triglycerides. The MRPC, an MRI of the pancreas, showed a chronic cyst of the pancreas from previous episodes of pancreatitis as well as narrowing of the splenic vein with backup of blood. The cyst was drained endoscopically, and ___________ Follow up with a gastroenterologist after discharge. If you have recurrent severe abdominal pain, nausea, and vomiting return to the ED.      SECONDARY DISCHARGE DIAGNOSES  Diagnosis: Elevated triglycerides with high cholesterol  Assessment and Plan of Treatment: You were found to have elevated triglycerides, which can lead to pancreatitis. Please continue to take your medications _______________ and follow up with PCP for further management.    Diagnosis: Diabetes mellitus  Assessment and Plan of Treatment: You were found to have an A1C of 11.6, indicating that your blood sugars have been very high for the past 3 months. You were started on insulin in the hospital, which you should continue at home according to _______. Please follow up with an endocrinologist and monitor your blood sugars regularly at home. if your levels become very high and / or you experience fatigue, confusion, increased urination, increased thirst, and abdominal cramping return to the ED.     PRINCIPAL DISCHARGE DIAGNOSIS  Diagnosis: Pancreatitis  Assessment and Plan of Treatment: You were diagnsed with Pancreatitis caused by your high levels of triglycerides. The MRPC, an MRI of the pancreas, showed a chronic cyst of the pancreas from previous episodes of pancreatitis as well as narrowing of the splenic vein with backup of blood. The cyst was drained endoscopically, and a stent wqas put in. Follow up with a gastroenterologist after discharge. You need to get a CT scan within 1 week to see the placement of the stent and then have the stent removed by the gastroenterologist within 2-3 weeks. If you have recurrent severe abdominal pain, nausea, and vomiting return to the ED.      SECONDARY DISCHARGE DIAGNOSES  Diagnosis: Elevated triglycerides with high cholesterol  Assessment and Plan of Treatment: You were found to have elevated triglycerides, which can lead to pancreatitis. Please continue to take your medications (atorvostatin, fenofibrate, lovaza) and follow up with PCP for further management.    Diagnosis: Diabetes mellitus  Assessment and Plan of Treatment: You were found to have an A1C of 11.6, indicating that your blood sugars have been very high for the past 3 months. You were started on insulin in the hospital, which you should continue at home according to 22 units of insulin Lantus at night and metformin 1000mg twice a day. Please follow up with an endocrinologist and monitor your blood sugars regularly at home. if your levels become very high and / or you experience fatigue, confusion, increased urination, increased thirst, and abdominal cramping return to the ED.     PRINCIPAL DISCHARGE DIAGNOSIS  Diagnosis: Pancreatitis  Assessment and Plan of Treatment: You were diagnsed with Pancreatitis caused by your high levels of triglycerides. The MRPC, an MRI of the pancreas, showed a chronic cyst of the pancreas from previous episodes of pancreatitis as well as narrowing of the splenic vein with backup of blood. The cyst was drained endoscopically, and a stent was put in. Follow up with a gastroenterologist after discharge. You need to get a CT scan within 1 week to see the placement of the stent and then have the stent removed by the gastroenterologist within 2-3 weeks. Please continue taking all of your medications as prescribed. If you have recurrent severe abdominal pain, nausea, and vomiting return to the ED.      SECONDARY DISCHARGE DIAGNOSES  Diagnosis: Elevated triglycerides with high cholesterol  Assessment and Plan of Treatment: You were found to have elevated triglycerides, which can lead to pancreatitis. Please continue to take your medications (atorvastatin, fenofibrate, lovaza) and follow up with PCP for further management.    Diagnosis: Diabetes mellitus  Assessment and Plan of Treatment: You were found to have an A1C of 11.6, indicating that your blood sugars have been very high for the past 3 months. You were seen by the endocrinology team in the hospital and started on insulin. On discharge, please take the following regimen: Basaglar 22U at bedtime and metformin 1000mg twice a day.  Please follow up with an endocrinologist and monitor your blood sugars regularly at home. if your levels become very high and / or you experience fatigue, confusion, increased urination, increased thirst, and abdominal cramping return to the ED.

## 2023-08-04 NOTE — DISCHARGE NOTE PROVIDER - HOSPITAL COURSE
Patient is a  is a 36 y/o male, PMH pre-DM, hypertriglyceridemia (follows with Dr. Michelle), multiple admissions of pancreatitis (last adm 2015 for necrotizing pancreatitis), pancreatic pseudocyst, initially presented to ED on 7/31/23 c/o abdominal pain x2 days. described pain as sharp, sudden onset, left sided, radiates towards the back b/l, 10/10. Pt also admitted associated nausea, 8 episodes of vomit ("cortes, creme" yellow in color, no blood), and unable to tolerate PO. Pt stated he has not had BMs in the past 2 days due to decreased PO. States the pain feels similar to his prior pancreatitis episode. Denies HA, CP, palpitations, SOB, diarrhea, fevers. Denies alcohol use. Pt admits stopping gemfibrozil and metformin 2 mo ago to try to control his medical issues with diet alone since he has been well controlled for the past 8 years. ED course significant for triglycerides 4742, cholesterol 963, lipase 2908, WBC 12. Pt admitted to MICU for management of acute pancreatitis, on IVF and insulin gtt. Endocrinology and gastroenterology were consulted for management of A1c of 11.6 and for pancreatitis, respectively. In MICU, Insulin gtt, D10 dc'd 8/3/23. transitioned to Lantus and Admelog. -170's since insulin dc'd. Trig 499. Lipase 206. C-peptide 1.6, 1.2. CT A/P showed Peripancreatic infiltration and confluent ill-defined fluid, compatible with acute pancreatitis. Further evaluation with MRCP showed -----    The patient is afebrile, hemodynamically stable and medically optimized for discharge to ___ with follow up with ____. On day of discharge, patient is clinically stable with no new exam findings or acute symptoms compared to prior. The patient was seen by the attending physician on the date of discharge and deemed stable and acceptable for discharge. The patient's chronic medical conditions were treated accordingly per the patient's home medication regimen. The patient's medication reconciliation (with changes made to chronic medications), follow up appointments, discharge orders, instructions, and significant lab and diagnostic studies are as noted.   Patient is a  is a 38 y/o male, PMH pre-DM, hypertriglyceridemia (follows with Dr. Michelle), multiple admissions of pancreatitis (last adm 2015 for necrotizing pancreatitis), pancreatic pseudocyst, initially presented to ED on 7/31/23 c/o abdominal pain x2 days. described pain as sharp, sudden onset, left sided, radiates towards the back b/l, 10/10. Pt also admitted associated nausea, 8 episodes of vomit ("cortes, creme" yellow in color, no blood), and unable to tolerate PO. Pt stated he has not had BMs in the past 2 days due to decreased PO. States the pain feels similar to his prior pancreatitis episode. Denies HA, CP, palpitations, SOB, diarrhea, fevers. Denies alcohol use. Pt admits stopping gemfibrozil and metformin 2 mo ago to try to control his medical issues with diet alone since he has been well controlled for the past 8 years. ED course significant for triglycerides 4742, cholesterol 963, lipase 2908, WBC 12. Pt admitted to MICU for management of acute pancreatitis, on IVF and insulin gtt. Endocrinology and gastroenterology were consulted for management of A1c of 11.6 and for pancreatitis, respectively. In MICU, Insulin gtt, D10 dc'd 8/3/23. transitioned to 22 units Lantus Hs and 7 units Admelog TID. -170's since insulin dc'd. Trig 499. Lipase 206. the hypertriglyceridemia was managed with fenofibrate 145mg daily, atorvastatin 80mg QHS, and lovaza 2mg BID. C-peptide 1.6, 1.2. CT A/P showed Peripancreatic infiltration and confluent ill-defined fluid, compatible with acute pancreatitis. Further evaluation with MRCP showed -----    The patient is afebrile, hemodynamically stable and medically optimized for discharge to ___ with follow up with ____. On day of discharge, patient is clinically stable with no new exam findings or acute symptoms compared to prior. The patient was seen by the attending physician on the date of discharge and deemed stable and acceptable for discharge. The patient's chronic medical conditions were treated accordingly per the patient's home medication regimen. The patient's medication reconciliation (with changes made to chronic medications), follow up appointments, discharge orders, instructions, and significant lab and diagnostic studies are as noted.   Patient is a  is a 36 y/o male, PMH pre-DM, hypertriglyceridemia (follows with Dr. Michelle), multiple admissions of pancreatitis (last adm 2015 for necrotizing pancreatitis), pancreatic pseudocyst, initially presented to ED on 7/31/23 c/o abdominal pain x2 days. described pain as sharp, sudden onset, left sided, radiates towards the back b/l, 10/10. Pt also admitted associated nausea, 8 episodes of vomit ("cortes, creme" yellow in color, no blood), and unable to tolerate PO. Pt stated he has not had BMs in the past 2 days due to decreased PO. States the pain feels similar to his prior pancreatitis episode. Denies HA, CP, palpitations, SOB, diarrhea, fevers. Denies alcohol use. Pt admits stopping gemfibrozil and metformin 2 mo ago to try to control his medical issues with diet alone since he has been well controlled for the past 8 years. ED course significant for triglycerides 4742, cholesterol 963, lipase 2908, WBC 12. Pt admitted to MICU for management of acute pancreatitis, on IVF and insulin gtt. Endocrinology and gastroenterology were consulted for management of A1c of 11.6 and for pancreatitis, respectively. In MICU, Insulin gtt, D10 dc'd 8/3/23. transitioned to 22 units Lantus Hs and 7 units Admelog TID. -170's since insulin dc'd. Trig 499. Lipase 206. the hypertriglyceridemia was managed with fenofibrate 145mg daily, atorvastatin 80mg QHS, and lovaza 2mg BID. C-peptide 1.6, 1.2. CT A/P showed Peripancreatic infiltration and confluent ill-defined fluid, compatible with acute pancreatitis. Further evaluation with MRCP showed Stable appearance of lesser sac pseudocyst between the posterior wall of the stomach and the tail the pancreas extending inferiorly along the peritoneum and Gerota's fascia. Extension of the collection into the LEFT iliopsoas and narrowing of the splenic vein with varices. He then underwent EUS for cyst drainage and splenic vein stenting.     The patient is afebrile, hemodynamically stable and medically optimized for discharge to ___ with follow up with ____. On day of discharge, patient is clinically stable with no new exam findings or acute symptoms compared to prior. The patient was seen by the attending physician on the date of discharge and deemed stable and acceptable for discharge. The patient's chronic medical conditions were treated accordingly per the patient's home medication regimen. The patient's medication reconciliation (with changes made to chronic medications), follow up appointments, discharge orders, instructions, and significant lab and diagnostic studies are as noted.   Patient is a  is a 36 y/o male, PMH pre-DM, hypertriglyceridemia (follows with Dr. Michelle), multiple admissions of pancreatitis (last adm 2015 for necrotizing pancreatitis), pancreatic pseudocyst, initially presented to ED on 7/31/23 c/o abdominal pain x2 days. described pain as sharp, sudden onset, left sided, radiates towards the back b/l, 10/10. Pt also admitted associated nausea, 8 episodes of vomit ("cortes, creme" yellow in color, no blood), and unable to tolerate PO. Pt stated he has not had BMs in the past 2 days due to decreased PO. States the pain feels similar to his prior pancreatitis episode. Denies HA, CP, palpitations, SOB, diarrhea, fevers. Denies alcohol use. Pt admits stopping gemfibrozil and metformin 2 mo ago to try to control his medical issues with diet alone since he has been well controlled for the past 8 years. ED course significant for triglycerides 4742, cholesterol 963, lipase 2908, WBC 12. Pt admitted to MICU for management of acute pancreatitis, on IVF and insulin gtt. Endocrinology and gastroenterology were consulted for management of A1c of 11.6 and for pancreatitis, respectively. In MICU, Insulin gtt, D10 dc'd 8/3/23. transitioned to 22 units Lantus Hs and 8 units Admelog TID. -170's since insulin dc'd. Trig 499. Lipase 206. the hypertriglyceridemia was managed with fenofibrate 145mg daily, atorvastatin 80mg QHS, and lovaza 2mg BID. C-peptide 1.6, 1.2. CT A/P showed Peripancreatic infiltration and confluent ill-defined fluid, compatible with acute pancreatitis. Further evaluation with MRCP showed Stable appearance of lesser sac pseudocyst between the posterior wall of the stomach and the tail the pancreas extending inferiorly along the peritoneum and Gerota's fascia. Extension of the collection into the LEFT iliopsoas and narrowing of the splenic vein with varices. Doppler ultrasound revealed chronic splenic vein thrombosis. He then underwent EUS with A large pseudocyst containing fluid and debris was seen in the pancreatic body/tail. The diagnosis is a pancreatic pseudocyst possibly containing necrotic material. Cystogastrostomy was performed with placement of a 15 x 10 mm AXIOS.       The patient is afebrile, hemodynamically stable and medically optimized for discharge to home with follow up with his PCP, endocrinology, and gastroenterology. On day of discharge, patient is clinically stable with no new exam findings or acute symptoms compared to prior. The patient was seen by the attending physician on the date of discharge and deemed stable and acceptable for discharge. The patient's chronic medical conditions were treated accordingly per the patient's home medication regimen. The patient's medication reconciliation (with changes made to chronic medications), follow up appointments, discharge orders, instructions, and significant lab and diagnostic studies are as noted.   Patient is a  is a 38 y/o male, PMH pre-DM, hypertriglyceridemia (follows with Dr. Michelle), multiple admissions of pancreatitis (last adm 2015 for necrotizing pancreatitis), pancreatic pseudocyst, initially presented to ED on 7/31/23 c/o abdominal pain x2 days. described pain as sharp, sudden onset, left sided, radiating towards the back b/l, 10/10. Pt also admitted associated nausea, 8 episodes of vomit ("cortes, creme" yellow in color, no blood), and unable to tolerate PO. Pt stated he has not had BMs in the past 2 days due to decreased PO. States the pain feels similar to his prior pancreatitis episode. Denies HA, CP, palpitations, SOB, diarrhea, fevers. Denies alcohol use. Pt admits stopping gemfibrozil and metformin 2 mo ago to try to control his medical issues with diet alone since he has been well controlled for the past 8 years. ED course significant for triglycerides 4742, cholesterol 963, lipase 2908, WBC 12. Pt admitted to MICU for management of acute pancreatitis, on IVF and insulin gtt. Endocrinology and gastroenterology were consulted for management of A1c of 11.6 and for pancreatitis, respectively. In MICU, Insulin gtt, D10 dc'd 8/3/23. transitioned to 22 units Lantus Hs and 8 units Admelog TID. -170's since insulin drip dc'd. Triglycerides and lipase downtrended and the hypertriglyceridemia was managed with fenofibrate 145mg daily, atorvastatin 80mg qhs, and lovaza 2mg BID. CT A/P showed Peripancreatic infiltration and confluent ill-defined fluid, compatible with acute pancreatitis. Further evaluation with MRCP showed Stable appearance of lesser sac pseudocyst between the posterior wall of the stomach and the tail the pancreas extending inferiorly along the peritoneum and Gerota's fascia. Extension of the collection into the LEFT iliopsoas and narrowing of the splenic vein with varices. Doppler ultrasound revealed chronic splenic vein thrombosis. He then underwent EUS with A large pseudocyst containing fluid and debris was seen in the pancreatic body/tail. The diagnosis is a pancreatic pseudocyst possibly containing necrotic material. Cystogastrostomy was performed with placement of a 15 x 10 mm AXIOS. On discharge, the patient will take the following DM regimen: basal insulin (22U) qhs + metformin 1000mg BID given difficulty in adhering to a premeal insulin regimen.    The patient is afebrile, hemodynamically stable and medically optimized for discharge to home with follow up with his PCP, endocrinology, and gastroenterology. On day of discharge, patient is clinically stable with no new exam findings or acute symptoms compared to prior. The patient was seen by the attending physician on the date of discharge and deemed stable and acceptable for discharge. The patient's chronic medical conditions were treated accordingly per the patient's home medication regimen. The patient's medication reconciliation (with changes made to chronic medications), follow up appointments, discharge orders, instructions, and significant lab and diagnostic studies are as noted.

## 2023-08-04 NOTE — PROGRESS NOTE ADULT - SUBJECTIVE AND OBJECTIVE BOX
Admitted for: Acute pancreatitis without infection or necrosis        Following for:    Subjective:       MEDICATIONS  (STANDING):  atorvastatin 80 milliGRAM(s) Oral at bedtime  chlorhexidine 4% Liquid 1 Application(s) Topical <User Schedule>  enoxaparin Injectable 40 milliGRAM(s) SubCutaneous every 24 hours  fenofibrate Tablet 145 milliGRAM(s) Oral daily  insulin glargine Injectable (LANTUS) 22 Unit(s) SubCutaneous at bedtime  insulin lispro (ADMELOG) corrective regimen sliding scale   SubCutaneous three times a day before meals  insulin lispro (ADMELOG) corrective regimen sliding scale   SubCutaneous at bedtime  insulin lispro Injectable (ADMELOG) 7 Unit(s) SubCutaneous before breakfast  insulin lispro Injectable (ADMELOG) 7 Unit(s) SubCutaneous before lunch  insulin lispro Injectable (ADMELOG) 7 Unit(s) SubCutaneous before dinner  lidocaine   4% Patch 1 Patch Transdermal daily  naloxone Injectable 0.4 milliGRAM(s) IV Push once  omega-3-Acid Ethyl Esters 2 Gram(s) Oral two times a day  ondansetron Injectable 4 milliGRAM(s) IV Push once    MEDICATIONS  (PRN):  acetaminophen     Tablet .. 1000 milliGRAM(s) Oral every 8 hours PRN Mild Pain (1 - 3)  morphine  - Injectable 2 milliGRAM(s) IV Push every 4 hours PRN Moderate Pain (4 - 6)  morphine  - Injectable 4 milliGRAM(s) IV Push every 4 hours PRN Severe Pain (7 - 10)  simethicone 80 milliGRAM(s) Chew every 6 hours PRN Gas      Allergies    No Known Allergies    Intolerances          PHYSICAL EXAM:  VITALS: T(C): 36.9 (08-04-23 @ 04:04)  T(F): 98.4 (08-04-23 @ 04:04), Max: 99.1 (08-03-23 @ 20:00)  HR: 83 (08-04-23 @ 04:04) (70 - 99)  BP: 100/68 (08-04-23 @ 04:04) (92/61 - 132/67)  RR:  (14 - 28)  SpO2:  (95% - 99%)  Wt(kg): --  GENERAL: NAD  EYES: No proptosis, no lid lag, anicteric  RESPIRATORY: Clear to auscultation bilaterally  CARDIOVASCULAR: Regular rate and rhythm  GI: Soft, nontender, non distended  EXT: b/l feet without wounds, 2+ pulses  PSYCH: Alert and oriented x 3, reactive affect      A1C with Estimated Average Glucose Result: 11.6 % (08-01-23 @ 06:23)      POCT Blood Glucose.: 146 mg/dL (08-04-23 @ 08:54)  POCT Blood Glucose.: 180 mg/dL (08-03-23 @ 21:39)  POCT Blood Glucose.: 177 mg/dL (08-03-23 @ 17:20)  POCT Blood Glucose.: 131 mg/dL (08-03-23 @ 13:45)  POCT Blood Glucose.: 145 mg/dL (08-03-23 @ 12:21)  POCT Blood Glucose.: 138 mg/dL (08-03-23 @ 08:40)  POCT Blood Glucose.: 135 mg/dL (08-03-23 @ 07:59)  POCT Blood Glucose.: 120 mg/dL (08-03-23 @ 07:01)  POCT Blood Glucose.: 137 mg/dL (08-03-23 @ 06:17)  POCT Blood Glucose.: 157 mg/dL (08-03-23 @ 05:03)  POCT Blood Glucose.: 168 mg/dL (08-03-23 @ 04:07)  POCT Blood Glucose.: 143 mg/dL (08-03-23 @ 03:10)  POCT Blood Glucose.: 162 mg/dL (08-03-23 @ 02:09)  POCT Blood Glucose.: 164 mg/dL (08-03-23 @ 01:09)  POCT Blood Glucose.: 170 mg/dL (08-03-23 @ 00:20)  POCT Blood Glucose.: 170 mg/dL (08-02-23 @ 23:05)  POCT Blood Glucose.: 173 mg/dL (08-02-23 @ 22:07)  POCT Blood Glucose.: 142 mg/dL (08-02-23 @ 21:06)  POCT Blood Glucose.: 116 mg/dL (08-02-23 @ 20:02)  POCT Blood Glucose.: 112 mg/dL (08-02-23 @ 19:00)  POCT Blood Glucose.: 91 mg/dL (08-02-23 @ 18:10)  POCT Blood Glucose.: 86 mg/dL (08-02-23 @ 17:11)  POCT Blood Glucose.: 97 mg/dL (08-02-23 @ 16:01)  POCT Blood Glucose.: 128 mg/dL (08-02-23 @ 15:03)  POCT Blood Glucose.: 119 mg/dL (08-02-23 @ 14:15)  POCT Blood Glucose.: 134 mg/dL (08-02-23 @ 13:01)  POCT Blood Glucose.: 164 mg/dL (08-02-23 @ 12:06)  POCT Blood Glucose.: 188 mg/dL (08-02-23 @ 11:01)  POCT Blood Glucose.: 183 mg/dL (08-02-23 @ 10:24)  POCT Blood Glucose.: 100 mg/dL (08-02-23 @ 09:07)  POCT Blood Glucose.: 122 mg/dL (08-02-23 @ 08:02)  POCT Blood Glucose.: 119 mg/dL (08-02-23 @ 06:54)  POCT Blood Glucose.: 117 mg/dL (08-02-23 @ 06:22)  POCT Blood Glucose.: 122 mg/dL (08-02-23 @ 05:04)  POCT Blood Glucose.: 114 mg/dL (08-02-23 @ 04:09)  POCT Blood Glucose.: 99 mg/dL (08-02-23 @ 03:13)  POCT Blood Glucose.: 93 mg/dL (08-02-23 @ 02:25)  POCT Blood Glucose.: 112 mg/dL (08-02-23 @ 01:06)  POCT Blood Glucose.: 120 mg/dL (08-01-23 @ 23:57)  POCT Blood Glucose.: 120 mg/dL (08-01-23 @ 23:09)  POCT Blood Glucose.: 142 mg/dL (08-01-23 @ 22:01)  POCT Blood Glucose.: 147 mg/dL (08-01-23 @ 20:58)  POCT Blood Glucose.: 145 mg/dL (08-01-23 @ 19:56)  POCT Blood Glucose.: 150 mg/dL (08-01-23 @ 18:51)  POCT Blood Glucose.: 123 mg/dL (08-01-23 @ 18:04)  POCT Blood Glucose.: 124 mg/dL (08-01-23 @ 17:02)  POCT Blood Glucose.: 114 mg/dL (08-01-23 @ 16:04)  POCT Blood Glucose.: 99 mg/dL (08-01-23 @ 15:02)  POCT Blood Glucose.: 99 mg/dL (08-01-23 @ 14:16)  POCT Blood Glucose.: 109 mg/dL (08-01-23 @ 13:10)  POCT Blood Glucose.: 156 mg/dL (08-01-23 @ 12:07)      08-04    135  |  102  |  11  ----------------------------<  165<H>  4.0   |  21<L>  |  0.72    eGFR: 121    Ca    8.8      08-04  Mg     1.9     08-04  Phos  3.1     08-04    TPro  6.6  /  Alb  3.2<L>  /  TBili  0.3  /  DBili  x   /  AST  22  /  ALT  21  /  AlkPhos  70  08-04      Thyroid Function Tests:                         Admitted for: Acute pancreatitis without infection or necrosis        Following for: HyperTG induced pancreatitis     Subjective:   - Patient feels well, still has some abdominal pain. Unsure if he wants to do the pancreatic collection drainage inpatient or outpatient.  - Also is concerned that he wouldn't be able to comply with premeal insulin injections outpatient due to nature of his work.        MEDICATIONS  (STANDING):  atorvastatin 80 milliGRAM(s) Oral at bedtime  chlorhexidine 4% Liquid 1 Application(s) Topical <User Schedule>  enoxaparin Injectable 40 milliGRAM(s) SubCutaneous every 24 hours  fenofibrate Tablet 145 milliGRAM(s) Oral daily  insulin glargine Injectable (LANTUS) 22 Unit(s) SubCutaneous at bedtime  insulin lispro (ADMELOG) corrective regimen sliding scale   SubCutaneous three times a day before meals  insulin lispro (ADMELOG) corrective regimen sliding scale   SubCutaneous at bedtime  insulin lispro Injectable (ADMELOG) 7 Unit(s) SubCutaneous before breakfast  insulin lispro Injectable (ADMELOG) 7 Unit(s) SubCutaneous before lunch  insulin lispro Injectable (ADMELOG) 7 Unit(s) SubCutaneous before dinner  lidocaine   4% Patch 1 Patch Transdermal daily  naloxone Injectable 0.4 milliGRAM(s) IV Push once  omega-3-Acid Ethyl Esters 2 Gram(s) Oral two times a day  ondansetron Injectable 4 milliGRAM(s) IV Push once    MEDICATIONS  (PRN):  acetaminophen     Tablet .. 1000 milliGRAM(s) Oral every 8 hours PRN Mild Pain (1 - 3)  morphine  - Injectable 2 milliGRAM(s) IV Push every 4 hours PRN Moderate Pain (4 - 6)  morphine  - Injectable 4 milliGRAM(s) IV Push every 4 hours PRN Severe Pain (7 - 10)  simethicone 80 milliGRAM(s) Chew every 6 hours PRN Gas      Allergies    No Known Allergies    Intolerances          PHYSICAL EXAM:  VITALS: T(C): 36.9 (08-04-23 @ 04:04)  T(F): 98.4 (08-04-23 @ 04:04), Max: 99.1 (08-03-23 @ 20:00)  HR: 83 (08-04-23 @ 04:04) (70 - 99)  BP: 100/68 (08-04-23 @ 04:04) (92/61 - 132/67)  RR:  (14 - 28)  SpO2:  (95% - 99%)  Wt(kg): --  GENERAL: NAD  EYES: No proptosis, no lid lag, anicteric  RESPIRATORY: Clear to auscultation bilaterally  CARDIOVASCULAR: Regular rate and rhythm  GI: Soft, nontender, non distended  EXT: b/l feet without wounds, 2+ pulses  PSYCH: Alert and oriented x 3, reactive affect      A1C with Estimated Average Glucose Result: 11.6 % (08-01-23 @ 06:23)      POCT Blood Glucose.: 146 mg/dL (08-04-23 @ 08:54)  POCT Blood Glucose.: 180 mg/dL (08-03-23 @ 21:39)  POCT Blood Glucose.: 177 mg/dL (08-03-23 @ 17:20)  POCT Blood Glucose.: 131 mg/dL (08-03-23 @ 13:45)  POCT Blood Glucose.: 145 mg/dL (08-03-23 @ 12:21)  POCT Blood Glucose.: 138 mg/dL (08-03-23 @ 08:40)  POCT Blood Glucose.: 135 mg/dL (08-03-23 @ 07:59)  POCT Blood Glucose.: 120 mg/dL (08-03-23 @ 07:01)  POCT Blood Glucose.: 137 mg/dL (08-03-23 @ 06:17)  POCT Blood Glucose.: 157 mg/dL (08-03-23 @ 05:03)  POCT Blood Glucose.: 168 mg/dL (08-03-23 @ 04:07)  POCT Blood Glucose.: 143 mg/dL (08-03-23 @ 03:10)  POCT Blood Glucose.: 162 mg/dL (08-03-23 @ 02:09)  POCT Blood Glucose.: 164 mg/dL (08-03-23 @ 01:09)  POCT Blood Glucose.: 170 mg/dL (08-03-23 @ 00:20)  POCT Blood Glucose.: 170 mg/dL (08-02-23 @ 23:05)  POCT Blood Glucose.: 173 mg/dL (08-02-23 @ 22:07)  POCT Blood Glucose.: 142 mg/dL (08-02-23 @ 21:06)  POCT Blood Glucose.: 116 mg/dL (08-02-23 @ 20:02)  POCT Blood Glucose.: 112 mg/dL (08-02-23 @ 19:00)  POCT Blood Glucose.: 91 mg/dL (08-02-23 @ 18:10)  POCT Blood Glucose.: 86 mg/dL (08-02-23 @ 17:11)  POCT Blood Glucose.: 97 mg/dL (08-02-23 @ 16:01)  POCT Blood Glucose.: 128 mg/dL (08-02-23 @ 15:03)  POCT Blood Glucose.: 119 mg/dL (08-02-23 @ 14:15)  POCT Blood Glucose.: 134 mg/dL (08-02-23 @ 13:01)  POCT Blood Glucose.: 164 mg/dL (08-02-23 @ 12:06)  POCT Blood Glucose.: 188 mg/dL (08-02-23 @ 11:01)  POCT Blood Glucose.: 183 mg/dL (08-02-23 @ 10:24)  POCT Blood Glucose.: 100 mg/dL (08-02-23 @ 09:07)  POCT Blood Glucose.: 122 mg/dL (08-02-23 @ 08:02)  POCT Blood Glucose.: 119 mg/dL (08-02-23 @ 06:54)  POCT Blood Glucose.: 117 mg/dL (08-02-23 @ 06:22)  POCT Blood Glucose.: 122 mg/dL (08-02-23 @ 05:04)  POCT Blood Glucose.: 114 mg/dL (08-02-23 @ 04:09)  POCT Blood Glucose.: 99 mg/dL (08-02-23 @ 03:13)  POCT Blood Glucose.: 93 mg/dL (08-02-23 @ 02:25)  POCT Blood Glucose.: 112 mg/dL (08-02-23 @ 01:06)  POCT Blood Glucose.: 120 mg/dL (08-01-23 @ 23:57)  POCT Blood Glucose.: 120 mg/dL (08-01-23 @ 23:09)  POCT Blood Glucose.: 142 mg/dL (08-01-23 @ 22:01)  POCT Blood Glucose.: 147 mg/dL (08-01-23 @ 20:58)  POCT Blood Glucose.: 145 mg/dL (08-01-23 @ 19:56)  POCT Blood Glucose.: 150 mg/dL (08-01-23 @ 18:51)  POCT Blood Glucose.: 123 mg/dL (08-01-23 @ 18:04)  POCT Blood Glucose.: 124 mg/dL (08-01-23 @ 17:02)  POCT Blood Glucose.: 114 mg/dL (08-01-23 @ 16:04)  POCT Blood Glucose.: 99 mg/dL (08-01-23 @ 15:02)  POCT Blood Glucose.: 99 mg/dL (08-01-23 @ 14:16)  POCT Blood Glucose.: 109 mg/dL (08-01-23 @ 13:10)  POCT Blood Glucose.: 156 mg/dL (08-01-23 @ 12:07)      08-04    135  |  102  |  11  ----------------------------<  165<H>  4.0   |  21<L>  |  0.72    eGFR: 121    Ca    8.8      08-04  Mg     1.9     08-04  Phos  3.1     08-04    TPro  6.6  /  Alb  3.2<L>  /  TBili  0.3  /  DBili  x   /  AST  22  /  ALT  21  /  AlkPhos  70  08-04      Thyroid Function Tests:                         Admitted for: Acute pancreatitis without infection or necrosis        Following for: HyperTG induced pancreatitis     Subjective:   - Patient feels well, still has some abdominal pain. Unsure if he wants to do the pancreatic collection drainage inpatient or outpatient.  - Also is concerned that he wouldn't be able to comply with premeal insulin injections outpatient due to nature of his work.        MEDICATIONS  (STANDING):  atorvastatin 80 milliGRAM(s) Oral at bedtime  chlorhexidine 4% Liquid 1 Application(s) Topical <User Schedule>  enoxaparin Injectable 40 milliGRAM(s) SubCutaneous every 24 hours  fenofibrate Tablet 145 milliGRAM(s) Oral daily  insulin glargine Injectable (LANTUS) 22 Unit(s) SubCutaneous at bedtime  insulin lispro (ADMELOG) corrective regimen sliding scale   SubCutaneous three times a day before meals  insulin lispro (ADMELOG) corrective regimen sliding scale   SubCutaneous at bedtime  insulin lispro Injectable (ADMELOG) 7 Unit(s) SubCutaneous before breakfast  insulin lispro Injectable (ADMELOG) 7 Unit(s) SubCutaneous before lunch  insulin lispro Injectable (ADMELOG) 7 Unit(s) SubCutaneous before dinner  lidocaine   4% Patch 1 Patch Transdermal daily  naloxone Injectable 0.4 milliGRAM(s) IV Push once  omega-3-Acid Ethyl Esters 2 Gram(s) Oral two times a day  ondansetron Injectable 4 milliGRAM(s) IV Push once    MEDICATIONS  (PRN):  acetaminophen     Tablet .. 1000 milliGRAM(s) Oral every 8 hours PRN Mild Pain (1 - 3)  morphine  - Injectable 2 milliGRAM(s) IV Push every 4 hours PRN Moderate Pain (4 - 6)  morphine  - Injectable 4 milliGRAM(s) IV Push every 4 hours PRN Severe Pain (7 - 10)  simethicone 80 milliGRAM(s) Chew every 6 hours PRN Gas      Allergies    No Known Allergies    Intolerances          PHYSICAL EXAM:  VITALS: T(C): 36.9 (08-04-23 @ 04:04)  T(F): 98.4 (08-04-23 @ 04:04), Max: 99.1 (08-03-23 @ 20:00)  HR: 83 (08-04-23 @ 04:04) (70 - 99)  BP: 100/68 (08-04-23 @ 04:04) (92/61 - 132/67)  RR:  (14 - 28)  SpO2:  (95% - 99%)  Wt(kg): --  GENERAL: NAD  EYES: No proptosis, no lid lag, anicteric  RESPIRATORY: No respiratory distress  CARDIOVASCULAR: No peripheral edema.  GI: soft, non distended. TTP in LLQ RLQ  EXT: No edema  PSYCH: Alert and oriented x 3, reactive affect      A1C with Estimated Average Glucose Result: 11.6 % (08-01-23 @ 06:23)      POCT Blood Glucose.: 146 mg/dL (08-04-23 @ 08:54)  POCT Blood Glucose.: 180 mg/dL (08-03-23 @ 21:39)  POCT Blood Glucose.: 177 mg/dL (08-03-23 @ 17:20)  POCT Blood Glucose.: 131 mg/dL (08-03-23 @ 13:45)  POCT Blood Glucose.: 145 mg/dL (08-03-23 @ 12:21)  POCT Blood Glucose.: 138 mg/dL (08-03-23 @ 08:40)  POCT Blood Glucose.: 135 mg/dL (08-03-23 @ 07:59)  POCT Blood Glucose.: 120 mg/dL (08-03-23 @ 07:01)  POCT Blood Glucose.: 137 mg/dL (08-03-23 @ 06:17)  POCT Blood Glucose.: 157 mg/dL (08-03-23 @ 05:03)  POCT Blood Glucose.: 168 mg/dL (08-03-23 @ 04:07)  POCT Blood Glucose.: 143 mg/dL (08-03-23 @ 03:10)  POCT Blood Glucose.: 162 mg/dL (08-03-23 @ 02:09)  POCT Blood Glucose.: 164 mg/dL (08-03-23 @ 01:09)  POCT Blood Glucose.: 170 mg/dL (08-03-23 @ 00:20)  POCT Blood Glucose.: 170 mg/dL (08-02-23 @ 23:05)  POCT Blood Glucose.: 173 mg/dL (08-02-23 @ 22:07)  POCT Blood Glucose.: 142 mg/dL (08-02-23 @ 21:06)  POCT Blood Glucose.: 116 mg/dL (08-02-23 @ 20:02)  POCT Blood Glucose.: 112 mg/dL (08-02-23 @ 19:00)  POCT Blood Glucose.: 91 mg/dL (08-02-23 @ 18:10)  POCT Blood Glucose.: 86 mg/dL (08-02-23 @ 17:11)  POCT Blood Glucose.: 97 mg/dL (08-02-23 @ 16:01)  POCT Blood Glucose.: 128 mg/dL (08-02-23 @ 15:03)  POCT Blood Glucose.: 119 mg/dL (08-02-23 @ 14:15)  POCT Blood Glucose.: 134 mg/dL (08-02-23 @ 13:01)  POCT Blood Glucose.: 164 mg/dL (08-02-23 @ 12:06)  POCT Blood Glucose.: 188 mg/dL (08-02-23 @ 11:01)  POCT Blood Glucose.: 183 mg/dL (08-02-23 @ 10:24)  POCT Blood Glucose.: 100 mg/dL (08-02-23 @ 09:07)  POCT Blood Glucose.: 122 mg/dL (08-02-23 @ 08:02)  POCT Blood Glucose.: 119 mg/dL (08-02-23 @ 06:54)  POCT Blood Glucose.: 117 mg/dL (08-02-23 @ 06:22)  POCT Blood Glucose.: 122 mg/dL (08-02-23 @ 05:04)  POCT Blood Glucose.: 114 mg/dL (08-02-23 @ 04:09)  POCT Blood Glucose.: 99 mg/dL (08-02-23 @ 03:13)  POCT Blood Glucose.: 93 mg/dL (08-02-23 @ 02:25)  POCT Blood Glucose.: 112 mg/dL (08-02-23 @ 01:06)  POCT Blood Glucose.: 120 mg/dL (08-01-23 @ 23:57)  POCT Blood Glucose.: 120 mg/dL (08-01-23 @ 23:09)  POCT Blood Glucose.: 142 mg/dL (08-01-23 @ 22:01)  POCT Blood Glucose.: 147 mg/dL (08-01-23 @ 20:58)  POCT Blood Glucose.: 145 mg/dL (08-01-23 @ 19:56)  POCT Blood Glucose.: 150 mg/dL (08-01-23 @ 18:51)  POCT Blood Glucose.: 123 mg/dL (08-01-23 @ 18:04)  POCT Blood Glucose.: 124 mg/dL (08-01-23 @ 17:02)  POCT Blood Glucose.: 114 mg/dL (08-01-23 @ 16:04)  POCT Blood Glucose.: 99 mg/dL (08-01-23 @ 15:02)  POCT Blood Glucose.: 99 mg/dL (08-01-23 @ 14:16)  POCT Blood Glucose.: 109 mg/dL (08-01-23 @ 13:10)  POCT Blood Glucose.: 156 mg/dL (08-01-23 @ 12:07)      08-04    135  |  102  |  11  ----------------------------<  165<H>  4.0   |  21<L>  |  0.72    eGFR: 121    Ca    8.8      08-04  Mg     1.9     08-04  Phos  3.1     08-04    TPro  6.6  /  Alb  3.2<L>  /  TBili  0.3  /  DBili  x   /  AST  22  /  ALT  21  /  AlkPhos  70  08-04      Thyroid Function Tests:

## 2023-08-04 NOTE — PROGRESS NOTE ADULT - ATTENDING COMMENTS
As above.    Pt seen/examined in the late afternoon.  Discussed with GI Fellow earlier in the day.    Impression:    #1.  Asymptomatic large walled-off peripancreatic collection (5 x 7 cm) without signs of infection.     #2.  Recurrent acute pancreatitis, thought due to hypertriglyceridemia, now off insulin drip and transferred to hospital floor.    Recommendations:    #1.  Treatment of hypertriglyceridemia  per endocrine and medicine.    #2.  Pt now agrees to EUS with possible drainage procedure (Cystgastrostomy with lumen apposing metal stent).      #3.  NPO Sunday night for possible EUS-cystgastrostomy on Monday (schedule permitting)

## 2023-08-04 NOTE — DISCHARGE NOTE PROVIDER - NSDCCPTREATMENT_GEN_ALL_CORE_FT
PRINCIPAL PROCEDURE  Procedure: CT abdomen pelvis  Findings and Treatment: FINDINGS:  LOWER CHEST: Minimal right lower lobe subsegmental atelectasis.  LIVER: Diffuse low-attenuation of the liver, which may represent   steatosis.  BILE DUCTS: Normal caliber.  GALLBLADDER: Cholelithiasis.  SPLEEN: Borderline enlarged, measures 13.1 cm.  PANCREAS: Diffuse peripancreatic infiltration and confluent ill-defined   fluid, most prominently about the pancreatic head, concerning for acute   pancreatitis. Homogeneous parenchymal enhancement without definite   evidence for necrosis. No main pancreatic duct dilation. 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 Again seen is a   peripancreatic tail collection that extends along the left anterior   pararenal space into the pelvis, which is increased in size and extent   since 3/21/2018. For example, a component adjacent to the pancreatic tail   that exerts mass effect along the undersurface of the stomach now   measures 7.1 x 5.2 cm (series 3 image 35), previously 4.6 x 4.6 cm. The   collection spans approximately 14.8 cm in craniocaudal dimension,   previously 13.7 cm when measured in similar fashion. There are additional   loculated components extending along the left iliopsoas musculature that   are new since 3/21/2018, with example interposed between iliacus and   psoas muscles measuring 2.8 x 1.3 cm (series 3 image 72). Calcifications   are again noted along the inferior margin of the collection.  ADRENALS: Within normal limits.  KIDNEYS/URETERS: Bilateral subcentimeter hypodense renal foci that are   too small to characterize. No hydronephrosis.  BLADDER: Within normal limits.  REPRODUCTIVE ORGANS: Prostate within normal limits.  BOWEL: Periduodenal fluid, likely reactive. No bowel obstruction.   Appendix is normal.  PERITONEUM: No ascites.  VESSELS: The hepatic and portal veins are patent. Patent superior   mesenteric vein.The splenic vein is not definitively identified, similar   to the previous exam, may be diminutive or chronically thrombosed. Again   seen are prominent perigastric, perisplenic, and gastro     PRINCIPAL PROCEDURE  Procedure: CT abdomen pelvis  Findings and Treatment: FINDINGS:  LOWER CHEST: Minimal right lower lobe subsegmental atelectasis.  LIVER: Diffuse low-attenuation of the liver, which may represent   steatosis.  BILE DUCTS: Normal caliber.  GALLBLADDER: Cholelithiasis.  SPLEEN: Borderline enlarged, measures 13.1 cm.  PANCREAS: Diffuse peripancreatic infiltration and confluent ill-defined   fluid, most prominently about the pancreatic head, concerning for acute   pancreatitis. Homogeneous parenchymal enhancement without definite   evidence for necrosis. No main pancreatic duct dilation. 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 Again seen is a   peripancreatic tail collection that extends along the left anterior   pararenal space into the pelvis, which is increased in size and extent   since 3/21/2018. For example, a component adjacent to the pancreatic tail   that exerts mass effect along the undersurface of the stomach now   measures 7.1 x 5.2 cm (series 3 image 35), previously 4.6 x 4.6 cm. The   collection spans approximately 14.8 cm in craniocaudal dimension,   previously 13.7 cm when measured in similar fashion. There are additional   loculated components extending along the left iliopsoas musculature that   are new since 3/21/2018, with example interposed between iliacus and   psoas muscles measuring 2.8 x 1.3 cm (series 3 image 72). Calcifications   are again noted along the inferior margin of the collection.  ADRENALS: Within normal limits.  KIDNEYS/URETERS: Bilateral subcentimeter hypodense renal foci that are   too small to characterize. No hydronephrosis.  BLADDER: Within normal limits.  REPRODUCTIVE ORGANS: Prostate within normal limits.  BOWEL: Periduodenal fluid, likely reactive. No bowel obstruction.   Appendix is normal.  PERITONEUM: No ascites.  VESSELS: The hepatic and portal veins are patent. Patent superior   mesenteric vein.The splenic vein is not definitively identified, similar   to the previous exam, may be diminutive or chronically thrombosed. Again   seen are prominent perigastric, perisplenic, and gastro      SECONDARY PROCEDURE  Procedure: MR MRCP  Findings and Treatment: FINDINGS:  LOWER CHEST: Bibasilar atelectasis.  LIVER: Mild steatosis.  BILE DUCTS: Normal caliber.  GALLBLADDER: Cholecystolithiasis with acute cholecystitis.  SPLEEN: Within normal limits.  PANCREAS: Pancreas divisum. Mild diffuse pancreatic infiltration and   interstitial edema more prominent in the pancreatic head. Mild   peripancreatic edema consistent with pancreatitis. Loculated well-defined   fluid collection is seen in the lesser sac between the tail the pancreas   and the posterior wall of the stomach unchanged in size and appearance   from prior CT. The collection measures 11.1 x 5.6 x 16.2 cm (25-44 and   10-26). The collection extends from the lesser sac along the LEFT   peritoneal refraction anterior to the kidney inferiorly along Gerota's   fascia. Additional collection extends inferiorly behind the psoas muscle   between the LEFT iliacus and LEFT psoas muscle. This is better seen on   the prior CT and is seen on coronal images 27-45 through 27-50.  ADRENALS: Within normal limits.  KIDNEYS/URETERS: Within normal limits.  VISUALIZED PORTIONS:  BOWEL: Mass effect on the posterior aspect of the stomach by the   pancreatic fluid collection.  PERITONEUM: No ascites.  VESSELS: Extreme narrowing and effective occlusion of the splenic vein   with massive short gastric varices.  RETROPERITONEUM/LYMPH NODES: No lymphadenopathy.  ABDOMINAL WALL: Within normal limits.  BONES: Within normal limits.  IMPRESSION:  1.  Mild acute pancreatitis without necrosis.  2.  Pancreas divisum.  3.  Stable appearance of lesser sac pseudocyst between the posterior wall   of the stomach and the tail the pancreas extending inferiorly along the   peritoneum and Gerota's fascia. Extension of the collection into the LEFT   iliopsoas.       PRINCIPAL PROCEDURE  Procedure: Ultrasound, pancreas, endoscopic  Findings and Treatment: indings:       The esophagus was normal.       A deformity was found in the gastric body. Estimated blood loss: none.       Diffuse mildly congested mucosa without active bleeding and with no stigmata of bleeding was        found in the second portion of the duodenum.       ENDOSONOGRAPHIC FINDING: :       The esophagus, stomach and duodenum and adjacent structures were visualized        endosonographically.       A hypoechoic lesion suggestive of a pseudocyst was identified in the pancreatic tail. It is        not in obvious communication with the pancreatic duct. The lesion measured at least 50 mm in     maximal cross-sectional diameter. There was a single compartment thinly septated. The outer        wall of the lesion was thick. There was no associated mass. There was internal debris within        the fluid-filled cavity. The decision was made tocreate a cystogastrostomy using the AXIOS        stent system. Once an appropriate position in the stomach was identified, the common wall        between the stomach and the cyst was interrogated utilizing color Doppler imaging to identify        interposed vessels. The stomach wall and the cyst were punctured under endosonographic        guidance with the 22 gauge needle. Necrotic tissue was aspirated. A wire was inserted into        the cyst under fluoroscopic guidance. The AXIOS stent and electrocautery device was        introduced through the working channel and advanced over the guidewire. Current was applied        to the cautery tip and then used to increase the diameter of the stoma. The AXIOS device was        advanced into the cyst,and a 15 x 10 mm AXIOS stent was placed with the flanges in close        approximation to the walls of the cyst and the stomach through the cystogastrostomy. The        stent was successfully placed.                                                      Impression:          - Deformity in the gastric body and congested duodenal mucosa likely related                        to pseudocyst.                       -      SECONDARY PROCEDURE  Procedure: CT abdomen pelvis  Findings and Treatment: FINDINGS:  LOWER CHEST: Minimal right lower lobe subsegmental atelectasis.  LIVER: Diffuse low-attenuation of the liver, which may represent   steatosis.  BILE DUCTS: Normal caliber.  GALLBLADDER: Cholelithiasis.  SPLEEN: Borderline enlarged, measures 13.1 cm.  PANCREAS: Diffuse peripancreatic infiltration and confluent ill-defined   fluid, most prominently about the pancreatic head, concerning for acute   pancreatitis. Homogeneous parenchymal enhancement without definite   evidence for necrosis. No main pancreatic duct dilation. 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 Again seen is a   peripancreatic tail collection that extends along the left anterior   pararenal space into the pelvis, which is increased in size and extent   since 3/21/2018. For example, a component adjacent to the pancreatic tail   that exerts mass effect along the undersurface of the stomach now   measures 7.1 x 5.2 cm (series 3 image 35), previously 4.6 x 4.6 cm. The   collection spans approximately 14.8 cm in craniocaudal dimension,   previously 13.7 cm when measured in similar fashion. There are additional   loculated components extending along the left iliopsoas musculature that   are new since 3/21/2018, with example interposed between iliacus and   psoas muscles measuring 2.8 x 1.3 cm (series 3 image 72). Calcifications   are again noted along the inferior margin of the collection.  ADRENALS: Within normal limits.  KIDNEYS/URETERS: Bilateral subcentimeter hypodense renal foci that are   too small to characterize. No hydronephrosis.  BLADDER: Within normal limits.  REPRODUCTIVE ORGANS: Prostate within normal limits.  BOWEL: Periduodenal fluid, likely reactive. No bowel obstruction.   Appendix is normal.  PERITONEUM: No ascites.  VESSELS: The hepatic and portal veins are patent. Patent superior   mesenteric vein.The splenic vein is not definitively identified, similar   to the previous exam, may be diminutive or chronically thrombosed. Again   seen are prominent perigastric, perisplenic, and gastro    Procedure: MR MRCP  Findings and Treatment: FINDINGS:  LOWER CHEST: Bibasilar atelectasis.  LIVER: Mild steatosis.  BILE DUCTS: Normal caliber.  GALLBLADDER: Cholecystolithiasis with acute cholecystitis.  SPLEEN: Within normal limits.  PANCREAS: Pancreas divisum. Mild diffuse pancreatic infiltration and   interstitial edema more prominent in the pancreatic head. Mild   peripancreatic edema consistent with pancreatitis. Loculated well-defined   fluid collection is seen in the lesser sac between the tail the pancreas   and the posterior wall of the stomach unchanged in size and appearance   from prior CT. The collection measures 11.1 x 5.6 x 16.2 cm (25-44 and   10-26). The collection extends from the lesser sac along the LEFT   peritoneal refraction anterior to the kidney inferiorly along Gerota's   fascia. Additional collection extends inferiorly behind the psoas muscle   between the LEFT iliacus and LEFT psoas muscle. This is better seen on   the prior CT and is seen on coronal images 27-45 through 27-50.  ADRENALS: Within normal limits.  KIDNEYS/URETERS: Within normal limits.  VISUALIZED PORTIONS:  BOWEL: Mass effect on the posterior aspect of the stomach by the   pancreatic fluid collection.  PERITONEUM: No ascites.  VESSELS: Extreme narrowing and effective occlusion of the splenic vein   with massive short gastric varices.  RETROPERITONEUM/LYMPH NODES: No lymphadenopathy.  ABDOMINAL WALL: Within normal limits.  BONES: Within normal limits.  IMPRESSION:  1.  Mild acute pancreatitis without necrosis.  2.  Pancreas divisum.  3.  Stable appearance of lesser sac pseudocyst between the posterior wall   of the stomach and the tail the pancreas extending inferiorly along the   peritoneum and Gerota's fascia. Extension of the collection into the LEFT   iliopsoas.      Procedure: US Doppler vessels of abdomen  Findings and Treatment: INDINGS:  Liver: Increased echogenicity  Bile ducts: Normal caliber. Common bile duct measures 0.5 cm mm.  Pancreas: Visualized portions are within normal limits.  Spleen: 15 cm cm. Splenomegaly.  Ascites: None.  Aorta and IVC: Visualized portions are within normal limits.  Main portal vein: Peak velocity 32 cm/s. 1.2 cm in diameter Patent  with   hepatopetal flow.  Right portal vein: Patent  with hepatopetal flow.  Left portal vein: Patent  with hepatopetal flow.  Hepatic artery: Patent with phasic waveform. Peak systolic velocity is 47   cm/s.  Hepatic veins: Patent with normal flow hemodynamics and spectral   waveforms.  Splenic vein: Patent at the splenic hilum with hepatopetal flow. The   portosplenic confluence is patent. The splenic vein posterior to the body   of the pancreas is patent with a velocity of 40 cm/s. Splenic vein is not   visualized posterior to the body of the pancreas and this is highly   suggestive of chronic thrombosis as was suggested on CT examination   7/31/2023.  IMPRESSION: Hepatic steatosis. Mild splenomegaly.  Patent splenic veinat the hilum and at the portovenous confluence. The   splenic vein posterior to the body of the pancreas is not visualized   consistent with chronic thrombosis.

## 2023-08-04 NOTE — DISCHARGE NOTE PROVIDER - NSFOLLOWUPCLINICSTOKEN_GEN_ALL_ED_FT
843922:2 weeks|| ||00\01||False;829005:2 weeks|| ||00\01||False; 036221:2 weeks|| ||00\01||False;484362:2 weeks|| ||00\01||False;415079:2 weeks|| ||00\01||False; 664863:2 weeks|| ||00\01||False;196542:2 weeks|| ||00\01||False;

## 2023-08-04 NOTE — DISCHARGE NOTE PROVIDER - NSDCFUSCHEDAPPT_GEN_ALL_CORE_FT
Marina Michelle  [facility]  INTMED [address]  Scheduled Appointment: 08/10/2023    Bryan Baca  [facility]  GASTRO [address]  Scheduled Appointment: 08/24/2023    Hazel Ferrera  [facility]  ENDOCRIN [address] [facility]  Scheduled Appointment: 10/03/2023

## 2023-08-04 NOTE — PROGRESS NOTE ADULT - SUBJECTIVE AND OBJECTIVE BOX
*******************************  [facility]  Contact via [employer] TEAMS  *******************************    PROGRESS NOTE:     Patient is a 37y old  Male who presents with a chief complaint of Abdominal Pain (03 Aug 2023 12:27)      INTERVAL EVENTS: No acute overnight events.     SUBJECTIVE: Patient seen and examined at bedside. Patient is a recent downgrade from MICU for triglyceride induced pancreatitis. This morning, the patient is comfortable and doing well. he reports mild abdominal pain and lower back pain but otherwise no complaints Denies fevers, chills, N/V/D, chest pain, SOB, abdominal pain. He is eating well, ambulating well, and going to the bathroom.     MEDICATIONS  (STANDING):  atorvastatin 80 milliGRAM(s) Oral at bedtime  chlorhexidine 4% Liquid 1 Application(s) Topical <User Schedule>  enoxaparin Injectable 40 milliGRAM(s) SubCutaneous every 24 hours  fenofibrate Tablet 145 milliGRAM(s) Oral daily  insulin glargine Injectable (LANTUS) 22 Unit(s) SubCutaneous at bedtime  insulin lispro (ADMELOG) corrective regimen sliding scale   SubCutaneous three times a day before meals  insulin lispro (ADMELOG) corrective regimen sliding scale   SubCutaneous at bedtime  insulin lispro Injectable (ADMELOG) 7 Unit(s) SubCutaneous before breakfast  insulin lispro Injectable (ADMELOG) 7 Unit(s) SubCutaneous before lunch  insulin lispro Injectable (ADMELOG) 7 Unit(s) SubCutaneous before dinner  lidocaine   4% Patch 1 Patch Transdermal daily  naloxone Injectable 0.4 milliGRAM(s) IV Push once  omega-3-Acid Ethyl Esters 2 Gram(s) Oral two times a day  ondansetron Injectable 4 milliGRAM(s) IV Push once    MEDICATIONS  (PRN):  acetaminophen     Tablet .. 1000 milliGRAM(s) Oral every 8 hours PRN Mild Pain (1 - 3)  morphine  - Injectable 2 milliGRAM(s) IV Push every 4 hours PRN Moderate Pain (4 - 6)  morphine  - Injectable 4 milliGRAM(s) IV Push every 4 hours PRN Severe Pain (7 - 10)  simethicone 80 milliGRAM(s) Chew every 6 hours PRN Gas      CAPILLARY BLOOD GLUCOSE  177 (03 Aug 2023 17:00)      POCT Blood Glucose.: 180 mg/dL (03 Aug 2023 21:39)  POCT Blood Glucose.: 177 mg/dL (03 Aug 2023 17:20)  POCT Blood Glucose.: 131 mg/dL (03 Aug 2023 13:45)  POCT Blood Glucose.: 145 mg/dL (03 Aug 2023 12:21)    I&O's Summary    03 Aug 2023 07:01  -  04 Aug 2023 07:00  --------------------------------------------------------  IN: 1062 mL / OUT: 200 mL / NET: 862 mL        PHYSICAL EXAM:  Vital Signs Last 24 Hrs  T(C): 36.9 (04 Aug 2023 04:04), Max: 37.3 (03 Aug 2023 20:00)  T(F): 98.4 (04 Aug 2023 04:04), Max: 99.1 (03 Aug 2023 20:00)  HR: 83 (04 Aug 2023 04:04) (70 - 99)  BP: 100/68 (04 Aug 2023 04:04) (92/61 - 132/67)  BP(mean): 89 (04 Aug 2023 03:00) (70 - 98)  RR: 18 (04 Aug 2023 04:04) (14 - 28)  SpO2: 97% (04 Aug 2023 04:04) (95% - 99%)    Parameters below as of 04 Aug 2023 04:04  Patient On (Oxygen Delivery Method): room air        GENERAL: NAD, lying in bed comfortably  HEAD: Atraumatic, normocephalic  EYES: EOMI, PERRLA, conjunctiva and sclera clear  ENT: Moist mucous membranes  NECK: Supple, no JVD  HEART: S1, S2, Regular rate and rhythm, no murmurs, rubs, or gallops  LUNGS: Unlabored respirations, clear to auscultation bilaterally, no crackles, wheezing, or rhonchi  ABDOMEN: Soft, mildly tender diffusely, nondistended, +BS  EXTREMITIES: 2+ peripheral pulses bilaterally. No clubbing, cyanosis, or edema  NERVOUS SYSTEM:  A&Ox3, no focal deficits   SKIN: No rashes or lesions  BACK: right paraspinal tenderness, no spinal tenderness    LABS:                        11.7   5.78  )-----------( 173      ( 04 Aug 2023 01:05 )             35.9     08-04    135  |  102  |  11  ----------------------------<  165<H>  4.0   |  21<L>  |  0.72    Ca    8.8      04 Aug 2023 01:05  Phos  3.1     08-04  Mg     1.9     08-04    TPro  6.6  /  Alb  3.2<L>  /  TBili  0.3  /  DBili  x   /  AST  22  /  ALT  21  /  AlkPhos  70  08-04          Urinalysis Basic - ( 04 Aug 2023 01:05 )    Color: x / Appearance: x / SG: x / pH: x  Gluc: 165 mg/dL / Ketone: x  / Bili: x / Urobili: x   Blood: x / Protein: x / Nitrite: x   Leuk Esterase: x / RBC: x / WBC x   Sq Epi: x / Non Sq Epi: x / Bacteria: x          RADIOLOGY & ADDITIONAL TESTS:  Results Reviewed:   Imaging Personally Reviewed:  Electrocardiogram Personally Reviewed:  Tele:

## 2023-08-04 NOTE — DISCHARGE NOTE PROVIDER - CARE PROVIDER_API CALL
Vi Andrade  Phone: [phone]  Fax: ()-  Follow Up Time: 2 weeks   Marina Michelle.  Internal Medicine  [address] [place] [address], [facility] [address]  [place] [address]  Phone: [phone]  Fax: [phone]  Follow Up Time: 1 week

## 2023-08-04 NOTE — DISCHARGE NOTE PROVIDER - NSDCMRMEDTOKEN_GEN_ALL_CORE_FT
gemfibrozil 600 mg oral tablet: 1 tab(s) orally 2 times a day (before meals)   Admelog 100 units/mL injectable solution: 7 unit(s) injectable 3 times a day (with meals) 7 unit(s) subcutaneous 3 times a day (with meals)  alcohol swabs : Apply topically to affected area 4 times a day  Freestyle Luz 2 [place]: Dispense 1 [place]  Freestyle Luz 2 sensors: Apply 1 sensor every 14 days  gemfibrozil 600 mg oral tablet: 1 tab(s) orally 2 times a day (before meals)  glucometer (per patient&#x27;s insurance): Test blood sugars four times a day. Dispense #1 glucometer.  Insulin Pen Needles, 4mm: 1 application subcutaneously 4 times a day. ** Use with insulin pen **  lancets: 1 application subcutaneously 4 times a day  Lantus Solostar Pen 100 units/mL subcutaneous solution: 22 unit(s) subcutaneous once a day (at bedtime) 22 unit(s) subcutaneous once a day  test strips (per patient&#x27;s insurance): 1 application subcutaneously 4 times a day. ** Compatible with patient&#x27;s glucometer **   alcohol swabs : Apply topically to affected area 4 times a day  atorvastatin 80 mg oral tablet: 1 tab(s) orally once a day  Basaglar KwikPen 100 units/mL subcutaneous solution: 22 unit(s) subcutaneous once a day (at bedtime) unit(s) subcutaneous once a day  Dexcom : Please dispense 1   Dexcom Sensor: Please dispense 3 sensors  Dexcom Transmitter: please dispense 1 unit  fenofibrate 145 mg oral tablet: 1 tab(s) orally once a day  glucometer (per patient&#x27;s insurance): Test blood sugars four times a day. Dispense #1 glucometer.  Insulin Pen Needles, 4mm: 1 application subcutaneously 4 times a day. ** Use with insulin pen **  lancets: 1 application subcutaneously 4 times a day  Lovaza 1000 mg oral capsule: 2 cap(s) orally 2 times a day  metFORMIN 1000 mg oral tablet: 1 tab(s) orally 2 times a day  test strips (per patient&#x27;s insurance): 1 application subcutaneously 4 times a day. ** Compatible with patient&#x27;s glucometer **   alcohol swabs: Apply topically to affected area 4 times a day  atorvastatin 80 mg oral tablet: 1 tab(s) orally once a day  Basaglar KwikPen 100 units/mL subcutaneous solution: 22 unit(s) subcutaneous once a day (at bedtime)  fenofibrate 145 mg oral tablet: 1 tab(s) orally once a day  glucometer (per patient&#x27;s insurance): Test blood sugars four times a day. Dispense #1 glucometer.  Insulin Pen Needles, 4mm: 1 application subcutaneously 4 times a day. ** Use with insulin pen **  lancets: 1 application subcutaneously 4 times a day  Lovaza 1000 mg oral capsule: 2 cap(s) orally 2 times a day  metFORMIN 1000 mg oral tablet: 1 tab(s) orally 2 times a day  test strips (per patient&#x27;s insurance): 1 application subcutaneously 4 times a day. ** Compatible with patient&#x27;s glucometer **   alcohol swabs: Apply topically to affected area 4 times a day  atorvastatin 80 mg oral tablet: 1 tab(s) orally once a day  Basaglar KwikPen 100 units/mL subcutaneous solution: 22 unit(s) subcutaneous once a day (at bedtime)  CT abdomen/pelvis with IV contrast: please obtain within 1 week of discharge  to assess stent of pancreatic pseudocyst (ICD10: K86. 3) MDD: 0  fenofibrate 145 mg oral tablet: 1 tab(s) orally once a day  glucometer (per patient&#x27;s insurance): Test blood sugars four times a day. Dispense #1 glucometer.  Insulin Pen Needles, 4mm: 1 application subcutaneously 4 times a day. ** Use with insulin pen **  lancets: 1 application subcutaneously 4 times a day  Lovaza 1000 mg oral capsule: 2 cap(s) orally 2 times a day  metFORMIN 1000 mg oral tablet: 1 tab(s) orally 2 times a day  test strips (per patient&#x27;s insurance): 1 application subcutaneously 4 times a day. ** Compatible with patient&#x27;s glucometer **

## 2023-08-04 NOTE — DISCHARGE NOTE PROVIDER - NSDCFUADDAPPT_GEN_ALL_CORE_FT
APPTS ARE READY TO BE MADE: [ ] YES    Best Family or Patient Contact (if needed):    Additional Information about above appointments (if needed):    1: PCP  2: gastroenterologist  3: ENdocrinologist    Other comments or requests:    APPTS ARE READY TO BE MADE: [ ] YES    Best Family or Patient Contact (if needed):    Additional Information about above appointments (if needed):    1: PCP  2: gastroenterologist  3: Endocrinologist    Other comments or requests:    APPTS ARE READY TO BE MADE: [x] YES    Best Family or Patient Contact (if needed):    Additional Information about above appointments (if needed):    1: PCP Dr Michelle  2: gastroenterologist  3: Endocrinologist    Other comments or requests:    APPTS ARE READY TO BE MADE: [x] YES    Best Family or Patient Contact (if needed):    Additional Information about above appointments (if needed):    1: PCP Dr Michelle in 2 weeks  2: gastroenterologist in 2 weeks   3: Endocrinologist in 2 weeks    Other comments or requests:    APPTS ARE READY TO BE MADE: [x] YES    Best Family or Patient Contact (if needed):    Additional Information about above appointments (if needed):    1: PCP Dr Michelle in 2 weeks  2: gastroenterologist in 2 weeks   3: Endocrinologist in 2 weeks    Other comments or requests:     Patient is scheduled to see Dr. Michelle at 3:40PM on 8/10 at [address] [place] [address]    Patient is scheduled to see Dr. Baca at 9:00AM on 8/24 at [address] [place] [address]    Patient is scheduled to see Dr. Ferrera at 2:00PM on 10/3 at [address] [place], [place].

## 2023-08-04 NOTE — PROGRESS NOTE ADULT - ATTENDING COMMENTS
Agree with assessment and plan as above by Dr. Cardenas. Reviewed all pertinent labs, glucose values, and imaging studies. Modifications made as indicated above. Pt. with uncontrolled Type 2 DM w/ hyperglycemia and hypertriglyceridemia with pancreatitis now resolving. Glucose remains at or close to goal. While in-patient on optimize dose of Admelog to 8 units with meals. Monitor on current dose of Lantus. For discharge, concerned about adherence to basal bolus regimen given work and lifestyle. Would recommend basal + metformin 1000mg BID if no contraindications with close monitoring and better adherence to medications diet and lifestyle modifications. Continue with cholesterol regimen for hypertriglyceridemia with avoidance of alcohol and simple sugars.    Ambrocio Milton D.O  [phone]

## 2023-08-04 NOTE — PROGRESS NOTE ADULT - ASSESSMENT
37-year-old male patient PMH of pre-DM, familial hypertriglyceridemia (follows with Dr. Michelle) c/b multiple admissions of pancreatitis (last adm 2015 for necrotizing pancreatitis) c/b pancreatic pseudocyst who presents to the emergency department for L sided abdominal pain with radiation to back x  2 days a/w N/V and decreased PO. States the pain feels similar to his prior pancreatitis episode. Denies HA, CP, palpitations, SOB, diarrhea, fevers, alcohol use. Of note, pt stopped taking gemfibrozil and metformin 2 mo ago to try to control his medical issues with diet alone since he has been well controlled for the past 8 years. Labs notable for WBC 12, triglycerides 4742, lipase 2908. 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 [facility] consulted for management of chronic peripancreatic fluid collection.    # Acute pancreatitis likely 2/2 familial hypertriglyceridemia  # Interval increase of chronic peripancreatic tail collection with mass effect along stomach (~7.1 x 5.2 cm) w/o e/o pancreatic necrosis    Recommendations:  - management of hyperTG per primary team/endocrinology- may potentially need apheresis for severe hyperTG i/s/o pancreatitis  - IVFs for acute pancreatitis 1.5 cc/kg/hr per Waterfall trial  - pain control and antiemetics as QTc allows  - CLD for now, can advance as tolerated to low fat diet  - avoidance of ETOH  - off insulin gtt and transferred to floor, would consider endoscopic drainage once stabilized and pancreatitis improves; patient still undecided about endoscopic drainage and asking about outpatient procedure  - will readdress utility for endoscopy next week    Note incomplete until finalized by attending signature/attestation.    Perico Casillas  GI/Hepatology Fellow    MONDAY-FRIDAY 8AM-5PM:  Pager# [phone] ([facility]) or [phone] ([facility])    NON-URGENT CONSULTS:  Please email [email or web address]  AT NIGHT AND ON WEEKENDS:  Contact on-call GI fellow via answering service ([phone]) from 5pm-8am and on weekends/holidays

## 2023-08-04 NOTE — PROGRESS NOTE ADULT - PROBLEM SELECTOR PLAN 2
history of pre - DM iso multiple episodes of pancreatitis.   -A1c 11.6 -- new diagnosis of T2DM  -Lantus 22 units QHS  - Admelog 7 units TIDAC  -endo following for management post discharge

## 2023-08-04 NOTE — DISCHARGE NOTE PROVIDER - PROVIDER TOKENS
PROVIDER:[TOKEN:[[id number]],FOLLOWUP:[2 weeks]] PROVIDER:[TOKEN:[[id number]],FOLLOWUP:[1 week]]

## 2023-08-04 NOTE — PROGRESS NOTE ADULT - SUBJECTIVE AND OBJECTIVE BOX
Interval Events:   No acute events overnight.  Patient transferred out of MICU and off insulin gtt.  He still endorses abdominal pain, however no fevers.    ROS:   12 point review of systems performed and negative except otherwise noted in HPI.    Hospital Medications:  acetaminophen     Tablet .. 1000 milliGRAM(s) Oral every 8 hours PRN  atorvastatin 80 milliGRAM(s) Oral at bedtime  chlorhexidine 4% Liquid 1 Application(s) Topical <User Schedule>  enoxaparin Injectable 40 milliGRAM(s) SubCutaneous every 24 hours  fenofibrate Tablet 145 milliGRAM(s) Oral daily  insulin glargine Injectable (LANTUS) 22 Unit(s) SubCutaneous at bedtime  insulin lispro (ADMELOG) corrective regimen sliding scale   SubCutaneous three times a day before meals  insulin lispro (ADMELOG) corrective regimen sliding scale   SubCutaneous at bedtime  insulin lispro Injectable (ADMELOG) 7 Unit(s) SubCutaneous before breakfast  insulin lispro Injectable (ADMELOG) 7 Unit(s) SubCutaneous before lunch  insulin lispro Injectable (ADMELOG) 7 Unit(s) SubCutaneous before dinner  lidocaine   4% Patch 1 Patch Transdermal daily  morphine  - Injectable 2 milliGRAM(s) IV Push every 4 hours PRN  morphine  - Injectable 4 milliGRAM(s) IV Push every 4 hours PRN  naloxone Injectable 0.4 milliGRAM(s) IV Push once  omega-3-Acid Ethyl Esters 2 Gram(s) Oral two times a day  ondansetron Injectable 4 milliGRAM(s) IV Push once  simethicone 80 milliGRAM(s) Chew every 6 hours PRN      PHYSICAL EXAM:   Vital Signs:  Vital Signs Last 24 Hrs  T(C): 36.9 (04 Aug 2023 04:04), Max: 37.3 (03 Aug 2023 20:00)  T(F): 98.4 (04 Aug 2023 04:04), Max: 99.1 (03 Aug 2023 20:00)  HR: 83 (04 Aug 2023 04:04) (70 - 99)  BP: 100/68 (04 Aug 2023 04:04) (92/61 - 132/67)  BP(mean): 89 (04 Aug 2023 03:00) (70 - 98)  RR: 18 (04 Aug 2023 04:04) (14 - 28)  SpO2: 97% (04 Aug 2023 04:04) (95% - 99%)    Parameters below as of 04 Aug 2023 04:04  Patient On (Oxygen Delivery Method): room air      Daily     Daily     GENERAL: no acute distress  NEURO: alert  HEENT: NCAT, no conjunctival pallor appreciated  CHEST: no respiratory distress, no accessory muscle use  CARDIAC: regular rate, +S1/S2  ABDOMEN: soft, epigastric tenderness, no rebound or guarding  EXTREMITIES: warm, well perfused  SKIN: no lesions noted    LABS: reviewed                        11.7   5.78  )-----------( 173      ( 04 Aug 2023 01:05 )             35.9     08-04    135  |  102  |  11  ----------------------------<  165<H>  4.0   |  21<L>  |  0.72    Ca    8.8      04 Aug 2023 01:05  Phos  3.1     08-04  Mg     1.9     08-04    TPro  6.6  /  Alb  3.2<L>  /  TBili  0.3  /  DBili  x   /  AST  22  /  ALT  21  /  AlkPhos  70  08-04    LIVER FUNCTIONS - ( 04 Aug 2023 01:05 )  Alb: 3.2 g/dL / Pro: 6.6 g/dL / ALK PHOS: 70 U/L / ALT: 21 U/L / AST: 22 U/L / GGT: x             Interval Diagnostic Studies: see sunrise for full report

## 2023-08-04 NOTE — CHART NOTE - NSCHARTNOTEFT_GEN_A_CORE
Sg Talbot  Internal Medicine PGY-3    MAR ACCEPT NOTE    Please refer to full transfer note for details. Briefly, this is a 36 y/o male, PMH pre-DM, hypertriglyceridemia (follows with Dr. Michelle), multiple admissions of pancreatitis (last adm 2015 for necrotizing pancreatitis), pancreatic pseudocyst, initially presented to ED on 7/31/23 c/o abdominal pain x2 days. described pain as sharp, sudden onset, left sided, radiates towards the back b/l, 10/10. Pt also admitted associated nausea, 8 episodes of vomit ("cortes, creme" yellow in color, no blood), and unable to tolerate PO. Pt stated he has not had BMs in the past 2 days due to decreased PO. States the pain feels similar to his prior pancreatitis episode. Denies HA, CP, palpitations, SOB, diarrhea, fevers. Denies alcohol use. Pt admits stopping gemfibrozil and metformin 2 mo ago to try to control his medical issues with diet alone since he has been well controlled for the past 8 years.    ED course significant for triglycerides 4742, cholesterol 963, lipase 2908, WBC 12. Pt admitted to MICU for management of acute pancreatitis, on IVF and insulin gtt.    In MICU, Insulin gtt, D10 dc'd 8/3/23. transitioned to Lantus and Admelog. -170's since insulin dc'd. Trig 499. Lipase 206. C-peptide 1.6, 1.2. DM management and diet education.    Pt now stable for transfer to medicine floor.    ASSESSMENT & PLAN:   36 y/o male, PMH pre-DM, hypertriglyceridemia (follows with Dr. Michelle), multiple admissions of pancreatitis (last adm 2015 for necrotizing pancreatitis), pancreatic pseudocyst, initially presented to ED on 7/31/23 c/o abdominal pain x2 days. ED course significant for triglycerides 4742, cholesterol 963, lipase 2908, WBC 12. Pt admitted to MICU for management of acute pancreatitis, on IVF and insulin gtt.    =====NEUROLOGIC=====  #no active issues  -baseline mental status --- AOx3, lives at home, functional, ambulatory  -c/w neuro checks q4h and PRN for changes  -PT/OT    =====CARDIOVASCULAR=====  #tachycardia  -resolved -- HR b/t   -continue to monitor -- may be 2/2 pain    =====RESPIRATORY=====  #no active issues    =====RENAL=====  #no active issues  -monitor I&O's   -correct electrolytes PRN    =====GI=====  #Acute pancreatitis  -hx of hospitalizations for pancreatitis  -hx of hypertriglyceridemia  -GI following -- will discuss EUS with possible drainage procedure based on above results and clinical course  -MRI/MRCP ordered -- for better characterization of the pancreas and peripancreatic fluid  -tolerating PO low fat diet  -multiple BMs overnight  -pain control w/ IV tylenol  -trig downtrending 499 <-- 4742  -lipase downtrending 206 <-- 963  -continue trending trig / lipase / LFTs  -c/w simethicone for abd gas/bloating  -c/w fenofibrate, atorvastatin, lovaza    =====HEME/ONC=====  #Leukocytosis -- resolved  -WBC 12.12 --> 6.25  -afebrile    =====INFECTIOUS DISEASE=====  #no active issues    =====ENDO=====  #Hypertriglyceridemia  #T2DM  -hx pre-dm  -A1c 11.6 -- new diagnosis of T2DM  -trig downtrending 499 <-- 4742  -lipase downtrending 206 <-- 963  -C-peptide 1.6  -Endo recs appreciated  -c/w fenofibrate 145mg daily, atorvastatin 80mg QHS, and lovaza 2mg BID  -dc'd insulin gtt this AM -- transition to Lantus 22 units QHS and Admelog 7 units TID AC, with low dose correction scale TID AC and low dose correction QHS  -continue trending trig / lipase / BMP / POCT glucose  -insulin pen teaching, glucometer teaching, diet education    =====SKIN=====  Lines: NONE)  Decubiti: NONE    =====PPX=====  -DVT ppx -- c/w Lovenox    =====GOC=====  -FULL code      FOLLOW-UP   [] transition to Lantus 22 units QHS and Admelog 7 units TID AC, with low dose correction scale TID AC and low dose correction QHS  [] begin insulin pen teaching, glucometer teaching  [] MRI/MRCP for 8/4 to assess pseudocyst   [] send prescriptions for diabetes supplies (glucometer, test strips, lancets, alcohol swabs, insulin pen needles)        Vital Signs Last 24 Hrs  T(C): 36.8 (03 Aug 2023 12:00), Max: 37.3 (03 Aug 2023 00:00)  T(F): 98.3 (03 Aug 2023 12:00), Max: 99.1 (03 Aug 2023 00:00)  HR: 91 (03 Aug 2023 13:00) (78 - 101)  BP: 92/61 (03 Aug 2023 13:00) (92/61 - 125/72)  BP(mean): 73 (03 Aug 2023 13:00) (73 - 94)  RR: 22 (03 Aug 2023 13:00) (14 - 24)  SpO2: 97% (03 Aug 2023 13:00) (94% - 98%)    Parameters below as of 03 Aug 2023 08:00  Patient On (Oxygen Delivery Method): room air    O2 Concentration (%): 21  I&O's Summary    02 Aug 2023 07:01  -  03 Aug 2023 07:00  --------------------------------------------------------  IN: 3290 mL / OUT: 2900 mL / NET: 390 mL    03 Aug 2023 07:01  -  03 Aug 2023 13:43  --------------------------------------------------------  IN: 472 mL / OUT: 200 mL / NET: 272 mL        MEDICATIONS  (STANDING):  atorvastatin 80 milliGRAM(s) Oral at bedtime  chlorhexidine 4% Liquid 1 Application(s) Topical <User Schedule>  enoxaparin Injectable 40 milliGRAM(s) SubCutaneous every 24 hours  fenofibrate Tablet 145 milliGRAM(s) Oral daily  insulin glargine Injectable (LANTUS) 22 Unit(s) SubCutaneous at bedtime  insulin lispro (ADMELOG) corrective regimen sliding scale   SubCutaneous three times a day before meals  insulin lispro (ADMELOG) corrective regimen sliding scale   SubCutaneous at bedtime  insulin lispro Injectable (ADMELOG) 7 Unit(s) SubCutaneous before breakfast  insulin lispro Injectable (ADMELOG) 7 Unit(s) SubCutaneous before lunch  insulin lispro Injectable (ADMELOG) 7 Unit(s) SubCutaneous before dinner  lidocaine   4% Patch 1 Patch Transdermal daily  naloxone Injectable 0.4 milliGRAM(s) IV Push once  omega-3-Acid Ethyl Esters 2 Gram(s) Oral two times a day  ondansetron Injectable 4 milliGRAM(s) IV Push once    MEDICATIONS  (PRN):  acetaminophen     Tablet .. 1000 milliGRAM(s) Oral every 8 hours PRN Mild Pain (1 - 3)  morphine  - Injectable 2 milliGRAM(s) IV Push every 4 hours PRN Moderate Pain (4 - 6)  morphine  - Injectable 4 milliGRAM(s) IV Push every 4 hours PRN Severe Pain (7 - 10)  simethicone 80 milliGRAM(s) Chew every 6 hours PRN Gas        LABS                                            12.0                  Neurophils% (auto):   x      (08-03 @ 00:24):    6.25 )-----------(164          Lymphocytes% (auto):  x                                             35.7                   Eosinphils% (auto):   x        Manual%: Neutrophils x    ; Lymphocytes x    ; Eosinophils x    ; Bands%: x    ; Blasts x                                    134    |  103    |  7                   Calcium: 9.0   / iCa: x      (08-03 @ 12:20)    ----------------------------<  162       Magnesium: 2.0                              3.7     |  20     |  0.50             Phosphorous: 3.0      TPro  6.3    /  Alb  3.1    /  TBili  0.3    /  DBili  x      /  AST  21     /  ALT  16     /  AlkPhos  57     03 Aug 2023 06:36.

## 2023-08-04 NOTE — PROGRESS NOTE ADULT - ASSESSMENT
37-year-old male patient PMH of pre-DM (diagnosed 2 years ago), hypertriglyceridemia (follows with Dr. Michelle), multiple admissions of pancreatitis (last adm  for necrotizing pancreatitis), pancreatic cyst/collection who presents to the emergency department for abdominal pain, nausea x 2 days. Found to have acute pancreatitis secondary to elevated triglycerides. A1c was 11.6%.    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 Hx of recurrent pancreatitis and pancreatic cyst/collection.  Lipase 2908, T --> down to 499.  - c/w continue fenofibrate 145mg daily, atorvastatin 80mg QHS, and lovaza 2mg BID.  - Appreciate RD consult   - Low fat/ CC diet     #T2DM  Hx of prediabetes.  A1c found to be 11.6%, new diagnosis of T2DM  Home regimen: Metformin ?dose BID, stopped 2 months ago.  C-peptide 1.6 with glc 162, not concerning for HORTENCIA or T1DM.  - Continue Lantus 22 units QHS  - Once resumes diet, increase Admelog from 7 to 8 units TIDAC  - low dose correction scale q6h while NPO. Once resumes diet, low dose correction scale TIDAC and low dose correction QHS  For discharge:  - Recommend basal insulin (dose TBD) + metformin 1000mg BID.  - Please begin insulin pen teaching, glucometer teaching.  - Please send prescriptions for diabetes supplies (glucometer, test strips, lancets, alcohol swabs, insulin pen needles). Please send prescriptions for Freestyle Luz 2 sensor x2 and reader.   - Patient will need follow up with Endocrinology.      Robbie Cardenas MD  Endocrine Fellow  Can be reached via teams. For follow up questions, discharge recommendations, or new consults, please call answering service at [phone] (weekdays); [phone] (nights/weekends).

## 2023-08-04 NOTE — PROGRESS NOTE ADULT - PROBLEM SELECTOR PLAN 1
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 and pancreatic pseudocyst.   -GI following, appreicate recs  -MRI/MRCP ordered for better characterization of the pancreas and peripancreatic fluid  -trig downtrending 499 <-- 4742  -lipase downtrending 206 <-- 963  -c/w fenofibrate 145mg daily, atorvastatin 80mg QHS, and lovaza 2mg BID  -continue trending trig / lipase / BMP

## 2023-08-04 NOTE — PROGRESS NOTE ADULT - ASSESSMENT
36 y/o male, PMH pre-DM, hypertriglyceridemia (follows with Dr. Michelle), multiple admissions of pancreatitis (last adm 2015 for necrotizing pancreatitis), pancreatic pseudocyst, initially presented to ED on 7/31/23 c/o abdominal pain x2 days with triglycerides 4742, cholesterol 963, lipase 2908, WBC 12 consistent with triglyceride induced pancreatitis.

## 2023-08-04 NOTE — DISCHARGE NOTE PROVIDER - NSDCCAREPROVSEEN_GEN_ALL_CORE_FT
[facility] Medicine Team 2 [facility] Medicine, Team 2

## 2023-08-04 NOTE — DISCHARGE NOTE PROVIDER - NSFOLLOWUPCLINICS_GEN_ALL_ED_FT
Gastroenterology at [facility]  Gastroenterology  [address] [place] [address]  Phone: [phone]  Fax:   Follow Up Time: 2 weeks    [facility] Endocrinology  Endocrinology  [address] [place] [address]  Phone: [phone]  Fax:   Follow Up Time: 2 weeks     Gastroenterology at [facility]  Gastroenterology  [address] [place] [address]  Phone: [phone]  Fax:   Follow Up Time: 2 weeks    [facility] Endocrinology  Endocrinology  [address] [place] [address]  Phone: [phone]  Fax:   Follow Up Time: 2 weeks    [facility] - Primary Care  Primary Care  [address] [place] [address]  Phone: [phone]  Fax:   Follow Up Time: 2 weeks     Gastroenterology at [facility]  Gastroenterology  [address] [place] [address]  Phone: [phone]  Fax:   Follow Up Time: 2 weeks    [facility] Endocrinology  Endocrinology  [address] [place] [address]  Phone: [phone]  Fax:   Follow Up Time: 2 weeks

## 2023-08-05 LAB
ALBUMIN SERPL ELPH-MCNC: 3.5 G/DL — SIGNIFICANT CHANGE UP (ref 3.3–5)
ALP SERPL-CCNC: 73 U/L — SIGNIFICANT CHANGE UP (ref 40–120)
ALT FLD-CCNC: 33 U/L — SIGNIFICANT CHANGE UP (ref 10–45)
ANION GAP SERPL CALC-SCNC: 13 MMOL/L — SIGNIFICANT CHANGE UP (ref 5–17)
AST SERPL-CCNC: 42 U/L — HIGH (ref 10–40)
BILIRUB SERPL-MCNC: 0.4 MG/DL — SIGNIFICANT CHANGE UP (ref 0.2–1.2)
BUN SERPL-MCNC: 12 MG/DL — SIGNIFICANT CHANGE UP (ref 7–23)
CALCIUM SERPL-MCNC: 9.3 MG/DL — SIGNIFICANT CHANGE UP (ref 8.4–10.5)
CHLORIDE SERPL-SCNC: 101 MMOL/L — SIGNIFICANT CHANGE UP (ref 96–108)
CHOLEST SERPL-MCNC: 369 MG/DL — HIGH
CO2 SERPL-SCNC: 22 MMOL/L — SIGNIFICANT CHANGE UP (ref 22–31)
CREAT SERPL-MCNC: 0.6 MG/DL — SIGNIFICANT CHANGE UP (ref 0.5–1.3)
EGFR: 128 ML/MIN/1.73M2 — SIGNIFICANT CHANGE UP
GLUCOSE BLDC GLUCOMTR-MCNC: 143 MG/DL — HIGH (ref 70–99)
GLUCOSE BLDC GLUCOMTR-MCNC: 154 MG/DL — HIGH (ref 70–99)
GLUCOSE BLDC GLUCOMTR-MCNC: 236 MG/DL — HIGH (ref 70–99)
GLUCOSE BLDC GLUCOMTR-MCNC: 244 MG/DL — HIGH (ref 70–99)
GLUCOSE SERPL-MCNC: 178 MG/DL — HIGH (ref 70–99)
HCT VFR BLD CALC: 38.8 % — LOW (ref 39–50)
HGB BLD-MCNC: 12.3 G/DL — LOW (ref 13–17)
LIDOCAIN IGE QN: 231 U/L — HIGH (ref 7–60)
MAGNESIUM SERPL-MCNC: 2 MG/DL — SIGNIFICANT CHANGE UP (ref 1.6–2.6)
MCHC RBC-ENTMCNC: 26.2 PG — LOW (ref 27–34)
MCHC RBC-ENTMCNC: 31.7 GM/DL — LOW (ref 32–36)
MCV RBC AUTO: 82.7 FL — SIGNIFICANT CHANGE UP (ref 80–100)
NRBC # BLD: 0 /100 WBCS — SIGNIFICANT CHANGE UP (ref 0–0)
PHOSPHATE SERPL-MCNC: 3.3 MG/DL — SIGNIFICANT CHANGE UP (ref 2.5–4.5)
PLATELET # BLD AUTO: 187 K/UL — SIGNIFICANT CHANGE UP (ref 150–400)
POTASSIUM SERPL-MCNC: 4.2 MMOL/L — SIGNIFICANT CHANGE UP (ref 3.5–5.3)
POTASSIUM SERPL-SCNC: 4.2 MMOL/L — SIGNIFICANT CHANGE UP (ref 3.5–5.3)
PROT SERPL-MCNC: 7.1 G/DL — SIGNIFICANT CHANGE UP (ref 6–8.3)
RBC # BLD: 4.69 M/UL — SIGNIFICANT CHANGE UP (ref 4.2–5.8)
RBC # FLD: 15.5 % — HIGH (ref 10.3–14.5)
SODIUM SERPL-SCNC: 136 MMOL/L — SIGNIFICANT CHANGE UP (ref 135–145)
TRIGL SERPL-MCNC: 418 MG/DL — HIGH
WBC # BLD: 4.71 K/UL — SIGNIFICANT CHANGE UP (ref 3.8–10.5)
WBC # FLD AUTO: 4.71 K/UL — SIGNIFICANT CHANGE UP (ref 3.8–10.5)

## 2023-08-05 PROCEDURE — 99232 SBSQ HOSP IP/OBS MODERATE 35: CPT | Mod: GC

## 2023-08-05 RX ADMIN — Medication 8 UNIT(S): at 09:04

## 2023-08-05 RX ADMIN — ENOXAPARIN SODIUM 40 MILLIGRAM(S): 100 INJECTION SUBCUTANEOUS at 05:12

## 2023-08-05 RX ADMIN — Medication 2: at 18:00

## 2023-08-05 RX ADMIN — Medication 2 GRAM(S): at 05:13

## 2023-08-05 RX ADMIN — ATORVASTATIN CALCIUM 80 MILLIGRAM(S): 80 TABLET, FILM COATED ORAL at 22:25

## 2023-08-05 RX ADMIN — INSULIN GLARGINE 22 UNIT(S): 100 INJECTION, SOLUTION SUBCUTANEOUS at 22:25

## 2023-08-05 RX ADMIN — Medication 145 MILLIGRAM(S): at 11:20

## 2023-08-05 RX ADMIN — Medication 1: at 13:08

## 2023-08-05 RX ADMIN — Medication 8 UNIT(S): at 13:09

## 2023-08-05 RX ADMIN — CHLORHEXIDINE GLUCONATE 1 APPLICATION(S): 213 SOLUTION TOPICAL at 06:13

## 2023-08-05 RX ADMIN — Medication 2 GRAM(S): at 17:25

## 2023-08-05 NOTE — PROGRESS NOTE ADULT - PROBLEM SELECTOR PLAN 1
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 and pancreatic pseudocyst, s/p IVFs and insulin gtt in MICU  - GI following; appreciate recs  - endocrine following; appreciate recs  - f/u MRI/MRCP for characterization of the pancreas and peripancreatic fluid  - triglycerides and lipase downtrending  - c/w fenofibrate 145mg qd, atorvastatin 80mg qhs, and Lovaza 2mg BID  - trend triglycerides, lipase, BMP 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 and pancreatic pseudocyst, s/p IVFs and insulin gtt in MICU  - GI following; appreciate recs- plan for possible EUS w/ drainage 8/7  - endocrine following; appreciate recs  - triglycerides and lipase downtrending; no indication to continue trending  - f/u MRI/MRCP for characterization of the pancreas and peripancreatic fluid  - c/w fenofibrate 145mg qd  - c/w atorvastatin 80mg qhs  - c/w Lovaza 2mg BID  - NPO after midnight Sunday 8/6 for possible EUS w/ drainage 8/7

## 2023-08-05 NOTE — PROGRESS NOTE ADULT - SUBJECTIVE AND OBJECTIVE BOX
*******************************  Vi Andrade MD (PGY-2)  Internal Medicine  Contact via [employer] TEAMS  [facility] Pager: [phone]  [facility] Pager: [phone]  *******************************    PROGRESS NOTE:     Patient is a 37y old  Male who presents with a chief complaint of Abdominal Pain (04 Aug 2023 13:20)    INTERVAL EVENTS: No acute overnight events.     SUBJECTIVE: Patient seen and examined at bedside. This morning, the patient is comfortable and doing well. No acute complaints. Denies fevers, chills, N/V/D, chest pain, SOB, abdominal pain.    MEDICATIONS  (STANDING):  atorvastatin 80 milliGRAM(s) Oral at bedtime  chlorhexidine 4% Liquid 1 Application(s) Topical <User Schedule>  enoxaparin Injectable 40 milliGRAM(s) SubCutaneous every 24 hours  fenofibrate Tablet 145 milliGRAM(s) Oral daily  insulin glargine Injectable (LANTUS) 22 Unit(s) SubCutaneous at bedtime  insulin lispro (ADMELOG) corrective regimen sliding scale   SubCutaneous three times a day before meals  insulin lispro (ADMELOG) corrective regimen sliding scale   SubCutaneous at bedtime  insulin lispro Injectable (ADMELOG) 8 Unit(s) SubCutaneous three times a day before meals  lidocaine   4% Patch 1 Patch Transdermal daily  naloxone Injectable 0.4 milliGRAM(s) IV Push once  omega-3-Acid Ethyl Esters 2 Gram(s) Oral two times a day  ondansetron Injectable 4 milliGRAM(s) IV Push once    MEDICATIONS  (PRN):  acetaminophen     Tablet .. 1000 milliGRAM(s) Oral every 8 hours PRN Mild Pain (1 - 3)  morphine  - Injectable 2 milliGRAM(s) IV Push every 4 hours PRN Moderate Pain (4 - 6)  morphine  - Injectable 4 milliGRAM(s) IV Push every 4 hours PRN Severe Pain (7 - 10)  simethicone 80 milliGRAM(s) Chew every 6 hours PRN Gas    CAPILLARY BLOOD GLUCOSE  POCT Blood Glucose.: 143 mg/dL (04 Aug 2023 21:06)  POCT Blood Glucose.: 163 mg/dL (04 Aug 2023 17:42)  POCT Blood Glucose.: 185 mg/dL (04 Aug 2023 12:34)  POCT Blood Glucose.: 146 mg/dL (04 Aug 2023 08:54)    I&O's Summary    04 Aug 2023 07:01  -  05 Aug 2023 07:00  --------------------------------------------------------  IN: 480 mL / OUT: 0 mL / NET: 480 mL    PHYSICAL EXAM:  Vital Signs Last 24 Hrs  T(C): 37.2 (05 Aug 2023 04:48), Max: 37.2 (05 Aug 2023 04:48)  T(F): 98.9 (05 Aug 2023 04:48), Max: 98.9 (05 Aug 2023 04:48)  HR: 69 (05 Aug 2023 04:48) (69 - 86)  BP: 106/72 (05 Aug 2023 04:48) (106/72 - 109/73)  BP(mean): --  RR: 18 (05 Aug 2023 04:48) (18 - 18)  SpO2: 97% (05 Aug 2023 04:48) (96% - 98%)    Parameters below as of 05 Aug 2023 04:48  Patient On (Oxygen Delivery Method): room air    GENERAL: NAD, lying in bed comfortably  HEAD: Atraumatic, normocephalic  EYES: EOMI, PERRLA, conjunctiva and sclera clear  ENT: Moist mucous membranes  NECK: Supple, no JVD  HEART: S1, S2, Regular rate and rhythm, no murmurs, rubs, or gallops  LUNGS: Unlabored respirations, clear to auscultation bilaterally, no crackles, wheezing, or rhonchi  ABDOMEN: Soft, nontender, nondistended, +BS  EXTREMITIES: 2+ peripheral pulses bilaterally. No clubbing, cyanosis, or edema  NERVOUS SYSTEM:  A&Ox3, no focal deficits   SKIN: No rashes or lesions    LABS:                        12.3   4.71  )-----------( 187      ( 05 Aug 2023 06:26 )             38.8     08-05    136  |  101  |  12  ----------------------------<  178<H>  4.2   |  22  |  0.60    Ca    9.3      05 Aug 2023 06:25  Phos  3.3     08-05  Mg     2.0     08-05    TPro  7.1  /  Alb  3.5  /  TBili  0.4  /  DBili  x   /  AST  42<H>  /  ALT  33  /  AlkPhos  73  08-05    Urinalysis Basic - ( 05 Aug 2023 06:25 )    Color: x / Appearance: x / SG: x / pH: x  Gluc: 178 mg/dL / Ketone: x  / Bili: x / Urobili: x   Blood: x / Protein: x / Nitrite: x   Leuk Esterase: x / RBC: x / WBC x   Sq Epi: x / Non Sq Epi: x / Bacteria: x    RADIOLOGY & ADDITIONAL TESTS:  Results Reviewed:   Imaging Personally Reviewed:  Electrocardiogram Personally Reviewed:  Tele: *******************************  Vi Andrade MD (PGY-2)  Internal Medicine  Contact via [employer] TEAMS  [facility] Pager: [phone]  [facility] Pager: [phone]  *******************************    PROGRESS NOTE:     Patient is a 37y old  Male who presents with a chief complaint of Abdominal Pain (04 Aug 2023 13:20)    INTERVAL EVENTS: No acute overnight events.     SUBJECTIVE: Patient seen and examined at bedside. This morning, the patient is comfortable and doing well. No acute complaints. Has been eating and tolerating diet, walking around unit. Denies fevers, chills, N/V/D, chest pain, SOB, abdominal pain.    MEDICATIONS  (STANDING):  atorvastatin 80 milliGRAM(s) Oral at bedtime  chlorhexidine 4% Liquid 1 Application(s) Topical <User Schedule>  enoxaparin Injectable 40 milliGRAM(s) SubCutaneous every 24 hours  fenofibrate Tablet 145 milliGRAM(s) Oral daily  insulin glargine Injectable (LANTUS) 22 Unit(s) SubCutaneous at bedtime  insulin lispro (ADMELOG) corrective regimen sliding scale   SubCutaneous three times a day before meals  insulin lispro (ADMELOG) corrective regimen sliding scale   SubCutaneous at bedtime  insulin lispro Injectable (ADMELOG) 8 Unit(s) SubCutaneous three times a day before meals  lidocaine   4% Patch 1 Patch Transdermal daily  naloxone Injectable 0.4 milliGRAM(s) IV Push once  omega-3-Acid Ethyl Esters 2 Gram(s) Oral two times a day  ondansetron Injectable 4 milliGRAM(s) IV Push once    MEDICATIONS  (PRN):  acetaminophen     Tablet .. 1000 milliGRAM(s) Oral every 8 hours PRN Mild Pain (1 - 3)  morphine  - Injectable 2 milliGRAM(s) IV Push every 4 hours PRN Moderate Pain (4 - 6)  morphine  - Injectable 4 milliGRAM(s) IV Push every 4 hours PRN Severe Pain (7 - 10)  simethicone 80 milliGRAM(s) Chew every 6 hours PRN Gas    CAPILLARY BLOOD GLUCOSE  POCT Blood Glucose.: 143 mg/dL (04 Aug 2023 21:06)  POCT Blood Glucose.: 163 mg/dL (04 Aug 2023 17:42)  POCT Blood Glucose.: 185 mg/dL (04 Aug 2023 12:34)  POCT Blood Glucose.: 146 mg/dL (04 Aug 2023 08:54)    I&O's Summary    04 Aug 2023 07:01  -  05 Aug 2023 07:00  --------------------------------------------------------  IN: 480 mL / OUT: 0 mL / NET: 480 mL    PHYSICAL EXAM:  Vital Signs Last 24 Hrs  T(C): 37.2 (05 Aug 2023 04:48), Max: 37.2 (05 Aug 2023 04:48)  T(F): 98.9 (05 Aug 2023 04:48), Max: 98.9 (05 Aug 2023 04:48)  HR: 69 (05 Aug 2023 04:48) (69 - 86)  BP: 106/72 (05 Aug 2023 04:48) (106/72 - 109/73)  BP(mean): --  RR: 18 (05 Aug 2023 04:48) (18 - 18)  SpO2: 97% (05 Aug 2023 04:48) (96% - 98%)    Parameters below as of 05 Aug 2023 04:48  Patient On (Oxygen Delivery Method): room air    GENERAL: NAD, lying in bed comfortably  HEAD: Atraumatic, normocephalic  EYES: EOMI, PERRLA, conjunctiva and sclera clear  ENT: Moist mucous membranes  NECK: Supple, no JVD  HEART: S1, S2, Regular rate and rhythm, no murmurs, rubs, or gallops  LUNGS: Unlabored respirations, clear to auscultation bilaterally, no crackles, wheezing, or rhonchi  ABDOMEN: Soft, nontender, nondistended, +BS  EXTREMITIES: 2+ peripheral pulses bilaterally. No clubbing, cyanosis, or edema  NERVOUS SYSTEM:  A&Ox3, no focal deficits   SKIN: No rashes or lesions    LABS:                        12.3   4.71  )-----------( 187      ( 05 Aug 2023 06:26 )             38.8     08-05    136  |  101  |  12  ----------------------------<  178<H>  4.2   |  22  |  0.60    Ca    9.3      05 Aug 2023 06:25  Phos  3.3     08-05  Mg     2.0     08-05    TPro  7.1  /  Alb  3.5  /  TBili  0.4  /  DBili  x   /  AST  42<H>  /  ALT  33  /  AlkPhos  73  08-05    Urinalysis Basic - ( 05 Aug 2023 06:25 )    Color: x / Appearance: x / SG: x / pH: x  Gluc: 178 mg/dL / Ketone: x  / Bili: x / Urobili: x   Blood: x / Protein: x / Nitrite: x   Leuk Esterase: x / RBC: x / WBC x   Sq Epi: x / Non Sq Epi: x / Bacteria: x    RADIOLOGY & ADDITIONAL TESTS:  Results Reviewed:   Imaging Personally Reviewed:  Electrocardiogram Personally Reviewed:  Tele:

## 2023-08-05 NOTE — PROGRESS NOTE ADULT - ASSESSMENT
37M w/ hypertriglyceridemia (follows with Dr. 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 Course c/b new-onset DM2 (A1c 11.6%), currently being optimized. 37M w/ hypertriglyceridemia (follows with Dr. Michelle), multiple admissions of pancreatitis (last adm 2015 for necrotizing pancreatitis), pancreatic pseudocyst, initially admitted to MICU for hypertriglyceridemia-induced pancreatitis s/p IVFs and insulin gtt, now transitioned to basal-bolus insulin regimen and transferred to the floors for further management. Course c/b new-onset DM2 (A1c 11.6%), currently being optimized.

## 2023-08-05 NOTE — PROGRESS NOTE ADULT - ATTENDING COMMENTS
37M w/pmh hypertriglyceridemia, triglyceride-induced pancreatitis, diabetes, multiple admissions of pancreatitis (last adm 2015 for necrotizing pancreatitis), pancreatic pseudocyst, initially admitted to MICU for hypertriglyceridemia requiring insulin drip for stabilization. Pancreatitis is now resolved and patient is tolerating regular consistency diet.     Appreciate endocrinology recs for uncontrolled diabetes with hyperglycemia. Will taper insulin regimen based on steroid regimen    Appreciated advanced GI recommendations - plan for EUS on Monday    Vaishnavi Sosa MD  Division of Hospital Medicine  Contact via [employer] Teams  Office: [phone] 37M w/pmh hypertriglyceridemia, triglyceride-induced pancreatitis, diabetes, multiple admissions of pancreatitis (last adm 2015 for necrotizing pancreatitis), pancreatic pseudocyst, initially admitted to MICU for hypertriglyceridemia requiring insulin drip for stabilization. Pancreatitis is now resolved and patient is tolerating regular consistency diet.     Appreciate endocrinology recs for uncontrolled diabetes with hyperglycemia. FS overall well-controlled    Appreciated advanced GI recommendations - plan for EUS on Monday    Vaishnavi Sosa MD  Division of Hospital Medicine  Contact via [employer] Teams  Office: [phone]

## 2023-08-05 NOTE — PROGRESS NOTE ADULT - PROBLEM SELECTOR PLAN 2
Hx of pre-DM 2/2 multiple episodes of pancreatitis now w/ A1c of 11.6%, new diagnosis of DM2  - endocrine following; appreciate recs- f/u final DM regimen prior to d/c  - c/w Admelog 8U TIDAC, Lantus 22U qhs, and ISS; adjust per patient's insulin requirements  - monitor FS for goal -180 while inpatient  - diabetic teaching and CC diet  - will send diabetes supplies to pharmacy (glucometer, test strips, alcohol pads, lancets, pen needles) Hx of pre-DM 2/2 multiple episodes of pancreatitis now w/ A1c of 11.6%, new diagnosis of DM2  - endocrine following; appreciate recs- final DM regimen to include basal insulin (dose TBD) + metformin 1000mg BID  - c/w Admelog 8U TIDAC, Lantus 22U qhs, and ISS; adjust per patient's insulin requirements  - monitor FS for goal -180 while inpatient  - diabetic teaching and CC diet  - will send diabetes supplies to pharmacy (glucometer, test strips, alcohol pads, lancets, pen needles)

## 2023-08-06 LAB
ALBUMIN SERPL ELPH-MCNC: 3.5 G/DL — SIGNIFICANT CHANGE UP (ref 3.3–5)
ALP SERPL-CCNC: 78 U/L — SIGNIFICANT CHANGE UP (ref 40–120)
ALT FLD-CCNC: 48 U/L — HIGH (ref 10–45)
ANION GAP SERPL CALC-SCNC: 12 MMOL/L — SIGNIFICANT CHANGE UP (ref 5–17)
AST SERPL-CCNC: 52 U/L — HIGH (ref 10–40)
BILIRUB SERPL-MCNC: 0.3 MG/DL — SIGNIFICANT CHANGE UP (ref 0.2–1.2)
BUN SERPL-MCNC: 11 MG/DL — SIGNIFICANT CHANGE UP (ref 7–23)
CALCIUM SERPL-MCNC: 9 MG/DL — SIGNIFICANT CHANGE UP (ref 8.4–10.5)
CHLORIDE SERPL-SCNC: 100 MMOL/L — SIGNIFICANT CHANGE UP (ref 96–108)
CO2 SERPL-SCNC: 22 MMOL/L — SIGNIFICANT CHANGE UP (ref 22–31)
CREAT SERPL-MCNC: 0.63 MG/DL — SIGNIFICANT CHANGE UP (ref 0.5–1.3)
EGFR: 126 ML/MIN/1.73M2 — SIGNIFICANT CHANGE UP
GLUCOSE BLDC GLUCOMTR-MCNC: 128 MG/DL — HIGH (ref 70–99)
GLUCOSE BLDC GLUCOMTR-MCNC: 147 MG/DL — HIGH (ref 70–99)
GLUCOSE BLDC GLUCOMTR-MCNC: 187 MG/DL — HIGH (ref 70–99)
GLUCOSE BLDC GLUCOMTR-MCNC: 199 MG/DL — HIGH (ref 70–99)
GLUCOSE SERPL-MCNC: 173 MG/DL — HIGH (ref 70–99)
HCT VFR BLD CALC: 38.1 % — LOW (ref 39–50)
HGB BLD-MCNC: 12 G/DL — LOW (ref 13–17)
MAGNESIUM SERPL-MCNC: 2 MG/DL — SIGNIFICANT CHANGE UP (ref 1.6–2.6)
MCHC RBC-ENTMCNC: 26.3 PG — LOW (ref 27–34)
MCHC RBC-ENTMCNC: 31.5 GM/DL — LOW (ref 32–36)
MCV RBC AUTO: 83.4 FL — SIGNIFICANT CHANGE UP (ref 80–100)
NRBC # BLD: 0 /100 WBCS — SIGNIFICANT CHANGE UP (ref 0–0)
PHOSPHATE SERPL-MCNC: 2.9 MG/DL — SIGNIFICANT CHANGE UP (ref 2.5–4.5)
PLATELET # BLD AUTO: 190 K/UL — SIGNIFICANT CHANGE UP (ref 150–400)
POTASSIUM SERPL-MCNC: 4 MMOL/L — SIGNIFICANT CHANGE UP (ref 3.5–5.3)
POTASSIUM SERPL-SCNC: 4 MMOL/L — SIGNIFICANT CHANGE UP (ref 3.5–5.3)
PROT SERPL-MCNC: 7.1 G/DL — SIGNIFICANT CHANGE UP (ref 6–8.3)
RBC # BLD: 4.57 M/UL — SIGNIFICANT CHANGE UP (ref 4.2–5.8)
RBC # FLD: 15.1 % — HIGH (ref 10.3–14.5)
SODIUM SERPL-SCNC: 134 MMOL/L — LOW (ref 135–145)
WBC # BLD: 5.04 K/UL — SIGNIFICANT CHANGE UP (ref 3.8–10.5)
WBC # FLD AUTO: 5.04 K/UL — SIGNIFICANT CHANGE UP (ref 3.8–10.5)

## 2023-08-06 PROCEDURE — 99232 SBSQ HOSP IP/OBS MODERATE 35: CPT | Mod: GC

## 2023-08-06 RX ADMIN — Medication 8 UNIT(S): at 12:52

## 2023-08-06 RX ADMIN — Medication 145 MILLIGRAM(S): at 11:15

## 2023-08-06 RX ADMIN — Medication 2 GRAM(S): at 17:13

## 2023-08-06 RX ADMIN — CHLORHEXIDINE GLUCONATE 1 APPLICATION(S): 213 SOLUTION TOPICAL at 06:20

## 2023-08-06 RX ADMIN — Medication 8 UNIT(S): at 17:50

## 2023-08-06 RX ADMIN — ATORVASTATIN CALCIUM 80 MILLIGRAM(S): 80 TABLET, FILM COATED ORAL at 22:14

## 2023-08-06 RX ADMIN — ENOXAPARIN SODIUM 40 MILLIGRAM(S): 100 INJECTION SUBCUTANEOUS at 06:20

## 2023-08-06 RX ADMIN — INSULIN GLARGINE 22 UNIT(S): 100 INJECTION, SOLUTION SUBCUTANEOUS at 22:14

## 2023-08-06 RX ADMIN — Medication 8 UNIT(S): at 09:36

## 2023-08-06 RX ADMIN — Medication 1: at 12:52

## 2023-08-06 NOTE — PROGRESS NOTE ADULT - ASSESSMENT
37M w/ hypertriglyceridemia (follows with Dr. Michelle), multiple admissions of pancreatitis (last adm 2015 for necrotizing pancreatitis), pancreatic pseudocyst, initially admitted to MICU for hypertriglyceridemia-induced pancreatitis s/p IVFs and insulin gtt, now transitioned to basal-bolus insulin regimen and transferred to the floors for further management. Course c/b new-onset DM2 (A1c 11.6%), currently being optimized.

## 2023-08-06 NOTE — PROGRESS NOTE ADULT - ATTENDING COMMENTS
37M w/pmh hypertriglyceridemia, triglyceride-induced pancreatitis, diabetes, multiple admissions of pancreatitis (last adm 2015 for necrotizing pancreatitis), pancreatic pseudocyst, initially admitted to MICU for hypertriglyceridemia requiring insulin drip for stabilization. Pancreatitis is now resolved and patient is tolerating regular consistency diet.     - Appreciate endocrinology recs for uncontrolled diabetes with hyperglycemia. FS overall well-controlled  - Appreciated advanced GI recommendations - plan for EUS on Monday  - MRI abdomen with splenic vein occulusion and gastric varices  - obtain doppler of abdomen to confirm    Vaishnavi Sosa MD  Division of Hospital Medicine  Contact via [employer] Teams  Office: [phone]

## 2023-08-06 NOTE — PROGRESS NOTE ADULT - PROBLEM SELECTOR PLAN 1
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 Extension of the collection into the LEFT iliopsoas. Also noted extreme narrowing and effective occlusion of the splenic vein with massive short gastric varices.  - c/w fenofibrate 145mg qd  - c/w atorvastatin 80mg qhs  - c/w Lovaza 2mg BID  - NPO after midnight Sunday 8/6 for possible EUS w/ drainage 8/7

## 2023-08-06 NOTE — PROGRESS NOTE ADULT - PROBLEM SELECTOR PLAN 2
Hx of pre-DM 2/2 multiple episodes of pancreatitis now w/ A1c of 11.6%, new diagnosis of DM2  - endocrine following; appreciate recs- final DM regimen to include basal insulin (dose TBD) + metformin 1000mg BID  - c/w Admelog 8U TIDAC, Lantus 22U qhs, and ISS; adjust per patient's insulin requirements  - monitor FS for goal -180 while inpatient  - diabetic teaching and CC diet  - will send diabetes supplies to pharmacy (glucometer, test strips, alcohol pads, lancets, pen needles)

## 2023-08-06 NOTE — PROGRESS NOTE ADULT - SUBJECTIVE AND OBJECTIVE BOX
PROGRESS NOTE:   Authored by Zeke Anglin MD  Pager:  [phone]    Patient is a 37y old  Male who presents with a chief complaint of Abdominal Pain (05 Aug 2023 07:12)      SUBJECTIVE / OVERNIGHT EVENTS:    ADDITIONAL REVIEW OF SYSTEMS:    MEDICATIONS  (STANDING):  atorvastatin 80 milliGRAM(s) Oral at bedtime  chlorhexidine 4% Liquid 1 Application(s) Topical <User Schedule>  enoxaparin Injectable 40 milliGRAM(s) SubCutaneous every 24 hours  fenofibrate Tablet 145 milliGRAM(s) Oral daily  insulin glargine Injectable (LANTUS) 22 Unit(s) SubCutaneous at bedtime  insulin lispro (ADMELOG) corrective regimen sliding scale   SubCutaneous three times a day before meals  insulin lispro (ADMELOG) corrective regimen sliding scale   SubCutaneous at bedtime  insulin lispro Injectable (ADMELOG) 8 Unit(s) SubCutaneous three times a day before meals  lidocaine   4% Patch 1 Patch Transdermal daily  naloxone Injectable 0.4 milliGRAM(s) IV Push once  omega-3-Acid Ethyl Esters 2 Gram(s) Oral two times a day  ondansetron Injectable 4 milliGRAM(s) IV Push once    MEDICATIONS  (PRN):  acetaminophen     Tablet .. 1000 milliGRAM(s) Oral every 8 hours PRN Mild Pain (1 - 3)  morphine  - Injectable 2 milliGRAM(s) IV Push every 4 hours PRN Moderate Pain (4 - 6)  morphine  - Injectable 4 milliGRAM(s) IV Push every 4 hours PRN Severe Pain (7 - 10)  simethicone 80 milliGRAM(s) Chew every 6 hours PRN Gas      CAPILLARY BLOOD GLUCOSE      POCT Blood Glucose.: 236 mg/dL (05 Aug 2023 22:16)  POCT Blood Glucose.: 244 mg/dL (05 Aug 2023 17:30)  POCT Blood Glucose.: 154 mg/dL (05 Aug 2023 13:07)  POCT Blood Glucose.: 143 mg/dL (05 Aug 2023 08:15)    I&O's Summary    05 Aug 2023 07:01  -  06 Aug 2023 07:00  --------------------------------------------------------  IN: 960 mL / OUT: 0 mL / NET: 960 mL        PHYSICAL EXAM:  Vital Signs Last 24 Hrs  T(C): 37.1 (06 Aug 2023 05:22), Max: 37.1 (06 Aug 2023 05:22)  T(F): 98.8 (06 Aug 2023 05:22), Max: 98.8 (06 Aug 2023 05:22)  HR: 74 (06 Aug 2023 05:22) (68 - 94)  BP: 113/74 (06 Aug 2023 05:22) (105/74 - 113/74)  BP(mean): --  RR: 18 (06 Aug 2023 05:22) (18 - 18)  SpO2: 97% (06 Aug 2023 05:22) (96% - 98%)    Parameters below as of 06 Aug 2023 05:22  Patient On (Oxygen Delivery Method): room air        GENERAL: No acute distress, well-developed  HEAD:  Atraumatic, Normocephalic  EYES: EOMI, PERRLA, conjunctiva and sclera clear  NECK: Supple, no lymphadenopathy, no JVD  CHEST/LUNG: CTAB; No wheezes, rales, or rhonchi  HEART: Regular rate and rhythm; No murmurs, rubs, or gallops  ABDOMEN: Soft, non-tender, non-distended; normal bowel sounds, no organomegaly  EXTREMITIES:  2+ peripheral pulses b/l, No clubbing, cyanosis, or edema  NEUROLOGY: A&O x 3, no focal deficits  SKIN: No rashes or lesions    LABS:                        12.0   5.04  )-----------( 190      ( 06 Aug 2023 06:45 )             38.1     08-05    136  |  101  |  12  ----------------------------<  178<H>  4.2   |  22  |  0.60    Ca    9.3      05 Aug 2023 06:25  Phos  3.3     08-05  Mg     2.0     08-05    TPro  7.1  /  Alb  3.5  /  TBili  0.4  /  DBili  x   /  AST  42<H>  /  ALT  33  /  AlkPhos  73  08-05          Urinalysis Basic - ( 05 Aug 2023 06:25 )    Color: x / Appearance: x / SG: x / pH: x  Gluc: 178 mg/dL / Ketone: x  / Bili: x / Urobili: x   Blood: x / Protein: x / Nitrite: x   Leuk Esterase: x / RBC: x / WBC x   Sq Epi: x / Non Sq Epi: x / Bacteria: x          RADIOLOGY & ADDITIONAL TESTS:  Results Reviewed:   Imaging Personally Reviewed:  Electrocardiogram Personally Reviewed:    COORDINATION OF CARE:  Care Discussed with Consultants/Other Providers [Y/N]:  Prior or Outpatient Records Reviewed [Y/N]:   PROGRESS NOTE:   Authored by Zeke Anglin MD  Pager:  [phone]    Patient is a 37y old  Male who presents with a chief complaint of Abdominal Pain (05 Aug 2023 07:12)      SUBJECTIVE / OVERNIGHT EVENTS: NAEO. Patient feels well. No complaints this morning.     ADDITIONAL REVIEW OF SYSTEMS:  REVIEW OF SYSTEMS:    CONSTITUTIONAL: No weakness, fevers or chills  RESPIRATORY: No cough, wheezing, hemoptysis; No shortness of breath  CARDIOVASCULAR: No chest pain or palpitations  GASTROINTESTINAL: No abdominal or epigastric pain. No nausea, vomiting, or hematemesis; No diarrhea or constipation. No melena or hematochezia.  SKIN: No itching, rashes      MEDICATIONS  (STANDING):  atorvastatin 80 milliGRAM(s) Oral at bedtime  chlorhexidine 4% Liquid 1 Application(s) Topical <User Schedule>  enoxaparin Injectable 40 milliGRAM(s) SubCutaneous every 24 hours  fenofibrate Tablet 145 milliGRAM(s) Oral daily  insulin glargine Injectable (LANTUS) 22 Unit(s) SubCutaneous at bedtime  insulin lispro (ADMELOG) corrective regimen sliding scale   SubCutaneous three times a day before meals  insulin lispro (ADMELOG) corrective regimen sliding scale   SubCutaneous at bedtime  insulin lispro Injectable (ADMELOG) 8 Unit(s) SubCutaneous three times a day before meals  lidocaine   4% Patch 1 Patch Transdermal daily  naloxone Injectable 0.4 milliGRAM(s) IV Push once  omega-3-Acid Ethyl Esters 2 Gram(s) Oral two times a day  ondansetron Injectable 4 milliGRAM(s) IV Push once    MEDICATIONS  (PRN):  acetaminophen     Tablet .. 1000 milliGRAM(s) Oral every 8 hours PRN Mild Pain (1 - 3)  morphine  - Injectable 2 milliGRAM(s) IV Push every 4 hours PRN Moderate Pain (4 - 6)  morphine  - Injectable 4 milliGRAM(s) IV Push every 4 hours PRN Severe Pain (7 - 10)  simethicone 80 milliGRAM(s) Chew every 6 hours PRN Gas      CAPILLARY BLOOD GLUCOSE      POCT Blood Glucose.: 236 mg/dL (05 Aug 2023 22:16)  POCT Blood Glucose.: 244 mg/dL (05 Aug 2023 17:30)  POCT Blood Glucose.: 154 mg/dL (05 Aug 2023 13:07)  POCT Blood Glucose.: 143 mg/dL (05 Aug 2023 08:15)    I&O's Summary    05 Aug 2023 07:01  -  06 Aug 2023 07:00  --------------------------------------------------------  IN: 960 mL / OUT: 0 mL / NET: 960 mL        PHYSICAL EXAM:  Vital Signs Last 24 Hrs  T(C): 37.1 (06 Aug 2023 05:22), Max: 37.1 (06 Aug 2023 05:22)  T(F): 98.8 (06 Aug 2023 05:22), Max: 98.8 (06 Aug 2023 05:22)  HR: 74 (06 Aug 2023 05:22) (68 - 94)  BP: 113/74 (06 Aug 2023 05:22) (105/74 - 113/74)  BP(mean): --  RR: 18 (06 Aug 2023 05:22) (18 - 18)  SpO2: 97% (06 Aug 2023 05:22) (96% - 98%)    Parameters below as of 06 Aug 2023 05:22  Patient On (Oxygen Delivery Method): room air        GENERAL: No acute distress, well-developed  HEAD:  Atraumatic, Normocephalic  EYES: EOMI, PERRLA, conjunctiva and sclera clear  NECK: Supple, no lymphadenopathy, no JVD  CHEST/LUNG: CTAB; No wheezes, rales, or rhonchi  HEART: Regular rate and rhythm; No murmurs, rubs, or gallops  ABDOMEN: Soft, non-tender, non-distended; normal bowel sounds, no organomegaly  EXTREMITIES:  2+ peripheral pulses b/l, No clubbing, cyanosis, or edema  NEUROLOGY: A&O x 3, no focal deficits  SKIN: No rashes or lesions    LABS:                        12.0   5.04  )-----------( 190      ( 06 Aug 2023 06:45 )             38.1     08-05    136  |  101  |  12  ----------------------------<  178<H>  4.2   |  22  |  0.60    Ca    9.3      05 Aug 2023 06:25  Phos  3.3     08-05  Mg     2.0     08-05    TPro  7.1  /  Alb  3.5  /  TBili  0.4  /  DBili  x   /  AST  42<H>  /  ALT  33  /  AlkPhos  73  08-05          Urinalysis Basic - ( 05 Aug 2023 06:25 )    Color: x / Appearance: x / SG: x / pH: x  Gluc: 178 mg/dL / Ketone: x  / Bili: x / Urobili: x   Blood: x / Protein: x / Nitrite: x   Leuk Esterase: x / RBC: x / WBC x   Sq Epi: x / Non Sq Epi: x / Bacteria: x          RADIOLOGY & ADDITIONAL TESTS:  Results Reviewed:   Imaging Personally Reviewed:  Electrocardiogram Personally Reviewed:    COORDINATION OF CARE:  Care Discussed with Consultants/Other Providers [Y/N]:  Prior or Outpatient Records Reviewed [Y/N]:

## 2023-08-07 LAB
ALBUMIN SERPL ELPH-MCNC: 3.8 G/DL — SIGNIFICANT CHANGE UP (ref 3.3–5)
ALP SERPL-CCNC: 76 U/L — SIGNIFICANT CHANGE UP (ref 40–120)
ALT FLD-CCNC: 49 U/L — HIGH (ref 10–45)
ANION GAP SERPL CALC-SCNC: 17 MMOL/L — SIGNIFICANT CHANGE UP (ref 5–17)
APTT BLD: 36.5 SEC — HIGH (ref 24.5–35.6)
AST SERPL-CCNC: 47 U/L — HIGH (ref 10–40)
BILIRUB SERPL-MCNC: 0.3 MG/DL — SIGNIFICANT CHANGE UP (ref 0.2–1.2)
BLD GP AB SCN SERPL QL: NEGATIVE — SIGNIFICANT CHANGE UP
BUN SERPL-MCNC: 14 MG/DL — SIGNIFICANT CHANGE UP (ref 7–23)
CALCIUM SERPL-MCNC: 9.8 MG/DL — SIGNIFICANT CHANGE UP (ref 8.4–10.5)
CHLORIDE SERPL-SCNC: 99 MMOL/L — SIGNIFICANT CHANGE UP (ref 96–108)
CO2 SERPL-SCNC: 21 MMOL/L — LOW (ref 22–31)
CREAT SERPL-MCNC: 0.6 MG/DL — SIGNIFICANT CHANGE UP (ref 0.5–1.3)
EGFR: 128 ML/MIN/1.73M2 — SIGNIFICANT CHANGE UP
GLUCOSE BLDC GLUCOMTR-MCNC: 113 MG/DL — HIGH (ref 70–99)
GLUCOSE BLDC GLUCOMTR-MCNC: 122 MG/DL — HIGH (ref 70–99)
GLUCOSE BLDC GLUCOMTR-MCNC: 148 MG/DL — HIGH (ref 70–99)
GLUCOSE BLDC GLUCOMTR-MCNC: 368 MG/DL — HIGH (ref 70–99)
GLUCOSE SERPL-MCNC: 144 MG/DL — HIGH (ref 70–99)
HCT VFR BLD CALC: 40.3 % — SIGNIFICANT CHANGE UP (ref 39–50)
HGB BLD-MCNC: 13 G/DL — SIGNIFICANT CHANGE UP (ref 13–17)
INR BLD: 1.08 RATIO — SIGNIFICANT CHANGE UP (ref 0.85–1.18)
MAGNESIUM SERPL-MCNC: 2.1 MG/DL — SIGNIFICANT CHANGE UP (ref 1.6–2.6)
MCHC RBC-ENTMCNC: 26.5 PG — LOW (ref 27–34)
MCHC RBC-ENTMCNC: 32.3 GM/DL — SIGNIFICANT CHANGE UP (ref 32–36)
MCV RBC AUTO: 82.1 FL — SIGNIFICANT CHANGE UP (ref 80–100)
NRBC # BLD: 0 /100 WBCS — SIGNIFICANT CHANGE UP (ref 0–0)
PHOSPHATE SERPL-MCNC: 3.4 MG/DL — SIGNIFICANT CHANGE UP (ref 2.5–4.5)
PLATELET # BLD AUTO: 212 K/UL — SIGNIFICANT CHANGE UP (ref 150–400)
POTASSIUM SERPL-MCNC: 4 MMOL/L — SIGNIFICANT CHANGE UP (ref 3.5–5.3)
POTASSIUM SERPL-SCNC: 4 MMOL/L — SIGNIFICANT CHANGE UP (ref 3.5–5.3)
PROT SERPL-MCNC: 7.7 G/DL — SIGNIFICANT CHANGE UP (ref 6–8.3)
PROTHROM AB SERPL-ACNC: 11.9 SEC — SIGNIFICANT CHANGE UP (ref 9.5–13)
RBC # BLD: 4.91 M/UL — SIGNIFICANT CHANGE UP (ref 4.2–5.8)
RBC # FLD: 14.9 % — HIGH (ref 10.3–14.5)
RH IG SCN BLD-IMP: POSITIVE — SIGNIFICANT CHANGE UP
SODIUM SERPL-SCNC: 137 MMOL/L — SIGNIFICANT CHANGE UP (ref 135–145)
WBC # BLD: 5.63 K/UL — SIGNIFICANT CHANGE UP (ref 3.8–10.5)
WBC # FLD AUTO: 5.63 K/UL — SIGNIFICANT CHANGE UP (ref 3.8–10.5)

## 2023-08-07 PROCEDURE — 99232 SBSQ HOSP IP/OBS MODERATE 35: CPT | Mod: GC

## 2023-08-07 PROCEDURE — 43240 EGD W/TRANSMURAL DRAIN CYST: CPT

## 2023-08-07 PROCEDURE — 93975 VASCULAR STUDY: CPT | Mod: 26

## 2023-08-07 DEVICE — BLLN EXTRACT FUSION QUATRO 8.5 10 12 15MM: Type: IMPLANTABLE DEVICE | Status: FUNCTIONAL

## 2023-08-07 DEVICE — AUTOTOME CANNULATING SPHINCTEROTOME RX 44 20MM: Type: IMPLANTABLE DEVICE | Status: FUNCTIONAL

## 2023-08-07 RX ORDER — INSULIN LISPRO 100/ML
VIAL (ML) SUBCUTANEOUS EVERY 6 HOURS
Refills: 0 | Status: DISCONTINUED | OUTPATIENT
Start: 2023-08-07 | End: 2023-08-07

## 2023-08-07 RX ORDER — INSULIN LISPRO 100/ML
VIAL (ML) SUBCUTANEOUS
Refills: 0 | Status: DISCONTINUED | OUTPATIENT
Start: 2023-08-07 | End: 2023-08-08

## 2023-08-07 RX ORDER — INSULIN LISPRO 100/ML
VIAL (ML) SUBCUTANEOUS AT BEDTIME
Refills: 0 | Status: DISCONTINUED | OUTPATIENT
Start: 2023-08-07 | End: 2023-08-08

## 2023-08-07 RX ADMIN — Medication 8 UNIT(S): at 19:18

## 2023-08-07 RX ADMIN — CHLORHEXIDINE GLUCONATE 1 APPLICATION(S): 213 SOLUTION TOPICAL at 06:22

## 2023-08-07 RX ADMIN — Medication 3: at 22:59

## 2023-08-07 RX ADMIN — INSULIN GLARGINE 22 UNIT(S): 100 INJECTION, SOLUTION SUBCUTANEOUS at 22:59

## 2023-08-07 RX ADMIN — Medication 145 MILLIGRAM(S): at 19:19

## 2023-08-07 RX ADMIN — Medication 2 GRAM(S): at 19:18

## 2023-08-07 RX ADMIN — ATORVASTATIN CALCIUM 80 MILLIGRAM(S): 80 TABLET, FILM COATED ORAL at 22:39

## 2023-08-07 NOTE — PROGRESS NOTE ADULT - PROBLEM SELECTOR PLAN 1
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 Extension of the collection into the LEFT iliopsoas. Also noted extreme narrowing and effective occlusion of the splenic vein with massive short gastric varices.  - c/w fenofibrate 145mg qd  - c/w atorvastatin 80mg qhs  - c/w Lovaza 2mg BID  - NPO after midnight Sunday 8/6 for possible EUS w/ drainage 8/7

## 2023-08-07 NOTE — PROGRESS NOTE ADULT - SUBJECTIVE AND OBJECTIVE BOX
*******************************  [facility]  [facility]  Contact via [employer] TEAMS  *******************************    PROGRESS NOTE:     Patient is a 37y old  Male who presents with a chief complaint of Abdominal Pain (06 Aug 2023 07:22)      INTERVAL EVENTS: No acute overnight events.     SUBJECTIVE: Patient seen and examined at bedside. This morning, the patient is comfortable and doing well. He reports no abdominal pain, N/V/D and is ambulating well, eating well, and toileting well. He reports that he is willing to use insulin outpatient despite his work schedule if it will be the best way to control his blood sugars. No acute complaints. Denies fevers, chills, chest pain, SOB,    MEDICATIONS  (STANDING):  atorvastatin 80 milliGRAM(s) Oral at bedtime  chlorhexidine 4% Liquid 1 Application(s) Topical <User Schedule>  enoxaparin Injectable 40 milliGRAM(s) SubCutaneous every 24 hours  fenofibrate Tablet 145 milliGRAM(s) Oral daily  insulin glargine Injectable (LANTUS) 22 Unit(s) SubCutaneous at bedtime  insulin lispro (ADMELOG) corrective regimen sliding scale   SubCutaneous three times a day before meals  insulin lispro (ADMELOG) corrective regimen sliding scale   SubCutaneous at bedtime  insulin lispro Injectable (ADMELOG) 8 Unit(s) SubCutaneous three times a day before meals  lidocaine   4% Patch 1 Patch Transdermal daily  naloxone Injectable 0.4 milliGRAM(s) IV Push once  omega-3-Acid Ethyl Esters 2 Gram(s) Oral two times a day  ondansetron Injectable 4 milliGRAM(s) IV Push once    MEDICATIONS  (PRN):  acetaminophen     Tablet .. 1000 milliGRAM(s) Oral every 8 hours PRN Mild Pain (1 - 3)  morphine  - Injectable 2 milliGRAM(s) IV Push every 4 hours PRN Moderate Pain (4 - 6)  morphine  - Injectable 4 milliGRAM(s) IV Push every 4 hours PRN Severe Pain (7 - 10)  simethicone 80 milliGRAM(s) Chew every 6 hours PRN Gas      CAPILLARY BLOOD GLUCOSE      POCT Blood Glucose.: 187 mg/dL (06 Aug 2023 22:06)  POCT Blood Glucose.: 128 mg/dL (06 Aug 2023 17:28)  POCT Blood Glucose.: 199 mg/dL (06 Aug 2023 12:37)  POCT Blood Glucose.: 147 mg/dL (06 Aug 2023 08:30)    I&O's Summary    06 Aug 2023 07:01  -  07 Aug 2023 07:00  --------------------------------------------------------  IN: 240 mL / OUT: 0 mL / NET: 240 mL        PHYSICAL EXAM:  Vital Signs Last 24 Hrs  T(C): 36.4 (07 Aug 2023 04:50), Max: 36.9 (06 Aug 2023 12:58)  T(F): 97.6 (07 Aug 2023 04:50), Max: 98.4 (06 Aug 2023 12:58)  HR: 61 (07 Aug 2023 04:50) (61 - 88)  BP: 102/67 (07 Aug 2023 04:50) (102/67 - 118/77)  BP(mean): --  RR: 18 (07 Aug 2023 04:50) (18 - 18)  SpO2: 98% (07 Aug 2023 04:50) (97% - 99%)    Parameters below as of 07 Aug 2023 04:50  Patient On (Oxygen Delivery Method): room air        GENERAL: NAD, lying in bed comfortably  HEAD: Atraumatic, normocephalic  EYES: EOMI, PERRLA, conjunctiva and sclera clear  ENT: Moist mucous membranes  NECK: Supple, no JVD  HEART: S1, S2, Regular rate and rhythm, no murmurs, rubs, or gallops  LUNGS: Unlabored respirations, clear to auscultation bilaterally, no crackles, wheezing, or rhonchi  ABDOMEN: Soft, nontender, nondistended, +BS  EXTREMITIES: 2+ peripheral pulses bilaterally. No clubbing, cyanosis, or edema  NERVOUS SYSTEM:  A&Ox3, no focal deficits   SKIN: No rashes or lesions    LABS:                        13.0   5.63  )-----------( 212      ( 07 Aug 2023 07:02 )             40.3     08-07    137  |  99  |  14  ----------------------------<  144<H>  4.0   |  21<L>  |  0.60    Ca    9.8      07 Aug 2023 07:02  Phos  3.4     08-07  Mg     2.1     08-07    TPro  7.7  /  Alb  3.8  /  TBili  0.3  /  DBili  x   /  AST  47<H>  /  ALT  49<H>  /  AlkPhos  76  08-07    PT/INR - ( 07 Aug 2023 07:02 )   PT: 11.9 sec;   INR: 1.08 ratio         PTT - ( 07 Aug 2023 07:02 )  PTT:36.5 sec      Urinalysis Basic - ( 07 Aug 2023 07:02 )    Color: x / Appearance: x / SG: x / pH: x  Gluc: 144 mg/dL / Ketone: x  / Bili: x / Urobili: x   Blood: x / Protein: x / Nitrite: x   Leuk Esterase: x / RBC: x / WBC x   Sq Epi: x / Non Sq Epi: x / Bacteria: x          RADIOLOGY & ADDITIONAL TESTS:  Results Reviewed:   Imaging Personally Reviewed:  Electrocardiogram Personally Reviewed:  Tele:

## 2023-08-07 NOTE — PRE-ANESTHESIA EVALUATION ADULT - HEIGHT IN FEET
If you are a smoker, it is important for your health to stop smoking. Please be aware that second hand smoke is also harmful.
5

## 2023-08-07 NOTE — PROGRESS NOTE ADULT - SUBJECTIVE AND OBJECTIVE BOX
Admitted for: Acute pancreatitis without infection or necrosis        Following for:    Subjective:       MEDICATIONS  (STANDING):  atorvastatin 80 milliGRAM(s) Oral at bedtime  chlorhexidine 4% Liquid 1 Application(s) Topical <User Schedule>  enoxaparin Injectable 40 milliGRAM(s) SubCutaneous every 24 hours  fenofibrate Tablet 145 milliGRAM(s) Oral daily  insulin glargine Injectable (LANTUS) 22 Unit(s) SubCutaneous at bedtime  insulin lispro (ADMELOG) corrective regimen sliding scale   SubCutaneous three times a day before meals  insulin lispro (ADMELOG) corrective regimen sliding scale   SubCutaneous at bedtime  insulin lispro Injectable (ADMELOG) 8 Unit(s) SubCutaneous three times a day before meals  lidocaine   4% Patch 1 Patch Transdermal daily  naloxone Injectable 0.4 milliGRAM(s) IV Push once  omega-3-Acid Ethyl Esters 2 Gram(s) Oral two times a day  ondansetron Injectable 4 milliGRAM(s) IV Push once    MEDICATIONS  (PRN):  acetaminophen     Tablet .. 1000 milliGRAM(s) Oral every 8 hours PRN Mild Pain (1 - 3)  morphine  - Injectable 4 milliGRAM(s) IV Push every 4 hours PRN Severe Pain (7 - 10)  morphine  - Injectable 2 milliGRAM(s) IV Push every 4 hours PRN Moderate Pain (4 - 6)  simethicone 80 milliGRAM(s) Chew every 6 hours PRN Gas      Allergies    No Known Allergies    Intolerances          PHYSICAL EXAM:  VITALS: T(C): 36.4 (08-07-23 @ 04:50)  T(F): 97.6 (08-07-23 @ 04:50), Max: 98.4 (08-06-23 @ 12:58)  HR: 61 (08-07-23 @ 04:50) (61 - 88)  BP: 102/67 (08-07-23 @ 04:50) (102/67 - 118/77)  RR:  (18 - 18)  SpO2:  (97% - 99%)  Wt(kg): --  GENERAL: NAD  EYES: No proptosis, no lid lag, anicteric  RESPIRATORY: Clear to auscultation bilaterally  CARDIOVASCULAR: Regular rate and rhythm  GI: Soft, nontender, non distended  EXT: b/l feet without wounds, 2+ pulses  PSYCH: Alert and oriented x 3, reactive affect      A1C with Estimated Average Glucose Result: 11.6 % (08-01-23 @ 06:23)      POCT Blood Glucose.: 122 mg/dL (08-07-23 @ 08:25)  POCT Blood Glucose.: 187 mg/dL (08-06-23 @ 22:06)  POCT Blood Glucose.: 128 mg/dL (08-06-23 @ 17:28)  POCT Blood Glucose.: 199 mg/dL (08-06-23 @ 12:37)  POCT Blood Glucose.: 147 mg/dL (08-06-23 @ 08:30)  POCT Blood Glucose.: 236 mg/dL (08-05-23 @ 22:16)  POCT Blood Glucose.: 244 mg/dL (08-05-23 @ 17:30)  POCT Blood Glucose.: 154 mg/dL (08-05-23 @ 13:07)  POCT Blood Glucose.: 143 mg/dL (08-05-23 @ 08:15)  POCT Blood Glucose.: 143 mg/dL (08-04-23 @ 21:06)  POCT Blood Glucose.: 163 mg/dL (08-04-23 @ 17:42)  POCT Blood Glucose.: 185 mg/dL (08-04-23 @ 12:34)      08-07    137  |  99  |  14  ----------------------------<  144<H>  4.0   |  21<L>  |  0.60    eGFR: 128    Ca    9.8      08-07  Mg     2.1     08-07  Phos  3.4     08-07    TPro  7.7  /  Alb  3.8  /  TBili  0.3  /  DBili  x   /  AST  47<H>  /  ALT  49<H>  /  AlkPhos  76  08-07      Thyroid Function Tests:                         Admitted for: Acute pancreatitis without infection or necrosis        Following for: HyperTG induced pancreatitis     Subjective:   - Per primary team, patient is NPO for possible EUS    MEDICATIONS  (STANDING):  atorvastatin 80 milliGRAM(s) Oral at bedtime  chlorhexidine 4% Liquid 1 Application(s) Topical <User Schedule>  enoxaparin Injectable 40 milliGRAM(s) SubCutaneous every 24 hours  fenofibrate Tablet 145 milliGRAM(s) Oral daily  insulin glargine Injectable (LANTUS) 22 Unit(s) SubCutaneous at bedtime  insulin lispro (ADMELOG) corrective regimen sliding scale   SubCutaneous three times a day before meals  insulin lispro (ADMELOG) corrective regimen sliding scale   SubCutaneous at bedtime  insulin lispro Injectable (ADMELOG) 8 Unit(s) SubCutaneous three times a day before meals  lidocaine   4% Patch 1 Patch Transdermal daily  naloxone Injectable 0.4 milliGRAM(s) IV Push once  omega-3-Acid Ethyl Esters 2 Gram(s) Oral two times a day  ondansetron Injectable 4 milliGRAM(s) IV Push once    MEDICATIONS  (PRN):  acetaminophen     Tablet .. 1000 milliGRAM(s) Oral every 8 hours PRN Mild Pain (1 - 3)  morphine  - Injectable 4 milliGRAM(s) IV Push every 4 hours PRN Severe Pain (7 - 10)  morphine  - Injectable 2 milliGRAM(s) IV Push every 4 hours PRN Moderate Pain (4 - 6)  simethicone 80 milliGRAM(s) Chew every 6 hours PRN Gas      Allergies    No Known Allergies    Intolerances          PHYSICAL EXAM:  VITALS: T(C): 36.4 (08-07-23 @ 04:50)  T(F): 97.6 (08-07-23 @ 04:50), Max: 98.4 (08-06-23 @ 12:58)  HR: 61 (08-07-23 @ 04:50) (61 - 88)  BP: 102/67 (08-07-23 @ 04:50) (102/67 - 118/77)  RR:  (18 - 18)  SpO2:  (97% - 99%)  Wt(kg): --  GENERAL: NAD  EYES: No proptosis, no lid lag, anicteric  RESPIRATORY: No respiratory distress  CARDIOVASCULAR: No peripheral edema.  GI: soft, non distended. TTP in LLQ RLQ  EXT: No edema  PSYCH: Alert and oriented x 3, reactive affect      A1C with Estimated Average Glucose Result: 11.6 % (08-01-23 @ 06:23)      POCT Blood Glucose.: 122 mg/dL (08-07-23 @ 08:25)  POCT Blood Glucose.: 187 mg/dL (08-06-23 @ 22:06)  POCT Blood Glucose.: 128 mg/dL (08-06-23 @ 17:28)  POCT Blood Glucose.: 199 mg/dL (08-06-23 @ 12:37)  POCT Blood Glucose.: 147 mg/dL (08-06-23 @ 08:30)  POCT Blood Glucose.: 236 mg/dL (08-05-23 @ 22:16)  POCT Blood Glucose.: 244 mg/dL (08-05-23 @ 17:30)  POCT Blood Glucose.: 154 mg/dL (08-05-23 @ 13:07)  POCT Blood Glucose.: 143 mg/dL (08-05-23 @ 08:15)  POCT Blood Glucose.: 143 mg/dL (08-04-23 @ 21:06)  POCT Blood Glucose.: 163 mg/dL (08-04-23 @ 17:42)  POCT Blood Glucose.: 185 mg/dL (08-04-23 @ 12:34)      08-07    137  |  99  |  14  ----------------------------<  144<H>  4.0   |  21<L>  |  0.60    eGFR: 128    Ca    9.8      08-07  Mg     2.1     08-07  Phos  3.4     08-07    TPro  7.7  /  Alb  3.8  /  TBili  0.3  /  DBili  x   /  AST  47<H>  /  ALT  49<H>  /  AlkPhos  76  08-07      Thyroid Function Tests:

## 2023-08-07 NOTE — PRE PROCEDURE NOTE - PRE PROCEDURE EVALUATION
Attending Physician: Dr Shah                           Procedure: EUS/AXIOS stent     Indication for Procedure: pancreatic collection   ________________________________________________________  PAST MEDICAL & SURGICAL HISTORY:  Pancreatitis      Pancreatic pseudocyst      Necrotizing pancreatitis      Hypertriglyceridemia      No significant past surgical history        ALLERGIES:  No Known Allergies    HOME MEDICATIONS:  gemfibrozil 600 mg oral tablet: 1 tab(s) orally 2 times a day (before meals)    AICD/PPM: [ ] yes   [x ] no    PERTINENT LAB DATA:                        13.0   5.63  )-----------( 212      ( 07 Aug 2023 07:02 )             40.3     08-07    137  |  99  |  14  ----------------------------<  144<H>  4.0   |  21<L>  |  0.60    Ca    9.8      07 Aug 2023 07:02  Phos  3.4     08-07  Mg     2.1     08-07    TPro  7.7  /  Alb  3.8  /  TBili  0.3  /  DBili  x   /  AST  47<H>  /  ALT  49<H>  /  AlkPhos  76  08-07    PT/INR - ( 07 Aug 2023 07:02 )   PT: 11.9 sec;   INR: 1.08 ratio         PTT - ( 07 Aug 2023 07:02 )  PTT:36.5 sec            PHYSICAL EXAMINATION:    T(C): 36.2  HR: 77  BP: 112/69  RR: 13  SpO2: 97%    Constitutional: NAD    Neck:  No JVD  Respiratory: no respiratory distress   Cardiovascular: RRR  Extremities: No peripheral edema  Neurological: A/O x 3, no focal deficits        COMMENTS:    The patient is a suitable candidate for the planned procedure unless box checked [ ]  No, explain:

## 2023-08-07 NOTE — PROGRESS NOTE ADULT - ATTENDING COMMENTS
37M w/pmh hypertriglyceridemia, triglyceride-induced pancreatitis, diabetes, multiple admissions of pancreatitis (last adm 2015 for necrotizing pancreatitis), pancreatic pseudocyst, initially admitted to MICU for hypertriglyceridemia requiring insulin drip for stabilization. Pancreatitis is now resolved and patient is tolerating regular consistency diet.     - Appreciate endocrinology recs for uncontrolled diabetes with hyperglycemia. FS overall well-controlled.  - Appreciated advanced GI recommendations - plan for EUS 8/7  - MRI abdomen also with splenic vein occulusion and gastric varices, will ask radiology if they think it's related to mass effect from pseudocyst  - obtain doppler of abdomen to confirm above    Possible discharge home 8/8.

## 2023-08-07 NOTE — PRE-OP CHECKLIST - WARM FLUIDS/WARM BLANKETS
Spoke with crna regarding IM vistaril ordered by Dr Carren Denver. She did not wish to administer it when patient was asleep. no

## 2023-08-07 NOTE — CHART NOTE - NSCHARTNOTEFT_GEN_A_CORE
pt complaining of upper abd/ rib cage pain N/V x 1 episode since yesterday 12 noon with cough denies fever chills Unable to see patient x 2 -- patient was not present; at US and then at endoscopy.    Chart reviewed.    37-year-old male patient PMH of pre-DM (diagnosed 2 years ago), hypertriglyceridemia (follows with Dr. Michelle), multiple admissions of pancreatitis (last adm 2015 for necrotizing pancreatitis), pancreatic cyst/collection who presents to the emergency department for abdominal pain, nausea x 2 days. Found to have acute pancreatitis secondary to elevated triglycerides. A1c was 11.6%.    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 Hx of recurrent pancreatitis and pancreatic cyst/collection.  - c/w continue fenofibrate 145mg daily, atorvastatin 80mg QHS, and lovaza 2mg BID.  - Low fat/ CC diet     #T2DM  Hx of prediabetes.  A1c found to be 11.6%, new diagnosis of T2DM  Home regimen: Metformin ?dose BID, stopped 2 months ago.  C-peptide 1.6 with glc 162, not concerning for HORTENCIA or T1DM.  - Continue Lantus 22 units QHS  - Once resumes diet, resume Admelog 8 units TIDAC  - low dose correction scale q6h while NPO. Once resumes diet, low dose correction scale TIDAC and low dose correction QHS    For discharge:  - Recommend Lantus 22 units QHS + metformin 1000mg BID.  - Please send RX for basal insulin pen (ie. Basaglar, Lantus, Tresiba, Toujeo, Levemir) to see which is covered.  - Please send prescriptions for Freestyle Luz 2 sensor x2 and reader.   - Please send prescriptions for diabetes supplies (glucometer, test strips, lancets, alcohol swabs, insulin pen needles).  - Please begin insulin pen teaching, glucometer teaching.  - Patient will need follow up with Endocrinology. Will email to find clinic close to his home.    Robbie Cardenas MD  Endocrine Fellow  Can be reached via teams. For follow up questions, discharge recommendations, or new consults, please call answering service at [phone] (weekdays); [phone] (nights/weekends).

## 2023-08-07 NOTE — PRE-ANESTHESIA EVALUATION ADULT - NSANTHOSAYNRD_GEN_A_CORE
No. KARTHIK screening performed.  STOP BANG Legend: 0-2 = LOW Risk; 3-4 = INTERMEDIATE Risk; 5-8 = HIGH Risk

## 2023-08-08 ENCOUNTER — TRANSCRIPTION ENCOUNTER (OUTPATIENT)
Age: 38
End: 2023-08-08

## 2023-08-08 VITALS
RESPIRATION RATE: 18 BRPM | DIASTOLIC BLOOD PRESSURE: 66 MMHG | OXYGEN SATURATION: 96 % | SYSTOLIC BLOOD PRESSURE: 103 MMHG | TEMPERATURE: 99 F | HEART RATE: 87 BPM

## 2023-08-08 LAB
ALBUMIN SERPL ELPH-MCNC: 3.6 G/DL — SIGNIFICANT CHANGE UP (ref 3.3–5)
ALP SERPL-CCNC: 78 U/L — SIGNIFICANT CHANGE UP (ref 40–120)
ALT FLD-CCNC: 41 U/L — SIGNIFICANT CHANGE UP (ref 10–45)
ANION GAP SERPL CALC-SCNC: 14 MMOL/L — SIGNIFICANT CHANGE UP (ref 5–17)
APTT BLD: 33.6 SEC — SIGNIFICANT CHANGE UP (ref 24.5–35.6)
AST SERPL-CCNC: 32 U/L — SIGNIFICANT CHANGE UP (ref 10–40)
BILIRUB SERPL-MCNC: 0.2 MG/DL — SIGNIFICANT CHANGE UP (ref 0.2–1.2)
BUN SERPL-MCNC: 15 MG/DL — SIGNIFICANT CHANGE UP (ref 7–23)
CALCIUM SERPL-MCNC: 9.1 MG/DL — SIGNIFICANT CHANGE UP (ref 8.4–10.5)
CHLORIDE SERPL-SCNC: 100 MMOL/L — SIGNIFICANT CHANGE UP (ref 96–108)
CO2 SERPL-SCNC: 21 MMOL/L — LOW (ref 22–31)
CREAT SERPL-MCNC: 0.63 MG/DL — SIGNIFICANT CHANGE UP (ref 0.5–1.3)
EGFR: 126 ML/MIN/1.73M2 — SIGNIFICANT CHANGE UP
GLUCOSE BLDC GLUCOMTR-MCNC: 123 MG/DL — HIGH (ref 70–99)
GLUCOSE BLDC GLUCOMTR-MCNC: 140 MG/DL — HIGH (ref 70–99)
GLUCOSE BLDC GLUCOMTR-MCNC: 149 MG/DL — HIGH (ref 70–99)
GLUCOSE BLDC GLUCOMTR-MCNC: 160 MG/DL — HIGH (ref 70–99)
GLUCOSE SERPL-MCNC: 182 MG/DL — HIGH (ref 70–99)
HCT VFR BLD CALC: 38 % — LOW (ref 39–50)
HGB BLD-MCNC: 12.5 G/DL — LOW (ref 13–17)
INR BLD: 1.16 RATIO — SIGNIFICANT CHANGE UP (ref 0.85–1.18)
MAGNESIUM SERPL-MCNC: 2 MG/DL — SIGNIFICANT CHANGE UP (ref 1.6–2.6)
MCHC RBC-ENTMCNC: 27 PG — SIGNIFICANT CHANGE UP (ref 27–34)
MCHC RBC-ENTMCNC: 32.9 GM/DL — SIGNIFICANT CHANGE UP (ref 32–36)
MCV RBC AUTO: 82.1 FL — SIGNIFICANT CHANGE UP (ref 80–100)
NRBC # BLD: 0 /100 WBCS — SIGNIFICANT CHANGE UP (ref 0–0)
PHOSPHATE SERPL-MCNC: 3.3 MG/DL — SIGNIFICANT CHANGE UP (ref 2.5–4.5)
PLATELET # BLD AUTO: 246 K/UL — SIGNIFICANT CHANGE UP (ref 150–400)
POTASSIUM SERPL-MCNC: 3.9 MMOL/L — SIGNIFICANT CHANGE UP (ref 3.5–5.3)
POTASSIUM SERPL-SCNC: 3.9 MMOL/L — SIGNIFICANT CHANGE UP (ref 3.5–5.3)
PROT SERPL-MCNC: 7.2 G/DL — SIGNIFICANT CHANGE UP (ref 6–8.3)
PROTHROM AB SERPL-ACNC: 12.1 SEC — SIGNIFICANT CHANGE UP (ref 9.5–13)
RBC # BLD: 4.63 M/UL — SIGNIFICANT CHANGE UP (ref 4.2–5.8)
RBC # FLD: 15.1 % — HIGH (ref 10.3–14.5)
SODIUM SERPL-SCNC: 135 MMOL/L — SIGNIFICANT CHANGE UP (ref 135–145)
WBC # BLD: 6 K/UL — SIGNIFICANT CHANGE UP (ref 3.8–10.5)
WBC # FLD AUTO: 6 K/UL — SIGNIFICANT CHANGE UP (ref 3.8–10.5)

## 2023-08-08 PROCEDURE — 80053 COMPREHEN METABOLIC PANEL: CPT

## 2023-08-08 PROCEDURE — 83036 HEMOGLOBIN GLYCOSYLATED A1C: CPT

## 2023-08-08 PROCEDURE — A9585: CPT

## 2023-08-08 PROCEDURE — 84100 ASSAY OF PHOSPHORUS: CPT

## 2023-08-08 PROCEDURE — 84681 ASSAY OF C-PEPTIDE: CPT

## 2023-08-08 PROCEDURE — 85027 COMPLETE CBC AUTOMATED: CPT

## 2023-08-08 PROCEDURE — 85025 COMPLETE CBC W/AUTO DIFF WBC: CPT

## 2023-08-08 PROCEDURE — 85014 HEMATOCRIT: CPT

## 2023-08-08 PROCEDURE — 83735 ASSAY OF MAGNESIUM: CPT

## 2023-08-08 PROCEDURE — 86901 BLOOD TYPING SEROLOGIC RH(D): CPT

## 2023-08-08 PROCEDURE — 99239 HOSP IP/OBS DSCHRG MGMT >30: CPT | Mod: GC

## 2023-08-08 PROCEDURE — 82962 GLUCOSE BLOOD TEST: CPT

## 2023-08-08 PROCEDURE — 84132 ASSAY OF SERUM POTASSIUM: CPT

## 2023-08-08 PROCEDURE — 74177 CT ABD & PELVIS W/CONTRAST: CPT | Mod: MA

## 2023-08-08 PROCEDURE — 82947 ASSAY GLUCOSE BLOOD QUANT: CPT

## 2023-08-08 PROCEDURE — 84478 ASSAY OF TRIGLYCERIDES: CPT

## 2023-08-08 PROCEDURE — 99285 EMERGENCY DEPT VISIT HI MDM: CPT | Mod: 25

## 2023-08-08 PROCEDURE — 80048 BASIC METABOLIC PNL TOTAL CA: CPT

## 2023-08-08 PROCEDURE — 85018 HEMOGLOBIN: CPT

## 2023-08-08 PROCEDURE — 99233 SBSQ HOSP IP/OBS HIGH 50: CPT

## 2023-08-08 PROCEDURE — 83605 ASSAY OF LACTIC ACID: CPT

## 2023-08-08 PROCEDURE — 99233 SBSQ HOSP IP/OBS HIGH 50: CPT | Mod: GC

## 2023-08-08 PROCEDURE — 85610 PROTHROMBIN TIME: CPT

## 2023-08-08 PROCEDURE — C9399: CPT

## 2023-08-08 PROCEDURE — 82435 ASSAY OF BLOOD CHLORIDE: CPT

## 2023-08-08 PROCEDURE — 82465 ASSAY BLD/SERUM CHOLESTEROL: CPT

## 2023-08-08 PROCEDURE — 83690 ASSAY OF LIPASE: CPT

## 2023-08-08 PROCEDURE — 96376 TX/PRO/DX INJ SAME DRUG ADON: CPT

## 2023-08-08 PROCEDURE — 86900 BLOOD TYPING SEROLOGIC ABO: CPT

## 2023-08-08 PROCEDURE — 97165 OT EVAL LOW COMPLEX 30 MIN: CPT

## 2023-08-08 PROCEDURE — 82330 ASSAY OF CALCIUM: CPT

## 2023-08-08 PROCEDURE — 82803 BLOOD GASES ANY COMBINATION: CPT

## 2023-08-08 PROCEDURE — 74183 MRI ABD W/O CNTR FLWD CNTR: CPT

## 2023-08-08 PROCEDURE — 85730 THROMBOPLASTIN TIME PARTIAL: CPT

## 2023-08-08 PROCEDURE — 84295 ASSAY OF SERUM SODIUM: CPT

## 2023-08-08 PROCEDURE — 93975 VASCULAR STUDY: CPT

## 2023-08-08 PROCEDURE — 86850 RBC ANTIBODY SCREEN: CPT

## 2023-08-08 PROCEDURE — 96375 TX/PRO/DX INJ NEW DRUG ADDON: CPT

## 2023-08-08 PROCEDURE — 96374 THER/PROPH/DIAG INJ IV PUSH: CPT

## 2023-08-08 PROCEDURE — 36415 COLL VENOUS BLD VENIPUNCTURE: CPT

## 2023-08-08 RX ORDER — OMEGA-3 ACID ETHYL ESTERS 1 G
2 CAPSULE ORAL
Qty: 120 | Refills: 0
Start: 2023-08-08 | End: 2023-09-06

## 2023-08-08 RX ORDER — ISOPROPYL ALCOHOL, BENZOCAINE .7; .06 ML/ML; ML/ML
0 SWAB TOPICAL
Qty: 100 | Refills: 1
Start: 2023-08-08

## 2023-08-08 RX ORDER — FENOFIBRATE,MICRONIZED 130 MG
1 CAPSULE ORAL
Qty: 30 | Refills: 0
Start: 2023-08-08 | End: 2023-09-06

## 2023-08-08 RX ORDER — METFORMIN HYDROCHLORIDE 850 MG/1
1 TABLET ORAL
Qty: 60 | Refills: 0
Start: 2023-08-08 | End: 2023-09-06

## 2023-08-08 RX ORDER — INSULIN GLARGINE 100 [IU]/ML
22 INJECTION, SOLUTION SUBCUTANEOUS
Qty: 3 | Refills: 0
Start: 2023-08-08 | End: 2023-09-06

## 2023-08-08 RX ORDER — ATORVASTATIN CALCIUM 80 MG/1
1 TABLET, FILM COATED ORAL
Qty: 30 | Refills: 0
Start: 2023-08-08 | End: 2023-09-06

## 2023-08-08 RX ADMIN — Medication 1: at 18:15

## 2023-08-08 RX ADMIN — Medication 2 GRAM(S): at 05:23

## 2023-08-08 RX ADMIN — ENOXAPARIN SODIUM 40 MILLIGRAM(S): 100 INJECTION SUBCUTANEOUS at 05:23

## 2023-08-08 RX ADMIN — INSULIN GLARGINE 22 UNIT(S): 100 INJECTION, SOLUTION SUBCUTANEOUS at 21:37

## 2023-08-08 RX ADMIN — Medication 8 UNIT(S): at 12:59

## 2023-08-08 RX ADMIN — Medication 2 GRAM(S): at 18:16

## 2023-08-08 RX ADMIN — ATORVASTATIN CALCIUM 80 MILLIGRAM(S): 80 TABLET, FILM COATED ORAL at 21:37

## 2023-08-08 RX ADMIN — CHLORHEXIDINE GLUCONATE 1 APPLICATION(S): 213 SOLUTION TOPICAL at 06:46

## 2023-08-08 RX ADMIN — Medication 8 UNIT(S): at 08:30

## 2023-08-08 RX ADMIN — Medication 145 MILLIGRAM(S): at 12:59

## 2023-08-08 RX ADMIN — Medication 8 UNIT(S): at 18:14

## 2023-08-08 NOTE — PROGRESS NOTE ADULT - ATTENDING COMMENTS
37M w/pmh hypertriglyceridemia, triglyceride-induced pancreatitis, diabetes, multiple admissions of pancreatitis (last adm 2015 for necrotizing pancreatitis), pancreatic pseudocyst, initially admitted to MICU for hypertriglyceridemia requiring insulin drip for stabilization. Pancreatitis is now resolved and patient is tolerating regular consistency diet.     - Appreciate endocrinology recs for uncontrolled diabetes with hyperglycemia. FS overall well-controlled.  - Appreciate advanced GI recommendations - s/p EUS 8/7 w/cystogastrostomy stent placed to drain pseudocyst. WIll need repeat CT abd in 1 week to plan for stent removal in 2-3 weeks.  - abdominal ultrasound w/doppler showed chronic splenic vein thrombosis - will ask GI if they feel this needs anticoagulation; patient is asymptomatic, it has been present for years possibly, not growing.    Discharge home today. 37 minutes spent coordinating discharge. Will provide patient with script to get CT abd done before next appointment with GI as outpatient.

## 2023-08-08 NOTE — PROGRESS NOTE ADULT - PROBLEM SELECTOR PROBLEM 2
Uncontrolled type 2 diabetes mellitus with hyperglycemia

## 2023-08-08 NOTE — PROGRESS NOTE ADULT - ATTENDING SUPERVISION STATEMENT
Fellow
Resident
Fellow
Resident
Student
Resident

## 2023-08-08 NOTE — PROGRESS NOTE ADULT - PROBLEM SELECTOR PROBLEM 3
Acute pancreatitis
Prophylactic measure

## 2023-08-08 NOTE — PROGRESS NOTE ADULT - SUBJECTIVE AND OBJECTIVE BOX
Interval Events:   No acute events overnight following endoscopy.  Patient without acute symptoms at this time.    ROS:   12 point review of systems performed and negative except otherwise noted in HPI.    Hospital Medications:  acetaminophen     Tablet .. 1000 milliGRAM(s) Oral every 8 hours PRN  atorvastatin 80 milliGRAM(s) Oral at bedtime  chlorhexidine 4% Liquid 1 Application(s) Topical <User Schedule>  enoxaparin Injectable 40 milliGRAM(s) SubCutaneous every 24 hours  fenofibrate Tablet 145 milliGRAM(s) Oral daily  insulin glargine Injectable (LANTUS) 22 Unit(s) SubCutaneous at bedtime  insulin lispro (ADMELOG) corrective regimen sliding scale   SubCutaneous three times a day before meals  insulin lispro (ADMELOG) corrective regimen sliding scale   SubCutaneous at bedtime  insulin lispro Injectable (ADMELOG) 8 Unit(s) SubCutaneous three times a day before meals  lidocaine   4% Patch 1 Patch Transdermal daily  omega-3-Acid Ethyl Esters 2 Gram(s) Oral two times a day  ondansetron Injectable 4 milliGRAM(s) IV Push once  simethicone 80 milliGRAM(s) Chew every 6 hours PRN      PHYSICAL EXAM:   Vital Signs:  Vital Signs Last 24 Hrs  T(C): 36.5 (08 Aug 2023 06:29), Max: 37.1 (07 Aug 2023 22:01)  T(F): 97.7 (08 Aug 2023 06:29), Max: 98.7 (07 Aug 2023 22:01)  HR: 70 (08 Aug 2023 06:29) (70 - 119)  BP: 114/79 (08 Aug 2023 06:29) (108/76 - 125/65)  BP(mean): 89 (07 Aug 2023 16:17) (89 - 89)  RR: 16 (08 Aug 2023 06:29) (13 - 20)  SpO2: 95% (08 Aug 2023 06:29) (93% - 99%)    Parameters below as of 08 Aug 2023 06:29  Patient On (Oxygen Delivery Method): room air      Daily Height in cm: 180.34 (07 Aug 2023 16:17)    Daily     GENERAL: no acute distress  NEURO: alert  HEENT: NCAT, no conjunctival pallor appreciated  CHEST: no respiratory distress, no accessory muscle use  CARDIAC: regular rate, +S1/S2  ABDOMEN: soft, nontender, no rebound or guarding  EXTREMITIES: warm, well perfused  SKIN: no lesions noted    LABS: reviewed                        12.5   6.00  )-----------( 246      ( 08 Aug 2023 07:30 )             38.0     08-08    135  |  100  |  15  ----------------------------<  182<H>  3.9   |  21<L>  |  0.63    Ca    9.1      08 Aug 2023 07:28  Phos  3.3     08-08  Mg     2.0     08-08    TPro  7.2  /  Alb  3.6  /  TBili  0.2  /  DBili  x   /  AST  32  /  ALT  41  /  AlkPhos  78  08-08    LIVER FUNCTIONS - ( 08 Aug 2023 07:28 )  Alb: 3.6 g/dL / Pro: 7.2 g/dL / ALK PHOS: 78 U/L / ALT: 41 U/L / AST: 32 U/L / GGT: x             Interval Diagnostic Studies: see sunrise for full report

## 2023-08-08 NOTE — PROGRESS NOTE ADULT - REASON FOR ADMISSION
Abdominal Pain

## 2023-08-08 NOTE — DISCHARGE NOTE NURSING/CASE MANAGEMENT/SOCIAL WORK - NSDCFUADDAPPT_GEN_ALL_CORE_FT
APPTS ARE READY TO BE MADE: [x] YES    Best Family or Patient Contact (if needed):    Additional Information about above appointments (if needed):    1: PCP Dr Michelle in 2 weeks  2: gastroenterologist in 2 weeks   3: Endocrinologist in 2 weeks    Other comments or requests:

## 2023-08-08 NOTE — PROGRESS NOTE ADULT - PROBLEM SELECTOR PROBLEM 1
Acute pancreatitis
Hypertriglyceridemia
Acute pancreatitis
Acute pancreatitis
Hypertriglyceridemia
Acute pancreatitis
Hypertriglyceridemia
Acute pancreatitis

## 2023-08-08 NOTE — PROGRESS NOTE ADULT - ASSESSMENT
37-year-old male patient PMH of pre-DM, familial hypertriglyceridemia (follows with Dr. Michelle) c/b multiple admissions of pancreatitis (last adm 2015 for necrotizing pancreatitis) c/b pancreatic pseudocyst who presents to the emergency department for L sided abdominal pain with radiation to back x  2 days a/w N/V and decreased PO. States the pain feels similar to his prior pancreatitis episode. Denies HA, CP, palpitations, SOB, diarrhea, fevers, alcohol use. Of note, pt stopped taking gemfibrozil and metformin 2 mo ago to try to control his medical issues with diet alone since he has been well controlled for the past 8 years. Labs notable for WBC 12, triglycerides 4742, lipase 2908. 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 Advanced GI consulted for management of chronic peripancreatic fluid collection.    # Acute pancreatitis likely 2/2 familial hypertriglyceridemia  # Interval increase of chronic peripancreatic tail collection with mass effect along stomach (~7.1 x 5.2 cm) w/o e/o pancreatic necrosis  -s/p EGD with AXIOS 15 x 10mm stent placement and endoscopic drainage on 8/7/2023    Recommendations:  - pain control and antiemetics as QTc allows  - avoidance of ETOH  - endoscopic drainage as above  - ok to advance diet as tolerated to low fat diet  - will need outpatient followup in 2 weeks with repeat imaging and evaluation for timing of AXIOS stent removal following above  - no further plans for anticipated inpatient GI interventions, please call back with questions    Note incomplete until finalized by attending signature/attestation.    Perico Casillas  GI/Hepatology Fellow    MONDAY-FRIDAY 8AM-5PM:  Pager# [phone] ([facility]) or [phone] ([facility])    NON-URGENT CONSULTS:  Please email [email or web address]  AT NIGHT AND ON WEEKENDS:  Contact on-call GI fellow via answering service ([phone]) from 5pm-8am and on weekends/holidays

## 2023-08-08 NOTE — PROGRESS NOTE ADULT - SUBJECTIVE AND OBJECTIVE BOX
Admitted for: Acute pancreatitis without infection or necrosis        Following for:    Subjective:       MEDICATIONS  (STANDING):  atorvastatin 80 milliGRAM(s) Oral at bedtime  chlorhexidine 4% Liquid 1 Application(s) Topical <User Schedule>  enoxaparin Injectable 40 milliGRAM(s) SubCutaneous every 24 hours  fenofibrate Tablet 145 milliGRAM(s) Oral daily  insulin glargine Injectable (LANTUS) 22 Unit(s) SubCutaneous at bedtime  insulin lispro (ADMELOG) corrective regimen sliding scale   SubCutaneous at bedtime  insulin lispro (ADMELOG) corrective regimen sliding scale   SubCutaneous three times a day before meals  insulin lispro Injectable (ADMELOG) 8 Unit(s) SubCutaneous three times a day before meals  lidocaine   4% Patch 1 Patch Transdermal daily  omega-3-Acid Ethyl Esters 2 Gram(s) Oral two times a day  ondansetron Injectable 4 milliGRAM(s) IV Push once    MEDICATIONS  (PRN):  acetaminophen     Tablet .. 1000 milliGRAM(s) Oral every 8 hours PRN Mild Pain (1 - 3)  simethicone 80 milliGRAM(s) Chew every 6 hours PRN Gas      Allergies    No Known Allergies    Intolerances          PHYSICAL EXAM:  VITALS: T(C): 37 (08-08-23 @ 12:16)  T(F): 98.6 (08-08-23 @ 12:16), Max: 98.7 (08-07-23 @ 22:01)  HR: 77 (08-08-23 @ 12:16) (70 - 119)  BP: 116/77 (08-08-23 @ 12:16) (108/76 - 125/65)  RR:  (13 - 20)  SpO2:  (93% - 99%)  Wt(kg): --  GENERAL: NAD  EYES: No proptosis, no lid lag, anicteric  RESPIRATORY: Clear to auscultation bilaterally  CARDIOVASCULAR: Regular rate and rhythm  GI: Soft, nontender, non distended  EXT: b/l feet without wounds, 2+ pulses  PSYCH: Alert and oriented x 3, reactive affect      A1C with Estimated Average Glucose Result: 11.6 % (08-01-23 @ 06:23)      POCT Blood Glucose.: 140 mg/dL (08-08-23 @ 12:42)  POCT Blood Glucose.: 149 mg/dL (08-08-23 @ 08:14)  POCT Blood Glucose.: 368 mg/dL (08-07-23 @ 22:57)  POCT Blood Glucose.: 148 mg/dL (08-07-23 @ 19:03)  POCT Blood Glucose.: 113 mg/dL (08-07-23 @ 13:18)  POCT Blood Glucose.: 122 mg/dL (08-07-23 @ 08:25)  POCT Blood Glucose.: 187 mg/dL (08-06-23 @ 22:06)  POCT Blood Glucose.: 128 mg/dL (08-06-23 @ 17:28)  POCT Blood Glucose.: 199 mg/dL (08-06-23 @ 12:37)  POCT Blood Glucose.: 147 mg/dL (08-06-23 @ 08:30)  POCT Blood Glucose.: 236 mg/dL (08-05-23 @ 22:16)  POCT Blood Glucose.: 244 mg/dL (08-05-23 @ 17:30)      08-08    135  |  100  |  15  ----------------------------<  182<H>  3.9   |  21<L>  |  0.63    eGFR: 126    Ca    9.1      08-08  Mg     2.0     08-08  Phos  3.3     08-08    TPro  7.2  /  Alb  3.6  /  TBili  0.2  /  DBili  x   /  AST  32  /  ALT  41  /  AlkPhos  78  08-08      Thyroid Function Tests:                         Admitted for: Acute pancreatitis without infection or necrosis        Following for: HyperTG and T2DM    Subjective:   - S/p EUS and axios stent placement yesterday  - Patient feels well.        MEDICATIONS  (STANDING):  atorvastatin 80 milliGRAM(s) Oral at bedtime  chlorhexidine 4% Liquid 1 Application(s) Topical <User Schedule>  enoxaparin Injectable 40 milliGRAM(s) SubCutaneous every 24 hours  fenofibrate Tablet 145 milliGRAM(s) Oral daily  insulin glargine Injectable (LANTUS) 22 Unit(s) SubCutaneous at bedtime  insulin lispro (ADMELOG) corrective regimen sliding scale   SubCutaneous at bedtime  insulin lispro (ADMELOG) corrective regimen sliding scale   SubCutaneous three times a day before meals  insulin lispro Injectable (ADMELOG) 8 Unit(s) SubCutaneous three times a day before meals  lidocaine   4% Patch 1 Patch Transdermal daily  omega-3-Acid Ethyl Esters 2 Gram(s) Oral two times a day  ondansetron Injectable 4 milliGRAM(s) IV Push once    MEDICATIONS  (PRN):  acetaminophen     Tablet .. 1000 milliGRAM(s) Oral every 8 hours PRN Mild Pain (1 - 3)  simethicone 80 milliGRAM(s) Chew every 6 hours PRN Gas      Allergies    No Known Allergies    Intolerances          PHYSICAL EXAM:  VITALS: T(C): 37 (08-08-23 @ 12:16)  T(F): 98.6 (08-08-23 @ 12:16), Max: 98.7 (08-07-23 @ 22:01)  HR: 77 (08-08-23 @ 12:16) (70 - 119)  BP: 116/77 (08-08-23 @ 12:16) (108/76 - 125/65)  RR:  (13 - 20)  SpO2:  (93% - 99%)  Wt(kg): --  GENERAL: NAD  EYES: No proptosis, no lid lag, anicteric  RESPIRATORY: Clear to auscultation bilaterally  CARDIOVASCULAR: Regular rate and rhythm  GI: Soft, nontender, non distended  EXT: b/l feet without wounds, 2+ pulses  PSYCH: Alert and oriented x 3, reactive affect      A1C with Estimated Average Glucose Result: 11.6 % (08-01-23 @ 06:23)      POCT Blood Glucose.: 140 mg/dL (08-08-23 @ 12:42)  POCT Blood Glucose.: 149 mg/dL (08-08-23 @ 08:14)  POCT Blood Glucose.: 368 mg/dL (08-07-23 @ 22:57)  POCT Blood Glucose.: 148 mg/dL (08-07-23 @ 19:03)  POCT Blood Glucose.: 113 mg/dL (08-07-23 @ 13:18)  POCT Blood Glucose.: 122 mg/dL (08-07-23 @ 08:25)  POCT Blood Glucose.: 187 mg/dL (08-06-23 @ 22:06)  POCT Blood Glucose.: 128 mg/dL (08-06-23 @ 17:28)  POCT Blood Glucose.: 199 mg/dL (08-06-23 @ 12:37)  POCT Blood Glucose.: 147 mg/dL (08-06-23 @ 08:30)  POCT Blood Glucose.: 236 mg/dL (08-05-23 @ 22:16)  POCT Blood Glucose.: 244 mg/dL (08-05-23 @ 17:30)      08-08    135  |  100  |  15  ----------------------------<  182<H>  3.9   |  21<L>  |  0.63    eGFR: 126    Ca    9.1      08-08  Mg     2.0     08-08  Phos  3.3     08-08    TPro  7.2  /  Alb  3.6  /  TBili  0.2  /  DBili  x   /  AST  32  /  ALT  41  /  AlkPhos  78  08-08      Thyroid Function Tests:

## 2023-08-08 NOTE — PROGRESS NOTE ADULT - ATTENDING COMMENTS
As above  Acute pancreatitis with chronic pancreatic psuedocyst secondary to hypertriglyceridemia  S/p EGD/EUS with cystgastrostomy yesterday  Doing well today  Will need outpatient  followup in ~2 weeks with repeat CT scan and AXIOS removal vs necrosectomy    Thank you for this interesting consult.  Please call the advanced GI service with any questions or concerns.

## 2023-08-08 NOTE — DISCHARGE NOTE NURSING/CASE MANAGEMENT/SOCIAL WORK - PATIENT PORTAL LINK FT
You can access the FollowMyHealth Patient Portal offered by [facility] by registering at the following website: [email or web address]. By joining [employer]s FollowMyHealth portal, you will also be able to view your health information using other applications (apps) compatible with our system.

## 2023-08-08 NOTE — PROGRESS NOTE ADULT - ASSESSMENT
37M w/ hypertriglyceridemia (follows with Dr. Michelle), multiple admissions of pancreatitis (last adm 2015 for necrotizing pancreatitis), pancreatic pseudocyst, initially admitted to MICU for hypertriglyceridemia-induced pancreatitis s/p IVFs and insulin gtt, now transitioned to basal-bolus insulin regimen and transferred to the floors for further management. Course c/b new-onset DM2 (A1c 11.6%), currently being optimized. S/P EUS with cystogastrotomy and abdominal doppler with chronic splenic vein thrombosis.

## 2023-08-08 NOTE — PROGRESS NOTE ADULT - SUBJECTIVE AND OBJECTIVE BOX
*******************************  Sudhakar Dhaliwal MS4  Internal Medicine  Contact via [employer] TEAMS  *******************************    PROGRESS NOTE:     Patient is a 37y old  Male who presents with a chief complaint of Abdominal Pain (08 Aug 2023 10:20)      INTERVAL EVENTS: No acute overnight events.     SUBJECTIVE: Patient seen and examined at bedside. This morning, the patient is comfortable and doing well post EUS with only mild abdominal discomfort. No acute complaints. Eating and drinking well. Denies fevers, chills, N/V/D, chest pain, SOB, abdominal pain.    MEDICATIONS  (STANDING):  atorvastatin 80 milliGRAM(s) Oral at bedtime  chlorhexidine 4% Liquid 1 Application(s) Topical <User Schedule>  enoxaparin Injectable 40 milliGRAM(s) SubCutaneous every 24 hours  fenofibrate Tablet 145 milliGRAM(s) Oral daily  insulin glargine Injectable (LANTUS) 22 Unit(s) SubCutaneous at bedtime  insulin lispro (ADMELOG) corrective regimen sliding scale   SubCutaneous three times a day before meals  insulin lispro (ADMELOG) corrective regimen sliding scale   SubCutaneous at bedtime  insulin lispro Injectable (ADMELOG) 8 Unit(s) SubCutaneous three times a day before meals  lidocaine   4% Patch 1 Patch Transdermal daily  omega-3-Acid Ethyl Esters 2 Gram(s) Oral two times a day  ondansetron Injectable 4 milliGRAM(s) IV Push once    MEDICATIONS  (PRN):  acetaminophen     Tablet .. 1000 milliGRAM(s) Oral every 8 hours PRN Mild Pain (1 - 3)  simethicone 80 milliGRAM(s) Chew every 6 hours PRN Gas      CAPILLARY BLOOD GLUCOSE      POCT Blood Glucose.: 149 mg/dL (08 Aug 2023 08:14)  POCT Blood Glucose.: 368 mg/dL (07 Aug 2023 22:57)  POCT Blood Glucose.: 148 mg/dL (07 Aug 2023 19:03)  POCT Blood Glucose.: 113 mg/dL (07 Aug 2023 13:18)    I&O's Summary    07 Aug 2023 07:01  -  08 Aug 2023 07:00  --------------------------------------------------------  IN: 0 mL / OUT: 0 mL / NET: 0 mL    08 Aug 2023 07:01  -  08 Aug 2023 11:18  --------------------------------------------------------  IN: 360 mL / OUT: 0 mL / NET: 360 mL        PHYSICAL EXAM:  Vital Signs Last 24 Hrs  T(C): 36.5 (08 Aug 2023 06:29), Max: 37.1 (07 Aug 2023 22:01)  T(F): 97.7 (08 Aug 2023 06:29), Max: 98.7 (07 Aug 2023 22:01)  HR: 70 (08 Aug 2023 06:29) (70 - 119)  BP: 114/79 (08 Aug 2023 06:29) (108/76 - 125/65)  BP(mean): 89 (07 Aug 2023 16:17) (89 - 89)  RR: 16 (08 Aug 2023 06:29) (13 - 20)  SpO2: 95% (08 Aug 2023 06:29) (93% - 99%)    Parameters below as of 08 Aug 2023 06:29  Patient On (Oxygen Delivery Method): room air        GENERAL: NAD, lying in bed comfortably  HEAD: Atraumatic, normocephalic  EYES: EOMI, PERRLA, conjunctiva and sclera clear  ENT: Moist mucous membranes  NECK: Supple, no JVD  HEART: S1, S2, Regular rate and rhythm, no murmurs, rubs, or gallops  LUNGS: Unlabored respirations, clear to auscultation bilaterally, no crackles, wheezing, or rhonchi  ABDOMEN: Soft, nontender, nondistended, +BS  EXTREMITIES: 2+ peripheral pulses bilaterally. No clubbing, cyanosis, or edema  NERVOUS SYSTEM:  A&Ox3, no focal deficits   SKIN: No rashes or lesions    LABS:                        12.5   6.00  )-----------( 246      ( 08 Aug 2023 07:30 )             38.0     08-08    135  |  100  |  15  ----------------------------<  182<H>  3.9   |  21<L>  |  0.63    Ca    9.1      08 Aug 2023 07:28  Phos  3.3     08-08  Mg     2.0     08-08    TPro  7.2  /  Alb  3.6  /  TBili  0.2  /  DBili  x   /  AST  32  /  ALT  41  /  AlkPhos  78  08-08    PT/INR - ( 08 Aug 2023 07:31 )   PT: 12.1 sec;   INR: 1.16 ratio         PTT - ( 08 Aug 2023 07:31 )  PTT:33.6 sec      Urinalysis Basic - ( 08 Aug 2023 07:28 )    Color: x / Appearance: x / SG: x / pH: x  Gluc: 182 mg/dL / Ketone: x  / Bili: x / Urobili: x   Blood: x / Protein: x / Nitrite: x   Leuk Esterase: x / RBC: x / WBC x   Sq Epi: x / Non Sq Epi: x / Bacteria: x          RADIOLOGY & ADDITIONAL TESTS:  Results Reviewed:   Imaging Personally Reviewed:  Electrocardiogram Personally Reviewed:  Tele:

## 2023-08-08 NOTE — PROGRESS NOTE ADULT - PROVIDER SPECIALTY LIST ADULT
Endocrinology
Gastroenterology
Gastroenterology
Internal Medicine
MICU
Endocrinology
Endocrinology
Internal Medicine
Internal Medicine
MICU
Gastroenterology
MICU
Internal Medicine
Internal Medicine

## 2023-08-08 NOTE — PROGRESS NOTE ADULT - PROBLEM SELECTOR PLAN 2
Hx of pre-DM 2/2 multiple episodes of pancreatitis now w/ A1c of 11.6%, new diagnosis of DM2  - endocrine following; appreciate recs- final DM regimen to include basal insulin (Lantus 22 units HS) + metformin 1000mg BID  - c/w Admelog 8U TIDAC, Lantus 22U qhs, and ISS; adjust per patient's insulin requirements  - monitor FS for goal -180 while inpatient  - diabetic teaching and CC diet  - will send diabetes supplies to pharmacy (glucometer, test strips, alcohol pads, lancets, pen needles)

## 2023-08-08 NOTE — PROGRESS NOTE ADULT - ASSESSMENT
37-year-old male patient PMH of pre-DM (diagnosed 2 years ago), hypertriglyceridemia (follows with Dr. Michelle), multiple admissions of pancreatitis (last adm 2015 for necrotizing pancreatitis), pancreatic cyst/collection who presents to the emergency department for abdominal pain, nausea x 2 days. Found to have acute pancreatitis secondary to elevated triglycerides. A1c was 11.6%.    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 Hx of recurrent pancreatitis and pancreatic cyst/collection.  - c/w continue fenofibrate 145mg daily, atorvastatin 80mg QHS, and lovaza 2mg BID.  - Low fat/ CC diet     #T2DM  Hx of prediabetes.  A1c found to be 11.6%, new diagnosis of T2DM  Home regimen: Metformin ?dose BID, stopped 2 months ago.  C-peptide 1.6 with glc 162, not concerning for HORTENCIA or T1DM.  Discharge to be discharged today:  - Basaglar pen 22 units QHS + metformin 1000mg BID.  - No insurance coverage for CGM at this time, pursue outpatient.   - All diabetes supplies were sent by primary team (glucometer, test strips, lancets, alcohol swabs, insulin pen needles).  - S/p pen teaching, glucometer teaching, diabetes education.  - Patient will need follow up with Endocrinology. Emailed clinic to schedule appt for him at [place] vs. [place].    Robbie Cardenas MD  Endocrine Fellow  Can be reached via teams. For follow up questions, discharge recommendations, or new consults, please call answering service at [phone] (weekdays); [phone] (nights/weekends).

## 2023-08-08 NOTE — PROGRESS NOTE ADULT - PROBLEM SELECTOR PLAN 1
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 and pancreatic pseudocyst, s/p IVFs and insulin gtt in MICU  - GI following; appreciate recs- EUS performed 8/7 with cystogastrotomy of peripancreatic fluid collection with AXIOS stent.   - endocrine following; continue triglyceride meds  - triglycerides and lipase downtrending; no indication to continue trending  - MR Abdomen showing Mild acute pancreatitis without necrosis, Pancreas divisum, and stable appearance of lesser sac pseudocyst between the posterior wall of the stomach and the tail the pancreas extending inferiorly along the peritoneum and Gerota's fascia. Extension of the collection into the LEFT iliopsoas. Also noted extreme narrowing and effective occlusion of the splenic vein with massive short gastric varices.  -ABdominal Doppler showing chronic splenic vein thrombosis - GI recs for anticoagulation / intervention.   - c/w fenofibrate 145mg qd  - c/w atorvastatin 80mg qhs  - c/w Lovaza 2mg BID

## 2023-08-08 NOTE — DISCHARGE NOTE NURSING/CASE MANAGEMENT/SOCIAL WORK - NSDCVIVACCINE_GEN_ALL_CORE_FT
Tdap; 14-Jan-2018 22:58; Javi Whiting (SCAR); [employer]; [id number]; IntraMuscular; Deltoid Right.; 0.5 milliLiter(s); VIS (VIS Published: 09-May-2013, VIS Presented: 14-Jan-2018);

## 2023-08-08 NOTE — PROGRESS NOTE ADULT - PROBLEM SELECTOR PLAN 3
DVT PPx: Lovenox  Diet: consistent carb  Dispo: pending clinical improvement   Code Status: full code
-Lovenox 40 for DVT prophylaxis  - Diet CC  - Dispo pending  -full code

## 2023-08-08 NOTE — DISCHARGE NOTE NURSING/CASE MANAGEMENT/SOCIAL WORK - NSDCPEFALRISK_GEN_ALL_CORE
For information on Fall & Injury Prevention, visit: [email or web address] OR  [email or web address] OR  https://www.cdc.gov/steadi/patient.html

## 2023-08-09 ENCOUNTER — INPATIENT (INPATIENT)
Facility: HOSPITAL | Age: 38
LOS: 4 days | Discharge: HOME HEALTH | DRG: 853 | End: 2023-08-14
Attending: HOSPITALIST | Admitting: INTERNAL MEDICINE
Payer: COMMERCIAL

## 2023-08-09 VITALS
WEIGHT: 179.02 LBS | OXYGEN SATURATION: 98 % | HEART RATE: 114 BPM | TEMPERATURE: 100 F | HEIGHT: 71 IN | DIASTOLIC BLOOD PRESSURE: 65 MMHG | SYSTOLIC BLOOD PRESSURE: 103 MMHG | RESPIRATION RATE: 20 BRPM

## 2023-08-09 LAB
ALBUMIN SERPL ELPH-MCNC: 4 G/DL — SIGNIFICANT CHANGE UP (ref 3.3–5)
ALP SERPL-CCNC: 73 U/L — SIGNIFICANT CHANGE UP (ref 40–120)
ALT FLD-CCNC: 36 U/L — SIGNIFICANT CHANGE UP (ref 10–45)
ANION GAP SERPL CALC-SCNC: 18 MMOL/L — HIGH (ref 5–17)
AST SERPL-CCNC: 23 U/L — SIGNIFICANT CHANGE UP (ref 10–40)
BASE EXCESS BLDV CALC-SCNC: 0.8 MMOL/L — SIGNIFICANT CHANGE UP (ref -2–3)
BASOPHILS # BLD AUTO: 0.04 K/UL — SIGNIFICANT CHANGE UP (ref 0–0.2)
BASOPHILS NFR BLD AUTO: 0.3 % — SIGNIFICANT CHANGE UP (ref 0–2)
BILIRUB SERPL-MCNC: 1 MG/DL — SIGNIFICANT CHANGE UP (ref 0.2–1.2)
BUN SERPL-MCNC: 15 MG/DL — SIGNIFICANT CHANGE UP (ref 7–23)
CA-I SERPL-SCNC: 1.21 MMOL/L — SIGNIFICANT CHANGE UP (ref 1.15–1.33)
CALCIUM SERPL-MCNC: 9.8 MG/DL — SIGNIFICANT CHANGE UP (ref 8.4–10.5)
CHLORIDE BLDV-SCNC: 97 MMOL/L — SIGNIFICANT CHANGE UP (ref 96–108)
CHLORIDE SERPL-SCNC: 95 MMOL/L — LOW (ref 96–108)
CO2 BLDV-SCNC: 26 MMOL/L — SIGNIFICANT CHANGE UP (ref 22–26)
CO2 SERPL-SCNC: 19 MMOL/L — LOW (ref 22–31)
CREAT SERPL-MCNC: 0.88 MG/DL — SIGNIFICANT CHANGE UP (ref 0.5–1.3)
EGFR: 114 ML/MIN/1.73M2 — SIGNIFICANT CHANGE UP
EOSINOPHIL # BLD AUTO: 0 K/UL — SIGNIFICANT CHANGE UP (ref 0–0.5)
EOSINOPHIL NFR BLD AUTO: 0 % — SIGNIFICANT CHANGE UP (ref 0–6)
GAS PNL BLDV: 130 MMOL/L — LOW (ref 136–145)
GAS PNL BLDV: SIGNIFICANT CHANGE UP
GAS PNL BLDV: SIGNIFICANT CHANGE UP
GLUCOSE BLDV-MCNC: 247 MG/DL — HIGH (ref 70–99)
GLUCOSE SERPL-MCNC: 226 MG/DL — HIGH (ref 70–99)
HCO3 BLDV-SCNC: 25 MMOL/L — SIGNIFICANT CHANGE UP (ref 22–29)
HCT VFR BLD CALC: 42.2 % — SIGNIFICANT CHANGE UP (ref 39–50)
HCT VFR BLDA CALC: 42 % — SIGNIFICANT CHANGE UP (ref 39–51)
HGB BLD CALC-MCNC: 14 G/DL — SIGNIFICANT CHANGE UP (ref 12.6–17.4)
HGB BLD-MCNC: 13.7 G/DL — SIGNIFICANT CHANGE UP (ref 13–17)
IMM GRANULOCYTES NFR BLD AUTO: 0.5 % — SIGNIFICANT CHANGE UP (ref 0–0.9)
LACTATE BLDV-MCNC: 2.1 MMOL/L — HIGH (ref 0.5–2)
LIDOCAIN IGE QN: 112 U/L — HIGH (ref 7–60)
LYMPHOCYTES # BLD AUTO: 1.02 K/UL — SIGNIFICANT CHANGE UP (ref 1–3.3)
LYMPHOCYTES # BLD AUTO: 6.9 % — LOW (ref 13–44)
MCHC RBC-ENTMCNC: 26.3 PG — LOW (ref 27–34)
MCHC RBC-ENTMCNC: 32.5 GM/DL — SIGNIFICANT CHANGE UP (ref 32–36)
MCV RBC AUTO: 81 FL — SIGNIFICANT CHANGE UP (ref 80–100)
MONOCYTES # BLD AUTO: 1.27 K/UL — HIGH (ref 0–0.9)
MONOCYTES NFR BLD AUTO: 8.5 % — SIGNIFICANT CHANGE UP (ref 2–14)
NEUTROPHILS # BLD AUTO: 12.47 K/UL — HIGH (ref 1.8–7.4)
NEUTROPHILS NFR BLD AUTO: 83.8 % — HIGH (ref 43–77)
NRBC # BLD: 0 /100 WBCS — SIGNIFICANT CHANGE UP (ref 0–0)
PCO2 BLDV: 39 MMHG — LOW (ref 42–55)
PH BLDV: 7.42 — SIGNIFICANT CHANGE UP (ref 7.32–7.43)
PLATELET # BLD AUTO: 261 K/UL — SIGNIFICANT CHANGE UP (ref 150–400)
PO2 BLDV: 17 MMHG — LOW (ref 25–45)
POTASSIUM BLDV-SCNC: 4.2 MMOL/L — SIGNIFICANT CHANGE UP (ref 3.5–5.1)
POTASSIUM SERPL-MCNC: 3.9 MMOL/L — SIGNIFICANT CHANGE UP (ref 3.5–5.3)
POTASSIUM SERPL-SCNC: 3.9 MMOL/L — SIGNIFICANT CHANGE UP (ref 3.5–5.3)
PROT SERPL-MCNC: 8.5 G/DL — HIGH (ref 6–8.3)
RBC # BLD: 5.21 M/UL — SIGNIFICANT CHANGE UP (ref 4.2–5.8)
RBC # FLD: 14.8 % — HIGH (ref 10.3–14.5)
SAO2 % BLDV: 22.7 % — LOW (ref 67–88)
SODIUM SERPL-SCNC: 132 MMOL/L — LOW (ref 135–145)
WBC # BLD: 14.88 K/UL — HIGH (ref 3.8–10.5)
WBC # FLD AUTO: 14.88 K/UL — HIGH (ref 3.8–10.5)

## 2023-08-09 PROCEDURE — 99285 EMERGENCY DEPT VISIT HI MDM: CPT

## 2023-08-09 RX ORDER — MORPHINE SULFATE 50 MG/1
4 CAPSULE, EXTENDED RELEASE ORAL ONCE
Refills: 0 | Status: DISCONTINUED | OUTPATIENT
Start: 2023-08-09 | End: 2023-08-09

## 2023-08-09 RX ORDER — SODIUM CHLORIDE 9 MG/ML
1000 INJECTION, SOLUTION INTRAVENOUS ONCE
Refills: 0 | Status: COMPLETED | OUTPATIENT
Start: 2023-08-09 | End: 2023-08-09

## 2023-08-09 NOTE — ED PROVIDER NOTE - WET READ LAUNCH FT
Pt states he has been having intermittent shooting pains from abd to chest and back.  Pain lasts approx 2-5 minutes and goes away There are no Wet Read(s) to document.

## 2023-08-09 NOTE — ED PROVIDER NOTE - ATTENDING CONTRIBUTION TO CARE
Febrile. Awake and Alert. Lungs CTA. Heart RRR. Abdomen soft, epigastric TTP, ND. CN II-XII grossly intact. Moves all extremities without lateralization.

## 2023-08-09 NOTE — ED PROVIDER NOTE - PHYSICAL EXAMINATION
Gen: Patient is in mild distress due to pain, AAOx3, able to ambulate without assistance  HEENT: NCAT, normal conjunctiva, tongue midline, oral mucosa moist  Lung: CTAB, no respiratory distress, no wheezes/rhonchi/rales B/L, speaking in full sentences  CV: RRR, no murmurs, rubs or gallops, distal pulses 2+ b/l  Abd: soft, generalized tenderness on palpation, ND, no guarding, no rigidity, no rebound tenderness, no CVA tenderness   MSK: no visible deformities, ROM normal in UE/LE, no midline spinal tenderness  Neuro: No focal sensory or motor deficits  Skin: Warm, well perfused, no leg swelling  Psych: normal affect, calm

## 2023-08-09 NOTE — ED PROVIDER NOTE - RAPID ASSESSMENT
Attending MD Sharif:     37M with PMH/PSH including pre-DM, hypertriglyceridemia (follows with Dr. Michelle), multiple admissions of pancreatitis  (last adm 2015 for necrotizing pancreatitis), pancreatic pseudocyst, recent admission 7/31/23-7/8/23 for hypertriglyceridemia induced pancreatitis, pancreatic pseudocyst possibly containing necrotic material presents to the ED with return of abdominal pain.  Reports pain is localized to upper abdomen and feels like the pain he had when he first came in and needed to be admitted.  Reports decreased appetite, "could not drink water", denies vomiting.  Reports fevers today Tmax 103 at noon.  Denies taking antipyretic or analgesic today.  Denies chest pain, shortness of breath.  Reports feels more comfortable to take shallow breaths.  Denies urinary complaints.      Attending MD Sharif: This patient was seen and orders were placed in the waiting room as per our department's QDoc model.  Patient was to be sent to main ED for full medical evaluation and receiving team was to follow up on any labs, analgesia, clinical imaging ordered.  Any reassessment and disposition decisions were to be made by receiving team as clinically indicated, all decisions regarding the progression of care to be made at their discretion.  I did not perform a comprehensive history and physical on this patient.

## 2023-08-09 NOTE — ED PROVIDER NOTE - PROGRESS NOTE DETAILS
Liset PGY3: CT imaging showing possible abscess on pancreas. Surgery consult placed. Agreeable to evaluate patient. Noy Downey- surgery no acute intervention. will admit to medicine. surgery will follow

## 2023-08-09 NOTE — ED PROVIDER NOTE - CLINICAL SUMMARY MEDICAL DECISION MAKING FREE TEXT BOX
37 y M, hx of hypertriglyceridemia pancreatitis, DM, p/w 1-day onset of fever (103 at home), generalized abdominal pain, back pain. Reports associated symptoms of nausea. Denies vomiting, diarrhea, urinary symptoms. Patient was just discharged from hospitalization yesterday for acute pancreatitis. , temp 99.7 on arrival. Otherwise VSWNL. PE as noted above. Patient is in mild distress due to pain. Normal respiratory effort, clear lung exam bilaterally, generalized abdominal tenderness noted, no leg swelling. DDx include but not limited to recurrent pancreatitis vs necrotizing pancreatitis vs abscess vs peritonitis. Will get labs, sepsis workup, IV antibiotics, IV hydration, CTAP, admit.

## 2023-08-09 NOTE — ED PROVIDER NOTE - OBJECTIVE STATEMENT
37 y M, hx of hypertriglyceridemia pancreatitis, DM, p/w 1-day onset of fever (103 at home), generalized abdominal pain, bilateral back pain. Reports associated symptoms of nausea. Denies vomiting, diarrhea, urinary symptoms. Patient was just discharged from hospitalization yesterday for acute pancreatitis.

## 2023-08-10 ENCOUNTER — APPOINTMENT (OUTPATIENT)
Dept: INTERNAL MEDICINE | Facility: CLINIC | Age: 38
End: 2023-08-10

## 2023-08-10 DIAGNOSIS — K85.90 ACUTE PANCREATITIS WITHOUT NECROSIS OR INFECTION, UNSPECIFIED: ICD-10-CM

## 2023-08-10 DIAGNOSIS — Z29.9 ENCOUNTER FOR PROPHYLACTIC MEASURES, UNSPECIFIED: ICD-10-CM

## 2023-08-10 DIAGNOSIS — E78.1 PURE HYPERGLYCERIDEMIA: ICD-10-CM

## 2023-08-10 DIAGNOSIS — R50.9 FEVER, UNSPECIFIED: ICD-10-CM

## 2023-08-10 DIAGNOSIS — A41.9 SEPSIS, UNSPECIFIED ORGANISM: ICD-10-CM

## 2023-08-10 LAB
APPEARANCE UR: CLEAR — SIGNIFICANT CHANGE UP
BACTERIA # UR AUTO: NEGATIVE — SIGNIFICANT CHANGE UP
BASE EXCESS BLDV CALC-SCNC: 0.3 MMOL/L — SIGNIFICANT CHANGE UP (ref -2–3)
BILIRUB UR-MCNC: NEGATIVE — SIGNIFICANT CHANGE UP
CA-I SERPL-SCNC: 1.19 MMOL/L — SIGNIFICANT CHANGE UP (ref 1.15–1.33)
CHLORIDE BLDV-SCNC: 98 MMOL/L — SIGNIFICANT CHANGE UP (ref 96–108)
CO2 BLDV-SCNC: 27 MMOL/L — HIGH (ref 22–26)
COLOR SPEC: YELLOW — SIGNIFICANT CHANGE UP
DIFF PNL FLD: NEGATIVE — SIGNIFICANT CHANGE UP
EPI CELLS # UR: 2 /HPF — SIGNIFICANT CHANGE UP
GAS PNL BLDV: 131 MMOL/L — LOW (ref 136–145)
GAS PNL BLDV: SIGNIFICANT CHANGE UP
GAS PNL BLDV: SIGNIFICANT CHANGE UP
GLUCOSE BLDV-MCNC: 205 MG/DL — HIGH (ref 70–99)
GLUCOSE UR QL: ABNORMAL
HCO3 BLDV-SCNC: 25 MMOL/L — SIGNIFICANT CHANGE UP (ref 22–29)
HCT VFR BLDA CALC: 38 % — LOW (ref 39–51)
HGB BLD CALC-MCNC: 12.5 G/DL — LOW (ref 12.6–17.4)
HOROWITZ INDEX BLDV+IHG-RTO: SIGNIFICANT CHANGE UP
HYALINE CASTS # UR AUTO: 1 /LPF — SIGNIFICANT CHANGE UP (ref 0–2)
KETONES UR-MCNC: SIGNIFICANT CHANGE UP
LACTATE BLDV-MCNC: 1 MMOL/L — SIGNIFICANT CHANGE UP (ref 0.5–2)
LEUKOCYTE ESTERASE UR-ACNC: NEGATIVE — SIGNIFICANT CHANGE UP
NITRITE UR-MCNC: NEGATIVE — SIGNIFICANT CHANGE UP
PCO2 BLDV: 42 MMHG — SIGNIFICANT CHANGE UP (ref 42–55)
PH BLDV: 7.39 — SIGNIFICANT CHANGE UP (ref 7.32–7.43)
PH UR: 7 — SIGNIFICANT CHANGE UP (ref 5–8)
PO2 BLDV: 43 MMHG — SIGNIFICANT CHANGE UP (ref 25–45)
POTASSIUM BLDV-SCNC: 3.8 MMOL/L — SIGNIFICANT CHANGE UP (ref 3.5–5.1)
PROT UR-MCNC: ABNORMAL
RAPID RVP RESULT: SIGNIFICANT CHANGE UP
RBC CASTS # UR COMP ASSIST: 1 /HPF — SIGNIFICANT CHANGE UP (ref 0–4)
SAO2 % BLDV: 71 % — SIGNIFICANT CHANGE UP (ref 67–88)
SARS-COV-2 RNA SPEC QL NAA+PROBE: SIGNIFICANT CHANGE UP
SP GR SPEC: >1.05 (ref 1.01–1.02)
TRIGL SERPL-MCNC: 197 MG/DL — HIGH
UROBILINOGEN FLD QL: ABNORMAL
WBC UR QL: 5 /HPF — SIGNIFICANT CHANGE UP (ref 0–5)

## 2023-08-10 PROCEDURE — 99222 1ST HOSP IP/OBS MODERATE 55: CPT

## 2023-08-10 PROCEDURE — 74177 CT ABD & PELVIS W/CONTRAST: CPT | Mod: 26,MA

## 2023-08-10 PROCEDURE — 99223 1ST HOSP IP/OBS HIGH 75: CPT | Mod: GC

## 2023-08-10 PROCEDURE — 71046 X-RAY EXAM CHEST 2 VIEWS: CPT | Mod: 26

## 2023-08-10 RX ORDER — MORPHINE SULFATE 50 MG/1
2 CAPSULE, EXTENDED RELEASE ORAL ONCE
Refills: 0 | Status: DISCONTINUED | OUTPATIENT
Start: 2023-08-10 | End: 2023-08-10

## 2023-08-10 RX ORDER — MORPHINE SULFATE 50 MG/1
4 CAPSULE, EXTENDED RELEASE ORAL EVERY 4 HOURS
Refills: 0 | Status: DISCONTINUED | OUTPATIENT
Start: 2023-08-10 | End: 2023-08-13

## 2023-08-10 RX ORDER — DEXTROSE 50 % IN WATER 50 %
12.5 SYRINGE (ML) INTRAVENOUS ONCE
Refills: 0 | Status: DISCONTINUED | OUTPATIENT
Start: 2023-08-10 | End: 2023-08-11

## 2023-08-10 RX ORDER — DEXTROSE 50 % IN WATER 50 %
15 SYRINGE (ML) INTRAVENOUS ONCE
Refills: 0 | Status: DISCONTINUED | OUTPATIENT
Start: 2023-08-10 | End: 2023-08-11

## 2023-08-10 RX ORDER — DEXTROSE 50 % IN WATER 50 %
25 SYRINGE (ML) INTRAVENOUS ONCE
Refills: 0 | Status: DISCONTINUED | OUTPATIENT
Start: 2023-08-10 | End: 2023-08-11

## 2023-08-10 RX ORDER — SODIUM CHLORIDE 9 MG/ML
1000 INJECTION, SOLUTION INTRAVENOUS ONCE
Refills: 0 | Status: COMPLETED | OUTPATIENT
Start: 2023-08-10 | End: 2023-08-10

## 2023-08-10 RX ORDER — ACETAMINOPHEN 500 MG
650 TABLET ORAL EVERY 6 HOURS
Refills: 0 | Status: DISCONTINUED | OUTPATIENT
Start: 2023-08-10 | End: 2023-08-14

## 2023-08-10 RX ORDER — PIPERACILLIN AND TAZOBACTAM 4; .5 G/20ML; G/20ML
3.38 INJECTION, POWDER, LYOPHILIZED, FOR SOLUTION INTRAVENOUS ONCE
Refills: 0 | Status: COMPLETED | OUTPATIENT
Start: 2023-08-10 | End: 2023-08-10

## 2023-08-10 RX ORDER — SODIUM CHLORIDE 9 MG/ML
1000 INJECTION, SOLUTION INTRAVENOUS
Refills: 0 | Status: DISCONTINUED | OUTPATIENT
Start: 2023-08-10 | End: 2023-08-11

## 2023-08-10 RX ORDER — OMEGA-3 ACID ETHYL ESTERS 1 G
2 CAPSULE ORAL
Refills: 0 | Status: DISCONTINUED | OUTPATIENT
Start: 2023-08-10 | End: 2023-08-14

## 2023-08-10 RX ORDER — CHLORHEXIDINE GLUCONATE 213 G/1000ML
1 SOLUTION TOPICAL DAILY
Refills: 0 | Status: DISCONTINUED | OUTPATIENT
Start: 2023-08-10 | End: 2023-08-14

## 2023-08-10 RX ORDER — ACETAMINOPHEN 500 MG
1000 TABLET ORAL ONCE
Refills: 0 | Status: COMPLETED | OUTPATIENT
Start: 2023-08-10 | End: 2023-08-10

## 2023-08-10 RX ORDER — INSULIN LISPRO 100/ML
VIAL (ML) SUBCUTANEOUS EVERY 6 HOURS
Refills: 0 | Status: DISCONTINUED | OUTPATIENT
Start: 2023-08-10 | End: 2023-08-11

## 2023-08-10 RX ORDER — MORPHINE SULFATE 50 MG/1
2 CAPSULE, EXTENDED RELEASE ORAL EVERY 4 HOURS
Refills: 0 | Status: DISCONTINUED | OUTPATIENT
Start: 2023-08-10 | End: 2023-08-13

## 2023-08-10 RX ORDER — MORPHINE SULFATE 50 MG/1
4 CAPSULE, EXTENDED RELEASE ORAL ONCE
Refills: 0 | Status: DISCONTINUED | OUTPATIENT
Start: 2023-08-10 | End: 2023-08-10

## 2023-08-10 RX ORDER — FENOFIBRATE,MICRONIZED 130 MG
145 CAPSULE ORAL DAILY
Refills: 0 | Status: DISCONTINUED | OUTPATIENT
Start: 2023-08-10 | End: 2023-08-14

## 2023-08-10 RX ORDER — ATORVASTATIN CALCIUM 80 MG/1
80 TABLET, FILM COATED ORAL AT BEDTIME
Refills: 0 | Status: DISCONTINUED | OUTPATIENT
Start: 2023-08-10 | End: 2023-08-14

## 2023-08-10 RX ORDER — GLUCAGON INJECTION, SOLUTION 0.5 MG/.1ML
1 INJECTION, SOLUTION SUBCUTANEOUS ONCE
Refills: 0 | Status: DISCONTINUED | OUTPATIENT
Start: 2023-08-10 | End: 2023-08-11

## 2023-08-10 RX ORDER — SODIUM CHLORIDE 9 MG/ML
1000 INJECTION, SOLUTION INTRAVENOUS
Refills: 0 | Status: COMPLETED | OUTPATIENT
Start: 2023-08-10 | End: 2023-08-11

## 2023-08-10 RX ADMIN — Medication 400 MILLIGRAM(S): at 01:52

## 2023-08-10 RX ADMIN — Medication 2 GRAM(S): at 17:40

## 2023-08-10 RX ADMIN — SODIUM CHLORIDE 120 MILLILITER(S): 9 INJECTION, SOLUTION INTRAVENOUS at 16:02

## 2023-08-10 RX ADMIN — MORPHINE SULFATE 2 MILLIGRAM(S): 50 CAPSULE, EXTENDED RELEASE ORAL at 11:14

## 2023-08-10 RX ADMIN — MORPHINE SULFATE 4 MILLIGRAM(S): 50 CAPSULE, EXTENDED RELEASE ORAL at 22:05

## 2023-08-10 RX ADMIN — Medication 1000 MILLIGRAM(S): at 15:30

## 2023-08-10 RX ADMIN — MORPHINE SULFATE 2 MILLIGRAM(S): 50 CAPSULE, EXTENDED RELEASE ORAL at 11:50

## 2023-08-10 RX ADMIN — PIPERACILLIN AND TAZOBACTAM 200 GRAM(S): 4; .5 INJECTION, POWDER, LYOPHILIZED, FOR SOLUTION INTRAVENOUS at 05:23

## 2023-08-10 RX ADMIN — MORPHINE SULFATE 4 MILLIGRAM(S): 50 CAPSULE, EXTENDED RELEASE ORAL at 15:02

## 2023-08-10 RX ADMIN — SODIUM CHLORIDE 1000 MILLILITER(S): 9 INJECTION, SOLUTION INTRAVENOUS at 01:52

## 2023-08-10 RX ADMIN — MORPHINE SULFATE 4 MILLIGRAM(S): 50 CAPSULE, EXTENDED RELEASE ORAL at 15:30

## 2023-08-10 RX ADMIN — SODIUM CHLORIDE 1000 MILLILITER(S): 9 INJECTION, SOLUTION INTRAVENOUS at 02:53

## 2023-08-10 RX ADMIN — MORPHINE SULFATE 4 MILLIGRAM(S): 50 CAPSULE, EXTENDED RELEASE ORAL at 21:45

## 2023-08-10 RX ADMIN — CHLORHEXIDINE GLUCONATE 1 APPLICATION(S): 213 SOLUTION TOPICAL at 17:40

## 2023-08-10 RX ADMIN — Medication 1: at 17:51

## 2023-08-10 RX ADMIN — ATORVASTATIN CALCIUM 80 MILLIGRAM(S): 80 TABLET, FILM COATED ORAL at 21:45

## 2023-08-10 RX ADMIN — MORPHINE SULFATE 4 MILLIGRAM(S): 50 CAPSULE, EXTENDED RELEASE ORAL at 01:52

## 2023-08-10 RX ADMIN — Medication 400 MILLIGRAM(S): at 15:02

## 2023-08-10 NOTE — H&P ADULT - NSHPSOCIALHISTORY_GEN_ALL_CORE
Works in [employer] and in [employer]. Lives at home with wife and three kids. No history of alcohol use, smoking, or recreational drug use.

## 2023-08-10 NOTE — H&P ADULT - NSHPPHYSICALEXAM_GEN_ALL_CORE
PHYSICAL EXAM:   GENERAL: Alert. Not confused. No acute distress. Not thin. Not cachectic. Not obese.  HEAD:  Atraumatic. Normocephalic.  EYES: EOMI. PERRLA. Normal conjunctiva/sclera.  ENT: Neck supple. No JVD. Moist oral mucosa. Not edentulous. No thrush.  LYMPH: Normal supraclavicular/cervical lymph nodes.   CARDIAC: Not tachy, Not cristal. Regular rhythm. Not irregularly irregular. S1. S2. No murmur. No rub. No distant heart sounds.  LUNG/CHEST: CTAB. BS equal bilaterally. No wheezes. No rales. No rhonchi.  ABDOMEN: Soft. No tenderness. No distension. No fluid wave. Normal bowel sounds.  BACK: No midline/vertebral tenderness. No flank tenderness.  VASCULAR: +2 b/l radial or ulnar pulses. Palpable DP pulses.  EXTREMITIES:  No clubbing. No cyanosis. No edema. Moving all 4.  NEUROLOGY: A&Ox3. Non-focal exam. Cranial nerves intact. Normal speech. Sensation intact.  PSYCH: Normal behavior. Normal affect.  SKIN: No jaundice. No erythema. No rash/lesion.  Vascular Access:     ICU Vital Signs Last 24 Hrs  T(C): 36.8 (10 Aug 2023 13:31), Max: 37.7 (09 Aug 2023 19:22)  T(F): 98.3 (10 Aug 2023 13:31), Max: 99.9 (09 Aug 2023 19:22)  HR: 86 (10 Aug 2023 13:31) (70 - 114)  BP: 129/72 (10 Aug 2023 13:31) (101/67 - 137/73)  BP(mean): --  ABP: --  ABP(mean): --  RR: 18 (10 Aug 2023 13:31) (16 - 20)  SpO2: 97% (10 Aug 2023 13:31) (97% - 99%)    O2 Parameters below as of 10 Aug 2023 13:31  Patient On (Oxygen Delivery Method): room air            I&O's Summary Vital Signs Last 24 Hrs  T(C): 36.8 (10 Aug 2023 13:31), Max: 37.7 (09 Aug 2023 19:22)  T(F): 98.3 (10 Aug 2023 13:31), Max: 99.9 (09 Aug 2023 19:22)  HR: 86 (10 Aug 2023 13:31) (70 - 114)  BP: 129/72 (10 Aug 2023 13:31) (101/67 - 137/73)  BP(mean): --  RR: 18 (10 Aug 2023 13:31) (16 - 20)    PHYSICAL EXAM:   GENERAL: Alert. Apparent pain.  HEAD:  Atraumatic. Normocephalic.  EYES: EOMI. Normal conjunctiva/sclera.  CARDIAC: Regular rate and rhythm. Normal S1, S2. No murmurs, rubs, or gallops  LUNG/CHEST: CTAB. BS equal bilaterally. No wheezes. No rales. No rhonchi.  ABDOMEN: Soft. Non-distended. Tenderness to palpation in all 4 quadrants. No rebound/guarding. Normal bowel sounds.  BACK: No midline/vertebral tenderness. No flank tenderness.  EXTREMITIES:  No clubbing. No cyanosis. No edema. Moving all 4.  NEUROLOGY: A&Ox3. Non-focal exam.   SKIN: No jaundice. No erythema. No rash/lesion.

## 2023-08-10 NOTE — H&P ADULT - PROBLEM SELECTOR PLAN 1
-GI consult; most recent scope w/Dr. Shah 08/07  -s/p CT abdomen + pelvis showing interval decrease in cyst and reduced peripancreatic inflammation  -Diet: NPO  -Pain: Morphine + acetaminophen PRN  -Fluids: 125 cc/hr LR   -AM CMP, CBC

## 2023-08-10 NOTE — H&P ADULT - ASSESSMENT
38 yo M with PMHx of familial triglyceridemia who presents with acute abdominal pain in the context of chronic pancreatitis.    36 yo M with PMHx of familial triglyceridemia and recurrent pancreatitis who presents with acute onset abdominal pain in the context of recent EUS + cystogastrostomy for pancreatic pseudocyst. Hospitalized in early august for acute pancreatitis c/b pancreatic pseudocyst. S/p cystogastrostomy with AXIOS stent placement on 08/07. Asymptomatic on discharge from hospital on 08/08. Returned to hospital after abdominal pain returned on 08/09. On admission, labs significant for leukocytosis of 14.8. Home temperatures reported to be 103 but afebrile since ED. Lipase 112, likely reactive to recent manipulation. Triglycerides 197. CT abdomen/pelvis demonstrated interval decrease pancreatic pseudocyst size and reduced peripancreatic inflammation. Differential for abdominal pain includes recent manipulation on endoscopy, recurrent pancreatitis, or peripancreatic abscess.

## 2023-08-10 NOTE — H&P ADULT - HISTORY OF PRESENT ILLNESS
HPI: Patient is a 37y old  Male with PMHx of familial hypertriglyceridemia who presents with a chief complaint of diffuse abdominal pain in the context of recent admission for pancreatitis.      PAST MEDICAL HISTORY:    Pancreatitis      Pancreatic pseudocyst      Necrotizing pancreatitis      Hypertriglyceridemia    PAST SURGICAL HISTORY:    No significant past surgical history    MEDICATIONS  (STANDING):    MEDICATIONS  (PRN):      CAPILLARY BLOOD GLUCOSE        I&O's Summary      PHYSICAL EXAM:  Vital Signs Last 24 Hrs  T(C): 36.8 (10 Aug 2023 13:31), Max: 37.7 (09 Aug 2023 19:22)  T(F): 98.3 (10 Aug 2023 13:31), Max: 99.9 (09 Aug 2023 19:22)  HR: 86 (10 Aug 2023 13:31) (70 - 114)  BP: 129/72 (10 Aug 2023 13:31) (101/67 - 137/73)  BP(mean): --  RR: 18 (10 Aug 2023 13:31) (16 - 20)  SpO2: 97% (10 Aug 2023 13:31) (97% - 99%)    Parameters below as of 10 Aug 2023 13:31  Patient On (Oxygen Delivery Method): room air        CONSTITUTIONAL: NAD, well-developed, A&Ox3 to person, place, time.  RESPIRATORY: Normal respiratory effort; lungs are clear to auscultation bilaterally  CARDIOVASCULAR: Regular rate and rhythm, normal S1 and S2, no murmur/rub/gallop; No lower extremity edema; Peripheral pulses are 2+ bilaterally  ABDOMEN: Nontender to palpation, no rebound/guarding; No hepatosplenomegaly  MUSCLOSKELETAL: no clubbing or cyanosis of digits; no joint swelling or tenderness to palpation  NEURO: CN 2-12 grossly intact, moves all limbs spontaneously      LABS:                          13.7   14.88 )-----------( 261      ( 09 Aug 2023 22:06 )             42.2     08-09    132<L>  |  95<L>  |  15  ----------------------------<  226<H>  3.9   |  19<L>  |  0.88    Ca    9.8      09 Aug 2023 22:06    TPro  8.5<H>  /  Alb  4.0  /  TBili  1.0  /  DBili  x   /  AST  23  /  ALT  36  /  AlkPhos  73  08-09          -----    MICRO  Urinalysis Basic - ( 10 Aug 2023 07:38 )    Color: Yellow / Appearance: Clear / SG: >1.050 / pH: x  Gluc: x / Ketone: Trace  / Bili: Negative / Urobili: 6 mg/dL   Blood: x / Protein: 30 mg/dL / Nitrite: Negative   Leuk Esterase: Negative / RBC: 1 /hpf / WBC 5 /HPF   Sq Epi: x / Non Sq Epi: x / Bacteria: Negative        -----    TRENDS  Hemoglobin: 13.7 g/dL (08-09 @ 22:06)  Hemoglobin: 12.5 g/dL (08-08 @ 07:30)  Hemoglobin: 13.0 g/dL (08-07 @ 07:02)  Hemoglobin: 12.0 g/dL (08-06 @ 06:45)    Creatinine Trend: 0.88<--, 0.63<--, 0.60<--, 0.63<--, 0.60<--, 0.72<--  ----  RADIOLOGY & ADDITIONAL TESTS:  Results Reviewed:   Imaging Personally Reviewed:  Electrocardiogram Personally Reviewed:    COORDINATION OF CARE:  Care Discussed with Consultants/Other Providers [Y/N]:  Prior or Outpatient Records Reviewed [Y/N]: HPI: Patient is a 37y old  Male with PMHx of familial hypertriglyceridemia, pre-DM, and recurrent pancreatitis who presents with a chief complaint of diffuse abdominal pain in the context of recent hospital admission for pancreatitis, pancreatic pseudocyst, EUD, and cystogastrostomy with AXIOS stent placement. Pt. discharged from hospital on 08/08 following endoscopic stent placement draining pancreatic pseudocyst. Pt. reported resolution of abdominal pain at that point and also advanced to regular diet. One day after discharge, pt. began to feel recurrence of abdominal pain, worst in LLQ with hips flexed. At home, pt. reported temperature of up to 103 degrees, which resolved without taking any medications. This morning, pt. came in to ED with worsening abdominal pain, Pt. does not endorse nausea and vomiting. Pt. examined at bedside, in apparent pain. Pt. has not had a BM since discharge home. Pt. also endorses eating very little since discharge, largely 2/2 to abdominal pain. Pain is worst when hips are flexed. Pain is located deep in abdomen toward LLQ and does not radiate anywhere. Fevers have been on or off. No dysuria or hematuria. No chest pain or SOB.       PAST MEDICAL HISTORY:    Pancreatitis    Pancreatic pseudocyst    Necrotizing pancreatitis    Hypertriglyceridemia    PAST SURGICAL HISTORY:    No significant past surgical history      MEDICATIONS  (STANDING):  atorvastatin 80 milliGRAM(s) Oral at bedtime  chlorhexidine 4% Liquid 1 Application(s) Topical daily  dextrose 5%. 1000 milliLiter(s) (50 mL/Hr) IV Continuous <Continuous>  dextrose 5%. 1000 milliLiter(s) (100 mL/Hr) IV Continuous <Continuous>  dextrose 50% Injectable 12.5 Gram(s) IV Push once  dextrose 50% Injectable 25 Gram(s) IV Push once  dextrose 50% Injectable 25 Gram(s) IV Push once  fenofibrate Tablet 145 milliGRAM(s) Oral daily  glucagon  Injectable 1 milliGRAM(s) IntraMuscular once  insulin lispro (ADMELOG) corrective regimen sliding scale   SubCutaneous every 6 hours  lactated ringers. 1000 milliLiter(s) (120 mL/Hr) IV Continuous <Continuous>  omega-3-Acid Ethyl Esters 2 Gram(s) Oral two times a day    MEDICATIONS  (PRN):  acetaminophen     Tablet .. 650 milliGRAM(s) Oral every 6 hours PRN Mild Pain (1 - 3)  dextrose Oral Gel 15 Gram(s) Oral once PRN Blood Glucose LESS THAN 70 milliGRAM(s)/deciliter  morphine  - Injectable 4 milliGRAM(s) IV Push every 4 hours PRN Severe Pain (7 - 10)  morphine  - Injectable 2 milliGRAM(s) IV Push every 4 hours PRN Moderate Pain (4 - 6)      PHYSICAL EXAM:  Vital Signs Last 24 Hrs  T(C): 36.8 (10 Aug 2023 13:31), Max: 37.7 (09 Aug 2023 19:22)  T(F): 98.3 (10 Aug 2023 13:31), Max: 99.9 (09 Aug 2023 19:22)  HR: 86 (10 Aug 2023 13:31) (70 - 114)  BP: 129/72 (10 Aug 2023 13:31) (101/67 - 137/73)  BP(mean): --  RR: 18 (10 Aug 2023 13:31) (16 - 20)  SpO2: 97% (10 Aug 2023 13:31) (97% - 99%)    Parameters below as of 10 Aug 2023 13:31  Patient On (Oxygen Delivery Method): room air

## 2023-08-10 NOTE — PROGRESS NOTE ADULT - SUBJECTIVE AND OBJECTIVE BOX
PROGRESS NOTE:     Patient is a 37y old  Male who presents with a chief complaint of     SUBJECTIVE / OVERNIGHT EVENTS:    ADDITIONAL REVIEW OF SYSTEMS: 10 point ROS negative except per HPI    MEDICATIONS  (STANDING):    MEDICATIONS  (PRN):      CAPILLARY BLOOD GLUCOSE        I&O's Summary      PHYSICAL EXAM:  Vital Signs Last 24 Hrs  T(C): 36.7 (10 Aug 2023 11:13), Max: 37.7 (09 Aug 2023 19:22)  T(F): 98 (10 Aug 2023 11:13), Max: 99.9 (09 Aug 2023 19:22)  HR: 84 (10 Aug 2023 11:13) (70 - 114)  BP: 115/78 (10 Aug 2023 11:13) (101/67 - 137/73)  BP(mean): --  RR: 16 (10 Aug 2023 11:13) (16 - 20)  SpO2: 97% (10 Aug 2023 11:13) (97% - 99%)    Parameters below as of 10 Aug 2023 11:13  Patient On (Oxygen Delivery Method): room air        CONSTITUTIONAL: NAD, well-developed, A&Ox3 to person, place, time.  RESPIRATORY: Normal respiratory effort; lungs are clear to auscultation bilaterally  CARDIOVASCULAR: Regular rate and rhythm, normal S1 and S2, no murmur/rub/gallop; No lower extremity edema; Peripheral pulses are 2+ bilaterally  ABDOMEN: Nontender to palpation, no rebound/guarding; No hepatosplenomegaly  MUSCLOSKELETAL: no clubbing or cyanosis of digits; no joint swelling or tenderness to palpation  NEURO: CN 2-12 grossly intact, moves all limbs spontaneously      LABS:                          13.7   14.88 )-----------( 261      ( 09 Aug 2023 22:06 )             42.2     08-09    132<L>  |  95<L>  |  15  ----------------------------<  226<H>  3.9   |  19<L>  |  0.88    Ca    9.8      09 Aug 2023 22:06    TPro  8.5<H>  /  Alb  4.0  /  TBili  1.0  /  DBili  x   /  AST  23  /  ALT  36  /  AlkPhos  73  08-09          -----    MICRO  Urinalysis Basic - ( 10 Aug 2023 07:38 )    Color: Yellow / Appearance: Clear / SG: >1.050 / pH: x  Gluc: x / Ketone: Trace  / Bili: Negative / Urobili: 6 mg/dL   Blood: x / Protein: 30 mg/dL / Nitrite: Negative   Leuk Esterase: Negative / RBC: 1 /hpf / WBC 5 /HPF   Sq Epi: x / Non Sq Epi: x / Bacteria: Negative        -----    TRENDS  Hemoglobin: 13.7 g/dL (08-09 @ 22:06)  Hemoglobin: 12.5 g/dL (08-08 @ 07:30)  Hemoglobin: 13.0 g/dL (08-07 @ 07:02)  Hemoglobin: 12.0 g/dL (08-06 @ 06:45)    Creatinine Trend: 0.88<--, 0.63<--, 0.60<--, 0.63<--, 0.60<--, 0.72<--  ----  RADIOLOGY & ADDITIONAL TESTS:  Results Reviewed:   Imaging Personally Reviewed:  Electrocardiogram Personally Reviewed:    COORDINATION OF CARE:  Care Discussed with Consultants/Other Providers [Y/N]:  Prior or Outpatient Records Reviewed [Y/N]:

## 2023-08-10 NOTE — CONSULT NOTE ADULT - ATTENDING COMMENTS
Mr Pulido is a 37 year old man w/ familial hypertriglyceridemia w/ recurrent pancreatitis admitted recently with acute pancreatitis and found to have worsening pseudocyst s/p cystgastrostomy earlier this week presenting with fever and severe left flank pain.  Imaging reviewed and revealed improved walled-off pancreatic fluid collection.  There also appears to be necrotic material within the fluid collection.      Broad coverage ABx.  Plan for EGD to evaluate cystgastrostomy w/ possible necrosectomy.

## 2023-08-10 NOTE — CONSULT NOTE ADULT - SUBJECTIVE AND OBJECTIVE BOX
Patient is a 37y old  Male who presents with a chief complaint of abdominal pain.      HPI:  A 37 year-old male with PMHx of  hypertriglyceridemia, multiple admissions of pancreatitis (last adm 2015 for necrotizing pancreatitis), chronic splenic thrombosis, pancreatic pseudocyst, was admitted to MICU for hypertriglyceridemia-induced pancreatitis. Patient was discharged 08/08/2023. He presented today to the ED for abdominal pain, nausea without vomiting. CT scan shows a mild to moderate fluid stranding around the pancreas and a reteroperitoneal fluid collection 3.0x5.4x18.7 cm.       T(C): 36.8 (08-10-23 @ 06:22), Max: 37.7 (08-09-23 @ 19:22)  HR: 84 (08-10-23 @ 06:22) (84 - 114)  BP: 101/67 (08-10-23 @ 06:22) (101/67 - 137/73)  RR: 18 (08-10-23 @ 06:22) (18 - 20)  SpO2: 99% (08-10-23 @ 06:22) (98% - 99%)  Wt(kg): --    PHYSICAL EXAM:    GENERAL: Comfortable, in not acute distress  HEAD:  Atraumatic, Normocephalic  NECK: Supple, No JVD  NERVOUS SYSTEM:  Alert & Oriented X3, Motor Strength 5/5 B/L upper and lower extremities  HEART: Regular rate and rhythm; No murmurs, rubs, or gallops  ABDOMEN: Soft, Nontender, Nondistended    LABS:                        13.7   14.88 )-----------( 261      ( 09 Aug 2023 22:06 )             42.2     08-09    132<L>  |  95<L>  |  15  ----------------------------<  226<H>  3.9   |  19<L>  |  0.88    Ca    9.8      09 Aug 2023 22:06  Phos  3.3     08-08  Mg     2.0     08-08    TPro  8.5<H>  /  Alb  4.0  /  TBili  1.0  /  DBili  x   /  AST  23  /  ALT  36  /  AlkPhos  73  08-09    PT/INR - ( 08 Aug 2023 07:31 )   PT: 12.1 sec;   INR: 1.16 ratio         PTT - ( 08 Aug 2023 07:31 )  PTT:33.6 sec  Urinalysis Basic - ( 09 Aug 2023 22:06 )    Color: x / Appearance: x / SG: x / pH: x  Gluc: 226 mg/dL / Ketone: x  / Bili: x / Urobili: x   Blood: x / Protein: x / Nitrite: x   Leuk Esterase: x / RBC: x / WBC x   Sq Epi: x / Non Sq Epi: x / Bacteria: x        CAPILLARY BLOOD GLUCOSE          CAPILLARY BLOOD GLUCOSE        CAPILLARY BLOOD GLUCOSE      POCT Blood Glucose.: 123 mg/dL (08 Aug 2023 21:32)            RECENT CULTURES:                MEDS

## 2023-08-10 NOTE — H&P ADULT - NSHPREVIEWOFSYSTEMS_GEN_ALL_CORE
REVIEW OF SYSTEMS:  CONSTITUTIONAL: No weakness. No fevers. No chills. No rigors. No weight loss. No night sweats. No diaphoresis. No poor appetite.  EYES: No blurry vision. No double vision. No eye pain.  ENT: No hearing difficulty. No vertigo. No dysphagia. No sore throat. No Sinusitis/rhinorrhea.   NECK: No pain. No stiffness/rigidity.  CARDIAC: No chest pain. No palpitations. No lightheadedness. No syncope.  RESPIRATORY: No cough. No SOB. No hemoptysis.  GASTROINTESTINAL: No abdominal pain. No distension. No nausea. No vomiting. No hematemesis. No diarrhea. No constipation. No melena. No hematochezia.  GENITOURINARY: No dysuria. No frequency. No hesitancy. No hematuria. No oliguria.  NEUROLOGICAL: No numbness/tingling. No focal weakness. No urinary or fecal incontinence. No headache. No unsteady gait.  BACK: No back pain. No flank pain.  EXTREMITIES: No lower extremity edema. Full ROM. No joint pain.  SKIN: No rashes. No itching. No other lesions.  PSYCHIATRIC: No depression. No anxiety. No SI/HI.  ALLERGIC: No lip swelling. No hives.  All other review of systems is negative unless indicated above.  Unless indicated above, unable to assess ROS 2/2

## 2023-08-10 NOTE — ED ADULT NURSE NOTE - OBJECTIVE STATEMENT
38y/o M coming to the ED c.o abdominal pain. Pt states that he was D/C yesterday after being admitted for pancreatitis for x1week. Pt states that the abdominal pain started yesterday afternoon and worsened today. Pt denies any CP/SOB/N/V/D. On exam, pt is breathing spontaneously, able to speak full sentences w.o difficulty, saturating 98% RA. Abdomen soft, nondistended, tender. IV placed labs collected and sent. VSS.

## 2023-08-10 NOTE — PATIENT PROFILE ADULT - DOES PATIENT HAVE ADVANCE DIRECTIVE
Alert, pleasant in no distress  CN grossly intact  Extremities warm without edema  Lungs clear to auscultation  RRR No

## 2023-08-10 NOTE — CONSULT NOTE ADULT - SUBJECTIVE AND OBJECTIVE BOX
HPI:  VALERIE VALERA is a 37 year old male with history of pre-DM, familial hypertriglyceridemia (follows with Dr. Michelle) c/b multiple admissions of pancreatitis (last adm 2015 for necrotizing pancreatitis) c/b pancreatic pseudocyst who presents with fever and left flank pain.    Patient recently admitted for presentation of left sided abdominal pain with radiation to back, a/w N/V, treated for pancreatitis in the ICU with insulin gtt.  Ultimately improved and underwent EGD with AXIOS 15 x 10mm stent placement and endoscopic drainage on 8/7/2023.  Patient states he advanced his diet to "eating chicken" on day of discharge, which he states resulted in "gas pains" when he got home.  Subsequently, he developed fever with Tmax 104 with left flank pain.  No epigastric pain.  CT A/P 8/10/2023 reveals marked decrease in size of peripancreatic cyst, however some fluid remains with small amount of contrast present.    ROS:   General:  +fevers  Eyes:  Good vision, no reported pain  ENT:  No sore throat, pain, runny nose  CV:  No pain, palpitations  Pulm:  No dyspnea, cough  GI:  See HPI, otherwise negative  :  No  incontinence, nocturia  Muscle:  No pain, weakness  Neuro:  No memory problems  Psych:  No insomnia, mood problems, depression  Endocrine:  No polyuria, polydipsia, cold/heat intolerance  Heme:  No petechiae, ecchymosis, easy bruisability  Skin:  No rash    PMHX/PSHX:    Pancreatitis    Pancreatic pseudocyst    Necrotizing pancreatitis    Hypertriglyceridemia    No significant past surgical history      Allergies:  No Known Allergies      Home Medications: reviewed  Hospital Medications:  acetaminophen     Tablet .. 650 milliGRAM(s) Oral every 6 hours PRN  atorvastatin 80 milliGRAM(s) Oral at bedtime  chlorhexidine 4% Liquid 1 Application(s) Topical daily  dextrose 5%. 1000 milliLiter(s) IV Continuous <Continuous>  dextrose 5%. 1000 milliLiter(s) IV Continuous <Continuous>  dextrose 50% Injectable 12.5 Gram(s) IV Push once  dextrose 50% Injectable 25 Gram(s) IV Push once  dextrose 50% Injectable 25 Gram(s) IV Push once  dextrose Oral Gel 15 Gram(s) Oral once PRN  fenofibrate Tablet 145 milliGRAM(s) Oral daily  glucagon  Injectable 1 milliGRAM(s) IntraMuscular once  insulin lispro (ADMELOG) corrective regimen sliding scale   SubCutaneous every 6 hours  lactated ringers. 1000 milliLiter(s) IV Continuous <Continuous>  morphine  - Injectable 4 milliGRAM(s) IV Push every 4 hours PRN  morphine  - Injectable 2 milliGRAM(s) IV Push every 4 hours PRN  omega-3-Acid Ethyl Esters 2 Gram(s) Oral two times a day      Social History:   Tobacco: denies  Alcohol: denies  Recreational drugs: denies    Family history:    No pertinent family history in first degree relatives    Family history of endogenous hypertriglyceridemia (Mother, Sibling, Uncle)    Family history of diabetes mellitus (Mother, Father)      Denies family history of colon cancer/polyps, stomach cancer/polyps, pancreatic cancer/masses, liver cancer/disease, ovarian cancer and endometrial cancer.    PHYSICAL EXAM:   Vital Signs:  Vital Signs Last 24 Hrs  T(C): 36.8 (10 Aug 2023 13:31), Max: 37.7 (09 Aug 2023 19:22)  T(F): 98.3 (10 Aug 2023 13:31), Max: 99.9 (09 Aug 2023 19:22)  HR: 86 (10 Aug 2023 13:31) (70 - 114)  BP: 129/72 (10 Aug 2023 13:31) (101/67 - 137/73)  BP(mean): --  RR: 18 (10 Aug 2023 13:31) (16 - 20)  SpO2: 97% (10 Aug 2023 13:31) (97% - 99%)    Parameters below as of 10 Aug 2023 13:31  Patient On (Oxygen Delivery Method): room air      Daily Height in cm: 180.34 (09 Aug 2023 19:22)    Daily     GENERAL: no acute distress  NEURO: alert  HEENT: NCAT, no conjunctival pallor appreciated  CHEST: no respiratory distress, no accessory muscle use  CARDIAC: regular rate, +S1/S2  ABDOMEN: soft, left flank tenderness, no rebound or guarding  EXTREMITIES: warm, well perfused  SKIN: no lesions noted    LABS: reviewed                        13.7   14.88 )-----------( 261      ( 09 Aug 2023 22:06 )             42.2     08-09    132<L>  |  95<L>  |  15  ----------------------------<  226<H>  3.9   |  19<L>  |  0.88    Ca    9.8      09 Aug 2023 22:06    TPro  8.5<H>  /  Alb  4.0  /  TBili  1.0  /  DBili  x   /  AST  23  /  ALT  36  /  AlkPhos  73  08-09    LIVER FUNCTIONS - ( 09 Aug 2023 22:06 )  Alb: 4.0 g/dL / Pro: 8.5 g/dL / ALK PHOS: 73 U/L / ALT: 36 U/L / AST: 23 U/L / GGT: x               Diagnostic Studies: see sunrise for full report        37-year-old male patient PMH of pre-DM, familial hypertriglyceridemia (follows with Dr. Michelle) c/b multiple admissions of pancreatitis (last adm 2015 for necrotizing pancreatitis) c/b pancreatic pseudocyst who presents to the emergency department for L sided abdominal pain with radiation to back x  2 days a/w N/V and decreased PO. States the pain feels similar to his prior pancreatitis episode. Denies HA, CP, palpitations, SOB, diarrhea, fevers, alcohol use. Of note, pt stopped taking gemfibrozil and metformin 2 mo ago to try to control his medical issues with diet alone since he has been well controlled for the past 8 years. Labs notable for WBC 12, triglycerides 4742, lipase 2908. 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 [facility] consulted for management of chronic peripancreatic fluid collection.    # Acute pancreatitis likely 2/2 familial hypertriglyceridemia  # Interval increase of chronic peripancreatic tail collection with mass effect along stomach (~7.1 x 5.2 cm) w/o e/o pancreatic necrosis  -s/p EGD with AXIOS 15 x 10mm stent placement and endoscopic drainage on 8/7/2023    Recommendations:  - pain control and antiemetics as QTc allows  - avoidance of ETOH  - endoscopic drainage as above  - ok to advance diet as tolerated to low fat diet  - will need outpatient followup in 2 weeks with repeat imaging and evaluation for timing of AXIOS stent removal following above  - no further plans for anticipated inpatient GI interventions, please call back with questions

## 2023-08-10 NOTE — ED ADULT NURSE REASSESSMENT NOTE - NS ED NURSE REASSESS COMMENT FT1
Report received from SCAR Luke. Pt a&ox3, breathing spontaneous and unlabored, moving all extremities and following commands, skin warm dry and appropriate color. Pt denies pain at this time. Pt safety measures in place and comfort provided.

## 2023-08-10 NOTE — ED ADULT NURSE NOTE - NSFALLUNIVINTERV_ED_ALL_ED
Bed/Stretcher in lowest position, wheels locked, appropriate side rails in place/Call bell, personal items and telephone in reach/Instruct patient to call for assistance before getting out of bed/chair/stretcher/Non-slip footwear applied when patient is off stretcher/[address] to call system/Physically safe environment - no spills, clutter or unnecessary equipment/Purposeful proactive rounding/Room/bathroom lighting operational, light cord in reach

## 2023-08-10 NOTE — H&P ADULT - PROBLEM SELECTOR PLAN 3
-fenofibrate Tablet 145 milligrams) Oral daily  -omega-3-Acid Ethyl Esters 2 Gram(s) Oral two times a day  -atorvastatin 80 milligrams Oral at bedtime  -Triglycerides 197 on admission

## 2023-08-10 NOTE — H&P ADULT - ATTENDING COMMENTS
The patient is a 37M with history of pre-DM, familial hypertriglyceridemia (follows with Dr. Michelle) c/b multiple admissions of pancreatitis (last adm 2015 for necrotizing pancreatitis) c/b pancreatic pseudocyst, recently treated for pancreatitis in the ICU with insulin gtt.  He underwent EGD with AXIOS 15 x 10mm stent placement and endoscopic drainage on 8/7/2023 by [facility], remained stable and was discharged home as he was tolerating meals..      He developed 103F fever, gas pains but no N/V, SOB or chest pain. in ED CT A/P 8/10/2023 reveals marked decrease in size of peripancreatic cyst, however some fluid remains with small amount of contrast present. WBC 14.8, Lipase 197 but BP has been stable    1. Fever, abdominal pain- likely from pancreatitis and recent pancreatic drain  2. Familial hypertriglyceridemia    - Will c/w IV hydration, NPO, analgesics and IV Zosyn. F/U c/s  - [facility] consult The patient is a 37M with history of pre-DM, familial hypertriglyceridemia (follows with Dr. Michelle) c/b multiple admissions of pancreatitis (last adm 2015 for necrotizing pancreatitis) c/b pancreatic pseudocyst, recently treated for pancreatitis in the ICU with insulin gtt.  He underwent EGD with AXIOS 15 x 10mm stent placement and endoscopic drainage on 8/7/2023 by [facility], remained stable and was discharged home as he was tolerating meals..      He developed 103F fever, gas pains but no N/V, SOB or chest pain. in ED CT A/P 8/10/2023 reveals marked decrease in size of peripancreatic cyst, however some fluid remains with small amount of contrast present. WBC 14.8, Lipase 197 but BP has been stable    1. Fever, abdominal pain- likely from pancreatitis and recent pancreatic drain  2. Familial hypertriglyceridemia    - Will c/w IV hydration, NPO, IV/PO analgesics and IV Zosyn. F/U blood c/s. Reviewed CT abd/pelvis as above.  - [facility] consult  - CMP, Lipid panel in am

## 2023-08-10 NOTE — H&P ADULT - NSHPLABSRESULTS_GEN_ALL_CORE
LABS:                          13.7   14.88 )-----------( 261      ( 09 Aug 2023 22:06 )             42.2     08-09    132<L>  |  95<L>  |  15  ----------------------------<  226<H>  3.9   |  19<L>  |  0.88    Ca    9.8      09 Aug 2023 22:06    TPro  8.5<H>  /  Alb  4.0  /  TBili  1.0  /  DBili  x   /  AST  23  /  ALT  36  /  AlkPhos  73  08-09    -----    MICRO  Urinalysis Basic - ( 10 Aug 2023 07:38 )    Color: Yellow / Appearance: Clear / SG: >1.050 / pH: x  Gluc: x / Ketone: Trace  / Bili: Negative / Urobili: 6 mg/dL   Blood: x / Protein: 30 mg/dL / Nitrite: Negative   Leuk Esterase: Negative / RBC: 1 /hpf / WBC 5 /HPF   Sq Epi: x / Non Sq Epi: x / Bacteria: Negative    TRENDS  Hemoglobin: 13.7 g/dL (08-09 @ 22:06)  Hemoglobin: 12.5 g/dL (08-08 @ 07:30)  Hemoglobin: 13.0 g/dL (08-07 @ 07:02)  Hemoglobin: 12.0 g/dL (08-06 @ 06:45)    Creatinine Trend: 0.88<--, 0.63<--, 0.60<--, 0.63<--, 0.60<--, 0.72<--    CT Abdomen and Pelvis w/ IV Cont (08.10.23 @ 03:22)     PANCREAS: Divisum. Decreased peripancreatic inflammation and fluid.   Inferior pancreatic head fluid collection (6.4 x 1.5 cm). Interval   cystogastrostomy stent placement into pancreatic tail fluid collection   with mild decrease in size and containing a small amount of contrast   material.    IMPRESSION:  Pancreatitis and pancreas divisum.  Interval placement of cystogastrostomy stent into pancreatic tail fluid   collection.

## 2023-08-10 NOTE — CONSULT NOTE ADULT - ASSESSMENT
37 year old male with history of pre-DM, familial hypertriglyceridemia (follows with Dr. Michelle) c/b multiple admissions of pancreatitis (last adm 2015 for necrotizing pancreatitis) c/b pancreatic pseudocyst who presents with fever and left flank pain.  Patient recently admitted for presentation of left sided abdominal pain with radiation to back, a/w N/V, treated for pancreatitis in the ICU with insulin gtt.  Ultimately improved and underwent EGD with AXIOS 15 x 10mm stent placement and endoscopic drainage on 8/7/2023.  Patient states he advanced his diet to "eating chicken" on day of discharge, which he states resulted in "gas pains" when he got home.  Subsequently, he developed fever with Tmax 104 with left flank pain.  No epigastric pain.  CT A/P 8/10/2023 reveals marked decrease in size of peripancreatic cyst, however some fluid remains with small amount of contrast present.    # Fever  # Leukocytosis  # Left flank pain  # Familial hypertriglyceridemia c/b recurrent pancreatitis  # Peripancreatic fluid collection s/p AXIOS 15 x 10mm stent placement and endoscopic drainage on 8/7/2023  -unclear etiology for fever/leukocytosis/left flank pain.  Stent appears to be in good position with interval decrease in fluid collection.  However, some fluid and debris remain, with what appears to be a small amount of contrast within the collection.    Recommendations:  -trend clinical symptoms, exam findings, vital signs, CBC, CMP, INR  -complete infectious workup and f/u BCx  -IVF and empiric Zosyn following culture collection  -pain control and antiemetics as QTc allows  -keep NPO after midnight, may require re-attempting EGD +/- necrosectomy pending clinical course    Note incomplete until finalized by attending signature/attestation.    Perico Casillas  GI/Hepatology Fellow    MONDAY-FRIDAY 8AM-5PM:  Pager# [phone] ([facility]) or [phone] ([facility])    NON-URGENT CONSULTS:  Please email [email or web address]  AT NIGHT AND ON WEEKENDS:  Contact on-call GI fellow via answering service ([phone]) from 5pm-8am and on weekends/holidays    
A 37 year-old male with PMHx of  hypertriglyceridemia, multiple admissions of pancreatitis (last adm 2015 for necrotizing pancreatitis), chronic splenic thrombosis, pancreatic pseudocyst, was admitted to MICU for hypertriglyceridemia-induced pancreatitis. Patient was discharged 08/08/2023. He presented today to the ED for abdominal pain, nausea without vomiting. CT scan shows a mild to moderate fluid stranding around the pancreas and a reteroperitoneal fluid collection 3.0x5.4x18.7 cm.     Plan:  - Admit to medicine service   - Surgery will follow under Dr. Nava       [facility]  [id number]

## 2023-08-10 NOTE — ED ADULT NURSE REASSESSMENT NOTE - NS ED NURSE REASSESS COMMENT FT1
Pt c/o increased abdominal pain, admitting team made aware ([id number]), medication administered as ordered

## 2023-08-10 NOTE — PROGRESS NOTE ADULT - ASSESSMENT
37-year-old male patient PMH of pre-DM, hypertriglyceridemia (follows with Dr. Michelle), multiple admissions of pancreatitis  (last adm 2015 for necrotizing pancreatitis), pancreatic pseudocyst who p/w left sided abdominal pain x 2 days ago associated with nausea and vomiting admitted to MICU for acute pancreatitis.     ===NEURO===  -AOx3   -ctm    ===CARDIAC===  Tachycardia  -HR 110s  -may be reflexive     ===PULM===  -no issues at this time    ===RENAL===  -no issues at this time    ===GI===  #acute pancreatitis   -hx of hospitalizations for pancreatitis  -hx of hypertriglyceridemia off gembrozil  -lipase = 2908  -CT abd: Peripancreatic infiltration and confluent ill-defined fluid, compatible with acute pancreatitis  -Plan:             -insulin 8.6U gtt              -triglycerides q12, will d/c insulin gtt when trigly goes below 1000               -BMP q6 to monitor lytes             -hourly glucose POCT for goal <180             -IVF: D5 LR 150cc/hr             -c/s GI for enlarging pseudocysts, GI e-mailed             -Pain regimen in place w/ morphine IV    Diet  -NPO for possible GI intervention in the AM     ===HEME/ONC===  Leukocytosis   -afebrile  -likely 2/2 hemoconcentration 2/2 third spacing from pancreatitis   -resolved     ===ID===  -no issues at this time     ===ENDO===  #hypertriglyceridemia  -c/s endo  -triglycerides q12, will d/c insulin gtt when trigly goes below 1000  -Endocrine consultation e-mailed    pre-DM   -f/u a1c     DVT  -lovenox subq    FULL CODE  wife is surrogate decision maker

## 2023-08-11 LAB
ALBUMIN SERPL ELPH-MCNC: 3.3 G/DL — SIGNIFICANT CHANGE UP (ref 3.3–5)
ALP SERPL-CCNC: 69 U/L — SIGNIFICANT CHANGE UP (ref 40–120)
ALT FLD-CCNC: 35 U/L — SIGNIFICANT CHANGE UP (ref 10–45)
ANION GAP SERPL CALC-SCNC: 15 MMOL/L — SIGNIFICANT CHANGE UP (ref 5–17)
APTT BLD: 31.3 SEC — SIGNIFICANT CHANGE UP (ref 24.5–35.6)
AST SERPL-CCNC: 29 U/L — SIGNIFICANT CHANGE UP (ref 10–40)
BASOPHILS # BLD AUTO: 0.03 K/UL — SIGNIFICANT CHANGE UP (ref 0–0.2)
BASOPHILS NFR BLD AUTO: 0.3 % — SIGNIFICANT CHANGE UP (ref 0–2)
BILIRUB SERPL-MCNC: 0.3 MG/DL — SIGNIFICANT CHANGE UP (ref 0.2–1.2)
BUN SERPL-MCNC: 11 MG/DL — SIGNIFICANT CHANGE UP (ref 7–23)
CALCIUM SERPL-MCNC: 9.2 MG/DL — SIGNIFICANT CHANGE UP (ref 8.4–10.5)
CHLORIDE SERPL-SCNC: 98 MMOL/L — SIGNIFICANT CHANGE UP (ref 96–108)
CO2 SERPL-SCNC: 22 MMOL/L — SIGNIFICANT CHANGE UP (ref 22–31)
CREAT SERPL-MCNC: 0.65 MG/DL — SIGNIFICANT CHANGE UP (ref 0.5–1.3)
CULTURE RESULTS: SIGNIFICANT CHANGE UP
EGFR: 125 ML/MIN/1.73M2 — SIGNIFICANT CHANGE UP
EOSINOPHIL # BLD AUTO: 0.06 K/UL — SIGNIFICANT CHANGE UP (ref 0–0.5)
EOSINOPHIL NFR BLD AUTO: 0.5 % — SIGNIFICANT CHANGE UP (ref 0–6)
GLUCOSE BLDC GLUCOMTR-MCNC: 210 MG/DL — HIGH (ref 70–99)
GLUCOSE BLDC GLUCOMTR-MCNC: 231 MG/DL — HIGH (ref 70–99)
GLUCOSE SERPL-MCNC: 142 MG/DL — HIGH (ref 70–99)
HCT VFR BLD CALC: 31.8 % — LOW (ref 39–50)
HGB BLD-MCNC: 10.3 G/DL — LOW (ref 13–17)
IMM GRANULOCYTES NFR BLD AUTO: 0.6 % — SIGNIFICANT CHANGE UP (ref 0–0.9)
INR BLD: 1.33 RATIO — HIGH (ref 0.85–1.18)
LYMPHOCYTES # BLD AUTO: 0.89 K/UL — LOW (ref 1–3.3)
LYMPHOCYTES # BLD AUTO: 8 % — LOW (ref 13–44)
MAGNESIUM SERPL-MCNC: 2 MG/DL — SIGNIFICANT CHANGE UP (ref 1.6–2.6)
MCHC RBC-ENTMCNC: 26.5 PG — LOW (ref 27–34)
MCHC RBC-ENTMCNC: 32.4 GM/DL — SIGNIFICANT CHANGE UP (ref 32–36)
MCV RBC AUTO: 81.7 FL — SIGNIFICANT CHANGE UP (ref 80–100)
MONOCYTES # BLD AUTO: 0.74 K/UL — SIGNIFICANT CHANGE UP (ref 0–0.9)
MONOCYTES NFR BLD AUTO: 6.7 % — SIGNIFICANT CHANGE UP (ref 2–14)
MRSA PCR RESULT.: SIGNIFICANT CHANGE UP
NEUTROPHILS # BLD AUTO: 9.31 K/UL — HIGH (ref 1.8–7.4)
NEUTROPHILS NFR BLD AUTO: 83.9 % — HIGH (ref 43–77)
NRBC # BLD: 0 /100 WBCS — SIGNIFICANT CHANGE UP (ref 0–0)
PHOSPHATE SERPL-MCNC: 2.6 MG/DL — SIGNIFICANT CHANGE UP (ref 2.5–4.5)
PLATELET # BLD AUTO: 208 K/UL — SIGNIFICANT CHANGE UP (ref 150–400)
POTASSIUM SERPL-MCNC: 4.1 MMOL/L — SIGNIFICANT CHANGE UP (ref 3.5–5.3)
POTASSIUM SERPL-SCNC: 4.1 MMOL/L — SIGNIFICANT CHANGE UP (ref 3.5–5.3)
PROT SERPL-MCNC: 7.1 G/DL — SIGNIFICANT CHANGE UP (ref 6–8.3)
PROTHROM AB SERPL-ACNC: 14.5 SEC — HIGH (ref 9.5–13)
RBC # BLD: 3.89 M/UL — LOW (ref 4.2–5.8)
RBC # FLD: 14.8 % — HIGH (ref 10.3–14.5)
S AUREUS DNA NOSE QL NAA+PROBE: DETECTED
SODIUM SERPL-SCNC: 135 MMOL/L — SIGNIFICANT CHANGE UP (ref 135–145)
SPECIMEN SOURCE: SIGNIFICANT CHANGE UP
WBC # BLD: 11.1 K/UL — HIGH (ref 3.8–10.5)
WBC # FLD AUTO: 11.1 K/UL — HIGH (ref 3.8–10.5)

## 2023-08-11 PROCEDURE — 43247 EGD REMOVE FOREIGN BODY: CPT | Mod: GC

## 2023-08-11 PROCEDURE — 88305 TISSUE EXAM BY PATHOLOGIST: CPT | Mod: 26

## 2023-08-11 PROCEDURE — 43239 EGD BIOPSY SINGLE/MULTIPLE: CPT | Mod: GC

## 2023-08-11 PROCEDURE — 99233 SBSQ HOSP IP/OBS HIGH 50: CPT | Mod: GC

## 2023-08-11 RX ORDER — PIPERACILLIN AND TAZOBACTAM 4; .5 G/20ML; G/20ML
3.38 INJECTION, POWDER, LYOPHILIZED, FOR SOLUTION INTRAVENOUS EVERY 8 HOURS
Refills: 0 | Status: DISCONTINUED | OUTPATIENT
Start: 2023-08-11 | End: 2023-08-12

## 2023-08-11 RX ORDER — DEXTROSE 50 % IN WATER 50 %
25 SYRINGE (ML) INTRAVENOUS ONCE
Refills: 0 | Status: DISCONTINUED | OUTPATIENT
Start: 2023-08-11 | End: 2023-08-14

## 2023-08-11 RX ORDER — DEXTROSE 50 % IN WATER 50 %
15 SYRINGE (ML) INTRAVENOUS ONCE
Refills: 0 | Status: DISCONTINUED | OUTPATIENT
Start: 2023-08-11 | End: 2023-08-14

## 2023-08-11 RX ORDER — SODIUM CHLORIDE 9 MG/ML
1000 INJECTION, SOLUTION INTRAVENOUS
Refills: 0 | Status: DISCONTINUED | OUTPATIENT
Start: 2023-08-11 | End: 2023-08-14

## 2023-08-11 RX ORDER — INSULIN LISPRO 100/ML
VIAL (ML) SUBCUTANEOUS
Refills: 0 | Status: DISCONTINUED | OUTPATIENT
Start: 2023-08-11 | End: 2023-08-14

## 2023-08-11 RX ORDER — DEXTROSE 50 % IN WATER 50 %
12.5 SYRINGE (ML) INTRAVENOUS ONCE
Refills: 0 | Status: DISCONTINUED | OUTPATIENT
Start: 2023-08-11 | End: 2023-08-14

## 2023-08-11 RX ORDER — INSULIN LISPRO 100/ML
VIAL (ML) SUBCUTANEOUS AT BEDTIME
Refills: 0 | Status: DISCONTINUED | OUTPATIENT
Start: 2023-08-11 | End: 2023-08-14

## 2023-08-11 RX ORDER — GLUCAGON INJECTION, SOLUTION 0.5 MG/.1ML
1 INJECTION, SOLUTION SUBCUTANEOUS ONCE
Refills: 0 | Status: DISCONTINUED | OUTPATIENT
Start: 2023-08-11 | End: 2023-08-14

## 2023-08-11 RX ADMIN — MORPHINE SULFATE 4 MILLIGRAM(S): 50 CAPSULE, EXTENDED RELEASE ORAL at 16:23

## 2023-08-11 RX ADMIN — Medication 2 GRAM(S): at 05:41

## 2023-08-11 RX ADMIN — MORPHINE SULFATE 4 MILLIGRAM(S): 50 CAPSULE, EXTENDED RELEASE ORAL at 10:48

## 2023-08-11 RX ADMIN — Medication 2 GRAM(S): at 17:15

## 2023-08-11 RX ADMIN — MORPHINE SULFATE 4 MILLIGRAM(S): 50 CAPSULE, EXTENDED RELEASE ORAL at 03:31

## 2023-08-11 RX ADMIN — PIPERACILLIN AND TAZOBACTAM 25 GRAM(S): 4; .5 INJECTION, POWDER, LYOPHILIZED, FOR SOLUTION INTRAVENOUS at 15:52

## 2023-08-11 RX ADMIN — MORPHINE SULFATE 2 MILLIGRAM(S): 50 CAPSULE, EXTENDED RELEASE ORAL at 22:15

## 2023-08-11 RX ADMIN — MORPHINE SULFATE 4 MILLIGRAM(S): 50 CAPSULE, EXTENDED RELEASE ORAL at 03:50

## 2023-08-11 RX ADMIN — MORPHINE SULFATE 2 MILLIGRAM(S): 50 CAPSULE, EXTENDED RELEASE ORAL at 21:44

## 2023-08-11 RX ADMIN — Medication 2: at 17:50

## 2023-08-11 RX ADMIN — ATORVASTATIN CALCIUM 80 MILLIGRAM(S): 80 TABLET, FILM COATED ORAL at 21:40

## 2023-08-11 RX ADMIN — MORPHINE SULFATE 4 MILLIGRAM(S): 50 CAPSULE, EXTENDED RELEASE ORAL at 15:53

## 2023-08-11 RX ADMIN — MORPHINE SULFATE 4 MILLIGRAM(S): 50 CAPSULE, EXTENDED RELEASE ORAL at 10:18

## 2023-08-11 RX ADMIN — PIPERACILLIN AND TAZOBACTAM 25 GRAM(S): 4; .5 INJECTION, POWDER, LYOPHILIZED, FOR SOLUTION INTRAVENOUS at 21:43

## 2023-08-11 NOTE — PRE-OP CHECKLIST - ASSESSMENT, HISTORY & PHYSICAL COMPLETED AND ON MEDICAL RECORD
Pre Operative History & Physical Note      Paulo Guevara  [id number]  1998    5/10/2023  11:37 AM    CC: vomiting, change bowel habits      Past Medical History:   Diagnosis Date   • No known problems      Past Surgical History:   Procedure Laterality Date   • Removal gallbladder Right      Social History     Socioeconomic History   • Marital status: Single     Spouse name: Not on file   • Number of children: Not on file   • Years of education: Not on file   • Highest education level: Not on file   Occupational History   • Not on file   Tobacco Use   • Smoking status: Never   • Smokeless tobacco: Never   Vaping Use   • Vaping status: never used   Substance and Sexual Activity   • Alcohol use: Never   • Drug use: Never   • Sexual activity: Not on file   Other Topics Concern   • Not on file   Social History Narrative   • Not on file     Social Determinants of Health     Financial Resource Strain: Not on file   Food Insecurity: Not on file   Transportation Needs: Not on file   Physical Activity: Not on file   Stress: Not on file   Social Connections: Not on file   Intimate Partner Violence: Not on file     No family history on file.      Physical Exam:    Well-developed well-nourished pleasant patient in no acute distress  HEENT: Symmetric cranium. Extraocular motions are intact. There is no icterus or jaundice.  Nodes: No cervical clavicular or axillary nodes palpable.  Neck: Supple, no jugulovenous distention or thyromegaly.   Lungs: Clear to auscultation.  Heart: Regular rate and rhythm without any murmurs.  Abdomen: Normal bowel sounds flat. No masses are palpated.    In summary this patient comes in for a endoscopic procedure.     Change in bowel habits  Nausea  Vomiting  Plan  egd colon    Nishant Flynn MD           done

## 2023-08-11 NOTE — PROGRESS NOTE ADULT - ASSESSMENT
38 yo M with PMHx of familial triglyceridemia and recurrent pancreatitis who presents with acute onset abdominal pain in the context of recent EUS + cystogastrostomy for pancreatic pseudocyst. Hospitalized in early august for acute pancreatitis c/b pancreatic pseudocyst. S/p cystogastrostomy with AXIOS stent placement on 08/07. Asymptomatic on discharge from hospital on 08/08. Returned to hospital after abdominal pain returned on 08/09. On admission, labs significant for leukocytosis of 14.8. Home temperatures reported to be 103 but afebrile since ED. Lipase 112, likely reactive to recent manipulation. Triglycerides 197. CT abdomen/pelvis demonstrated interval decrease pancreatic pseudocyst size and reduced peripancreatic inflammation. Differential for abdominal pain includes recent manipulation on endoscopy, recurrent pancreatitis, or peripancreatic abscess.    38 yo M with PMHx of familial triglyceridemia and recurrent pancreatitis who presents with acute onset abdominal pain in the context of recent EUS + cystogastrostomy for pancreatic pseudocyst. Hospitalized in early august for acute pancreatitis c/b pancreatic pseudocyst. S/p cystogastrostomy with AXIOS stent placement on 08/07. Asymptomatic on discharge from hospital on 08/08. Returned to hospital after abdominal pain returned on 08/09. On admission, labs significant for leukocytosis of 14.8. Home temperatures reported to be 103 but afebrile since ED. Lipase 112, likely reactive to recent manipulation. Triglycerides 197. CT abdomen/pelvis demonstrated interval decrease pancreatic pseudocyst size and reduced peripancreatic inflammation. Repeat EGD +/- necrosectomy per GI on 08/11. Differential for abdominal pain includes recent manipulation on endoscopy, recurrent pancreatitis, or peripancreatic abscess.

## 2023-08-11 NOTE — PROGRESS NOTE ADULT - ATTENDING COMMENTS
The patient is a 37M with history of pre-DM, familial hypertriglyceridemia (follows with Dr. Michelle) c/b multiple admissions of pancreatitis (last adm 2015 for necrotizing pancreatitis) c/b pancreatic pseudocyst, recently treated for pancreatitis in the ICU with insulin gtt.  He underwent EGD with AXIOS 15 x 10mm stent placement and endoscopic drainage on 8/7/2023 by [facility], remained stable and was discharged home as he was tolerating meals..      He developed 103F fever, gas pains but no N/V, SOB or chest pain. in ED CT A/P 8/10/2023 reveals marked decrease in size of peripancreatic cyst, however some fluid remains with small amount of contrast present. WBC 14.8, Lipase 197 but BP has been stable    1. Fever, abdominal pain- likely from pancreatitis, pseudocyst and recent pancreatic drain  2. Familial hypertriglyceridemia    - Less abdominal pain, no fever, no N/V  - On IV hydration, NPO, IV/PO analgesics and IV Zosyn. Will f/u blood c/s. Reviewed CT abd/pelvis as above.  - appreciated advance GI consult and plans- EGD done, s/p biopsy in esophageal nodules, s/p necrosectomy in pseudocyst, need repeat EGD in 2 weeks  - will advance diet as low residue  - c/w Tricor, statin and Omega 3 fatty acid  - f/u biopsy and c/s  - CMP, Lipid panel in am  - d/c planning with GI rec

## 2023-08-11 NOTE — PROGRESS NOTE ADULT - SUBJECTIVE AND OBJECTIVE BOX
Surgery Progress Note     Subjective:  Patient seen and examined on AM rounds. Patient reports abdominal pain has improved. Denies nausea, vomiting, fevers, chills.      Vital Signs:  Vital Signs Last 24 Hrs  T(C): 36.9 (11 Aug 2023 12:15), Max: 37.3 (11 Aug 2023 10:19)  T(F): 98.5 (11 Aug 2023 10:49), Max: 99.1 (11 Aug 2023 10:19)  HR: 72 (11 Aug 2023 14:29) (72 - 99)  BP: 109/60 (11 Aug 2023 14:29) (104/57 - 119/58)  BP(mean): --  RR: 14 (11 Aug 2023 14:29) (13 - 21)  SpO2: 95% (11 Aug 2023 14:29) (95% - 98%)    Parameters below as of 11 Aug 2023 14:29  Patient On (Oxygen Delivery Method): room air        CAPILLARY BLOOD GLUCOSE      POCT Blood Glucose.: 143 mg/dL (11 Aug 2023 06:03)  POCT Blood Glucose.: 150 mg/dL (10 Aug 2023 23:54)  POCT Blood Glucose.: 183 mg/dL (10 Aug 2023 17:44)      I&O's Detail    10 Aug 2023 07:01  -  11 Aug 2023 07:00  --------------------------------------------------------  IN:    IV PiggyBack: 1080 mL  Total IN: 1080 mL    OUT:  Total OUT: 0 mL    Total NET: 1080 mL            Physical Exam:  General: NAD, resting comfortably in bed  HEENT: Normocephalic atraumatic  Respiratory: Nonlabored respirations  Cardio: regular rate  Abdomen: soft, nondistended, L flank tenderness  Vascular: extremities are warm and well perfused      Labs:    08-11    135  |  98  |  11  ----------------------------<  142<H>  4.1   |  22  |  0.65    Ca    9.2      11 Aug 2023 07:04  Phos  2.6     08-11  Mg     2.0     08-11    TPro  7.1  /  Alb  3.3  /  TBili  0.3  /  DBili  x   /  AST  29  /  ALT  35  /  AlkPhos  69  08-11    LIVER FUNCTIONS - ( 11 Aug 2023 07:04 )  Alb: 3.3 g/dL / Pro: 7.1 g/dL / ALK PHOS: 69 U/L / ALT: 35 U/L / AST: 29 U/L / GGT: x                                 10.3   11.10 )-----------( 208      ( 11 Aug 2023 07:04 )             31.8     PT/INR - ( 11 Aug 2023 07:05 )   PT: 14.5 sec;   INR: 1.33 ratio         PTT - ( 11 Aug 2023 07:05 )  PTT:31.3 sec

## 2023-08-11 NOTE — PRE PROCEDURE NOTE - PRE PROCEDURE EVALUATION
Attending Physician:    Dr. Baca                      Procedure: EGD +/- necrosectomy     Indication for Procedure:  Peripancreatic fluid collection s/p AXIOS 15 x 10mm stent placement and endoscopic drainage on 8/7/2023  -unclear etiology for fever/leukocytosis/left flank pain.  Stent appears to be in good position with interval decrease in fluid collection.  However, some fluid and debris remain, with what appears to be a small amount of contrast within the collection.  ________________________________________________________  PAST MEDICAL & SURGICAL HISTORY:  Pancreatitis      Pancreatic pseudocyst      Necrotizing pancreatitis      Hypertriglyceridemia      No significant past surgical history        ALLERGIES:  No Known Allergies    HOME MEDICATIONS:    AICD/PPM: [ ] yes   [x] no    PERTINENT LAB DATA:                        10.3   11.10 )-----------( 208      ( 11 Aug 2023 07:04 )             31.8     08-11    135  |  98  |  11  ----------------------------<  142<H>  4.1   |  22  |  0.65    Ca    9.2      11 Aug 2023 07:04  Phos  2.6     08-11  Mg     2.0     08-11    TPro  7.1  /  Alb  3.3  /  TBili  0.3  /  DBili  x   /  AST  29  /  ALT  35  /  AlkPhos  69  08-11    PT/INR - ( 11 Aug 2023 07:05 )   PT: 14.5 sec;   INR: 1.33 ratio         PTT - ( 11 Aug 2023 07:05 )  PTT:31.3 sec            PHYSICAL EXAMINATION:    T(C): 36.9  HR: 89  BP: 108/70  RR: 18  SpO2: 97%    Constitutional: NAD    Neck:  No JVD  Respiratory: no respiratory  distress   Cardiovascular: RRR  Extremities: No peripheral edema  Neurological: A/O x 3, no focal deficits        COMMENTS:    The patient is a suitable candidate for the planned procedure unless box checked [ ]  No, explain:

## 2023-08-11 NOTE — PROGRESS NOTE ADULT - SUBJECTIVE AND OBJECTIVE BOX
PROGRESS NOTE:     Patient is a 37y old  Male who presents with a chief complaint of Acute Pancreatitis; Retroperitoneal abscess (10 Aug 2023 16:52)      SUBJECTIVE / OVERNIGHT EVENTS:    ADDITIONAL REVIEW OF SYSTEMS: 10 point ROS negative except per HPI    MEDICATIONS  (STANDING):  atorvastatin 80 milliGRAM(s) Oral at bedtime  chlorhexidine 4% Liquid 1 Application(s) Topical daily  dextrose 5%. 1000 milliLiter(s) (50 mL/Hr) IV Continuous <Continuous>  dextrose 5%. 1000 milliLiter(s) (100 mL/Hr) IV Continuous <Continuous>  dextrose 50% Injectable 12.5 Gram(s) IV Push once  dextrose 50% Injectable 25 Gram(s) IV Push once  dextrose 50% Injectable 25 Gram(s) IV Push once  fenofibrate Tablet 145 milliGRAM(s) Oral daily  glucagon  Injectable 1 milliGRAM(s) IntraMuscular once  insulin lispro (ADMELOG) corrective regimen sliding scale   SubCutaneous every 6 hours  lactated ringers. 1000 milliLiter(s) (120 mL/Hr) IV Continuous <Continuous>  omega-3-Acid Ethyl Esters 2 Gram(s) Oral two times a day    MEDICATIONS  (PRN):  acetaminophen     Tablet .. 650 milliGRAM(s) Oral every 6 hours PRN Mild Pain (1 - 3)  dextrose Oral Gel 15 Gram(s) Oral once PRN Blood Glucose LESS THAN 70 milliGRAM(s)/deciliter  morphine  - Injectable 2 milliGRAM(s) IV Push every 4 hours PRN Moderate Pain (4 - 6)  morphine  - Injectable 4 milliGRAM(s) IV Push every 4 hours PRN Severe Pain (7 - 10)      CAPILLARY BLOOD GLUCOSE      POCT Blood Glucose.: 143 mg/dL (11 Aug 2023 06:03)  POCT Blood Glucose.: 150 mg/dL (10 Aug 2023 23:54)  POCT Blood Glucose.: 183 mg/dL (10 Aug 2023 17:44)    I&O's Summary      PHYSICAL EXAM:  Vital Signs Last 24 Hrs  T(C): 36.9 (11 Aug 2023 04:06), Max: 36.9 (10 Aug 2023 21:50)  T(F): 98.4 (11 Aug 2023 04:06), Max: 98.4 (10 Aug 2023 21:50)  HR: 82 (11 Aug 2023 04:06) (70 - 86)  BP: 110/74 (11 Aug 2023 04:06) (110/74 - 129/72)  BP(mean): --  RR: 18 (11 Aug 2023 04:06) (16 - 18)  SpO2: 96% (11 Aug 2023 04:06) (96% - 99%)    Parameters below as of 11 Aug 2023 04:06  Patient On (Oxygen Delivery Method): room air        CONSTITUTIONAL: NAD, well-developed, A&Ox3 to person, place, time.  RESPIRATORY: Normal respiratory effort; lungs are clear to auscultation bilaterally  CARDIOVASCULAR: Regular rate and rhythm, normal S1 and S2, no murmur/rub/gallop; No lower extremity edema; Peripheral pulses are 2+ bilaterally  ABDOMEN: Nontender to palpation, no rebound/guarding; No hepatosplenomegaly  MUSCLOSKELETAL: no clubbing or cyanosis of digits; no joint swelling or tenderness to palpation  NEURO: CN 2-12 grossly intact, moves all limbs spontaneously      LABS:                          13.7   14.88 )-----------( 261      ( 09 Aug 2023 22:06 )             42.2     08-09    132<L>  |  95<L>  |  15  ----------------------------<  226<H>  3.9   |  19<L>  |  0.88    Ca    9.8      09 Aug 2023 22:06    TPro  8.5<H>  /  Alb  4.0  /  TBili  1.0  /  DBili  x   /  AST  23  /  ALT  36  /  AlkPhos  73  08-09          -----    MICRO  Urinalysis Basic - ( 10 Aug 2023 07:38 )    Color: Yellow / Appearance: Clear / SG: >1.050 / pH: x  Gluc: x / Ketone: Trace  / Bili: Negative / Urobili: 6 mg/dL   Blood: x / Protein: 30 mg/dL / Nitrite: Negative   Leuk Esterase: Negative / RBC: 1 /hpf / WBC 5 /HPF   Sq Epi: x / Non Sq Epi: x / Bacteria: Negative        Culture - Blood (collected 09 Aug 2023 21:49)  Source: .Blood Blood-Peripheral  Preliminary Report (11 Aug 2023 01:03):    No growth at 24 hours      -----    TRENDS  Hemoglobin: 13.7 g/dL (08-09 @ 22:06)  Hemoglobin: 12.5 g/dL (08-08 @ 07:30)  Hemoglobin: 13.0 g/dL (08-07 @ 07:02)    Creatinine Trend: 0.88<--, 0.63<--, 0.60<--, 0.63<--, 0.60<--, 0.72<--  ----  RADIOLOGY & ADDITIONAL TESTS:  Results Reviewed:   Imaging Personally Reviewed:  Electrocardiogram Personally Reviewed:    COORDINATION OF CARE:  Care Discussed with Consultants/Other Providers [Y/N]:  Prior or Outpatient Records Reviewed [Y/N]:   PROGRESS NOTE:     Patient is a 37y old  Male who presents with a chief complaint of Acute Pancreatitis; Retroperitoneal abscess (10 Aug 2023 16:52)      SUBJECTIVE / OVERNIGHT EVENTS: No acute events overnight. This morning, pt. seen at bedside, pain well-controlled. No BM overnight. Last dose of morphine in AM of 08/10. Afebrile. No N/V    ADDITIONAL REVIEW OF SYSTEMS: 10 point ROS negative except per HPI    MEDICATIONS  (STANDING):  atorvastatin 80 milliGRAM(s) Oral at bedtime  chlorhexidine 4% Liquid 1 Application(s) Topical daily  dextrose 5%. 1000 milliLiter(s) (50 mL/Hr) IV Continuous <Continuous>  dextrose 5%. 1000 milliLiter(s) (100 mL/Hr) IV Continuous <Continuous>  dextrose 50% Injectable 12.5 Gram(s) IV Push once  dextrose 50% Injectable 25 Gram(s) IV Push once  dextrose 50% Injectable 25 Gram(s) IV Push once  fenofibrate Tablet 145 milliGRAM(s) Oral daily  glucagon  Injectable 1 milliGRAM(s) IntraMuscular once  insulin lispro (ADMELOG) corrective regimen sliding scale   SubCutaneous every 6 hours  lactated ringers. 1000 milliLiter(s) (120 mL/Hr) IV Continuous <Continuous>  omega-3-Acid Ethyl Esters 2 Gram(s) Oral two times a day    MEDICATIONS  (PRN):  acetaminophen     Tablet .. 650 milliGRAM(s) Oral every 6 hours PRN Mild Pain (1 - 3)  dextrose Oral Gel 15 Gram(s) Oral once PRN Blood Glucose LESS THAN 70 milliGRAM(s)/deciliter  morphine  - Injectable 2 milliGRAM(s) IV Push every 4 hours PRN Moderate Pain (4 - 6)  morphine  - Injectable 4 milliGRAM(s) IV Push every 4 hours PRN Severe Pain (7 - 10)      CAPILLARY BLOOD GLUCOSE      POCT Blood Glucose.: 143 mg/dL (11 Aug 2023 06:03)  POCT Blood Glucose.: 150 mg/dL (10 Aug 2023 23:54)  POCT Blood Glucose.: 183 mg/dL (10 Aug 2023 17:44)    I&O's Summary      PHYSICAL EXAM:  Vital Signs Last 24 Hrs  T(C): 36.9 (11 Aug 2023 04:06), Max: 36.9 (10 Aug 2023 21:50)  T(F): 98.4 (11 Aug 2023 04:06), Max: 98.4 (10 Aug 2023 21:50)  HR: 82 (11 Aug 2023 04:06) (70 - 86)  BP: 110/74 (11 Aug 2023 04:06) (110/74 - 129/72)  BP(mean): --  RR: 18 (11 Aug 2023 04:06) (16 - 18)  SpO2: 96% (11 Aug 2023 04:06) (96% - 99%)    Parameters below as of 11 Aug 2023 04:06  Patient On (Oxygen Delivery Method): room air        CONSTITUTIONAL: NAD, resting comfortably  RESPIRATORY: Normal respiratory effort; lungs are clear to auscultation bilaterally  CARDIOVASCULAR: Regular rate and rhythm, normal S1 and S2, no murmur/rub/gallop; No lower extremity edema; Peripheral pulses are 2+ bilaterally  ABDOMEN: Nontender to palpation, no rebound/guarding; No hepatosplenomegaly  MUSCLOSKELETAL: no clubbing or cyanosis of digits; no joint swelling or tenderness to palpation  NEURO: CN 2-12 grossly intact, moves all limbs spontaneously      LABS:                          13.7   14.88 )-----------( 261      ( 09 Aug 2023 22:06 )             42.2     08-09    132<L>  |  95<L>  |  15  ----------------------------<  226<H>  3.9   |  19<L>  |  0.88    Ca    9.8      09 Aug 2023 22:06    TPro  8.5<H>  /  Alb  4.0  /  TBili  1.0  /  DBili  x   /  AST  23  /  ALT  36  /  AlkPhos  73  08-09          -----    MICRO  Urinalysis Basic - ( 10 Aug 2023 07:38 )    Color: Yellow / Appearance: Clear / SG: >1.050 / pH: x  Gluc: x / Ketone: Trace  / Bili: Negative / Urobili: 6 mg/dL   Blood: x / Protein: 30 mg/dL / Nitrite: Negative   Leuk Esterase: Negative / RBC: 1 /hpf / WBC 5 /HPF   Sq Epi: x / Non Sq Epi: x / Bacteria: Negative        Culture - Blood (collected 09 Aug 2023 21:49)  Source: .Blood Blood-Peripheral  Preliminary Report (11 Aug 2023 01:03):    No growth at 24 hours      -----    TRENDS  Hemoglobin: 13.7 g/dL (08-09 @ 22:06)  Hemoglobin: 12.5 g/dL (08-08 @ 07:30)  Hemoglobin: 13.0 g/dL (08-07 @ 07:02)    Creatinine Trend: 0.88<--, 0.63<--, 0.60<--, 0.63<--, 0.60<--, 0.72<--  ----  RADIOLOGY & ADDITIONAL TESTS:  Results Reviewed:   Imaging Personally Reviewed:  Electrocardiogram Personally Reviewed:    COORDINATION OF CARE:  Care Discussed with Consultants/Other Providers [Y/N]:  Prior or Outpatient Records Reviewed [Y/N]:   PROGRESS NOTE:     Patient is a 37y old  Male who presents with a chief complaint of Acute Pancreatitis; Retroperitoneal abscess (10 Aug 2023 16:52)      SUBJECTIVE / OVERNIGHT EVENTS: No acute events overnight. This morning, pt. seen at bedside, pain well-controlled. No BM overnight. Last dose of morphine in AM of 08/10. Afebrile. No N/V. Spoke to wife via telephone today to obtain collateral and discuss care plan.    ADDITIONAL REVIEW OF SYSTEMS: 10 point ROS negative except per HPI    MEDICATIONS  (STANDING):  atorvastatin 80 milliGRAM(s) Oral at bedtime  chlorhexidine 4% Liquid 1 Application(s) Topical daily  dextrose 5%. 1000 milliLiter(s) (50 mL/Hr) IV Continuous <Continuous>  dextrose 5%. 1000 milliLiter(s) (100 mL/Hr) IV Continuous <Continuous>  dextrose 50% Injectable 12.5 Gram(s) IV Push once  dextrose 50% Injectable 25 Gram(s) IV Push once  dextrose 50% Injectable 25 Gram(s) IV Push once  fenofibrate Tablet 145 milliGRAM(s) Oral daily  glucagon  Injectable 1 milliGRAM(s) IntraMuscular once  insulin lispro (ADMELOG) corrective regimen sliding scale   SubCutaneous every 6 hours  lactated ringers. 1000 milliLiter(s) (120 mL/Hr) IV Continuous <Continuous>  omega-3-Acid Ethyl Esters 2 Gram(s) Oral two times a day    MEDICATIONS  (PRN):  acetaminophen     Tablet .. 650 milliGRAM(s) Oral every 6 hours PRN Mild Pain (1 - 3)  dextrose Oral Gel 15 Gram(s) Oral once PRN Blood Glucose LESS THAN 70 milliGRAM(s)/deciliter  morphine  - Injectable 2 milliGRAM(s) IV Push every 4 hours PRN Moderate Pain (4 - 6)  morphine  - Injectable 4 milliGRAM(s) IV Push every 4 hours PRN Severe Pain (7 - 10)    POCT Blood Glucose.: 143 mg/dL (11 Aug 2023 06:03)  POCT Blood Glucose.: 150 mg/dL (10 Aug 2023 23:54)  POCT Blood Glucose.: 183 mg/dL (10 Aug 2023 17:44)    I&O's Summary      PHYSICAL EXAM:  Vital Signs Last 24 Hrs  T(C): 36.9 (11 Aug 2023 04:06), Max: 36.9 (10 Aug 2023 21:50)  T(F): 98.4 (11 Aug 2023 04:06), Max: 98.4 (10 Aug 2023 21:50)  HR: 82 (11 Aug 2023 04:06) (70 - 86)  BP: 110/74 (11 Aug 2023 04:06) (110/74 - 129/72)  BP(mean): --  RR: 18 (11 Aug 2023 04:06) (16 - 18)  SpO2: 96% (11 Aug 2023 04:06) (96% - 99%)    Parameters below as of 11 Aug 2023 04:06  Patient On (Oxygen Delivery Method): room air    CONSTITUTIONAL: NAD, resting comfortably  RESPIRATORY: Normal respiratory effort; lungs are clear to auscultation bilaterally  CARDIOVASCULAR: Regular rate and rhythm, normal S1 and S2, no murmur/rub/gallop; No lower extremity edema; Peripheral pulses are 2+ bilaterally  ABDOMEN: Soft, nondistended, nontender to palpation, no rebound/guarding  MUSCLOSKELETAL: no clubbing or cyanosis of digits; no joint swelling or tenderness to palpation  NEURO: moves all limbs spontaneously      LABS:                          13.7   14.88 )-----------( 261      ( 09 Aug 2023 22:06 )             42.2     08-09    132<L>  |  95<L>  |  15  ----------------------------<  226<H>  3.9   |  19<L>  |  0.88    Ca    9.8      09 Aug 2023 22:06    TPro  8.5<H>  /  Alb  4.0  /  TBili  1.0  /  DBili  x   /  AST  23  /  ALT  36  /  AlkPhos  73  08-09          -----    MICRO  Urinalysis Basic - ( 10 Aug 2023 07:38 )    Color: Yellow / Appearance: Clear / SG: >1.050 / pH: x  Gluc: x / Ketone: Trace  / Bili: Negative / Urobili: 6 mg/dL   Blood: x / Protein: 30 mg/dL / Nitrite: Negative   Leuk Esterase: Negative / RBC: 1 /hpf / WBC 5 /HPF   Sq Epi: x / Non Sq Epi: x / Bacteria: Negative        Culture - Blood (collected 09 Aug 2023 21:49)  Source: .Blood Blood-Peripheral  Preliminary Report (11 Aug 2023 01:03):    No growth at 24 hours    -----    TRENDS  Hemoglobin: 13.7 g/dL (08-09 @ 22:06)  Hemoglobin: 12.5 g/dL (08-08 @ 07:30)  Hemoglobin: 13.0 g/dL (08-07 @ 07:02)    Creatinine Trend: 0.88<--, 0.63<--, 0.60<--, 0.63<--, 0.60<--, 0.72<--  ----

## 2023-08-11 NOTE — PROGRESS NOTE ADULT - PROBLEM SELECTOR PLAN 1
-GI consult; most recent scope w/Dr. Shah 08/07  -s/p CT abdomen + pelvis showing interval decrease in cyst and reduced peripancreatic inflammation  -Diet: NPO  -Pain: Morphine + acetaminophen PRN  -Fluids: 125 cc/hr LR   -AM CMP, CBC -GI consult; most recent scope w/Dr. Shah 08/07; repeat EGD 08/11  -s/p CT abdomen + pelvis showing interval decrease in cyst and reduced peripancreatic inflammation  -Diet: NPO  -Pain: Morphine + acetaminophen PRN  -Fluids: 125 cc/hr LR   -WBC downtrending

## 2023-08-11 NOTE — PROGRESS NOTE ADULT - PROBLEM SELECTOR PLAN 2
-IV Zosyn 3.375   -Trend WBC  -f/u blood cx + urine cx -IV Zosyn 3.375   -WBC downtrending  -f/u blood cx + urine cx

## 2023-08-11 NOTE — PROGRESS NOTE ADULT - ASSESSMENT
37 year-old male with PMHx of  hypertriglyceridemia, multiple admissions of pancreatitis (last adm 2015 for necrotizing pancreatitis), chronic splenic thrombosis, pancreatic pseudocyst, was admitted to MICU for hypertriglyceridemia-induced pancreatitis. Patient was discharged 08/08/2023. He presented today to the ED for abdominal pain, nausea without vomiting. CT scan shows a mild to moderate fluid stranding around the pancreas and a reteroperitoneal fluid collection 3.0x5.4x18.7 cm.     Plan:  - No acute surgical intervention  - F/u GI intervention  - Surgery will follow      [facility]  [id number]

## 2023-08-12 ENCOUNTER — TRANSCRIPTION ENCOUNTER (OUTPATIENT)
Age: 38
End: 2023-08-12

## 2023-08-12 LAB
ALBUMIN SERPL ELPH-MCNC: 3.4 G/DL — SIGNIFICANT CHANGE UP (ref 3.3–5)
ALP SERPL-CCNC: 72 U/L — SIGNIFICANT CHANGE UP (ref 40–120)
ALT FLD-CCNC: 66 U/L — HIGH (ref 10–45)
ANION GAP SERPL CALC-SCNC: 16 MMOL/L — SIGNIFICANT CHANGE UP (ref 5–17)
AST SERPL-CCNC: 51 U/L — HIGH (ref 10–40)
BASOPHILS # BLD AUTO: 0.02 K/UL — SIGNIFICANT CHANGE UP (ref 0–0.2)
BASOPHILS NFR BLD AUTO: 0.2 % — SIGNIFICANT CHANGE UP (ref 0–2)
BILIRUB SERPL-MCNC: 0.3 MG/DL — SIGNIFICANT CHANGE UP (ref 0.2–1.2)
BUN SERPL-MCNC: 11 MG/DL — SIGNIFICANT CHANGE UP (ref 7–23)
CALCIUM SERPL-MCNC: 9.4 MG/DL — SIGNIFICANT CHANGE UP (ref 8.4–10.5)
CHLORIDE SERPL-SCNC: 97 MMOL/L — SIGNIFICANT CHANGE UP (ref 96–108)
CO2 SERPL-SCNC: 23 MMOL/L — SIGNIFICANT CHANGE UP (ref 22–31)
CREAT SERPL-MCNC: 0.7 MG/DL — SIGNIFICANT CHANGE UP (ref 0.5–1.3)
EGFR: 122 ML/MIN/1.73M2 — SIGNIFICANT CHANGE UP
EOSINOPHIL # BLD AUTO: 0.08 K/UL — SIGNIFICANT CHANGE UP (ref 0–0.5)
EOSINOPHIL NFR BLD AUTO: 1 % — SIGNIFICANT CHANGE UP (ref 0–6)
GLUCOSE BLDC GLUCOMTR-MCNC: 180 MG/DL — HIGH (ref 70–99)
GLUCOSE BLDC GLUCOMTR-MCNC: 194 MG/DL — HIGH (ref 70–99)
GLUCOSE BLDC GLUCOMTR-MCNC: 232 MG/DL — HIGH (ref 70–99)
GLUCOSE BLDC GLUCOMTR-MCNC: 255 MG/DL — HIGH (ref 70–99)
GLUCOSE SERPL-MCNC: 164 MG/DL — HIGH (ref 70–99)
HCT VFR BLD CALC: 32.4 % — LOW (ref 39–50)
HGB BLD-MCNC: 10.3 G/DL — LOW (ref 13–17)
IMM GRANULOCYTES NFR BLD AUTO: 0.6 % — SIGNIFICANT CHANGE UP (ref 0–0.9)
LYMPHOCYTES # BLD AUTO: 0.99 K/UL — LOW (ref 1–3.3)
LYMPHOCYTES # BLD AUTO: 11.8 % — LOW (ref 13–44)
MAGNESIUM SERPL-MCNC: 2.3 MG/DL — SIGNIFICANT CHANGE UP (ref 1.6–2.6)
MCHC RBC-ENTMCNC: 26.3 PG — LOW (ref 27–34)
MCHC RBC-ENTMCNC: 31.8 GM/DL — LOW (ref 32–36)
MCV RBC AUTO: 82.9 FL — SIGNIFICANT CHANGE UP (ref 80–100)
MONOCYTES # BLD AUTO: 0.62 K/UL — SIGNIFICANT CHANGE UP (ref 0–0.9)
MONOCYTES NFR BLD AUTO: 7.4 % — SIGNIFICANT CHANGE UP (ref 2–14)
NEUTROPHILS # BLD AUTO: 6.61 K/UL — SIGNIFICANT CHANGE UP (ref 1.8–7.4)
NEUTROPHILS NFR BLD AUTO: 79 % — HIGH (ref 43–77)
NRBC # BLD: 0 /100 WBCS — SIGNIFICANT CHANGE UP (ref 0–0)
PHOSPHATE SERPL-MCNC: 3.1 MG/DL — SIGNIFICANT CHANGE UP (ref 2.5–4.5)
PLATELET # BLD AUTO: 260 K/UL — SIGNIFICANT CHANGE UP (ref 150–400)
POTASSIUM SERPL-MCNC: 3.6 MMOL/L — SIGNIFICANT CHANGE UP (ref 3.5–5.3)
POTASSIUM SERPL-SCNC: 3.6 MMOL/L — SIGNIFICANT CHANGE UP (ref 3.5–5.3)
PROT SERPL-MCNC: 7.3 G/DL — SIGNIFICANT CHANGE UP (ref 6–8.3)
RBC # BLD: 3.91 M/UL — LOW (ref 4.2–5.8)
RBC # FLD: 14.8 % — HIGH (ref 10.3–14.5)
SODIUM SERPL-SCNC: 136 MMOL/L — SIGNIFICANT CHANGE UP (ref 135–145)
WBC # BLD: 8.37 K/UL — SIGNIFICANT CHANGE UP (ref 3.8–10.5)
WBC # FLD AUTO: 8.37 K/UL — SIGNIFICANT CHANGE UP (ref 3.8–10.5)

## 2023-08-12 PROCEDURE — 99232 SBSQ HOSP IP/OBS MODERATE 35: CPT | Mod: GC

## 2023-08-12 RX ORDER — INSULIN GLARGINE 100 [IU]/ML
18 INJECTION, SOLUTION SUBCUTANEOUS AT BEDTIME
Refills: 0 | Status: DISCONTINUED | OUTPATIENT
Start: 2023-08-12 | End: 2023-08-14

## 2023-08-12 RX ADMIN — Medication 1: at 09:27

## 2023-08-12 RX ADMIN — Medication 145 MILLIGRAM(S): at 12:43

## 2023-08-12 RX ADMIN — MORPHINE SULFATE 4 MILLIGRAM(S): 50 CAPSULE, EXTENDED RELEASE ORAL at 05:13

## 2023-08-12 RX ADMIN — PIPERACILLIN AND TAZOBACTAM 25 GRAM(S): 4; .5 INJECTION, POWDER, LYOPHILIZED, FOR SOLUTION INTRAVENOUS at 05:13

## 2023-08-12 RX ADMIN — Medication 650 MILLIGRAM(S): at 22:40

## 2023-08-12 RX ADMIN — MORPHINE SULFATE 4 MILLIGRAM(S): 50 CAPSULE, EXTENDED RELEASE ORAL at 20:30

## 2023-08-12 RX ADMIN — Medication 2 GRAM(S): at 05:13

## 2023-08-12 RX ADMIN — MORPHINE SULFATE 4 MILLIGRAM(S): 50 CAPSULE, EXTENDED RELEASE ORAL at 19:59

## 2023-08-12 RX ADMIN — MORPHINE SULFATE 4 MILLIGRAM(S): 50 CAPSULE, EXTENDED RELEASE ORAL at 05:43

## 2023-08-12 RX ADMIN — CHLORHEXIDINE GLUCONATE 1 APPLICATION(S): 213 SOLUTION TOPICAL at 12:43

## 2023-08-12 RX ADMIN — Medication 1: at 17:57

## 2023-08-12 RX ADMIN — Medication 2 GRAM(S): at 17:03

## 2023-08-12 RX ADMIN — ATORVASTATIN CALCIUM 80 MILLIGRAM(S): 80 TABLET, FILM COATED ORAL at 20:00

## 2023-08-12 RX ADMIN — Medication 3: at 13:23

## 2023-08-12 RX ADMIN — Medication 650 MILLIGRAM(S): at 22:09

## 2023-08-12 RX ADMIN — INSULIN GLARGINE 18 UNIT(S): 100 INJECTION, SOLUTION SUBCUTANEOUS at 22:09

## 2023-08-12 NOTE — PROGRESS NOTE ADULT - PROBLEM SELECTOR PLAN 4
DVT prophylaxis: out of bed ambulating  Diet: NPO  Contact: Wife, Nan, [phone] DVT prophylaxis: out of bed ambulating  Diet: full liquid / low residue  Contact: Wife, Nan, [phone]

## 2023-08-12 NOTE — PROGRESS NOTE ADULT - PROBLEM SELECTOR PLAN 2
-IV Zosyn 3.375   -WBC downtrending  -BCx and UCx negative - 8/10 IV Zosyn 3.375, 8/12 discontinue with neg cultures.  -WBC downtrending  -BCx and UCx negative

## 2023-08-12 NOTE — DISCHARGE NOTE PROVIDER - HOSPITAL COURSE
36 yo M with PMHx of familial triglyceridemia and recurrent pancreatitis who presents with acute onset abdominal pain in the context of recent EUS + cystogastrostomy for pancreatic pseudocyst. Hospitalized in early august for acute pancreatitis c/b pancreatic pseudocyst. S/p cystogastrostomy with AXIOS stent placement on 08/07. Asymptomatic on discharge from hospital on 08/08. Returned to hospital after abdominal pain returned on 08/09 after eating with nausea. On admission, labs significant for leukocytosis of 14.8. Home temperatures reported to be 103 but afebrile since ED. Lipase 112, likely reactive to recent manipulation. Triglycerides 197. CT abdomen/pelvis demonstrated interval decrease pancreatic pseudocyst size and reduced peripancreatic inflammation. Repeat EGD with necrosectomy performed by GI on 08/11 showing "Two small nodules found in the esophagus that were Biopsied, Erosive gastropathy. Pre-existing cystgastrostomy stent in place. The pseudocyst was partially filled with fluid and a small amount of black necrotic tissue, however walls of cyst are clean. Necrosectomy was performed with a snare, requiring several intubations of the pseudocyst. Also irrigated with 180cc 20% hydrogen peroxide. Normal examined duodenum." GI recommended follow up in 2 weeks for stent removal, avoiding PPIs, and advancing diet as tolerated. Infectious workup was negative in blood and urine cultures, so zosyn which was started on admission was discontinued. Pain was well controlled with morphine and the diet was advanced as tolerated with symptomatic improvement in pain and nausea.     The patient is afebrile, hemodynamically stable and medically optimized for discharge to home with follow up with gastroenterology. On day of discharge, patient is clinically stable with no new exam findings or acute symptoms compared to prior. The patient was seen by the attending physician on the date of discharge and deemed stable and acceptable for discharge. The patient's chronic medical conditions were treated accordingly per the patient's home medication regimen. The patient's medication reconciliation (with changes made to chronic medications), follow up appointments, discharge orders, instructions, and significant lab and diagnostic studies are as noted.   36 yo M with PMHx of familial triglyceridemia and recurrent pancreatitis who presents with acute onset abdominal pain in the context of recent EUS + cystogastrostomy for pancreatic pseudocyst. He was hospitalized in early august for acute pancreatitis c/b pancreatic pseudocyst. S/p cystogastrostomy with AXIOS stent placement on 08/07 and was asymptomatic on discharge from hospital on 08/08. He returned to hospital after abdominal pain returned on 08/09 after eating with nausea and fevers. On admission, labs significant for leukocytosis of 14.8. Home temperatures reported to be 103 but afebrile since ED. Lipase 112, likely reactive to recent manipulation. Triglycerides 197. CT abdomen/pelvis demonstrated interval decrease pancreatic pseudocyst size and reduced peripancreatic inflammation. Repeat EGD with necrosectomy performed by GI on 08/11 showing "Two small nodules found in the esophagus that were Biopsied, Erosive gastropathy. Pre-existing cystgastrostomy stent in place. The pseudocyst was partially filled with fluid and a small amount of black necrotic tissue, however walls of cyst are clean. Necrosectomy was performed with a snare, requiring several intubations of the pseudocyst. Also irrigated with 180cc 20% hydrogen peroxide. Normal examined duodenum." GI recommended follow up in 2 weeks for stent removal, avoiding PPIs, and advancing diet as tolerated. Infectious workup was negative in blood and urine cultures, so zosyn which was started on admission was discontinued. Pain was well controlled with morphine and the diet was advanced as tolerated with symptomatic improvement in pain and nausea.     The patient is afebrile, hemodynamically stable and medically optimized for discharge to home with follow up with gastroenterology. On day of discharge, patient is clinically stable with no new exam findings or acute symptoms compared to prior. The patient was seen by the attending physician on the date of discharge and deemed stable and acceptable for discharge. The patient's chronic medical conditions were treated accordingly per the patient's home medication regimen. The patient's medication reconciliation (with changes made to chronic medications), follow up appointments, discharge orders, instructions, and significant lab and diagnostic studies are as noted.   36 yo M with PMHx of familial triglyceridemia and recurrent pancreatitis who presents with acute onset abdominal pain in the context of recent EUS + cystogastrostomy for pancreatic pseudocyst. He was hospitalized in early august for acute pancreatitis c/b pancreatic pseudocyst. S/p cystogastrostomy with AXIOS stent placement on 08/07 and was asymptomatic on discharge from hospital on 08/08. He returned to hospital after abdominal pain returned on 08/09 after eating with nausea and fevers. On admission, labs significant for leukocytosis of 14.8. Home temperatures reported to be 103 but afebrile since ED. Lipase 112, likely reactive to recent manipulation. Triglycerides 197. CT abdomen/pelvis demonstrated interval decrease pancreatic pseudocyst size and reduced peripancreatic inflammation. Repeat EGD with necrosectomy performed by GI on 08/11 showing "Two small nodules found in the esophagus that were Biopsied, Erosive gastropathy. Pre-existing cystgastrostomy stent in place. The pseudocyst was partially filled with fluid and a small amount of black necrotic tissue, however walls of cyst are clean. Necrosectomy was performed with a snare, requiring several intubations of the pseudocyst. Also irrigated with 180cc 20% hydrogen peroxide. Normal examined duodenum." GI recommended follow up in 2 weeks for stent removal, avoiding PPIs, and advancing diet as tolerated. Infectious workup was negative in blood and urine cultures, so zosyn which was started on admission was discontinued. Pain was well controlled with morphine with good transition to oxycodone and the diet was advanced as tolerated with symptomatic improvement in pain and nausea.     The patient is afebrile, hemodynamically stable and medically optimized for discharge to home with follow up with gastroenterology. On day of discharge, patient is clinically stable with no new exam findings or acute symptoms compared to prior. The patient was seen by the attending physician on the date of discharge and deemed stable and acceptable for discharge. The patient's chronic medical conditions were treated accordingly per the patient's home medication regimen. The patient's medication reconciliation (with changes made to chronic medications), follow up appointments, discharge orders, instructions, and significant lab and diagnostic studies are as noted.   38 yo M with PMHx of familial triglyceridemia and recurrent pancreatitis who presents with acute onset abdominal pain in the context of recent EUS + cystogastrostomy for pancreatic pseudocyst. He was hospitalized in early august for acute pancreatitis c/b pancreatic pseudocyst. S/p cystogastrostomy with AXIOS stent placement on 08/07 and was asymptomatic on discharge from hospital on 08/08. He returned to hospital after abdominal pain returned on 08/09 after eating with nausea and fevers. On admission, labs significant for leukocytosis of 14.8. Home temperatures reported to be 103 but afebrile since ED. Lipase 112, likely reactive to recent manipulation. Triglycerides 197. CT abdomen/pelvis demonstrated interval decrease pancreatic pseudocyst size and reduced peripancreatic inflammation. Repeat EGD with necrosectomy performed by GI on 08/11 showing "Two small nodules found in the esophagus that were Biopsied, Erosive gastropathy. Pre-existing cystgastrostomy stent in place. The pseudocyst was partially filled with fluid and a small amount of black necrotic tissue, however walls of cyst are clean. Necrosectomy was performed with a snare, requiring several intubations of the pseudocyst. Also irrigated with 180cc 20% hydrogen peroxide. Normal examined duodenum." GI recommended follow up in 2 weeks for stent removal, avoiding PPIs, and advancing diet as tolerated. Infectious workup was negative in blood and urine cultures, so zosyn which was started on admission was discontinued. Pain was well controlled with morphine with appropriate transition to oxycodone and the diet was advanced as tolerated with symptomatic improvement in pain and nausea.     The patient is afebrile, hemodynamically stable and medically optimized for discharge to home with follow up with gastroenterology. On day of discharge, patient is clinically stable with no new exam findings or acute symptoms compared to prior. The patient was seen by the attending physician on the date of discharge and deemed stable and acceptable for discharge. The patient's chronic medical conditions were treated accordingly per the patient's home medication regimen. The patient's medication reconciliation (with changes made to chronic medications), follow up appointments, discharge orders, instructions, and significant lab and diagnostic studies are as noted.

## 2023-08-12 NOTE — PROGRESS NOTE ADULT - SUBJECTIVE AND OBJECTIVE BOX
*******************************  [facility]  [facility]  Contact via [employer] TEAMS  *******************************    PROGRESS NOTE:     Patient is a 37y old  Male who presents with a chief complaint of Acute Pancreatitis; Retroperitoneal abscess (11 Aug 2023 14:44)      INTERVAL EVENTS: No acute overnight events.     SUBJECTIVE: Patient seen and examined at bedside. This morning, the patient is comfortable and doing well. His pain is well controlled with 4mg morphine IVP but not with 2mg. His pain is improved post endoscopy and necrostectomy. he is tolerating the liquid diet well without pain, nausea, or vomiting. Has not had a bowel movement since Tuesday. No acute complaints. Denies fevers, chills, N/V/D, chest pain, SOB, abdominal pain, dysuria.     MEDICATIONS  (STANDING):  atorvastatin 80 milliGRAM(s) Oral at bedtime  chlorhexidine 4% Liquid 1 Application(s) Topical daily  dextrose 5%. 1000 milliLiter(s) (50 mL/Hr) IV Continuous <Continuous>  dextrose 5%. 1000 milliLiter(s) (100 mL/Hr) IV Continuous <Continuous>  dextrose 50% Injectable 12.5 Gram(s) IV Push once  dextrose 50% Injectable 25 Gram(s) IV Push once  dextrose 50% Injectable 25 Gram(s) IV Push once  fenofibrate Tablet 145 milliGRAM(s) Oral daily  glucagon  Injectable 1 milliGRAM(s) IntraMuscular once  insulin lispro (ADMELOG) corrective regimen sliding scale   SubCutaneous at bedtime  insulin lispro (ADMELOG) corrective regimen sliding scale   SubCutaneous three times a day before meals  omega-3-Acid Ethyl Esters 2 Gram(s) Oral two times a day  piperacillin/tazobactam IVPB.. 3.375 Gram(s) IV Intermittent every 8 hours    MEDICATIONS  (PRN):  acetaminophen     Tablet .. 650 milliGRAM(s) Oral every 6 hours PRN Mild Pain (1 - 3)  dextrose Oral Gel 15 Gram(s) Oral once PRN Blood Glucose LESS THAN 70 milliGRAM(s)/deciliter  morphine  - Injectable 4 milliGRAM(s) IV Push every 4 hours PRN Severe Pain (7 - 10)  morphine  - Injectable 2 milliGRAM(s) IV Push every 4 hours PRN Moderate Pain (4 - 6)      CAPILLARY BLOOD GLUCOSE      POCT Blood Glucose.: 180 mg/dL (12 Aug 2023 08:41)  POCT Blood Glucose.: 231 mg/dL (11 Aug 2023 21:28)  POCT Blood Glucose.: 210 mg/dL (11 Aug 2023 17:23)    I&O's Summary      PHYSICAL EXAM:  Vital Signs Last 24 Hrs  T(C): 36.7 (12 Aug 2023 04:58), Max: 37.3 (11 Aug 2023 10:19)  T(F): 98.1 (12 Aug 2023 04:58), Max: 99.1 (11 Aug 2023 10:19)  HR: 61 (12 Aug 2023 04:58) (61 - 99)  BP: 109/70 (12 Aug 2023 04:58) (104/57 - 134/83)  BP(mean): --  RR: 18 (12 Aug 2023 04:58) (13 - 21)  SpO2: 98% (12 Aug 2023 04:58) (95% - 98%)    Parameters below as of 12 Aug 2023 04:58  Patient On (Oxygen Delivery Method): room air        GENERAL: NAD, lying in bed comfortably  HEAD: Atraumatic, normocephalic  EYES: EOMI, PERRLA, conjunctiva and sclera clear  ENT: Moist mucous membranes  NECK: Supple, no JVD  HEART: S1, S2, Regular rate and rhythm, no murmurs, rubs, or gallops  LUNGS: Unlabored respirations, clear to auscultation bilaterally, no crackles, wheezing, or rhonchi  ABDOMEN: Soft, nontender, nondistended, but CVA tenderness in the left flank.   EXTREMITIES: 2+ peripheral pulses bilaterally. No clubbing, cyanosis, or edema  NERVOUS SYSTEM:  A&Ox3, no focal deficits   SKIN: No rashes or lesions    LABS:                        10.3   8.37  )-----------( 260      ( 12 Aug 2023 07:30 )             32.4     08-12    136  |  97  |  11  ----------------------------<  164<H>  3.6   |  23  |  0.70    Ca    9.4      12 Aug 2023 07:28  Phos  3.1     08-12  Mg     2.3     08-12    TPro  7.3  /  Alb  3.4  /  TBili  0.3  /  DBili  x   /  AST  51<H>  /  ALT  66<H>  /  AlkPhos  72  08-12    PT/INR - ( 11 Aug 2023 07:05 )   PT: 14.5 sec;   INR: 1.33 ratio         PTT - ( 11 Aug 2023 07:05 )  PTT:31.3 sec      Urinalysis Basic - ( 12 Aug 2023 07:28 )    Color: x / Appearance: x / SG: x / pH: x  Gluc: 164 mg/dL / Ketone: x  / Bili: x / Urobili: x   Blood: x / Protein: x / Nitrite: x   Leuk Esterase: x / RBC: x / WBC x   Sq Epi: x / Non Sq Epi: x / Bacteria: x        Culture - Urine (collected 10 Aug 2023 07:38)  Source: Clean Catch Clean Catch (Midstream)  Final Report (11 Aug 2023 09:11):    <10,000 CFU/mL Normal Urogenital Micaela    Culture - Blood (collected 10 Aug 2023 02:02)  Source: .Blood Blood-Peripheral  Preliminary Report (12 Aug 2023 09:02):    No growth at 48 Hours    Culture - Blood (collected 09 Aug 2023 21:49)  Source: .Blood Blood-Peripheral  Preliminary Report (12 Aug 2023 01:03):    No growth at 48 Hours        RADIOLOGY & ADDITIONAL TESTS:  Results Reviewed:   Imaging Personally Reviewed:  Electrocardiogram Personally Reviewed:  Tele:

## 2023-08-12 NOTE — DISCHARGE NOTE PROVIDER - NSDCMRMEDTOKEN_GEN_ALL_CORE_FT
alcohol swabs: Apply topically to affected area 4 times a day  atorvastatin 80 mg oral tablet: 1 tab(s) orally once a day  Basaglar KwikPen 100 units/mL subcutaneous solution: 22 unit(s) subcutaneous once a day (at bedtime)  CT abdomen/pelvis with IV contrast: please obtain within 1 week of discharge  to assess stent of pancreatic pseudocyst (ICD10: K86. 3) MDD: 0  fenofibrate 145 mg oral tablet: 1 tab(s) orally once a day  glucometer (per patient&#x27;s insurance): Test blood sugars four times a day. Dispense #1 glucometer.  Insulin Pen Needles, 4mm: 1 application subcutaneously 4 times a day. ** Use with insulin pen **  lancets: 1 application subcutaneously 4 times a day  Lovaza 1000 mg oral capsule: 2 cap(s) orally 2 times a day  metFORMIN 1000 mg oral tablet: 1 tab(s) orally 2 times a day  test strips (per patient&#x27;s insurance): 1 application subcutaneously 4 times a day. ** Compatible with patient&#x27;s glucometer **   atorvastatin 80 mg oral tablet: 1 tab(s) orally once a day  Basaglar KwikPen 100 units/mL subcutaneous solution: 22 unit(s) subcutaneous once a day (at bedtime)  CT abdomen/pelvis with IV contrast: please obtain within 1 week of discharge  to assess stent of pancreatic pseudocyst (ICD10: K86. 3) MDD: 0  fenofibrate 145 mg oral tablet: 1 tab(s) orally once a day  Lovaza 1000 mg oral capsule: 2 cap(s) orally 2 times a day  metFORMIN 1000 mg oral tablet: 1 tab(s) orally 2 times a day   atorvastatin 80 mg oral tablet: 1 tab(s) orally once a day  Basaglar KwikPen 100 units/mL subcutaneous solution: 22 unit(s) subcutaneous once a day (at bedtime)  CT abdomen/pelvis with IV contrast: please obtain within 1 week of discharge  to assess stent of pancreatic pseudocyst (ICD10: K86. 3) MDD: 0  fenofibrate 145 mg oral tablet: 1 tab(s) orally once a day  Lovaza 1000 mg oral capsule: 2 cap(s) orally 2 times a day  metFORMIN 1000 mg oral tablet: 1 tab(s) orally 2 times a day  oxyCODONE 10 mg oral tablet: 1 tab(s) orally every 8 hours as needed for  severe pain MDD: 3 tabs

## 2023-08-12 NOTE — DISCHARGE NOTE PROVIDER - NSDCFUADDAPPT_GEN_ALL_CORE_FT
APPTS ARE READY TO BE MADE: [x] YES    Best Family or Patient Contact (if needed):    Additional Information about above appointments (if needed):    1: PCP Dr sierra within 1 week  2: Gastroenterology (appointment 8.24)  3: Endocrinology (appointment 10/03)     APPTS ARE READY TO BE MADE: [x] YES    Best Family or Patient Contact (if needed):    Additional Information about above appointments (if needed):    1: PCP Dr michelle within 1 week  2: Gastroenterology (appointment 8.24)  3: Endocrinology (appointment 10/03)    Patient was previously scheduled for an appointment on 08/15/2023 2:00PM at [address] [place] [id number] with Dr. Marina Michelle    Patient was previously scheduled for an appointment on 08/24/2023 9:00AM at [address] [place] [id number] with Dr. Bryan Baca.    Patient was previously scheduled for an appointment on 10/03/2023 2:00PM at [address] [place] [address] with Dr. Hazel Ferrera.

## 2023-08-12 NOTE — DISCHARGE NOTE PROVIDER - NSDCCPCAREPLAN_GEN_ALL_CORE_FT
PRINCIPAL DISCHARGE DIAGNOSIS  Diagnosis: Pancreatic cyst  Assessment and Plan of Treatment: You were admitted to the hospital with abdominal pain, most likely related to the pancreatic cyst that was drained last admission. Another procedure was done to remove some of the dead tissue still inside the cyst. You were given oxycodone for pain control every 8 hours at home for pain, watch out for constipation which can occur with opioids and you can take an over the coutner stool softener for. Your diet was advanced, please eat starting with blander foods and eating more foods as you can tolerate it. Follow up with your gastroenterologist for removal of your stent. If you experience severe abdominal pain, nausea, vomiting please return to the ED.      SECONDARY DISCHARGE DIAGNOSES  Diagnosis: Hypertriglyceridemia  Assessment and Plan of Treatment: You have high triglycerides, which causes pancreatitis. Please continue to take your atorvostatin, fenofibrate, and lovaza and follow up with your PCP.    Diagnosis: Diabetes mellitus  Assessment and Plan of Treatment: Last admission you were foudn to have diabetes. Please take your insulin 22 units every night and metformin 1000 twice a day. Please follow up with your PCP and endocrinology for further management.  If you expereince nausea, vomiting, confusion, or severe dehydration, please return to the ED.     PRINCIPAL DISCHARGE DIAGNOSIS  Diagnosis: Pancreatic cyst  Assessment and Plan of Treatment: You were admitted to the hospital with abdominal pain, most likely related to the pancreatic cyst that was drained last admission. Another procedure was done to remove some of the dead tissue still inside the cyst. You were given oxycodone for pain control every 8 hours at home for pain, watch out for constipation which can occur with opioids and you can take an over the coutner stool softener for. Your diet was advanced, please eat starting with blander foods and eating more foods as you can tolerate it. Follow up with your gastroenterologist for removal of your stent. If you experience severe abdominal pain, nausea, vomiting please return to the ED.  You will follow up w/ GI in 2 weeks for repeat upper endoscopy and stent removal.      SECONDARY DISCHARGE DIAGNOSES  Diagnosis: Hypertriglyceridemia  Assessment and Plan of Treatment: You have high triglycerides, which causes pancreatitis. Please continue to take your atorvostatin, fenofibrate, and lovaza and follow up with your PCP.    Diagnosis: Diabetes mellitus  Assessment and Plan of Treatment: Last admission you were foudn to have diabetes. Please take your insulin 22 units every night and metformin 1000 twice a day. Please follow up with your PCP and endocrinology for further management.  If you expereince nausea, vomiting, confusion, or severe dehydration, please return to the ED.

## 2023-08-12 NOTE — PROGRESS NOTE ADULT - PROBLEM SELECTOR PLAN 1
-GI consult; most recent scope w/Dr. Shah 08/07; repeat EGD 08/11 showing black necrotic tissue in the cyst with a necrosectomy.   -s/p CT abdomen + pelvis showing interval decrease in cyst and reduced peripancreatic inflammation  -Diet: clear liquid diet, can advance to low residue as tolerated  -Pain: Morphine + acetaminophen PRN  -Fluids: 125 cc/hr LR   -WBC downtrending -GI consult; most recent scope w/Dr. Shah 08/07; repeat EGD 08/11 showing black necrotic tissue in the cyst with a necrosectomy.   -s/p CT abdomen + pelvis showing interval decrease in cyst and reduced peripancreatic inflammation  -Diet: advance to full liquid low residue as tolerated  -Pain: Morphine + acetaminophen PRN  -Fluids: 125 cc/hr LR   -WBC downtrending

## 2023-08-12 NOTE — PROGRESS NOTE ADULT - ASSESSMENT
38 yo M with PMHx of familial triglyceridemia and recurrent pancreatitis who presents with acute onset abdominal pain in the context of recent EUS + cystogastrostomy for pancreatic pseudocyst. Hospitalized in early august for acute pancreatitis c/b pancreatic pseudocyst. S/p cystogastrostomy with AXIOS stent placement on 08/07. Asymptomatic on discharge from hospital on 08/08. Returned to hospital after abdominal pain returned on 08/09. On admission, labs significant for leukocytosis of 14.8. Home temperatures reported to be 103 but afebrile since ED. Lipase 112, likely reactive to recent manipulation. Triglycerides 197. CT abdomen/pelvis demonstrated interval decrease pancreatic pseudocyst size and reduced peripancreatic inflammation. Repeat EGD +/- necrosectomy per GI on 08/11. Differential for abdominal pain includes recent manipulation on endoscopy, recurrent pancreatitis, or peripancreatic abscess.

## 2023-08-12 NOTE — PROGRESS NOTE ADULT - ATTENDING COMMENTS
37M with history of DM, familial hypertriglyceridemia (follows with Dr. Michelle) c/b multiple admissions of pancreatitis (last adm 2015 for necrotizing pancreatitis) c/b pancreatic pseudocyst, recently treated for pancreatitis in the ICU with insulin gtt. He underwent EGD with AXIOS 15 x 10mm stent placement and endoscopic drainage on 8/7/2023 by [facility], remained stable and was discharged home as he was tolerating meals.    Presented back the next day w/103F fever, abdominal pain. CT A/P 8/10/2023 reveals marked decrease in size of peripancreatic cyst, however some fluid remains with small amount of contrast present. He underwent repeat s/p biopsy in esophageal nodules, s/p necrosectomy in pseudocyst, need repeat EGD in 2 weeks.    - advance diet to low residue, continue pain meds  - c/w Tricor, statin and Omega 3 fatty acid  - f/u biopsy  - DC zosyn, cultures are negative, no abscess seen, monitor

## 2023-08-12 NOTE — DISCHARGE NOTE PROVIDER - NSDCFUSCHEDAPPT_GEN_ALL_CORE_FT
Bryan Baca  [facility]  GASTRO [address] [facility]  Scheduled Appointment: 08/24/2023    Hazel Ferrera  [facility]  ENDOCRIN [address] [facility]  Scheduled Appointment: 10/03/2023     Bryan Baca  [facility]  GASTRO [address]  Scheduled Appointment: 08/24/2023    Marina Michelle  [facility]  INTMED [address]  Scheduled Appointment: 09/18/2023    Hazel Ferrera  [facility]  ENDOCRIN [address] [facility]  Scheduled Appointment: 10/03/2023

## 2023-08-12 NOTE — DISCHARGE NOTE PROVIDER - CARE PROVIDER_API CALL
Marina Michelle.  Internal Medicine  [address] [place] [address], [address]  [place] [address]  Phone: [phone]  Fax: [phone]  Established Patient  Follow Up Time: 1 week

## 2023-08-13 LAB
ALBUMIN SERPL ELPH-MCNC: 3.5 G/DL — SIGNIFICANT CHANGE UP (ref 3.3–5)
ALP SERPL-CCNC: 75 U/L — SIGNIFICANT CHANGE UP (ref 40–120)
ALT FLD-CCNC: 128 U/L — HIGH (ref 10–45)
ANION GAP SERPL CALC-SCNC: 13 MMOL/L — SIGNIFICANT CHANGE UP (ref 5–17)
AST SERPL-CCNC: 86 U/L — HIGH (ref 10–40)
BILIRUB SERPL-MCNC: 0.3 MG/DL — SIGNIFICANT CHANGE UP (ref 0.2–1.2)
BUN SERPL-MCNC: 12 MG/DL — SIGNIFICANT CHANGE UP (ref 7–23)
CALCIUM SERPL-MCNC: 9.1 MG/DL — SIGNIFICANT CHANGE UP (ref 8.4–10.5)
CHLORIDE SERPL-SCNC: 100 MMOL/L — SIGNIFICANT CHANGE UP (ref 96–108)
CO2 SERPL-SCNC: 22 MMOL/L — SIGNIFICANT CHANGE UP (ref 22–31)
CREAT SERPL-MCNC: 0.59 MG/DL — SIGNIFICANT CHANGE UP (ref 0.5–1.3)
EGFR: 129 ML/MIN/1.73M2 — SIGNIFICANT CHANGE UP
GLUCOSE BLDC GLUCOMTR-MCNC: 143 MG/DL — HIGH (ref 70–99)
GLUCOSE BLDC GLUCOMTR-MCNC: 183 MG/DL — HIGH (ref 70–99)
GLUCOSE BLDC GLUCOMTR-MCNC: 200 MG/DL — HIGH (ref 70–99)
GLUCOSE BLDC GLUCOMTR-MCNC: 237 MG/DL — HIGH (ref 70–99)
GLUCOSE SERPL-MCNC: 151 MG/DL — HIGH (ref 70–99)
HCT VFR BLD CALC: 34.7 % — LOW (ref 39–50)
HGB BLD-MCNC: 11.3 G/DL — LOW (ref 13–17)
MAGNESIUM SERPL-MCNC: 2.1 MG/DL — SIGNIFICANT CHANGE UP (ref 1.6–2.6)
MCHC RBC-ENTMCNC: 26.5 PG — LOW (ref 27–34)
MCHC RBC-ENTMCNC: 32.6 GM/DL — SIGNIFICANT CHANGE UP (ref 32–36)
MCV RBC AUTO: 81.3 FL — SIGNIFICANT CHANGE UP (ref 80–100)
NRBC # BLD: 0 /100 WBCS — SIGNIFICANT CHANGE UP (ref 0–0)
PHOSPHATE SERPL-MCNC: 3.4 MG/DL — SIGNIFICANT CHANGE UP (ref 2.5–4.5)
PLATELET # BLD AUTO: 269 K/UL — SIGNIFICANT CHANGE UP (ref 150–400)
POTASSIUM SERPL-MCNC: 3.7 MMOL/L — SIGNIFICANT CHANGE UP (ref 3.5–5.3)
POTASSIUM SERPL-SCNC: 3.7 MMOL/L — SIGNIFICANT CHANGE UP (ref 3.5–5.3)
PROT SERPL-MCNC: 7.1 G/DL — SIGNIFICANT CHANGE UP (ref 6–8.3)
RBC # BLD: 4.27 M/UL — SIGNIFICANT CHANGE UP (ref 4.2–5.8)
RBC # FLD: 14.5 % — SIGNIFICANT CHANGE UP (ref 10.3–14.5)
SODIUM SERPL-SCNC: 135 MMOL/L — SIGNIFICANT CHANGE UP (ref 135–145)
WBC # BLD: 7.01 K/UL — SIGNIFICANT CHANGE UP (ref 3.8–10.5)
WBC # FLD AUTO: 7.01 K/UL — SIGNIFICANT CHANGE UP (ref 3.8–10.5)

## 2023-08-13 PROCEDURE — 99232 SBSQ HOSP IP/OBS MODERATE 35: CPT | Mod: GC

## 2023-08-13 RX ORDER — SENNA PLUS 8.6 MG/1
2 TABLET ORAL AT BEDTIME
Refills: 0 | Status: DISCONTINUED | OUTPATIENT
Start: 2023-08-13 | End: 2023-08-14

## 2023-08-13 RX ORDER — LIDOCAINE 4 G/100G
1 CREAM TOPICAL DAILY
Refills: 0 | Status: DISCONTINUED | OUTPATIENT
Start: 2023-08-13 | End: 2023-08-14

## 2023-08-13 RX ORDER — NALOXONE HYDROCHLORIDE 4 MG/.1ML
0.4 SPRAY NASAL ONCE
Refills: 0 | Status: DISCONTINUED | OUTPATIENT
Start: 2023-08-13 | End: 2023-08-14

## 2023-08-13 RX ORDER — OXYCODONE HYDROCHLORIDE 5 MG/1
10 TABLET ORAL EVERY 4 HOURS
Refills: 0 | Status: DISCONTINUED | OUTPATIENT
Start: 2023-08-13 | End: 2023-08-14

## 2023-08-13 RX ORDER — OXYCODONE HYDROCHLORIDE 5 MG/1
5 TABLET ORAL EVERY 4 HOURS
Refills: 0 | Status: DISCONTINUED | OUTPATIENT
Start: 2023-08-13 | End: 2023-08-14

## 2023-08-13 RX ORDER — POLYETHYLENE GLYCOL 3350 17 G/17G
17 POWDER, FOR SOLUTION ORAL DAILY
Refills: 0 | Status: DISCONTINUED | OUTPATIENT
Start: 2023-08-13 | End: 2023-08-14

## 2023-08-13 RX ADMIN — ATORVASTATIN CALCIUM 80 MILLIGRAM(S): 80 TABLET, FILM COATED ORAL at 21:15

## 2023-08-13 RX ADMIN — OXYCODONE HYDROCHLORIDE 10 MILLIGRAM(S): 5 TABLET ORAL at 13:14

## 2023-08-13 RX ADMIN — Medication 1: at 17:44

## 2023-08-13 RX ADMIN — Medication 145 MILLIGRAM(S): at 13:05

## 2023-08-13 RX ADMIN — Medication 2 GRAM(S): at 17:44

## 2023-08-13 RX ADMIN — MORPHINE SULFATE 4 MILLIGRAM(S): 50 CAPSULE, EXTENDED RELEASE ORAL at 05:00

## 2023-08-13 RX ADMIN — OXYCODONE HYDROCHLORIDE 10 MILLIGRAM(S): 5 TABLET ORAL at 14:42

## 2023-08-13 RX ADMIN — OXYCODONE HYDROCHLORIDE 10 MILLIGRAM(S): 5 TABLET ORAL at 21:44

## 2023-08-13 RX ADMIN — POLYETHYLENE GLYCOL 3350 17 GRAM(S): 17 POWDER, FOR SOLUTION ORAL at 13:05

## 2023-08-13 RX ADMIN — MORPHINE SULFATE 4 MILLIGRAM(S): 50 CAPSULE, EXTENDED RELEASE ORAL at 04:20

## 2023-08-13 RX ADMIN — OXYCODONE HYDROCHLORIDE 10 MILLIGRAM(S): 5 TABLET ORAL at 21:14

## 2023-08-13 RX ADMIN — INSULIN GLARGINE 18 UNIT(S): 100 INJECTION, SOLUTION SUBCUTANEOUS at 21:55

## 2023-08-13 RX ADMIN — SENNA PLUS 2 TABLET(S): 8.6 TABLET ORAL at 21:15

## 2023-08-13 RX ADMIN — Medication 2 GRAM(S): at 04:20

## 2023-08-13 RX ADMIN — LIDOCAINE 1 PATCH: 4 CREAM TOPICAL at 13:12

## 2023-08-13 RX ADMIN — Medication 2: at 13:08

## 2023-08-13 RX ADMIN — CHLORHEXIDINE GLUCONATE 1 APPLICATION(S): 213 SOLUTION TOPICAL at 13:05

## 2023-08-13 NOTE — PROGRESS NOTE ADULT - PROBLEM SELECTOR PLAN 2
- 8/10 IV Zosyn 3.375, 8/12 discontinue with neg cultures.  -WBC downtrending  -BCx and UCx negative

## 2023-08-13 NOTE — PROGRESS NOTE ADULT - PROBLEM SELECTOR PLAN 1
-GI consult; most recent scope w/Dr. Shah 08/07; repeat EGD 08/11 showing black necrotic tissue in the cyst with a necrosectomy.   -s/p CT abdomen + pelvis showing interval decrease in cyst and reduced peripancreatic inflammation  -Diet: advance to full liquid low residue as tolerated  -Pain: Morphine + acetaminophen PRN  -Fluids: 125 cc/hr LR   -WBC downtrending -GI consult; most recent scope w/Dr. Shah 08/07; repeat EGD 08/11 showing black necrotic tissue in the cyst with a necrosectomy.   -s/p CT abdomen + pelvis showing interval decrease in cyst and reduced peripancreatic inflammation  -Diet: advance to low residue  -Pain: oxycodone 5mg q4 moderate pain, oxycodone 10mg q6 severe pain  -Fluids: off  -WBC downtrending

## 2023-08-13 NOTE — PROGRESS NOTE ADULT - SUBJECTIVE AND OBJECTIVE BOX
*******************************  Kenneth Odell, PGY-1  Internal Medicine  Contact via [employer] TEAMS    *******************************    PROGRESS NOTE:     Patient is a 37y old  Male who presents with a chief complaint of Acute Pancreatitis; Retroperitoneal abscess (12 Aug 2023 12:31)      INTERVAL EVENTS: No acute overnight events.     SUBJECTIVE: Patient seen and examined at bedside. This morning, the patient is comfortable and doing well. No acute complaints. Denies fevers, chills, N/V/D, chest pain, SOB, abdominal pain.    MEDICATIONS  (STANDING):  atorvastatin 80 milliGRAM(s) Oral at bedtime  chlorhexidine 4% Liquid 1 Application(s) Topical daily  dextrose 5%. 1000 milliLiter(s) (50 mL/Hr) IV Continuous <Continuous>  dextrose 5%. 1000 milliLiter(s) (100 mL/Hr) IV Continuous <Continuous>  dextrose 50% Injectable 12.5 Gram(s) IV Push once  dextrose 50% Injectable 25 Gram(s) IV Push once  dextrose 50% Injectable 25 Gram(s) IV Push once  fenofibrate Tablet 145 milliGRAM(s) Oral daily  glucagon  Injectable 1 milliGRAM(s) IntraMuscular once  insulin glargine Injectable (LANTUS) 18 Unit(s) SubCutaneous at bedtime  insulin lispro (ADMELOG) corrective regimen sliding scale   SubCutaneous three times a day before meals  insulin lispro (ADMELOG) corrective regimen sliding scale   SubCutaneous at bedtime  omega-3-Acid Ethyl Esters 2 Gram(s) Oral two times a day    MEDICATIONS  (PRN):  acetaminophen     Tablet .. 650 milliGRAM(s) Oral every 6 hours PRN Mild Pain (1 - 3)  dextrose Oral Gel 15 Gram(s) Oral once PRN Blood Glucose LESS THAN 70 milliGRAM(s)/deciliter  morphine  - Injectable 4 milliGRAM(s) IV Push every 4 hours PRN Severe Pain (7 - 10)  morphine  - Injectable 2 milliGRAM(s) IV Push every 4 hours PRN Moderate Pain (4 - 6)      CAPILLARY BLOOD GLUCOSE      POCT Blood Glucose.: 232 mg/dL (12 Aug 2023 21:33)  POCT Blood Glucose.: 194 mg/dL (12 Aug 2023 17:20)  POCT Blood Glucose.: 255 mg/dL (12 Aug 2023 12:47)  POCT Blood Glucose.: 180 mg/dL (12 Aug 2023 08:41)    I&O's Summary      PHYSICAL EXAM:  Vital Signs Last 24 Hrs  T(C): 37 (13 Aug 2023 04:10), Max: 37.2 (12 Aug 2023 21:30)  T(F): 98.6 (13 Aug 2023 04:10), Max: 99 (12 Aug 2023 21:30)  HR: 71 (13 Aug 2023 04:10) (71 - 77)  BP: 120/76 (13 Aug 2023 04:10) (103/63 - 121/82)  BP(mean): --  RR: 18 (13 Aug 2023 04:10) (18 - 18)  SpO2: 96% (13 Aug 2023 04:10) (96% - 98%)    Parameters below as of 13 Aug 2023 04:10  Patient On (Oxygen Delivery Method): room air        GENERAL: NAD, lying in bed comfortably  HEAD: Atraumatic, normocephalic  EYES: EOMI, PERRLA, conjunctiva and sclera clear  ENT: Moist mucous membranes  NECK: Supple, no JVD  HEART: S1, S2, Regular rate and rhythm, no murmurs, rubs, or gallops  LUNGS: Unlabored respirations, clear to auscultation bilaterally, no crackles, wheezing, or rhonchi  ABDOMEN: Soft, nontender, nondistended, +BS  EXTREMITIES: 2+ peripheral pulses bilaterally. No clubbing, cyanosis, or edema  NERVOUS SYSTEM:  A&Ox3, no focal deficits   SKIN: No rashes or lesions    LABS:                        11.3   7.01  )-----------( 269      ( 13 Aug 2023 07:20 )             34.7     08-12    136  |  97  |  11  ----------------------------<  164<H>  3.6   |  23  |  0.70    Ca    9.4      12 Aug 2023 07:28  Phos  3.1     08-12  Mg     2.3     08-12    TPro  7.3  /  Alb  3.4  /  TBili  0.3  /  DBili  x   /  AST  51<H>  /  ALT  66<H>  /  AlkPhos  72  08-12          Urinalysis Basic - ( 12 Aug 2023 07:28 )    Color: x / Appearance: x / SG: x / pH: x  Gluc: 164 mg/dL / Ketone: x  / Bili: x / Urobili: x   Blood: x / Protein: x / Nitrite: x   Leuk Esterase: x / RBC: x / WBC x   Sq Epi: x / Non Sq Epi: x / Bacteria: x          RADIOLOGY & ADDITIONAL TESTS:  Results Reviewed:   Imaging Personally Reviewed:  Electrocardiogram Personally Reviewed:  Tele: *******************************  Kenneth Odell, PGY-1  Internal Medicine  Contact via [employer] TEAMS    *******************************    PROGRESS NOTE:     Patient is a 37y old  Male who presents with a chief complaint of Acute Pancreatitis; Retroperitoneal abscess (12 Aug 2023 12:31)      INTERVAL EVENTS: No acute overnight events.     SUBJECTIVE: Patient seen and examined at bedside. This morning, the patient continues to c/o 7/10 pain which increases to 10/10 as his medication wears off. He is tolerating PO intake on a liquid diet and denies any pain, nausea, or vomiting after eating. He is amenable to advancing to a normal diet today. Endorses BM yesterday. Patient also willing to try transitioning from IV pain medication to oral today. Feels he is able to drink enough to stay hydrated and does not need IV fluids. Denies fevers, chills, N/V/D, chest pain, SOB.    MEDICATIONS  (STANDING):  atorvastatin 80 milliGRAM(s) Oral at bedtime  chlorhexidine 4% Liquid 1 Application(s) Topical daily  dextrose 5%. 1000 milliLiter(s) (50 mL/Hr) IV Continuous <Continuous>  dextrose 5%. 1000 milliLiter(s) (100 mL/Hr) IV Continuous <Continuous>  dextrose 50% Injectable 12.5 Gram(s) IV Push once  dextrose 50% Injectable 25 Gram(s) IV Push once  dextrose 50% Injectable 25 Gram(s) IV Push once  fenofibrate Tablet 145 milliGRAM(s) Oral daily  glucagon  Injectable 1 milliGRAM(s) IntraMuscular once  insulin glargine Injectable (LANTUS) 18 Unit(s) SubCutaneous at bedtime  insulin lispro (ADMELOG) corrective regimen sliding scale   SubCutaneous three times a day before meals  insulin lispro (ADMELOG) corrective regimen sliding scale   SubCutaneous at bedtime  omega-3-Acid Ethyl Esters 2 Gram(s) Oral two times a day    MEDICATIONS  (PRN):  acetaminophen     Tablet .. 650 milliGRAM(s) Oral every 6 hours PRN Mild Pain (1 - 3)  dextrose Oral Gel 15 Gram(s) Oral once PRN Blood Glucose LESS THAN 70 milliGRAM(s)/deciliter  morphine  - Injectable 4 milliGRAM(s) IV Push every 4 hours PRN Severe Pain (7 - 10)  morphine  - Injectable 2 milliGRAM(s) IV Push every 4 hours PRN Moderate Pain (4 - 6)      CAPILLARY BLOOD GLUCOSE      POCT Blood Glucose.: 232 mg/dL (12 Aug 2023 21:33)  POCT Blood Glucose.: 194 mg/dL (12 Aug 2023 17:20)  POCT Blood Glucose.: 255 mg/dL (12 Aug 2023 12:47)  POCT Blood Glucose.: 180 mg/dL (12 Aug 2023 08:41)    I&O's Summary      PHYSICAL EXAM:  Vital Signs Last 24 Hrs  T(C): 37 (13 Aug 2023 04:10), Max: 37.2 (12 Aug 2023 21:30)  T(F): 98.6 (13 Aug 2023 04:10), Max: 99 (12 Aug 2023 21:30)  HR: 71 (13 Aug 2023 04:10) (71 - 77)  BP: 120/76 (13 Aug 2023 04:10) (103/63 - 121/82)  BP(mean): --  RR: 18 (13 Aug 2023 04:10) (18 - 18)  SpO2: 96% (13 Aug 2023 04:10) (96% - 98%)    Parameters below as of 13 Aug 2023 04:10  Patient On (Oxygen Delivery Method): room air        GENERAL: NAD, lying in bed comfortably  HEAD: Atraumatic, normocephalic  EYES: EOMI, conjunctiva and sclera clear  ENT: Moist mucous membranes  NECK: Supple, no JVD  HEART: Regular rate and rhythm, no murmurs, rubs, or gallops  LUNGS: Unlabored respirations, clear to auscultation bilaterally  ABDOMEN: Soft, tender to palpation in LLQ, non-distended  EXTREMITIES: No LE edema  NERVOUS SYSTEM:  A&Ox3, no focal deficits   SKIN: No rashes or lesions    LABS:                        11.3   7.01  )-----------( 269      ( 13 Aug 2023 07:20 )             34.7     08-12    136  |  97  |  11  ----------------------------<  164<H>  3.6   |  23  |  0.70    Ca    9.4      12 Aug 2023 07:28  Phos  3.1     08-12  Mg     2.3     08-12    TPro  7.3  /  Alb  3.4  /  TBili  0.3  /  DBili  x   /  AST  51<H>  /  ALT  66<H>  /  AlkPhos  72  08-12          Urinalysis Basic - ( 12 Aug 2023 07:28 )    Color: x / Appearance: x / SG: x / pH: x  Gluc: 164 mg/dL / Ketone: x  / Bili: x / Urobili: x   Blood: x / Protein: x / Nitrite: x   Leuk Esterase: x / RBC: x / WBC x   Sq Epi: x / Non Sq Epi: x / Bacteria: x          RADIOLOGY & ADDITIONAL TESTS:  Results Reviewed:   Imaging Personally Reviewed:  Electrocardiogram Personally Reviewed:  Tele:

## 2023-08-13 NOTE — PROGRESS NOTE ADULT - ASSESSMENT
36 yo M with PMHx of familial triglyceridemia and recurrent pancreatitis who presents with acute onset abdominal pain in the context of recent EUS + cystogastrostomy for pancreatic pseudocyst. Hospitalized in early august for acute pancreatitis c/b pancreatic pseudocyst. S/p cystogastrostomy with AXIOS stent placement on 08/07. Asymptomatic on discharge from hospital on 08/08. Returned to hospital after abdominal pain returned on 08/09. On admission, labs significant for leukocytosis of 14.8. Home temperatures reported to be 103 but afebrile since ED. Lipase 112, likely reactive to recent manipulation. Triglycerides 197. CT abdomen/pelvis demonstrated interval decrease pancreatic pseudocyst size and reduced peripancreatic inflammation. Repeat EGD +/- necrosectomy per GI on 08/11. Differential for abdominal pain includes recent manipulation on endoscopy, recurrent pancreatitis, or peripancreatic abscess.

## 2023-08-13 NOTE — PROGRESS NOTE ADULT - PROBLEM SELECTOR PLAN 4
DVT prophylaxis: out of bed ambulating  Diet: full liquid / low residue  Contact: Wife, Nan, [phone]

## 2023-08-13 NOTE — PROGRESS NOTE ADULT - ATTENDING COMMENTS
37M with history of DM, familial hypertriglyceridemia (follows with Dr. Michelle) c/b multiple admissions of pancreatitis (last adm 2015 for necrotizing pancreatitis) c/b pancreatic pseudocyst, recently treated for pancreatitis in the ICU with insulin gtt. He underwent EGD with AXIOS 15 x 10mm stent placement and endoscopic drainage on 8/7/2023 by advance GI, remained stable and was discharged home as he was tolerating meals.    Presented back the next day w/103F fever, abdominal pain. CT A/P 8/10/2023 reveals marked decrease in size of peripancreatic cyst, however some fluid remains with small amount of contrast present. He underwent repeat s/p biopsy in esophageal nodules, s/p necrosectomy in pseudocyst, need repeat EGD in 2 weeks.    - advance diet to low residue, continue pain meds, switch to oral oxycodone  - c/w Tricor, statin and Omega 3 fatty acid  - f/u biopsy  - DC zosyn, cultures are negative, no abscess seen, monitor 37M with history of DM, familial hypertriglyceridemia (follows with Dr. Michelle) c/b multiple admissions of pancreatitis (last adm 2015 for necrotizing pancreatitis) c/b pancreatic pseudocyst, recently treated for pancreatitis in the ICU with insulin gtt. He underwent EGD with AXIOS 15 x 10mm stent placement and endoscopic drainage on 8/7/2023 by advance GI, remained stable and was discharged home as he was tolerating meals.    Presented back the next day w/103F fever, abdominal pain. CT A/P 8/10/2023 reveals marked decrease in size of peripancreatic cyst, however some fluid remains with small amount of contrast present. He underwent repeat s/p biopsy in esophageal nodules, s/p necrosectomy in pseudocyst, need repeat EGD in 2 weeks.    - advance diet to low residue, continue pain meds, switch to oral oxycodone  - c/w Tricor, statin and Omega 3 fatty acid  - f/u biopsy  - DC zosyn, cultures are negative, no abscess seen, monitor  - trend LFTs

## 2023-08-14 ENCOUNTER — TRANSCRIPTION ENCOUNTER (OUTPATIENT)
Age: 38
End: 2023-08-14

## 2023-08-14 VITALS
OXYGEN SATURATION: 98 % | DIASTOLIC BLOOD PRESSURE: 68 MMHG | HEART RATE: 91 BPM | SYSTOLIC BLOOD PRESSURE: 97 MMHG | TEMPERATURE: 98 F | RESPIRATION RATE: 18 BRPM

## 2023-08-14 DIAGNOSIS — E11.9 TYPE 2 DIABETES MELLITUS WITHOUT COMPLICATIONS: ICD-10-CM

## 2023-08-14 LAB
ALBUMIN SERPL ELPH-MCNC: 3.4 G/DL — SIGNIFICANT CHANGE UP (ref 3.3–5)
ALP SERPL-CCNC: 79 U/L — SIGNIFICANT CHANGE UP (ref 40–120)
ALT FLD-CCNC: 117 U/L — HIGH (ref 10–45)
ANION GAP SERPL CALC-SCNC: 13 MMOL/L — SIGNIFICANT CHANGE UP (ref 5–17)
AST SERPL-CCNC: 59 U/L — HIGH (ref 10–40)
BILIRUB SERPL-MCNC: 0.3 MG/DL — SIGNIFICANT CHANGE UP (ref 0.2–1.2)
BUN SERPL-MCNC: 11 MG/DL — SIGNIFICANT CHANGE UP (ref 7–23)
CALCIUM SERPL-MCNC: 9.1 MG/DL — SIGNIFICANT CHANGE UP (ref 8.4–10.5)
CHLORIDE SERPL-SCNC: 99 MMOL/L — SIGNIFICANT CHANGE UP (ref 96–108)
CO2 SERPL-SCNC: 22 MMOL/L — SIGNIFICANT CHANGE UP (ref 22–31)
CREAT SERPL-MCNC: 0.64 MG/DL — SIGNIFICANT CHANGE UP (ref 0.5–1.3)
EGFR: 126 ML/MIN/1.73M2 — SIGNIFICANT CHANGE UP
GLUCOSE BLDC GLUCOMTR-MCNC: 161 MG/DL — HIGH (ref 70–99)
GLUCOSE SERPL-MCNC: 164 MG/DL — HIGH (ref 70–99)
HCT VFR BLD CALC: 32.7 % — LOW (ref 39–50)
HGB BLD-MCNC: 10.9 G/DL — LOW (ref 13–17)
MAGNESIUM SERPL-MCNC: 1.9 MG/DL — SIGNIFICANT CHANGE UP (ref 1.6–2.6)
MCHC RBC-ENTMCNC: 26.7 PG — LOW (ref 27–34)
MCHC RBC-ENTMCNC: 33.3 GM/DL — SIGNIFICANT CHANGE UP (ref 32–36)
MCV RBC AUTO: 80 FL — SIGNIFICANT CHANGE UP (ref 80–100)
NRBC # BLD: 0 /100 WBCS — SIGNIFICANT CHANGE UP (ref 0–0)
PHOSPHATE SERPL-MCNC: 3.2 MG/DL — SIGNIFICANT CHANGE UP (ref 2.5–4.5)
PLATELET # BLD AUTO: 307 K/UL — SIGNIFICANT CHANGE UP (ref 150–400)
POTASSIUM SERPL-MCNC: 4 MMOL/L — SIGNIFICANT CHANGE UP (ref 3.5–5.3)
POTASSIUM SERPL-SCNC: 4 MMOL/L — SIGNIFICANT CHANGE UP (ref 3.5–5.3)
PROT SERPL-MCNC: 7.2 G/DL — SIGNIFICANT CHANGE UP (ref 6–8.3)
RBC # BLD: 4.09 M/UL — LOW (ref 4.2–5.8)
RBC # FLD: 14.6 % — HIGH (ref 10.3–14.5)
SODIUM SERPL-SCNC: 134 MMOL/L — LOW (ref 135–145)
WBC # BLD: 8.25 K/UL — SIGNIFICANT CHANGE UP (ref 3.8–10.5)
WBC # FLD AUTO: 8.25 K/UL — SIGNIFICANT CHANGE UP (ref 3.8–10.5)

## 2023-08-14 PROCEDURE — 85027 COMPLETE CBC AUTOMATED: CPT

## 2023-08-14 PROCEDURE — 96375 TX/PRO/DX INJ NEW DRUG ADDON: CPT

## 2023-08-14 PROCEDURE — 88305 TISSUE EXAM BY PATHOLOGIST: CPT

## 2023-08-14 PROCEDURE — 81001 URINALYSIS AUTO W/SCOPE: CPT

## 2023-08-14 PROCEDURE — 74177 CT ABD & PELVIS W/CONTRAST: CPT | Mod: MA

## 2023-08-14 PROCEDURE — 99233 SBSQ HOSP IP/OBS HIGH 50: CPT | Mod: GC

## 2023-08-14 PROCEDURE — 96374 THER/PROPH/DIAG INJ IV PUSH: CPT

## 2023-08-14 PROCEDURE — 84295 ASSAY OF SERUM SODIUM: CPT

## 2023-08-14 PROCEDURE — 87640 STAPH A DNA AMP PROBE: CPT

## 2023-08-14 PROCEDURE — 87086 URINE CULTURE/COLONY COUNT: CPT

## 2023-08-14 PROCEDURE — 99285 EMERGENCY DEPT VISIT HI MDM: CPT | Mod: 25

## 2023-08-14 PROCEDURE — 85014 HEMATOCRIT: CPT

## 2023-08-14 PROCEDURE — C9399: CPT

## 2023-08-14 PROCEDURE — 80053 COMPREHEN METABOLIC PANEL: CPT

## 2023-08-14 PROCEDURE — 82435 ASSAY OF BLOOD CHLORIDE: CPT

## 2023-08-14 PROCEDURE — 84478 ASSAY OF TRIGLYCERIDES: CPT

## 2023-08-14 PROCEDURE — 82962 GLUCOSE BLOOD TEST: CPT

## 2023-08-14 PROCEDURE — 71046 X-RAY EXAM CHEST 2 VIEWS: CPT

## 2023-08-14 PROCEDURE — 82803 BLOOD GASES ANY COMBINATION: CPT

## 2023-08-14 PROCEDURE — 85025 COMPLETE CBC W/AUTO DIFF WBC: CPT

## 2023-08-14 PROCEDURE — 84132 ASSAY OF SERUM POTASSIUM: CPT

## 2023-08-14 PROCEDURE — 87641 MR-STAPH DNA AMP PROBE: CPT

## 2023-08-14 PROCEDURE — 36415 COLL VENOUS BLD VENIPUNCTURE: CPT

## 2023-08-14 PROCEDURE — 84100 ASSAY OF PHOSPHORUS: CPT

## 2023-08-14 PROCEDURE — 0225U NFCT DS DNA&RNA 21 SARSCOV2: CPT

## 2023-08-14 PROCEDURE — 83605 ASSAY OF LACTIC ACID: CPT

## 2023-08-14 PROCEDURE — 85730 THROMBOPLASTIN TIME PARTIAL: CPT

## 2023-08-14 PROCEDURE — 85018 HEMOGLOBIN: CPT

## 2023-08-14 PROCEDURE — 82947 ASSAY GLUCOSE BLOOD QUANT: CPT

## 2023-08-14 PROCEDURE — 83735 ASSAY OF MAGNESIUM: CPT

## 2023-08-14 PROCEDURE — 82330 ASSAY OF CALCIUM: CPT

## 2023-08-14 PROCEDURE — 87040 BLOOD CULTURE FOR BACTERIA: CPT

## 2023-08-14 PROCEDURE — 85610 PROTHROMBIN TIME: CPT

## 2023-08-14 PROCEDURE — 99239 HOSP IP/OBS DSCHRG MGMT >30: CPT | Mod: GC

## 2023-08-14 RX ORDER — OXYCODONE HYDROCHLORIDE 5 MG/1
1 TABLET ORAL
Qty: 15 | Refills: 0
Start: 2023-08-14 | End: 2023-08-18

## 2023-08-14 RX ADMIN — OXYCODONE HYDROCHLORIDE 5 MILLIGRAM(S): 5 TABLET ORAL at 12:36

## 2023-08-14 RX ADMIN — OXYCODONE HYDROCHLORIDE 10 MILLIGRAM(S): 5 TABLET ORAL at 02:30

## 2023-08-14 RX ADMIN — Medication 1: at 09:14

## 2023-08-14 RX ADMIN — LIDOCAINE 1 PATCH: 4 CREAM TOPICAL at 11:17

## 2023-08-14 RX ADMIN — OXYCODONE HYDROCHLORIDE 10 MILLIGRAM(S): 5 TABLET ORAL at 03:00

## 2023-08-14 RX ADMIN — Medication 145 MILLIGRAM(S): at 11:18

## 2023-08-14 RX ADMIN — Medication 1: at 13:28

## 2023-08-14 RX ADMIN — Medication 2: at 17:32

## 2023-08-14 RX ADMIN — CHLORHEXIDINE GLUCONATE 1 APPLICATION(S): 213 SOLUTION TOPICAL at 11:16

## 2023-08-14 RX ADMIN — Medication 2 GRAM(S): at 05:29

## 2023-08-14 RX ADMIN — Medication 2 GRAM(S): at 17:32

## 2023-08-14 RX ADMIN — OXYCODONE HYDROCHLORIDE 5 MILLIGRAM(S): 5 TABLET ORAL at 13:00

## 2023-08-14 NOTE — PROGRESS NOTE ADULT - SUBJECTIVE AND OBJECTIVE BOX
*******************************  Sudhakar Madhuri MS4  Internal Medicine  Contact via [employer] TEAMS  *******************************    PROGRESS NOTE:     Patient is a 37y old  Male who presents with a chief complaint of Acute Pancreatitis; Retroperitoneal abscess (14 Aug 2023 08:00)      INTERVAL EVENTS: No acute overnight events.     SUBJECTIVE: Patient seen and examined at bedside. This morning, the patient is comfortable and doing well. He is tolerating his low residue diet but reports postprandial pain that feels like gas pain and like his pancreatic related pain. The pain is relatively controlled with oxycodone but he reports he still feels the pain, just feels too out of it from the oxycodone to pay attention to it. No acute complaints. Denies fevers, chills, N/V/D, chest pain, SOB, abdominal pain, greasy yellow stools.    MEDICATIONS  (STANDING):  atorvastatin 80 milliGRAM(s) Oral at bedtime  chlorhexidine 4% Liquid 1 Application(s) Topical daily  dextrose 5%. 1000 milliLiter(s) (100 mL/Hr) IV Continuous <Continuous>  dextrose 5%. 1000 milliLiter(s) (50 mL/Hr) IV Continuous <Continuous>  dextrose 50% Injectable 25 Gram(s) IV Push once  dextrose 50% Injectable 25 Gram(s) IV Push once  dextrose 50% Injectable 12.5 Gram(s) IV Push once  fenofibrate Tablet 145 milliGRAM(s) Oral daily  glucagon  Injectable 1 milliGRAM(s) IntraMuscular once  insulin glargine Injectable (LANTUS) 18 Unit(s) SubCutaneous at bedtime  insulin lispro (ADMELOG) corrective regimen sliding scale   SubCutaneous three times a day before meals  insulin lispro (ADMELOG) corrective regimen sliding scale   SubCutaneous at bedtime  lidocaine   4% Patch 1 Patch Transdermal daily  naloxone Injectable 0.4 milliGRAM(s) IV Push once  omega-3-Acid Ethyl Esters 2 Gram(s) Oral two times a day  polyethylene glycol 3350 17 Gram(s) Oral daily  senna 2 Tablet(s) Oral at bedtime    MEDICATIONS  (PRN):  acetaminophen     Tablet .. 650 milliGRAM(s) Oral every 6 hours PRN Mild Pain (1 - 3)  dextrose Oral Gel 15 Gram(s) Oral once PRN Blood Glucose LESS THAN 70 milliGRAM(s)/deciliter  oxyCODONE    IR 10 milliGRAM(s) Oral every 4 hours PRN Severe Pain (7 - 10)  oxyCODONE    IR 5 milliGRAM(s) Oral every 4 hours PRN Moderate Pain (4 - 6)      CAPILLARY BLOOD GLUCOSE      POCT Blood Glucose.: 161 mg/dL (14 Aug 2023 08:18)  POCT Blood Glucose.: 200 mg/dL (13 Aug 2023 21:23)  POCT Blood Glucose.: 183 mg/dL (13 Aug 2023 17:39)  POCT Blood Glucose.: 237 mg/dL (13 Aug 2023 12:36)    I&O's Summary      PHYSICAL EXAM:  Vital Signs Last 24 Hrs  T(C): 36.7 (14 Aug 2023 05:30), Max: 36.9 (13 Aug 2023 21:21)  T(F): 98 (14 Aug 2023 05:30), Max: 98.4 (13 Aug 2023 21:21)  HR: 73 (14 Aug 2023 05:30) (73 - 86)  BP: 114/70 (14 Aug 2023 05:30) (107/68 - 115/73)  BP(mean): --  RR: 18 (14 Aug 2023 05:30) (18 - 18)  SpO2: 97% (14 Aug 2023 05:30) (97% - 97%)    Parameters below as of 14 Aug 2023 05:30  Patient On (Oxygen Delivery Method): room air        GENERAL: NAD, lying in bed comfortably  HEAD: Atraumatic, normocephalic  EYES: EOMI, PERRLA, conjunctiva and sclera clear  ENT: Moist mucous membranes  NECK: Supple, no JVD  HEART: S1, S2, Regular rate and rhythm, no murmurs, rubs, or gallops  LUNGS: Unlabored respirations, clear to auscultation bilaterally, no crackles, wheezing, or rhonchi  ABDOMEN: Soft, nontender, nondistended, +BS  EXTREMITIES: 2+ peripheral pulses bilaterally. No clubbing, cyanosis, or edema  NERVOUS SYSTEM:  A&Ox3, no focal deficits   SKIN: No rashes or lesions    LABS:                        10.9   8.25  )-----------( 307      ( 14 Aug 2023 07:19 )             32.7     08-14    134<L>  |  99  |  11  ----------------------------<  164<H>  4.0   |  22  |  0.64    Ca    9.1      14 Aug 2023 07:15  Phos  3.2     08-14  Mg     1.9     08-14    TPro  7.2  /  Alb  3.4  /  TBili  0.3  /  DBili  x   /  AST  59<H>  /  ALT  117<H>  /  AlkPhos  79  08-14          Urinalysis Basic - ( 14 Aug 2023 07:15 )    Color: x / Appearance: x / SG: x / pH: x  Gluc: 164 mg/dL / Ketone: x  / Bili: x / Urobili: x   Blood: x / Protein: x / Nitrite: x   Leuk Esterase: x / RBC: x / WBC x   Sq Epi: x / Non Sq Epi: x / Bacteria: x          RADIOLOGY & ADDITIONAL TESTS:  Results Reviewed:   Imaging Personally Reviewed:  Electrocardiogram Personally Reviewed:  Tele:

## 2023-08-14 NOTE — PROGRESS NOTE ADULT - ASSESSMENT
37 year old male with history of pre-DM, familial hypertriglyceridemia (follows with Dr. Michelle) c/b multiple admissions of pancreatitis (last adm 2015 for necrotizing pancreatitis) c/b pancreatic pseudocyst who presents with fever and left flank pain.  Patient recently admitted for presentation of left sided abdominal pain with radiation to back, a/w N/V, treated for pancreatitis in the ICU with insulin gtt.  Ultimately improved and underwent EGD with AXIOS 15 x 10mm stent placement and endoscopic drainage on 8/7/2023.  Patient states he advanced his diet to "eating chicken" on day of discharge, which he states resulted in "gas pains" when he got home.  Subsequently, he developed fever with Tmax 104 with left flank pain.  No epigastric pain.  CT A/P 8/10/2023 reveals marked decrease in size of peripancreatic cyst, however some fluid remains with small amount of contrast present.    # Fever, resolved  # Leukocytosis, resolved  # Left flank pain  # Familial hypertriglyceridemia c/b recurrent pancreatitis  # Peripancreatic fluid collection s/p AXIOS 15 x 10mm stent placement and endoscopic drainage on 8/7/2023  -unclear etiology for fever/leukocytosis/left flank pain.  Stent appears to be in good position with interval decrease in fluid collection.  However, some fluid and debris remain, with what appears to be a small amount of contrast within the collection  -EGD and necrosectomy 8/11/2023 revealed two small nodules found in the esophagus s/p biopsy, erosive gastropathy, pre-existing cystgastrostomy stent in place, the pseudocyst was partially filled with fluid and a small amount of black necrotic tissue, however walls of cyst are clean. Necrosectomy was performed with a snare, requiring several intubations of the pseudocyst. Also irrigated with 180cc 20% hydrogen peroxide    Recommendations:  -trend clinical symptoms, exam findings, vital signs, CBC, CMP, INR  -pain control and antiemetics as QTc allows  -endoscopic findings as above  -f/u path  -regular diet as tolerated  -avoid proton pump inhibitor to allow for acid to sterilize the cyst cavity  -repeat upper endoscopy for stent removal in 2 weeks  -no further plans for anticipated inpatient GI interventions    Note incomplete until finalized by attending signature/attestation.    Perico Casillas  GI/Hepatology Fellow    MONDAY-FRIDAY 8AM-5PM:  Pager# [phone] ([facility]) or [phone] ([facility])    NON-URGENT CONSULTS:  Please email [email or web address]  AT NIGHT AND ON WEEKENDS:  Contact on-call GI fellow via answering service ([phone]) from 5pm-8am and on weekends/holidays

## 2023-08-14 NOTE — PROGRESS NOTE ADULT - REASON FOR ADMISSION
Acute Pancreatitis; Retroperitoneal abscess
Chronic pancreatitis; Walled-off necrosis
Acute Pancreatitis; Retroperitoneal abscess
Acute Pancreatitis; Retroperitoneal abscess

## 2023-08-14 NOTE — PROGRESS NOTE ADULT - PROBLEM SELECTOR PLAN 1
-GI consult; most recent scope w/Dr. Shah 08/07; repeat EGD 08/11 showing black necrotic tissue in the cyst with a necrosectomy.   -s/p CT abdomen + pelvis showing interval decrease in cyst and reduced peripancreatic inflammation  -Diet: tolerating well on low residue  -Pain: oxycodone 5mg q4 moderate pain, oxycodone 10mg q6 severe pain  -Fluids: off  -WBC downtrending -GI consult; most recent scope w/Dr. Shah 08/07; repeat EGD 08/11 showing black necrotic tissue in the cyst with a necrosectomy.    - f/u esophageal biopsy  -s/p CT abdomen + pelvis showing interval decrease in cyst and reduced peripancreatic inflammation  -Diet: tolerating well on low residue  -Pain: oxycodone 5mg q4 moderate pain, oxycodone 10mg q6 severe pain  -Fluids: off  -WBC downtrending

## 2023-08-14 NOTE — PROGRESS NOTE ADULT - ATTENDING COMMENTS
As above  37M with necrotizing pancreatitis s/p EUS cystgastrostomy  Readmitted with fever s/p necrosectomy  Now afebrile no white count  Low residue low fat diet  Rec discharge when able with outpatient repeat necrosectomy vs stent removal in 1-2 weeks    Thank you for this interesting consult.  Please call the advanced GI service with any questions or concerns.

## 2023-08-14 NOTE — DISCHARGE NOTE NURSING/CASE MANAGEMENT/SOCIAL WORK - PATIENT PORTAL LINK FT
You can access the FollowMyHealth Patient Portal offered by [facility] by registering at the following website: [email or web address]. By joining [facility]’s FollowMyHealth portal, you will also be able to view your health information using other applications (apps) compatible with our system.

## 2023-08-14 NOTE — DISCHARGE NOTE NURSING/CASE MANAGEMENT/SOCIAL WORK - NSDCFUADDAPPT_GEN_ALL_CORE_FT
APPTS ARE READY TO BE MADE: [x] YES    Best Family or Patient Contact (if needed):    Additional Information about above appointments (if needed):    1: PCP Dr sierra within 1 week  2: Gastroenterology (appointment 8.24)  3: Endocrinology (appointment 10/03)

## 2023-08-14 NOTE — CHART NOTE - NSCHARTNOTEFT_GEN_A_CORE
Confidential Drug Utilization Report  Search Terms: jaswinder iglesias, 1985Search Date: 08/14/2023 18:26:07 PM  The Drug Utilization Report below displays all of the controlled substance prescriptions, if any, that your patient has filled in the last twelve months. The information displayed on this report is compiled from pharmacy submissions to the Department, and accurately reflects the information as submitted by the pharmacies.    This report was requested by: Seth Lange | Reference #: [id number]    There are no results for the search terms that you entered.

## 2023-08-14 NOTE — DISCHARGE NOTE NURSING/CASE MANAGEMENT/SOCIAL WORK - NSDCVIVACCINE_GEN_ALL_CORE_FT
Tdap; 14-Jan-2018 22:58; Javi Whiting (SCAR); [employer]; [id number]; IntraMuscular; Deltoid Right.; 0.5 milliLiter(s); VIS (VIS Published: 09-May-2013, VIS Presented: 14-Jan-2018);

## 2023-08-14 NOTE — PROGRESS NOTE ADULT - PROBLEM SELECTOR PLAN 5
DVT prophylaxis: out of bed ambulating  Diet: full liquid / low residue  Contact: Wife, Nan, [phone]

## 2023-08-14 NOTE — PROGRESS NOTE ADULT - SUBJECTIVE AND OBJECTIVE BOX
Interval Events:   No acute events overnight.  Patient with some mild LLQ/flank pain, however improved from prior, patient afebrile, and tolerating regular diet.    ROS:   12 point review of systems performed and negative except otherwise noted in HPI.    Hospital Medications:  acetaminophen     Tablet .. 650 milliGRAM(s) Oral every 6 hours PRN  atorvastatin 80 milliGRAM(s) Oral at bedtime  chlorhexidine 4% Liquid 1 Application(s) Topical daily  dextrose 5%. 1000 milliLiter(s) IV Continuous <Continuous>  dextrose 5%. 1000 milliLiter(s) IV Continuous <Continuous>  dextrose 50% Injectable 12.5 Gram(s) IV Push once  dextrose 50% Injectable 25 Gram(s) IV Push once  dextrose 50% Injectable 25 Gram(s) IV Push once  dextrose Oral Gel 15 Gram(s) Oral once PRN  fenofibrate Tablet 145 milliGRAM(s) Oral daily  glucagon  Injectable 1 milliGRAM(s) IntraMuscular once  insulin glargine Injectable (LANTUS) 18 Unit(s) SubCutaneous at bedtime  insulin lispro (ADMELOG) corrective regimen sliding scale   SubCutaneous three times a day before meals  insulin lispro (ADMELOG) corrective regimen sliding scale   SubCutaneous at bedtime  lidocaine   4% Patch 1 Patch Transdermal daily  naloxone Injectable 0.4 milliGRAM(s) IV Push once  omega-3-Acid Ethyl Esters 2 Gram(s) Oral two times a day  oxyCODONE    IR 5 milliGRAM(s) Oral every 4 hours PRN  oxyCODONE    IR 10 milliGRAM(s) Oral every 4 hours PRN  polyethylene glycol 3350 17 Gram(s) Oral daily  senna 2 Tablet(s) Oral at bedtime      PHYSICAL EXAM:   Vital Signs:  Vital Signs Last 24 Hrs  T(C): 36.7 (14 Aug 2023 05:30), Max: 36.9 (13 Aug 2023 21:21)  T(F): 98 (14 Aug 2023 05:30), Max: 98.4 (13 Aug 2023 21:21)  HR: 73 (14 Aug 2023 05:30) (73 - 86)  BP: 114/70 (14 Aug 2023 05:30) (107/68 - 115/73)  BP(mean): --  RR: 18 (14 Aug 2023 05:30) (18 - 18)  SpO2: 97% (14 Aug 2023 05:30) (97% - 97%)    Parameters below as of 14 Aug 2023 05:30  Patient On (Oxygen Delivery Method): room air      Daily     Daily     GENERAL: no acute distress  NEURO: alert  HEENT: NCAT, no conjunctival pallor appreciated  CHEST: no respiratory distress, no accessory muscle use  CARDIAC: regular rate, +S1/S2  ABDOMEN: soft, minimal LLQ tenderness, no rebound or guarding  EXTREMITIES: warm, well perfused  SKIN: no lesions noted    LABS: reviewed                        10.9   8.25  )-----------( 307      ( 14 Aug 2023 07:19 )             32.7     08-13    135  |  100  |  12  ----------------------------<  151<H>  3.7   |  22  |  0.59    Ca    9.1      13 Aug 2023 07:16  Phos  3.4     08-13  Mg     2.1     08-13    TPro  7.1  /  Alb  3.5  /  TBili  0.3  /  DBili  x   /  AST  86<H>  /  ALT  128<H>  /  AlkPhos  75  08-13    LIVER FUNCTIONS - ( 13 Aug 2023 07:16 )  Alb: 3.5 g/dL / Pro: 7.1 g/dL / ALK PHOS: 75 U/L / ALT: 128 U/L / AST: 86 U/L / GGT: x             Interval Diagnostic Studies: see sunrise for full report

## 2023-08-14 NOTE — PROGRESS NOTE ADULT - SUBJECTIVE AND OBJECTIVE BOX
SURGERY PROGRESS NOTE    Interval events  - No acute events overnight.  - Vital signs stable, afebrile, on room air.   - Patient complains of difficulty with pain control.  - Endorses nausea no vomiting  - Tolerating Liquid diet. Having loose stools.   - GI plans to repeat upper endoscopy for stent removal in 2 weeks; no further plans for anticipated inpatient GI interventions.      Vital Signs Last 24 Hrs  T(C): 36.8 (14 Aug 2023 14:17), Max: 36.9 (13 Aug 2023 21:21)  T(F): 98.3 (14 Aug 2023 14:17), Max: 98.4 (13 Aug 2023 21:21)  HR: 91 (14 Aug 2023 14:17) (73 - 91)  BP: 97/68 (14 Aug 2023 14:17) (97/68 - 115/73)  BP(mean): --  RR: 18 (14 Aug 2023 14:17) (18 - 18)  SpO2: 98% (14 Aug 2023 14:17) (97% - 98%)    Parameters below as of 14 Aug 2023 14:17  Patient On (Oxygen Delivery Method): room air        OBJECTIVE: T(C): 36.8 (08-14-23 @ 14:17), Max: 36.9 (08-13-23 @ 21:21)  HR: 91 (08-14-23 @ 14:17) (73 - 91)  BP: 97/68 (08-14-23 @ 14:17) (97/68 - 115/73)  RR: 18 (08-14-23 @ 14:17) (18 - 18)  SpO2: 98% (08-14-23 @ 14:17) (97% - 98%)  Wt(kg): --  I&O's Summary    14 Aug 2023 07:01  -  14 Aug 2023 17:25  --------------------------------------------------------  IN: 240 mL / OUT: 0 mL / NET: 240 mL      I&O's Detail    14 Aug 2023 07:01  -  14 Aug 2023 17:25  --------------------------------------------------------  IN:    Oral Fluid: 240 mL  Total IN: 240 mL    OUT:  Total OUT: 0 mL    Total NET: 240 mL          MEDICATIONS  (STANDING):  atorvastatin 80 milliGRAM(s) Oral at bedtime  chlorhexidine 4% Liquid 1 Application(s) Topical daily  dextrose 5%. 1000 milliLiter(s) (100 mL/Hr) IV Continuous <Continuous>  dextrose 5%. 1000 milliLiter(s) (50 mL/Hr) IV Continuous <Continuous>  dextrose 50% Injectable 12.5 Gram(s) IV Push once  dextrose 50% Injectable 25 Gram(s) IV Push once  dextrose 50% Injectable 25 Gram(s) IV Push once  fenofibrate Tablet 145 milliGRAM(s) Oral daily  glucagon  Injectable 1 milliGRAM(s) IntraMuscular once  insulin glargine Injectable (LANTUS) 18 Unit(s) SubCutaneous at bedtime  insulin lispro (ADMELOG) corrective regimen sliding scale   SubCutaneous three times a day before meals  insulin lispro (ADMELOG) corrective regimen sliding scale   SubCutaneous at bedtime  lidocaine   4% Patch 1 Patch Transdermal daily  naloxone Injectable 0.4 milliGRAM(s) IV Push once  omega-3-Acid Ethyl Esters 2 Gram(s) Oral two times a day  polyethylene glycol 3350 17 Gram(s) Oral daily  senna 2 Tablet(s) Oral at bedtime    MEDICATIONS  (PRN):  acetaminophen     Tablet .. 650 milliGRAM(s) Oral every 6 hours PRN Mild Pain (1 - 3)  dextrose Oral Gel 15 Gram(s) Oral once PRN Blood Glucose LESS THAN 70 milliGRAM(s)/deciliter  oxyCODONE    IR 5 milliGRAM(s) Oral every 4 hours PRN Moderate Pain (4 - 6)  oxyCODONE    IR 10 milliGRAM(s) Oral every 4 hours PRN Severe Pain (7 - 10)      LABS:                        10.9   8.25  )-----------( 307      ( 14 Aug 2023 07:19 )             32.7     08-14    134<L>  |  99  |  11  ----------------------------<  164<H>  4.0   |  22  |  0.64    Ca    9.1      14 Aug 2023 07:15  Phos  3.2     08-14  Mg     1.9     08-14    TPro  7.2  /  Alb  3.4  /  TBili  0.3  /  DBili  x   /  AST  59<H>  /  ALT  117<H>  /  AlkPhos  79  08-14      Urinalysis Basic - ( 14 Aug 2023 07:15 )    Color: x / Appearance: x / SG: x / pH: x  Gluc: 164 mg/dL / Ketone: x  / Bili: x / Urobili: x   Blood: x / Protein: x / Nitrite: x   Leuk Esterase: x / RBC: x / WBC x   Sq Epi: x / Non Sq Epi: x / Bacteria: x        RADIOLOGY & ADDITIONAL STUDIES:    PHYSICAL EXAM  General: No acute distress , resting comfortably in bed  Abdomen: soft, non-distended, Mid-abdomen to Left Flank TTP, worse on the flank. No rebound, no guarding.       Assessment/Plan/Recs:  37 year-old male with PMHx of  hypertriglyceridemia, multiple admissions of pancreatitis (last adm 2015 for necrotizing pancreatitis), chronic splenic thrombosis, pancreatic pseudocyst, was admitted to MICU for hypertriglyceridemia-induced pancreatitis. Patient was discharged 08/08/2023. He presented today to the ED for abdominal pain, nausea without vomiting. CT scan shows a mild to moderate fluid stranding around the pancreas and a reteroperitoneal fluid collection 3.0x5.4x18.7 cm.     - No acute surgical intervention  - Surgery will follow while inpatient      Red Surgery  [id number]        ACS [phone]

## 2023-08-14 NOTE — PROGRESS NOTE ADULT - PROBLEM SELECTOR PLAN 4
Diabetes diagnosed last admission, A1c 11.6, likely due ot recurrent pancreatitis   - starting on lantus 22 qhs and metformin 1000 BID last admission   - currently on lantus 18 ubnits qhs and ISS   - sugars mostly within range during this admission

## 2023-08-14 NOTE — PROGRESS NOTE ADULT - ATTENDING COMMENTS
-Patient tolerating diet. Ambulating. Says pain manageable on oxycodone prn. -Will send on short course 10mg q8h prn x 5 days. -iSTOP in chart. -Outpatient GI f/u and f/u with Dr. Pradip Michelle for hypertriglyceridemia mgmt. Patient does not consume alcohol. -DM insulin pen teaching provided by RN. Says he has supplies/meds at home.   -Otherwise stable for DC with outpt f/u. -35 minutes spent on the DC process.

## 2023-08-14 NOTE — PROGRESS NOTE ADULT - PROVIDER SPECIALTY LIST ADULT
Internal Medicine
Surgery
Gastroenterology
Surgery
Internal Medicine

## 2023-08-15 ENCOUNTER — APPOINTMENT (OUTPATIENT)
Dept: INTERNAL MEDICINE | Facility: CLINIC | Age: 38
End: 2023-08-15
Payer: COMMERCIAL

## 2023-08-15 VITALS
SYSTOLIC BLOOD PRESSURE: 107 MMHG | TEMPERATURE: 97 F | HEIGHT: 71 IN | DIASTOLIC BLOOD PRESSURE: 75 MMHG | OXYGEN SATURATION: 96 % | BODY MASS INDEX: 23.94 KG/M2 | WEIGHT: 171 LBS | RESPIRATION RATE: 16 BRPM | HEART RATE: 103 BPM

## 2023-08-15 LAB
CULTURE RESULTS: SIGNIFICANT CHANGE UP
CULTURE RESULTS: SIGNIFICANT CHANGE UP
SPECIMEN SOURCE: SIGNIFICANT CHANGE UP
SPECIMEN SOURCE: SIGNIFICANT CHANGE UP

## 2023-08-15 PROCEDURE — 99496 TRANSJ CARE MGMT HIGH F2F 7D: CPT

## 2023-08-15 RX ORDER — DULAGLUTIDE 1.5 MG/.5ML
1.5 INJECTION, SOLUTION SUBCUTANEOUS
Qty: 4 | Refills: 3 | Status: DISCONTINUED | COMMUNITY
Start: 2022-05-16 | End: 2023-08-15

## 2023-08-15 RX ORDER — OXYCODONE 10 MG/1
10 TABLET ORAL
Refills: 0 | Status: ACTIVE | COMMUNITY
Start: 2023-08-15

## 2023-08-15 RX ORDER — ONDANSETRON 8 MG/1
8 TABLET ORAL 3 TIMES DAILY
Qty: 30 | Refills: 1 | Status: ACTIVE | COMMUNITY
Start: 2023-08-15 | End: 1900-01-01

## 2023-08-15 NOTE — ASSESSMENT
[FreeTextEntry1] : Pt s/p above 2 admissions.  Labs up to date as of yesterday in hosp. Has GI Dr. Keven almeida for next wk, has stent in place will need removal. Pain control, he states he has enough meds, will also send in Zofran for him. [place] low fat low carb diet reviewed. Has lost a lot of wt/body mass. Send in Lovaza, he has the other meds.  Endo 10/23 may need sooner appointmt. f/u 1 mo

## 2023-08-15 NOTE — PHYSICAL EXAM
[No Acute Distress] : no acute distress [Well Nourished] : well nourished [Well Developed] : well developed [Well-Appearing] : well-appearing [Normal Sclera/Conjunctiva] : normal sclera/conjunctiva [PERRL] : pupils equal round and reactive to light [EOMI] : extraocular movements intact [Normal Outer Ear/Nose] : the outer ears and nose were normal in appearance [Normal Oropharynx] : the oropharynx was normal [No JVD] : no jugular venous distention [No Lymphadenopathy] : no lymphadenopathy [Supple] : supple [Thyroid Normal, No Nodules] : the thyroid was normal and there were no nodules present [No Respiratory Distress] : no respiratory distress  [No Accessory Muscle Use] : no accessory muscle use [Clear to Auscultation] : lungs were clear to auscultation bilaterally [Normal Rate] : normal rate  [Regular Rhythm] : with a regular rhythm [Normal S1, S2] : normal S1 and S2 [No Murmur] : no murmur heard [No Carotid Bruits] : no carotid bruits [No Abdominal Bruit] : a ~M bruit was not heard ~T in the abdomen [No Varicosities] : no varicosities [Pedal Pulses Present] : the pedal pulses are present [No Edema] : there was no peripheral edema [No Palpable Aorta] : no palpable aorta [No Extremity Clubbing/Cyanosis] : no extremity clubbing/cyanosis [Soft] : abdomen soft [Non Tender] : non-tender [Non-distended] : non-distended [No Masses] : no abdominal mass palpated [No HSM] : no HSM [Normal Bowel Sounds] : normal bowel sounds [Normal Posterior Cervical Nodes] : no posterior cervical lymphadenopathy [Normal Anterior Cervical Nodes] : no anterior cervical lymphadenopathy [No CVA Tenderness] : no CVA  tenderness [No Spinal Tenderness] : no spinal tenderness [No Joint Swelling] : no joint swelling [Grossly Normal Strength/Tone] : grossly normal strength/tone [No Rash] : no rash [Coordination Grossly Intact] : coordination grossly intact [No Focal Deficits] : no focal deficits [Normal Gait] : normal gait [Deep Tendon Reflexes (DTR)] : deep tendon reflexes were 2+ and symmetric [Normal Affect] : the affect was normal [Normal Insight/Judgement] : insight and judgment were intact [93704 - Moderate Complexity requires multiple possible diagnoses and/or the management options, moderate complexity of the medical data (tests, etc.) to be reviewed, and moderate risk of significant complications, morbidity, and/or mortality as well as co] : Moderate Complexity [de-identified] : very thin, seems uncomfortable,nontoxic

## 2023-08-15 NOTE — HISTORY OF PRESENT ILLNESS
[Post-hospitalization from ___ Hospital] : Post-hospitalization from [unfilled] Hospital [Admitted on: ___] : The patient was admitted on [unfilled] [Discharge Summary] : discharge summary [Discharge Med List] : discharge medication list [Patient Contacted By: ____] : and contacted by [unfilled] [FreeTextEntry2] : 38 yo M with PMHx of familial triglyceridemia and recurrent pancreatitis who  presents with acute onset abdominal pain in the context of recent EUS +  cystogastrostomy for pancreatic pseudocyst. He was hospitalized in early august  for acute pancreatitis c/b pancreatic pseudocyst. S/p cystogastrostomy with  AXIOS stent placement on 08/07 and was asymptomatic on discharge from hospital  on 08/08. He returned to hospital after abdominal pain returned on 08/09 after  eating with nausea and fevers. On admission, labs significant for leukocytosis  of 14.8. Home temperatures reported to be 103 but afebrile since ED. Lipase  112, likely reactive to recent manipulation. Triglycerides 197. CT  abdomen/pelvis demonstrated interval decrease pancreatic pseudocyst size and  reduced peripancreatic inflammation. Repeat EGD with necrosectomy performed by  GI on 08/11 showing "Two small nodules found in the esophagus that were  Biopsied, Erosive gastropathy. Pre-existing cystgastrostomy stent in place. The  pseudocyst was partially filled with fluid and a small amount of black necrotic  tissue, however walls of cyst are clean. Necrosectomy was performed with a  snare, requiring several intubations of the pseudocyst. Also irrigated with  180cc 20% hydrogen peroxide. Normal examined duodenum." GI recommended follow  up in 2 weeks for stent removal, avoiding PPIs, and advancing diet as  tolerated. Infectious workup was negative in blood and urine cultures, so zosyn  which was started on admission was discontinued. Pain was well controlled with  morphine with appropriate transition to oxycodone and the diet was advanced as  tolerated with symptomatic improvement in pain and nausea.    The patient is afebrile, hemodynamically stable and medically optimized for  discharge to home with follow up with gastroenterology. On day of discharge,  patient is clinically stable with no new exam findings or acute symptoms  compared to prior. The patient was seen by the attending physician on the date  of discharge and deemed stable and acceptable for discharge. The patient's  chronic medical conditions were treated accordingly per the patient's home  medication regimen. The patient's medication reconciliation (with changes made  to chronic medications), follow up appointments, discharge orders,  instructions, and significant lab and diagnostic studies are as noted.    Med Reconciliation: done  Pt got home last night- had eaten dinner in hosp, BMs were nl. This am had one episode diarrhea and hasn't eaten today yet. He used Oxycodone last night and had nightmares. Yesterday labs reviewed on [facility]- AST//126. He was in ICU on insulin drip during one of the hosp. He is injecting Basaglar 22 nightly without a problem. Last TGs 197 on 8/9/23. Lipase 112. (after procedure). Has lost signif amt wt recently. Reports good appetite but afraid to eat too much.

## 2023-08-15 NOTE — CURRENT MEDS
[Takes medication as prescribed] : does not take [FreeTextEntry1] : Adherent but periodically d/cs everything and has been hosp in the past w similar episodes.

## 2023-08-20 ENCOUNTER — APPOINTMENT (OUTPATIENT)
Dept: CT IMAGING | Facility: IMAGING CENTER | Age: 38
End: 2023-08-20
Payer: COMMERCIAL

## 2023-08-20 ENCOUNTER — OUTPATIENT (OUTPATIENT)
Dept: OUTPATIENT SERVICES | Facility: HOSPITAL | Age: 38
LOS: 1 days | End: 2023-08-20
Payer: COMMERCIAL

## 2023-08-20 DIAGNOSIS — K86.89 OTHER SPECIFIED DISEASES OF PANCREAS: ICD-10-CM

## 2023-08-20 DIAGNOSIS — K86.3 PSEUDOCYST OF PANCREAS: ICD-10-CM

## 2023-08-20 PROCEDURE — 74177 CT ABD & PELVIS W/CONTRAST: CPT | Mod: 26

## 2023-08-20 PROCEDURE — 74177 CT ABD & PELVIS W/CONTRAST: CPT

## 2023-08-24 ENCOUNTER — APPOINTMENT (OUTPATIENT)
Dept: GASTROENTEROLOGY | Facility: CLINIC | Age: 38
End: 2023-08-24
Payer: COMMERCIAL

## 2023-08-24 ENCOUNTER — NON-APPOINTMENT (OUTPATIENT)
Age: 38
End: 2023-08-24

## 2023-08-24 VITALS
BODY MASS INDEX: 24.64 KG/M2 | OXYGEN SATURATION: 97 % | HEART RATE: 91 BPM | WEIGHT: 176 LBS | TEMPERATURE: 97 F | SYSTOLIC BLOOD PRESSURE: 106 MMHG | HEIGHT: 71 IN | DIASTOLIC BLOOD PRESSURE: 74 MMHG

## 2023-08-24 DIAGNOSIS — R11.0 NAUSEA: ICD-10-CM

## 2023-08-24 PROCEDURE — 99215 OFFICE O/P EST HI 40 MIN: CPT

## 2023-08-27 PROBLEM — R11.0 DAILY NAUSEA: Status: ACTIVE | Noted: 2023-08-15

## 2023-08-27 NOTE — HISTORY OF PRESENT ILLNESS
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 He had endoscopic ultrasound-guided placement of a cyst gastrostomy lumen opposing metal stent Followed by endoscopy with partial necrosectomy.   Patient states he has intermittent chills, nausea, fatigue, mostly in the morning, associated with relatively low post levels in the 80s. He will eat something and feel better within one hour.  No vomiting. No fever. Does not take his temperature. Has some constipation, with bowel movements every other day. Takes Senokot and Dulcolax. No melena or hematochezia. No jaundice.  No chest pain, dyspnea, dizziness.  Laboratory data reviewed.  Imaging studies with images and reports personally reviewed from August 10 and recent outpatient CT scan on August 20. Decrease in size left upper quadrant to left lateral collection, with small amount of solid material.  Endoscopic reports reviewed.  EUS 8/7/23: Findings:      The esophagus was normal.      A deformity was found in the gastric body. Estimated blood loss: none.      Diffuse mildly congested mucosa without active bleeding and with no stigmata of bleeding was       found in the second portion of the duodenum.      ENDOSONOGRAPHIC FINDING: :      The esophagus, stomach and duodenum and adjacent structures were visualized       endosonographically.      A hypoechoic lesion suggestive of a pseudocyst was identified in the pancreatic tail. It is       not in obvious communication with the pancreatic duct. The lesion measured at least 50 mm in    maximal cross-sectional diameter. There was a single compartment thinly septated. The outer       wall of the lesion was thick. There was no associated mass. There was internal debris within       the fluid-filled cavity. The decision was made tocreate a cystogastrostomy using the AXIOS       stent system. Once an appropriate position in the stomach was identified, the common wall       between the stomach and the cyst was interrogated utilizing color Doppler imaging to identify       interposed vessels. The stomach wall and the cyst were punctured under endosonographic       guidance with the 22 gauge needle. Necrotic tissue was aspirated. A wire was inserted into       the cyst under fluoroscopic guidance. The AXIOS stent and electrocautery device was       introduced through the working channel and advanced over the guidewire. Current was applied       to the cautery tip and then used to increase the diameter of the stoma. The AXIOS device was       advanced into the cyst,and a 15 x 10 mm AXIOS stent was placed with the flanges in close       approximation to the walls of the cyst and the stomach through the cystogastrostomy. The       stent was successfully placed.                                                    Impression:          - Deformity in the gastric body and congested duodenal mucosa likely related                       to pseudocyst.                      - A large pseudocyst containing fluid and debris was seen in the pancreatic                       body/tail. The diagnosis is a pancreatic pseudocyst possibly containing                       necrotic material. Cystogastrostomy was performed with placement of a 15 x 10 mm AXIOS.                      - No specimens collected.  EGD 8/11/23: Findings:      Two small nodules with alocalized distribution were found at the gastroesophageal junction.       Biopsies were taken with a cold forceps for histology.      The exam of the esophagus was otherwise normal.      Erosive gastropathy was found in the gastric body.      A previously placed cystgastrostomy stent was found in the gastric body.      The pseudocyst was partially filled with fluid and a small amount of black necrotic tissue,       however walls of cyst are clean. Necrosectomy was performed with a snare, requiring several       intubations of the pseudocyst. Also irrigated with 180cc 0.6% hydrogen peroxide.      The examined duodenum was normal.                                                                                                      Impression:          - Two small nodules found in the esophagus. Biopsied.                      - Erosive gastropathy.                      - Pre-existing cystgastrostomy stent in place. The pseudocyst was partially                       filled with fluid and a small amount of black necrotic tissue, however walls                       of cyst are clean. Necrosectomy was performed with a snare, requiring several                       intubations of the pseudocyst. Also irrigated with 180cc 20% hydrogen                       peroxide.                      - Normal examined duodenum.

## 2023-08-27 NOTE — ASSESSMENT
[FreeTextEntry1] : Impression:  #1. Necrotizing pancreatitis, with walls of necrosis, status post endoscopic cyst gastrostomy and endoscopic necrosectomy early Aug 2023, recent CT scan demonstrates improvement  #2. Persistent abdominal discomfort, intermittent chills and nausea.  Recommendations:  #1. Laboratory testing as below.  #2. Endoscopic procedure with possible additional necrosectomy and possible stent removal as scheduled on 8/30/23  Risks, benefits, alternatives of the procedure were discussed with the patient and the patient was educated about the procedure. Risks include, but are not limited to, pancreatitis, bleeding, infection, injury to internal organs, possible need for further procedures including emergency surgery, missed lesions, risk of anesthesia, and risk of IV site problems.  Patient understands and agrees to proceed.

## 2023-08-27 NOTE — PHYSICAL EXAM
[Alert] : alert [Sclera] : the sclera and conjunctiva were normal [Auscultation Breath Sounds / Voice Sounds] : lungs were clear to auscultation bilaterally [Heart Rate And Rhythm] : heart rate was normal and rhythm regular [Normal Affect] : the affect was normal [de-identified] : Supple [de-identified] : soft, nondistended, mild LLQ tenderness, bowel sound present [de-identified] : No jaundice [de-identified] : No confusion

## 2023-08-27 NOTE — CONSULT LETTER
[Dear  ___] : Dear  [unfilled], [Consult Letter:] : I had the pleasure of evaluating your patient, [unfilled]. [Consult Closing:] : Thank you very much for allowing me to participate in the care of this patient.  If you have any questions, please do not hesitate to contact me. [Please see my note below.] : Please see my note below. [Sincerely,] : Sincerely, [FreeTextEntry3] : Bryan Baca MD, MPH, JONNIE, EUNICEG Chief of Clinical Quality in Gastroenterology, [facility] Chief of Gastroenterology, [facility] Interim Director of Endoscopy, [facility] Director of Endoscopic Ultrasound, [address], [address] [facility], [id number] 24 hours [phone]

## 2023-08-28 LAB
ALBUMIN SERPL ELPH-MCNC: 3.7 G/DL
ALP BLD-CCNC: 66 U/L
ALT SERPL-CCNC: 27 U/L
AMYLASE/CREAT SERPL: 101 U/L
ANION GAP SERPL CALC-SCNC: 10 MMOL/L
AST SERPL-CCNC: 22 U/L
BASOPHILS # BLD AUTO: 0.02 K/UL
BASOPHILS NFR BLD AUTO: 0.3 %
BILIRUB SERPL-MCNC: 0.2 MG/DL
BUN SERPL-MCNC: 11 MG/DL
CALCIUM SERPL-MCNC: 9.6 MG/DL
CHLORIDE SERPL-SCNC: 100 MMOL/L
CO2 SERPL-SCNC: 25 MMOL/L
CREAT SERPL-MCNC: 0.72 MG/DL
EGFR: 121 ML/MIN/1.73M2
EOSINOPHIL # BLD AUTO: 0.06 K/UL
EOSINOPHIL NFR BLD AUTO: 1 %
GLUCOSE SERPL-MCNC: 115 MG/DL
HCT VFR BLD CALC: 30.4 %
HGB BLD-MCNC: 9.4 G/DL
IMM GRANULOCYTES NFR BLD AUTO: 0.5 %
LPL SERPL-CCNC: 79 U/L
LYMPHOCYTES # BLD AUTO: 0.71 K/UL
LYMPHOCYTES NFR BLD AUTO: 11.9 %
MAN DIFF?: NORMAL
MCHC RBC-ENTMCNC: 25.8 PG
MCHC RBC-ENTMCNC: 30.9 GM/DL
MCV RBC AUTO: 83.5 FL
MONOCYTES # BLD AUTO: 0.67 K/UL
MONOCYTES NFR BLD AUTO: 11.3 %
NEUTROPHILS # BLD AUTO: 4.46 K/UL
NEUTROPHILS NFR BLD AUTO: 75 %
PLATELET # BLD AUTO: 363 K/UL
POTASSIUM SERPL-SCNC: 4.7 MMOL/L
PROT SERPL-MCNC: 7.1 G/DL
RBC # BLD: 3.64 M/UL
RBC # FLD: 14.7 %
SODIUM SERPL-SCNC: 135 MMOL/L
WBC # FLD AUTO: 5.95 K/UL

## 2023-08-30 ENCOUNTER — OUTPATIENT (OUTPATIENT)
Dept: OUTPATIENT SERVICES | Facility: HOSPITAL | Age: 38
LOS: 1 days | Discharge: HOME | End: 2023-08-30
Payer: COMMERCIAL

## 2023-08-30 ENCOUNTER — APPOINTMENT (OUTPATIENT)
Dept: GASTROENTEROLOGY | Facility: HOSPITAL | Age: 38
End: 2023-08-30

## 2023-08-30 ENCOUNTER — TRANSCRIPTION ENCOUNTER (OUTPATIENT)
Age: 38
End: 2023-08-30

## 2023-08-30 VITALS
TEMPERATURE: 96 F | DIASTOLIC BLOOD PRESSURE: 59 MMHG | OXYGEN SATURATION: 100 % | HEART RATE: 80 BPM | SYSTOLIC BLOOD PRESSURE: 101 MMHG | HEIGHT: 71 IN | RESPIRATION RATE: 14 BRPM | WEIGHT: 175.05 LBS

## 2023-08-30 VITALS
HEART RATE: 76 BPM | OXYGEN SATURATION: 97 % | RESPIRATION RATE: 16 BRPM | SYSTOLIC BLOOD PRESSURE: 101 MMHG | DIASTOLIC BLOOD PRESSURE: 60 MMHG

## 2023-08-30 DIAGNOSIS — K86.3 PSEUDOCYST OF PANCREAS: ICD-10-CM

## 2023-08-30 DIAGNOSIS — K85.90 ACUTE PANCREATITIS WITHOUT NECROSIS OR INFECTION, UNSPECIFIED: ICD-10-CM

## 2023-08-30 LAB
GLUCOSE BLDC GLUCOMTR-MCNC: 93 MG/DL — SIGNIFICANT CHANGE UP (ref 70–99)
GLUCOSE BLDC GLUCOMTR-MCNC: 96 MG/DL — SIGNIFICANT CHANGE UP (ref 70–99)

## 2023-08-30 PROCEDURE — 43247 EGD REMOVE FOREIGN BODY: CPT | Mod: GC

## 2023-08-30 PROCEDURE — 82962 GLUCOSE BLOOD TEST: CPT

## 2023-08-30 PROCEDURE — C1889: CPT

## 2023-08-30 PROCEDURE — 43251 EGD REMOVE LESION SNARE: CPT

## 2023-08-30 DEVICE — RETRIEVAL FOOD BOLUS ROTHNET: Type: IMPLANTABLE DEVICE | Status: FUNCTIONAL

## 2023-08-30 RX ORDER — SODIUM CHLORIDE 9 MG/ML
500 INJECTION INTRAMUSCULAR; INTRAVENOUS; SUBCUTANEOUS
Refills: 0 | Status: DISCONTINUED | OUTPATIENT
Start: 2023-08-30 | End: 2023-09-13

## 2023-08-30 NOTE — PRE-ANESTHESIA EVALUATION ADULT - HEIGHT IN FEET
What Type Of Note Output Would You Prefer (Optional)?: Bullet Format Is The Patient Presenting As Previously Scheduled?: Yes How Severe Is Your Rash?: mild Is This A New Presentation, Or A Follow-Up?: Rash Additional History: Patient states he also deals with this one on and off for years. He said he gets really small bumps. He believes that years ago it was diagnosed as Folliculitis. He said before he has been given a topical steroid that was almost water like but he doesn’t remember the name. Additional History: Patient said he has been dealing with this rash on and off for years. He said it sometimes will spread to his chest. He said he wonders if it is stress induced eczema. He said it has not been treated 5

## 2023-08-30 NOTE — PRE PROCEDURE NOTE - PRE PROCEDURE EVALUATION
Attending Physician:   Keven                         Procedure:  Upper endoscopy/endoscopic necrosectomy    Indication for Procedure:  walled off pancreas necrosis  ________________________________________________________  PAST MEDICAL & SURGICAL HISTORY:  Pancreatitis      Pancreatic pseudocyst      Necrotizing pancreatitis      Hypertriglyceridemia      No significant past surgical history        ALLERGIES:  No Known Allergies    HOME MEDICATIONS:    AICD/PPM: [ ] yes   [x ] no    PERTINENT LAB DATA:                      PHYSICAL EXAMINATION:    Height (cm): 180.3  Weight (kg): 79.4  BMI (kg/m2): 24.4  BSA (m2): 1.99T(C): 35.8  HR: 80  BP: 101/59  RR: 14  SpO2: 100%    Constitutional: NAD  HEENT: anicteric  Neck:  No JVD  Respiratory: CTAB/L  Cardiovascular: S1 and S2  Gastrointestinal: BS+, soft, NT/ND  Extremities: No peripheral edema  Neurological: No confusion  Psychiatric: Normal mood, normal affect  Skin: No jaundice    ASA Class: I [ ]  II [ ]  III [x ]  IV [ ]    COMMENTS:    The patient is a suitable candidate for the planned procedure unless box checked [ ]  No, explain:

## 2023-08-30 NOTE — ASU DISCHARGE PLAN (ADULT/PEDIATRIC) - ASU DC SPECIAL INSTRUCTIONSFT
Ciprofloxacin (antibiotic) 500 mg by mouth twice daily for 3 days.  First dose 8/31/23 in the morning.  Repeat procedure in 1-2 weeks.

## 2023-08-30 NOTE — ASU DISCHARGE PLAN (ADULT/PEDIATRIC) - NS MD DC FALL RISK RISK
For information on Fall & Injury Prevention, visit: [email or web address] OR  [email or web address] OR  https://www.cdc.gov/steadi/patient.html

## 2023-09-01 DIAGNOSIS — D62 ACUTE POSTHEMORRHAGIC ANEMIA: ICD-10-CM

## 2023-09-01 LAB
ALBUMIN SERPL ELPH-MCNC: 3.8 G/DL
ALP BLD-CCNC: 60 U/L
ALT SERPL-CCNC: 14 U/L
ANION GAP SERPL CALC-SCNC: 12 MMOL/L
AST SERPL-CCNC: 15 U/L
BILIRUB SERPL-MCNC: 0.2 MG/DL
BUN SERPL-MCNC: 11 MG/DL
CALCIUM SERPL-MCNC: 9.3 MG/DL
CHLORIDE SERPL-SCNC: 102 MMOL/L
CHOLEST SERPL-MCNC: 118 MG/DL
CO2 SERPL-SCNC: 24 MMOL/L
CREAT SERPL-MCNC: 0.83 MG/DL
EGFR: 116 ML/MIN/1.73M2
ESTIMATED AVERAGE GLUCOSE: 229 MG/DL
GLUCOSE SERPL-MCNC: 104 MG/DL
HBA1C MFR BLD HPLC: 9.6 %
HCT VFR BLD CALC: 28.4 %
HDLC SERPL-MCNC: 34 MG/DL
HGB BLD-MCNC: 8.6 G/DL
LDLC SERPL CALC-MCNC: 57 MG/DL
MCHC RBC-ENTMCNC: 25.5 PG
MCHC RBC-ENTMCNC: 30.3 GM/DL
MCV RBC AUTO: 84.3 FL
NONHDLC SERPL-MCNC: 84 MG/DL
PLATELET # BLD AUTO: 338 K/UL
POTASSIUM SERPL-SCNC: 3.8 MMOL/L
PROT SERPL-MCNC: 6.8 G/DL
RBC # BLD: 3.37 M/UL
RBC # FLD: 15.5 %
SODIUM SERPL-SCNC: 138 MMOL/L
TRIGL SERPL-MCNC: 155 MG/DL
WBC # FLD AUTO: 5.1 K/UL

## 2023-09-05 LAB
FERRITIN SERPL-MCNC: 358 NG/ML
IRON SATN MFR SERPL: 21 %
IRON SERPL-MCNC: 59 UG/DL
TIBC SERPL-MCNC: 285 UG/DL
UIBC SERPL-MCNC: 226 UG/DL

## 2023-09-08 ENCOUNTER — NON-APPOINTMENT (OUTPATIENT)
Age: 38
End: 2023-09-08

## 2023-09-13 ENCOUNTER — APPOINTMENT (OUTPATIENT)
Dept: INTERNAL MEDICINE | Facility: CLINIC | Age: 38
End: 2023-09-13

## 2023-09-18 ENCOUNTER — APPOINTMENT (OUTPATIENT)
Dept: INTERNAL MEDICINE | Facility: CLINIC | Age: 38
End: 2023-09-18
Payer: COMMERCIAL

## 2023-09-18 VITALS
HEART RATE: 88 BPM | DIASTOLIC BLOOD PRESSURE: 77 MMHG | TEMPERATURE: 99.1 F | HEIGHT: 70.25 IN | BODY MASS INDEX: 25.34 KG/M2 | SYSTOLIC BLOOD PRESSURE: 124 MMHG | WEIGHT: 177 LBS | OXYGEN SATURATION: 98 %

## 2023-09-18 DIAGNOSIS — R10.9 UNSPECIFIED ABDOMINAL PAIN: ICD-10-CM

## 2023-09-18 PROCEDURE — 99213 OFFICE O/P EST LOW 20 MIN: CPT

## 2023-09-19 ENCOUNTER — TRANSCRIPTION ENCOUNTER (OUTPATIENT)
Age: 38
End: 2023-09-19

## 2023-09-22 ENCOUNTER — OUTPATIENT (OUTPATIENT)
Dept: OUTPATIENT SERVICES | Facility: HOSPITAL | Age: 38
LOS: 1 days | End: 2023-09-22
Payer: COMMERCIAL

## 2023-09-22 ENCOUNTER — TRANSCRIPTION ENCOUNTER (OUTPATIENT)
Age: 38
End: 2023-09-22

## 2023-09-22 ENCOUNTER — APPOINTMENT (OUTPATIENT)
Dept: GASTROENTEROLOGY | Facility: HOSPITAL | Age: 38
End: 2023-09-22

## 2023-09-22 VITALS
OXYGEN SATURATION: 99 % | DIASTOLIC BLOOD PRESSURE: 67 MMHG | SYSTOLIC BLOOD PRESSURE: 109 MMHG | RESPIRATION RATE: 20 BRPM | HEART RATE: 83 BPM

## 2023-09-22 VITALS
HEART RATE: 93 BPM | DIASTOLIC BLOOD PRESSURE: 72 MMHG | WEIGHT: 179.9 LBS | TEMPERATURE: 98 F | HEIGHT: 70 IN | RESPIRATION RATE: 16 BRPM | OXYGEN SATURATION: 100 % | SYSTOLIC BLOOD PRESSURE: 141 MMHG

## 2023-09-22 DIAGNOSIS — K86.3 PSEUDOCYST OF PANCREAS: ICD-10-CM

## 2023-09-22 DIAGNOSIS — K86.89 OTHER SPECIFIED DISEASES OF PANCREAS: ICD-10-CM

## 2023-09-22 LAB — GLUCOSE BLDC GLUCOMTR-MCNC: 122 MG/DL — HIGH (ref 70–99)

## 2023-09-22 PROCEDURE — 43215 ESOPHAGOSCOPY FLEX REMOVE FB: CPT

## 2023-09-22 PROCEDURE — 82962 GLUCOSE BLOOD TEST: CPT

## 2023-09-22 PROCEDURE — C9399: CPT

## 2023-09-22 PROCEDURE — 43247 EGD REMOVE FOREIGN BODY: CPT

## 2023-09-22 RX ORDER — SODIUM CHLORIDE 9 MG/ML
500 INJECTION INTRAMUSCULAR; INTRAVENOUS; SUBCUTANEOUS
Refills: 0 | Status: DISCONTINUED | OUTPATIENT
Start: 2023-09-22 | End: 2023-10-06

## 2023-09-22 NOTE — ASU PATIENT PROFILE, ADULT - FALL HARM RISK - CONCLUSION
Bria Gardner M.D.    1.  Please schedule 3T multiplanar MRI of the prostate. Call [phone] to schedule the test.  We will notify you of the results.     Further recommendations concerning the elevated PSA wi Universal Safety Interventions

## 2023-09-22 NOTE — ASU DISCHARGE PLAN (ADULT/PEDIATRIC) - NS MD DC FALL RISK RISK
For information on Fall & Injury Prevention, visit: [email or web address] OR  [email or web address] OR  https://www.cdc.gov/steadi/patient.html

## 2023-09-22 NOTE — PRE PROCEDURE NOTE - PRE PROCEDURE EVALUATION
Attending Physician:   Keven                         Procedure:  Upper endoscopy with necrosectomy    Indication for Procedure:  walled of pancreas necrosis  ________________________________________________________  PAST MEDICAL & SURGICAL HISTORY:  Pancreatitis      Pancreatic pseudocyst      Necrotizing pancreatitis      Hypertriglyceridemia      No significant past surgical history        ALLERGIES:  No Known Allergies    HOME MEDICATIONS:    AICD/PPM: [ ] yes   [x ] no    PERTINENT LAB DATA:                      PHYSICAL EXAMINATION:    Height (cm): 177.8  Weight (kg): 81.6  BMI (kg/m2): 25.8  BSA (m2): 2T(C): 36.7  HR: 93  BP: 141/72  RR: 16  SpO2: 100%    Constitutional: NAD  HEENT: anicteric  Neck:  No JVD  Respiratory: CTAB/L  Cardiovascular: S1 and S2  Gastrointestinal: BS+, soft, ND, LLQ TTP  Extremities: No peripheral edema  Neurological: No confusion  Psychiatric: Normal mood, normal affect  Skin: No rashes    ASA Class: I [ ]  II [x ]  III [ ]  IV [ ]    COMMENTS:    The patient is a suitable candidate for the planned procedure unless box checked [ ]  No, explain:

## 2023-09-22 NOTE — ASU PATIENT PROFILE, ADULT - FALL HARM RISK - UNIVERSAL INTERVENTIONS
Bed in lowest position, wheels locked, appropriate side rails in place/Call bell, personal items and telephone in reach/Instruct patient to call for assistance before getting out of bed or chair/Non-slip footwear when patient is out of bed/[place] to call system/Physically safe environment - no spills, clutter or unnecessary equipment/Purposeful Proactive Rounding/Room/bathroom lighting operational, light cord in reach

## 2023-09-22 NOTE — ASU DISCHARGE PLAN (ADULT/PEDIATRIC) - ASU DC SPECIAL INSTRUCTIONSFT
Ciprofloxacin (antibiotic) 500 mg by mouth twice daily, first dose tonight, for 5 days.  Clear liquid diet today.  Low fiber diet (no vegetables/fruits/nuts) starting tomorrow 9/23/23 for 1 week if feeling well.  Followup in GI office in 2 weeks.

## 2023-10-03 ENCOUNTER — APPOINTMENT (OUTPATIENT)
Dept: ENDOCRINOLOGY | Facility: CLINIC | Age: 38
End: 2023-10-03
Payer: COMMERCIAL

## 2023-10-03 VITALS
DIASTOLIC BLOOD PRESSURE: 75 MMHG | HEIGHT: 70.25 IN | HEART RATE: 90 BPM | SYSTOLIC BLOOD PRESSURE: 113 MMHG | BODY MASS INDEX: 26.05 KG/M2 | WEIGHT: 182 LBS | OXYGEN SATURATION: 96 %

## 2023-10-03 PROCEDURE — 82962 GLUCOSE BLOOD TEST: CPT | Mod: NC

## 2023-10-03 PROCEDURE — 99215 OFFICE O/P EST HI 40 MIN: CPT | Mod: 25

## 2023-10-03 RX ORDER — INSULIN GLARGINE 100 [IU]/ML
100 INJECTION, SOLUTION SUBCUTANEOUS
Qty: 4 | Refills: 3 | Status: DISCONTINUED | COMMUNITY
Start: 2023-08-15 | End: 2023-10-03

## 2023-10-04 LAB
ALBUMIN SERPL ELPH-MCNC: 4.7 G/DL
ALP BLD-CCNC: 66 U/L
ALT SERPL-CCNC: 20 U/L
ANION GAP SERPL CALC-SCNC: 11 MMOL/L
AST SERPL-CCNC: 27 U/L
BILIRUB SERPL-MCNC: 0.2 MG/DL
BUN SERPL-MCNC: 16 MG/DL
C PEPTIDE SERPL-MCNC: 6 NG/ML
CALCIUM SERPL-MCNC: 9.9 MG/DL
CHLORIDE SERPL-SCNC: 103 MMOL/L
CO2 SERPL-SCNC: 25 MMOL/L
CREAT SERPL-MCNC: 0.99 MG/DL
CREAT SPEC-SCNC: 199 MG/DL
EGFR: 101 ML/MIN/1.73M2
GLUCOSE SERPL-MCNC: 198 MG/DL
MICROALBUMIN 24H UR DL<=1MG/L-MCNC: 1.3 MG/DL
MICROALBUMIN/CREAT 24H UR-RTO: 7 MG/G
POTASSIUM SERPL-SCNC: 4.2 MMOL/L
PROT SERPL-MCNC: 7.6 G/DL
SODIUM SERPL-SCNC: 139 MMOL/L

## 2023-10-19 ENCOUNTER — APPOINTMENT (OUTPATIENT)
Dept: INTERNAL MEDICINE | Facility: CLINIC | Age: 38
End: 2023-10-19
Payer: COMMERCIAL

## 2023-10-19 VITALS
SYSTOLIC BLOOD PRESSURE: 122 MMHG | HEIGHT: 70.25 IN | WEIGHT: 185 LBS | RESPIRATION RATE: 17 BRPM | DIASTOLIC BLOOD PRESSURE: 83 MMHG | HEART RATE: 81 BPM | OXYGEN SATURATION: 99 % | BODY MASS INDEX: 26.48 KG/M2

## 2023-10-19 DIAGNOSIS — Z23 ENCOUNTER FOR IMMUNIZATION: ICD-10-CM

## 2023-10-19 PROCEDURE — 90686 IIV4 VACC NO PRSV 0.5 ML IM: CPT

## 2023-10-19 PROCEDURE — 99213 OFFICE O/P EST LOW 20 MIN: CPT | Mod: 25

## 2023-10-19 PROCEDURE — G0008: CPT

## 2023-10-24 ENCOUNTER — APPOINTMENT (OUTPATIENT)
Dept: GASTROENTEROLOGY | Facility: CLINIC | Age: 38
End: 2023-10-24
Payer: COMMERCIAL

## 2023-10-24 VITALS
HEART RATE: 93 BPM | OXYGEN SATURATION: 99 % | SYSTOLIC BLOOD PRESSURE: 111 MMHG | WEIGHT: 180 LBS | BODY MASS INDEX: 25.77 KG/M2 | HEIGHT: 70.25 IN | DIASTOLIC BLOOD PRESSURE: 75 MMHG

## 2023-10-24 PROCEDURE — 99214 OFFICE O/P EST MOD 30 MIN: CPT

## 2023-11-03 LAB
ALBUMIN SERPL ELPH-MCNC: 4.8 G/DL
ALP BLD-CCNC: 56 U/L
ALT SERPL-CCNC: 22 U/L
ANION GAP SERPL CALC-SCNC: 12 MMOL/L
AST SERPL-CCNC: 17 U/L
BILIRUB SERPL-MCNC: 0.3 MG/DL
BUN SERPL-MCNC: 18 MG/DL
CALCIUM SERPL-MCNC: 9.5 MG/DL
CHLORIDE SERPL-SCNC: 101 MMOL/L
CHOLEST SERPL-MCNC: 161 MG/DL
CO2 SERPL-SCNC: 26 MMOL/L
CREAT SERPL-MCNC: 0.76 MG/DL
EGFR: 119 ML/MIN/1.73M2
ESTIMATED AVERAGE GLUCOSE: 143 MG/DL
GLUCOSE SERPL-MCNC: 130 MG/DL
HBA1C MFR BLD HPLC: 6.6 %
HCT VFR BLD CALC: 42.8 %
HDLC SERPL-MCNC: 58 MG/DL
HGB BLD-MCNC: 13.4 G/DL
LDLC SERPL CALC-MCNC: 71 MG/DL
MCHC RBC-ENTMCNC: 25.8 PG
MCHC RBC-ENTMCNC: 31.3 GM/DL
MCV RBC AUTO: 82.5 FL
NONHDLC SERPL-MCNC: 103 MG/DL
PLATELET # BLD AUTO: 276 K/UL
POTASSIUM SERPL-SCNC: 4.2 MMOL/L
PROT SERPL-MCNC: 8 G/DL
RBC # BLD: 5.19 M/UL
RBC # FLD: 16 %
SODIUM SERPL-SCNC: 139 MMOL/L
TRIGL SERPL-MCNC: 191 MG/DL
WBC # FLD AUTO: 5.14 K/UL

## 2023-11-04 RX ORDER — CIPROFLOXACIN HYDROCHLORIDE 500 MG/1
500 TABLET, FILM COATED ORAL TWICE DAILY
Qty: 9 | Refills: 0 | Status: DISCONTINUED | COMMUNITY
Start: 2023-08-30 | End: 2023-11-04

## 2023-11-07 ENCOUNTER — TRANSCRIPTION ENCOUNTER (OUTPATIENT)
Age: 38
End: 2023-11-07

## 2023-11-11 ENCOUNTER — OUTPATIENT (OUTPATIENT)
Dept: OUTPATIENT SERVICES | Facility: HOSPITAL | Age: 38
LOS: 1 days | End: 2023-11-11
Payer: COMMERCIAL

## 2023-11-11 ENCOUNTER — APPOINTMENT (OUTPATIENT)
Dept: CT IMAGING | Facility: CLINIC | Age: 38
End: 2023-11-11
Payer: COMMERCIAL

## 2023-11-11 DIAGNOSIS — K86.3 PSEUDOCYST OF PANCREAS: ICD-10-CM

## 2023-11-11 PROCEDURE — 74160 CT ABDOMEN W/CONTRAST: CPT

## 2023-11-11 PROCEDURE — 74160 CT ABDOMEN W/CONTRAST: CPT | Mod: 26

## 2024-01-23 ENCOUNTER — APPOINTMENT (OUTPATIENT)
Dept: ENDOCRINOLOGY | Facility: CLINIC | Age: 39
End: 2024-01-23
Payer: COMMERCIAL

## 2024-01-23 VITALS
HEIGHT: 70.25 IN | OXYGEN SATURATION: 99 % | HEART RATE: 88 BPM | WEIGHT: 198 LBS | BODY MASS INDEX: 28.35 KG/M2 | SYSTOLIC BLOOD PRESSURE: 129 MMHG | DIASTOLIC BLOOD PRESSURE: 82 MMHG

## 2024-01-23 PROCEDURE — 82962 GLUCOSE BLOOD TEST: CPT

## 2024-01-23 PROCEDURE — 99214 OFFICE O/P EST MOD 30 MIN: CPT | Mod: 25

## 2024-01-23 PROCEDURE — 95251 CONT GLUC MNTR ANALYSIS I&R: CPT

## 2024-01-23 PROCEDURE — 83036 HEMOGLOBIN GLYCOSYLATED A1C: CPT | Mod: QW

## 2024-01-23 NOTE — HISTORY OF PRESENT ILLNESS
[FreeTextEntry1] : Mr. VALERIE VALERA  is a 38 year old male with past medical history of Diabetes Mellitus Type 2, HLD, Hypertriglyceridemia induced Pancreatitis s/p cyst gastrostomy and necrosectomy who presents for management of his diabetes.   POCT Glucose: 149 mg/dL POCT Hga1c: 8.6%   Diabetes first diagnosed:2023 Type: 2  Current diabetic regimen: Metformin 1000 mg BID  Basaglar 15 units QHS (stopped a week ago) Other relevant medications:  Self monitoring blood glucose : Luz  [employer] [facility] Report:  (10/20/23-1/17/24) High: 30% Target (): 61% Low: 0%  Hypoglycemia:none  Diet: low carb, low fat  Exercise:yes  Urine micro:na lipid profile:trig 155, LDL 57 (8/2023) last hba1c:9.6% (8/2023) eye exam:none diabetic foot exam/podiatry:na

## 2024-01-23 NOTE — ASSESSMENT
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 DM 2- uncontrolled, uncomplicated Patient counseled on the importance of diabetic control and risk of complications. We discussed about microvascular disease and macrovascular disease. We discussed the importance of opthalmology evaluations annually or more frequent as necessary. We also discussed the importance of diabetes foot care. Some form of glucose monitoring is always advised. Maintaining a low carbohydrate/ADA diet and physical activity was discussed. Patient's diet and the necessary changes discussed. Reviewed over freestyle landry and use. Reviewed over glycemic goals.  Cont Metformin 1000 mg BID Check fsg BID Cont ADA diet Cont exercise Opthalmology Visit referral given Foot Exam up-to-date  2. HLD- stable Cont Atorvastatin 80 mg QD Cont fenofibrate 145 mg QD Cont Lovaza.

## 2024-01-23 NOTE — PHYSICAL EXAM
[Alert] : alert [No Acute Distress] : no acute distress [Well Developed] : well developed [Normal Voice/Communication] : normal voice communication [No Rash] : no rash [Oriented x3] : oriented to person, place, and time [Right foot was examined, including] : right foot ~C was examined, including visual inspection with sensory and pulse exams [Left foot was examined, including] : left foot ~C was examined, including visual inspection with sensory and pulse exams [Normal] : normal [Full ROM] : with full range of motion [2+] : 2+ in the dorsalis pedis [Diminished Throughout Both Feet] : normal tactile sensation with monofilament testing throughout both feet

## 2024-01-28 ENCOUNTER — TRANSCRIPTION ENCOUNTER (OUTPATIENT)
Age: 39
End: 2024-01-28

## 2024-03-02 ENCOUNTER — NON-APPOINTMENT (OUTPATIENT)
Age: 39
End: 2024-03-02

## 2024-03-02 ENCOUNTER — APPOINTMENT (OUTPATIENT)
Dept: OPHTHALMOLOGY | Facility: CLINIC | Age: 39
End: 2024-03-02
Payer: COMMERCIAL

## 2024-03-02 PROCEDURE — 92015 DETERMINE REFRACTIVE STATE: CPT

## 2024-03-02 PROCEDURE — 92004 COMPRE OPH EXAM NEW PT 1/>: CPT

## 2024-03-20 ENCOUNTER — TRANSCRIPTION ENCOUNTER (OUTPATIENT)
Age: 39
End: 2024-03-20

## 2024-03-22 ENCOUNTER — TRANSCRIPTION ENCOUNTER (OUTPATIENT)
Age: 39
End: 2024-03-22

## 2024-03-26 ENCOUNTER — TRANSCRIPTION ENCOUNTER (OUTPATIENT)
Age: 39
End: 2024-03-26

## 2024-03-26 RX ORDER — FENOFIBRATE 145 MG/1
145 TABLET, COATED ORAL DAILY
Qty: 90 | Refills: 3 | Status: ACTIVE | COMMUNITY
Start: 2019-05-14 | End: 1900-01-01

## 2024-04-01 ENCOUNTER — APPOINTMENT (OUTPATIENT)
Dept: INTERNAL MEDICINE | Facility: CLINIC | Age: 39
End: 2024-04-01
Payer: COMMERCIAL

## 2024-04-01 VITALS
TEMPERATURE: 97.9 F | DIASTOLIC BLOOD PRESSURE: 85 MMHG | HEIGHT: 70.25 IN | HEART RATE: 77 BPM | WEIGHT: 194 LBS | BODY MASS INDEX: 27.77 KG/M2 | OXYGEN SATURATION: 99 % | SYSTOLIC BLOOD PRESSURE: 138 MMHG

## 2024-04-01 DIAGNOSIS — E78.1 PURE HYPERGLYCERIDEMIA: ICD-10-CM

## 2024-04-01 DIAGNOSIS — Z00.00 ENCOUNTER FOR GENERAL ADULT MEDICAL EXAMINATION W/OUT ABNORMAL FINDINGS: ICD-10-CM

## 2024-04-01 DIAGNOSIS — K86.89 OTHER SPECIFIED DISEASES OF PANCREAS: ICD-10-CM

## 2024-04-01 PROCEDURE — 99395 PREV VISIT EST AGE 18-39: CPT | Mod: 25

## 2024-04-01 PROCEDURE — 90715 TDAP VACCINE 7 YRS/> IM: CPT

## 2024-04-01 PROCEDURE — 90471 IMMUNIZATION ADMIN: CPT

## 2024-04-01 RX ORDER — METFORMIN HYDROCHLORIDE 1000 MG/1
1000 TABLET, COATED ORAL TWICE DAILY
Qty: 180 | Refills: 3 | Status: ACTIVE | COMMUNITY
Start: 2019-05-14 | End: 1900-01-01

## 2024-04-01 RX ORDER — OMEGA-3-ACID ETHYL ESTERS CAPSULES 1 G/1
1 CAPSULE, LIQUID FILLED ORAL TWICE DAILY
Qty: 360 | Refills: 3 | Status: ACTIVE | COMMUNITY
Start: 2023-08-15 | End: 1900-01-01

## 2024-04-01 RX ORDER — ATORVASTATIN CALCIUM 80 MG/1
80 TABLET, FILM COATED ORAL
Qty: 1 | Refills: 2 | Status: ACTIVE | COMMUNITY
Start: 2023-08-15 | End: 1900-01-01

## 2024-04-01 NOTE — PHYSICAL EXAM
[No Acute Distress] : no acute distress [Well Nourished] : well nourished [Well-Appearing] : well-appearing [Well Developed] : well developed [Normal Sclera/Conjunctiva] : normal sclera/conjunctiva [PERRL] : pupils equal round and reactive to light [EOMI] : extraocular movements intact [Normal Outer Ear/Nose] : the outer ears and nose were normal in appearance [No JVD] : no jugular venous distention [Normal Oropharynx] : the oropharynx was normal [No Lymphadenopathy] : no lymphadenopathy [Supple] : supple [Thyroid Normal, No Nodules] : the thyroid was normal and there were no nodules present [No Respiratory Distress] : no respiratory distress  [No Accessory Muscle Use] : no accessory muscle use [Clear to Auscultation] : lungs were clear to auscultation bilaterally [Normal Rate] : normal rate  [Regular Rhythm] : with a regular rhythm [Normal S1, S2] : normal S1 and S2 [No Murmur] : no murmur heard [No Carotid Bruits] : no carotid bruits [No Abdominal Bruit] : a ~M bruit was not heard ~T in the abdomen [No Varicosities] : no varicosities [Pedal Pulses Present] : the pedal pulses are present [No Edema] : there was no peripheral edema [No Palpable Aorta] : no palpable aorta [No Extremity Clubbing/Cyanosis] : no extremity clubbing/cyanosis [Soft] : abdomen soft [Non Tender] : non-tender [Non-distended] : non-distended [No Masses] : no abdominal mass palpated [Normal Bowel Sounds] : normal bowel sounds [No HSM] : no HSM [Normal Anterior Cervical Nodes] : no anterior cervical lymphadenopathy [Normal Posterior Cervical Nodes] : no posterior cervical lymphadenopathy [No CVA Tenderness] : no CVA  tenderness [No Joint Swelling] : no joint swelling [No Spinal Tenderness] : no spinal tenderness [Grossly Normal Strength/Tone] : grossly normal strength/tone [No Rash] : no rash [Coordination Grossly Intact] : coordination grossly intact [No Focal Deficits] : no focal deficits [Normal Gait] : normal gait [Deep Tendon Reflexes (DTR)] : deep tendon reflexes were 2+ and symmetric [Normal Affect] : the affect was normal [Normal Insight/Judgement] : insight and judgment were intact

## 2024-04-01 NOTE — ASSESSMENT
[FreeTextEntry1] : Stable. Taking all his meds. We discussed his diet- he does eat steak, drinks half-n-half etc. Check labs when fasting. Advised more exercise. Tdap now f/u 3 mos

## 2024-04-01 NOTE — HISTORY OF PRESENT ILLNESS
[FreeTextEntry1] : CPE  pt late, accomodated [de-identified] : cpe. 37 yo man for CPE.  h/o elev TGs/familial, DM2, acute pancreatitis '15  h/o necrotizing pancreatitis in '15 when TGs were 1400 (MICU, intubation, pressors, insulin )  Walled-off panc necrosis 12 x 12 cm, also panc divisum nonpainful.  RECURRENCE w ICU admit 8/23 same process required necrosectomy and cystogastrectomy procedures, currently keeping up w Dr Baca and Endo re his hypertriglyceridemia and his DM2. Has cGM monitor on arm, no longer on insulin is on Metf, Lipitor, fenofib, lovaza Tries to do low carb diet, low fat- altho has half-n-half daily in his tea. 1/d  He does not drink etoh.

## 2024-04-04 ENCOUNTER — APPOINTMENT (OUTPATIENT)
Dept: GASTROENTEROLOGY | Facility: CLINIC | Age: 39
End: 2024-04-04
Payer: COMMERCIAL

## 2024-04-04 VITALS
HEIGHT: 70.25 IN | SYSTOLIC BLOOD PRESSURE: 121 MMHG | WEIGHT: 190 LBS | HEART RATE: 96 BPM | OXYGEN SATURATION: 98 % | DIASTOLIC BLOOD PRESSURE: 78 MMHG | BODY MASS INDEX: 27.2 KG/M2

## 2024-04-04 DIAGNOSIS — K86.3 PSEUDOCYST OF PANCREAS: ICD-10-CM

## 2024-04-04 DIAGNOSIS — E11.9 TYPE 2 DIABETES MELLITUS W/OUT COMPLICATIONS: ICD-10-CM

## 2024-04-04 PROCEDURE — 99214 OFFICE O/P EST MOD 30 MIN: CPT

## 2024-04-23 PROBLEM — E11.9 DIABETES MELLITUS: Status: ACTIVE | Noted: 2019-05-14

## 2024-04-23 NOTE — HISTORY OF PRESENT ILLNESS
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 He had endoscopic ultrasound-guided placement of a cyst gastrostomy lumen opposing metal stent Followed by endoscopy with partial necrosectomy. Now s/p completion endoscopic necrosectomy and removed LAMS 9/22/23  No fevers, chills, sweats, abdominal pain, heartburn, , nausea, vomiting, diarrhea, melena, hematochezia, jaundice or weight loss. Has some constipation, with bowel movements every other day. Takes Senokot and Dulcolax.  Personally reviewed CT scan Nov 2023,  Interval improvement of pancreatic fluid collections.  PROCEDURE DATE:  11/11/2023 INTERPRETATION:  CLINICAL INFORMATION: Necrotizing pancreatitis, follow-up COMPARISON: CT abdomen 8/20/2023 CONTRAST/COMPLICATIONS: IV Contrast: Omnipaque 350  90 cc administered   10 cc discarded Oral Contrast: NONE Complications: None reported at time of study completion  PROCEDURE: CT of the Abdomen was performed. Arterial and Portal Venous phases were acquired. Sagittal and coronal reformats were performed.  FINDINGS: LOWER CHEST: Mild subsegmental left basilar atelectasis LIVER: Within normal limits. BILE DUCTS: Normal caliber. GALLBLADDER: Nondistended. Cholelithiasis SPLEEN: Within normal limits. PANCREAS: Preserved pancreatic parenchyma enhancement. Atrophic distal body and tail. No duct dilation Small volume of residual fluid at the site of previously seen now resolved walled off necrosis anterior to the distal body and tail following AXIOS stent removal. Small volume residual fluid within the left anterior pararenal space extends to the left paracolic gutter. Small loculated fluid collections within the left upper pelvis interposed between the left psoas and iliacus muscle measuring up to 2.6 x 1.7 cm. ADRENALS: Within normal limits. KIDNEYS/URETERS: Symmetric renal enhancement without hydronephrosis. Stable subcentimeter left renal hypodensities too small for definitive characterization by CT  VISUALIZED PORTIONS: BOWEL: Unobstructed bowel PERITONEUM: No ascites. 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 RETROPERITONEUM/LYMPH NODES: No lymphadenopathy. ABDOMINAL WALL: Tiny fat-containing umbilical hernia. BONES: No significant acute bony abnormality.  IMPRESSION: Resolved walled off necrosis anterior to the distal body and tail. Similar small residual fluid collections within the left upper pelvis interposed between left psoas and iliacus muscle relating to pancreatitis.

## 2024-04-23 NOTE — ASSESSMENT
[FreeTextEntry1] : Impression:  #1. Necrotizing pancreatitis from hypertriglyceridemia, with walls off necrosis, status post endoscopic cyst gastrostomy and endoscopic necrosectomy early Aug 2023, post procedure CT scan demonstrates improvement, now status post complete removal of endoscopically visible necrosis and removal of cystogastrostomy stent in late September 2023.  Surveillance imaging with CT scan of the abdomen with contrast November 2023 demonstrated improving fluid collections which are decreasing in size.  #2. Resolution of abdominal discomfort, intermittent chills and nausea.  #3. Average risk for colorectal cancer  #4.  Recent onset diabetes type 2.  Recommendations:  #1. Cholesterol/hypertriglyceridemia and diabetes control per endocrinologist.   #2.  GI office followup with me in 9 months, will decide on imaging at that time.  #3.  Colorectal cancer screening to start at age 45 y/o.  Follow-up with primary gastroenterologist.

## 2024-04-23 NOTE — CONSULT LETTER
[Dear  ___] : Dear  [unfilled], [Consult Letter:] : I had the pleasure of evaluating your patient, [unfilled]. [Please see my note below.] : Please see my note below. [Consult Closing:] : Thank you very much for allowing me to participate in the care of this patient.  If you have any questions, please do not hesitate to contact me. [Sincerely,] : Sincerely, [FreeTextEntry3] : Bryan Baca MD, MPH, JONNIE, EUNICEG Chief of Clinical Quality in Gastroenterology, [facility] Associate Chief of Gastroenterology, [facility] Interim Director of Endoscopy, [address], [address] [facility], [id number] 24 hours [phone]

## 2024-04-23 NOTE — PHYSICAL EXAM
[Alert] : alert [Sclera] : the sclera and conjunctiva were normal [Normal Affect] : the affect was normal [de-identified] : Soft, nondistended, nontender [de-identified] : No jaundice [de-identified] : No confusion

## 2024-05-06 LAB
ALBUMIN SERPL ELPH-MCNC: 4.9 G/DL
ALP BLD-CCNC: 56 U/L
ALT SERPL-CCNC: 33 U/L
ANION GAP SERPL CALC-SCNC: 15 MMOL/L
AST SERPL-CCNC: 27 U/L
BILIRUB SERPL-MCNC: 0.4 MG/DL
BUN SERPL-MCNC: 15 MG/DL
CALCIUM SERPL-MCNC: 9.7 MG/DL
CHLORIDE SERPL-SCNC: 101 MMOL/L
CHOLEST SERPL-MCNC: 181 MG/DL
CO2 SERPL-SCNC: 24 MMOL/L
CREAT SERPL-MCNC: 0.9 MG/DL
EGFR: 113 ML/MIN/1.73M2
ESTIMATED AVERAGE GLUCOSE: 194 MG/DL
FOLATE SERPL-MCNC: 11.3 NG/ML
FRUCTOSAMINE SERPL-MCNC: 368 UMOL/L
GLUCOSE SERPL-MCNC: 164 MG/DL
HBA1C MFR BLD HPLC: 8.4 %
HCT VFR BLD CALC: 44.3 %
HDLC SERPL-MCNC: 56 MG/DL
HGB BLD-MCNC: 14.8 G/DL
LDLC SERPL CALC-MCNC: 79 MG/DL
MCHC RBC-ENTMCNC: 28.3 PG
MCHC RBC-ENTMCNC: 33.4 GM/DL
MCV RBC AUTO: 84.7 FL
NONHDLC SERPL-MCNC: 126 MG/DL
PLATELET # BLD AUTO: 197 K/UL
POTASSIUM SERPL-SCNC: 4.4 MMOL/L
PROT SERPL-MCNC: 7.8 G/DL
RBC # BLD: 5.23 M/UL
RBC # FLD: 14 %
SODIUM SERPL-SCNC: 140 MMOL/L
TRIGL SERPL-MCNC: 288 MG/DL
TSH SERPL-ACNC: 2.84 UIU/ML
VIT B12 SERPL-MCNC: 535 PG/ML
WBC # FLD AUTO: 4.62 K/UL

## 2024-05-10 RX ORDER — BLOOD-GLUCOSE SENSOR
EACH MISCELLANEOUS
Qty: 2 | Refills: 7 | Status: ACTIVE | COMMUNITY
Start: 2023-10-03 | End: 1900-01-01

## 2024-06-10 NOTE — HISTORY OF PRESENT ILLNESS
[FreeTextEntry1] : Mr. VALERIE VALERA  is a 38 year old male with past medical history of Diabetes Mellitus Type 2, HLD, Hypertriglyceridemia induced Pancreatitis s/p cyst gastrostomy and necrosectomy who presents for management of his diabetes.   POCT Glucose: 149 mg/dL POCT Hga1c: 8.6%   Diabetes first diagnosed:2023 Type: 2  Current diabetic regimen: Metformin 1000 mg BID  Basaglar 15 units QHS (stopped a week ago) Other relevant medications:  Self monitoring blood glucose : Luz  [facility] Report:  (10/20/23-1/17/24) High: 30% Target (): 61% Low: 0%  Hypoglycemia:none  Diet: low carb, low fat  Exercise:yes  Urine micro:na lipid profile:trig 155, LDL 57 (8/2023) last hba1c:9.6% (8/2023) eye exam:none diabetic foot exam/podiatry:na

## 2024-06-11 ENCOUNTER — APPOINTMENT (OUTPATIENT)
Dept: ENDOCRINOLOGY | Facility: CLINIC | Age: 39
End: 2024-06-11

## 2024-09-06 NOTE — ED ADULT TRIAGE NOTE - MODE OF ARRIVAL
Pre Op Xray scheduled:  09/13/24    Previously scheduled 09/24/2024 appointment with Dr. Wu changed from a 6 week follow up to a Pre Op Exam with EKG with patient's permission.    Patient had labs completed at ER on 09/05/2024, will repeat if necessary at time of appointment with Dr. Wu.    Patient verbally confirmed all appointment dates, times, and locations.    Walk in Private Auto

## 2024-09-23 ENCOUNTER — TRANSCRIPTION ENCOUNTER (OUTPATIENT)
Age: 39
End: 2024-09-23

## 2024-11-12 ENCOUNTER — APPOINTMENT (OUTPATIENT)
Dept: GASTROENTEROLOGY | Facility: CLINIC | Age: 39
End: 2024-11-12

## 2025-01-16 DIAGNOSIS — E78.1 PURE HYPERGLYCERIDEMIA: ICD-10-CM

## 2025-01-16 DIAGNOSIS — K85.90 ACUTE PANCREATITIS WITHOUT NECROSIS OR INFECTION, UNSPECIFIED: ICD-10-CM

## 2025-01-16 DIAGNOSIS — E11.9 TYPE 2 DIABETES MELLITUS W/OUT COMPLICATIONS: ICD-10-CM

## 2025-01-16 DIAGNOSIS — Z00.00 ENCOUNTER FOR GENERAL ADULT MEDICAL EXAMINATION W/OUT ABNORMAL FINDINGS: ICD-10-CM

## 2025-01-16 DIAGNOSIS — K86.89 OTHER SPECIFIED DISEASES OF PANCREAS: ICD-10-CM

## 2025-02-11 ENCOUNTER — APPOINTMENT (OUTPATIENT)
Dept: INTERNAL MEDICINE | Facility: CLINIC | Age: 40
End: 2025-02-11
Payer: COMMERCIAL

## 2025-02-11 VITALS
OXYGEN SATURATION: 96 % | TEMPERATURE: 97.7 F | SYSTOLIC BLOOD PRESSURE: 123 MMHG | BODY MASS INDEX: 28.77 KG/M2 | DIASTOLIC BLOOD PRESSURE: 86 MMHG | HEART RATE: 94 BPM | HEIGHT: 70.25 IN | WEIGHT: 201 LBS

## 2025-02-11 DIAGNOSIS — Z00.00 ENCOUNTER FOR GENERAL ADULT MEDICAL EXAMINATION W/OUT ABNORMAL FINDINGS: ICD-10-CM

## 2025-02-11 DIAGNOSIS — E66.3 OVERWEIGHT: ICD-10-CM

## 2025-02-11 DIAGNOSIS — E78.1 PURE HYPERGLYCERIDEMIA: ICD-10-CM

## 2025-02-11 DIAGNOSIS — E11.9 TYPE 2 DIABETES MELLITUS W/OUT COMPLICATIONS: ICD-10-CM

## 2025-02-11 DIAGNOSIS — Z23 ENCOUNTER FOR IMMUNIZATION: ICD-10-CM

## 2025-02-11 DIAGNOSIS — K86.89 OTHER SPECIFIED DISEASES OF PANCREAS: ICD-10-CM

## 2025-02-11 PROCEDURE — G2211 COMPLEX E/M VISIT ADD ON: CPT | Mod: NC

## 2025-02-11 PROCEDURE — G0008: CPT

## 2025-02-11 PROCEDURE — 90656 IIV3 VACC NO PRSV 0.5 ML IM: CPT

## 2025-02-11 PROCEDURE — 99214 OFFICE O/P EST MOD 30 MIN: CPT | Mod: 25

## 2025-02-11 RX ORDER — FLASH GLUCOSE SCANNING READER
EACH MISCELLANEOUS
Qty: 1 | Refills: 3 | Status: ACTIVE | COMMUNITY
Start: 2025-02-11 | End: 1900-01-01

## 2025-02-22 LAB
25(OH)D3 SERPL-MCNC: 11.3 NG/ML
ALBUMIN SERPL ELPH-MCNC: 4.7 G/DL
ALP BLD-CCNC: 84 U/L
ALT SERPL-CCNC: 46 U/L
ANION GAP SERPL CALC-SCNC: 15 MMOL/L
AST SERPL-CCNC: 27 U/L
BILIRUB SERPL-MCNC: 0.3 MG/DL
BUN SERPL-MCNC: 14 MG/DL
CALCIUM SERPL-MCNC: 9.6 MG/DL
CHLORIDE SERPL-SCNC: 100 MMOL/L
CHOLEST SERPL-MCNC: 309 MG/DL
CO2 SERPL-SCNC: 24 MMOL/L
CREAT SERPL-MCNC: 0.78 MG/DL
EGFR: 117 ML/MIN/1.73M2
GLUCOSE SERPL-MCNC: 209 MG/DL
HCT VFR BLD CALC: 48 %
HDLC SERPL-MCNC: 32 MG/DL
HGB BLD-MCNC: 15.7 G/DL
LDLC SERPL CALC-MCNC: NORMAL MG/DL
LPL SERPL-CCNC: 35 U/L
MCHC RBC-ENTMCNC: 28.1 PG
MCHC RBC-ENTMCNC: 32.7 G/DL
MCV RBC AUTO: 86 FL
NONHDLC SERPL-MCNC: 278 MG/DL
PLATELET # BLD AUTO: 207 K/UL
POTASSIUM SERPL-SCNC: 4.1 MMOL/L
PROT SERPL-MCNC: 7.7 G/DL
RBC # BLD: 5.58 M/UL
RBC # FLD: 13.5 %
SODIUM SERPL-SCNC: 140 MMOL/L
TRIGL SERPL-MCNC: 1268 MG/DL
TSH SERPL-ACNC: 2.14 UIU/ML
WBC # FLD AUTO: 3.83 K/UL

## 2025-02-23 LAB
ESTIMATED AVERAGE GLUCOSE: 272 MG/DL
HBA1C MFR BLD HPLC: 11.1 %

## 2025-02-24 ENCOUNTER — TRANSCRIPTION ENCOUNTER (OUTPATIENT)
Age: 40
End: 2025-02-24

## 2025-02-24 RX ORDER — SEMAGLUTIDE 0.68 MG/ML
2 INJECTION, SOLUTION SUBCUTANEOUS
Qty: 4 | Refills: 3 | Status: ACTIVE | COMMUNITY
Start: 2025-02-24 | End: 1900-01-01

## 2025-03-15 ENCOUNTER — APPOINTMENT (OUTPATIENT)
Dept: CT IMAGING | Facility: CLINIC | Age: 40
End: 2025-03-15

## 2025-03-15 ENCOUNTER — OUTPATIENT (OUTPATIENT)
Dept: OUTPATIENT SERVICES | Facility: HOSPITAL | Age: 40
LOS: 1 days | End: 2025-03-15
Payer: SELF-PAY

## 2025-03-15 DIAGNOSIS — K86.89 OTHER SPECIFIED DISEASES OF PANCREAS: ICD-10-CM

## 2025-03-15 DIAGNOSIS — Z00.8 ENCOUNTER FOR OTHER GENERAL EXAMINATION: ICD-10-CM

## 2025-03-15 PROCEDURE — 74160 CT ABDOMEN W/CONTRAST: CPT | Mod: 26

## 2025-03-15 PROCEDURE — 74160 CT ABDOMEN W/CONTRAST: CPT

## 2025-03-17 ENCOUNTER — TRANSCRIPTION ENCOUNTER (OUTPATIENT)
Age: 40
End: 2025-03-17

## 2025-03-24 RX ORDER — BLOOD-GLUCOSE SENSOR
EACH MISCELLANEOUS
Qty: 2 | Refills: 6 | Status: ACTIVE | COMMUNITY
Start: 2025-03-24 | End: 1900-01-01

## 2025-04-11 ENCOUNTER — APPOINTMENT (OUTPATIENT)
Dept: INTERNAL MEDICINE | Facility: CLINIC | Age: 40
End: 2025-04-11

## 2025-05-13 ENCOUNTER — APPOINTMENT (OUTPATIENT)
Dept: GASTROENTEROLOGY | Facility: CLINIC | Age: 40
End: 2025-05-13

## 2025-05-13 ENCOUNTER — NON-APPOINTMENT (OUTPATIENT)
Age: 40
End: 2025-05-13

## 2025-05-13 VITALS
RESPIRATION RATE: 17 BRPM | BODY MASS INDEX: 28.77 KG/M2 | WEIGHT: 201 LBS | SYSTOLIC BLOOD PRESSURE: 131 MMHG | HEIGHT: 70 IN | HEART RATE: 88 BPM | OXYGEN SATURATION: 97 % | DIASTOLIC BLOOD PRESSURE: 86 MMHG

## 2025-05-13 PROCEDURE — G2211 COMPLEX E/M VISIT ADD ON: CPT | Mod: NC

## 2025-05-13 PROCEDURE — 99214 OFFICE O/P EST MOD 30 MIN: CPT

## 2025-05-21 ENCOUNTER — RESULT CHARGE (OUTPATIENT)
Age: 40
End: 2025-05-21

## 2025-05-21 ENCOUNTER — APPOINTMENT (OUTPATIENT)
Dept: ENDOCRINOLOGY | Facility: CLINIC | Age: 40
End: 2025-05-21
Payer: COMMERCIAL

## 2025-05-21 DIAGNOSIS — E11.9 TYPE 2 DIABETES MELLITUS W/OUT COMPLICATIONS: ICD-10-CM

## 2025-05-21 DIAGNOSIS — E78.1 PURE HYPERGLYCERIDEMIA: ICD-10-CM

## 2025-05-21 PROCEDURE — 95251 CONT GLUC MNTR ANALYSIS I&R: CPT

## 2025-05-21 PROCEDURE — 83036 HEMOGLOBIN GLYCOSYLATED A1C: CPT | Mod: QW

## 2025-05-21 PROCEDURE — G2211 COMPLEX E/M VISIT ADD ON: CPT | Mod: NC

## 2025-05-21 PROCEDURE — 99214 OFFICE O/P EST MOD 30 MIN: CPT

## 2025-05-21 RX ORDER — PEN NEEDLE, DIABETIC 32GX 5/32"
32G X 4 MM NEEDLE, DISPOSABLE MISCELLANEOUS
Qty: 1 | Refills: 2 | Status: ACTIVE | COMMUNITY
Start: 2025-05-21 | End: 1900-01-01

## 2025-05-21 RX ORDER — INSULIN GLARGINE 100 [IU]/ML
100 INJECTION, SOLUTION SUBCUTANEOUS
Qty: 5 | Refills: 1 | Status: ACTIVE | COMMUNITY
Start: 2025-05-21 | End: 1900-01-01

## 2025-07-07 ENCOUNTER — APPOINTMENT (OUTPATIENT)
Dept: ENDOCRINOLOGY | Facility: CLINIC | Age: 40
End: 2025-07-07
Payer: COMMERCIAL

## 2025-07-07 PROCEDURE — G2211 COMPLEX E/M VISIT ADD ON: CPT | Mod: NC,95

## 2025-07-07 PROCEDURE — 99214 OFFICE O/P EST MOD 30 MIN: CPT | Mod: 95

## 2025-08-25 ENCOUNTER — APPOINTMENT (OUTPATIENT)
Dept: ENDOCRINOLOGY | Facility: CLINIC | Age: 40
End: 2025-08-25
Payer: COMMERCIAL

## 2025-08-25 DIAGNOSIS — E11.9 TYPE 2 DIABETES MELLITUS W/OUT COMPLICATIONS: ICD-10-CM

## 2025-08-25 DIAGNOSIS — E78.1 PURE HYPERGLYCERIDEMIA: ICD-10-CM

## 2025-08-25 PROCEDURE — G2211 COMPLEX E/M VISIT ADD ON: CPT | Mod: NC,95

## 2025-08-25 PROCEDURE — 99214 OFFICE O/P EST MOD 30 MIN: CPT | Mod: 95

## 2025-08-25 RX ORDER — BLOOD-GLUCOSE SENSOR
EACH MISCELLANEOUS
Qty: 9 | Refills: 2 | Status: ACTIVE | COMMUNITY
Start: 2025-08-25 | End: 1900-01-01

## (undated) DEVICE — TUBING SUCTION 20FT

## (undated) DEVICE — PACK IV START WITH CHG

## (undated) DEVICE — TUBING IV SET GRAVITY 3Y 100" MACRO

## (undated) DEVICE — NDL INJ SCLERO INTERJECT 25G

## (undated) DEVICE — LITHOTRIPY BASKET TRAPEZOID 2.5CM

## (undated) DEVICE — SUCTION YANKAUER NO CONTROL VENT

## (undated) DEVICE — CATH IV SAFE BC 22G X 1" (BLUE)

## (undated) DEVICE — SOL INJ NS 0.9% 500ML 2 PORT

## (undated) DEVICE — FOLEY HOLDER STATLOCK 2 WAY ADULT

## (undated) DEVICE — SNARE POLYP MINI ACCUSNR 1.5 X 3CM

## (undated) DEVICE — SNARE CAPTIVATOR II 15MM

## (undated) DEVICE — CATH IV SAFE BC 20G X 1.16" (PINK)

## (undated) DEVICE — BITE BLOCK ADULT 20 X 27MM (GREEN)

## (undated) DEVICE — BALLOON US ENDO

## (undated) DEVICE — SENSOR O2 FINGER ADULT

## (undated) DEVICE — BRUSH COLONOSCOPY CYTOLOGY

## (undated) DEVICE — BIOPSY FORCEP RADIAL JAW 4 STANDARD WITH NEEDLE

## (undated) DEVICE — FORCEP GRASPING POLYGRAB TRIPOD DISP

## (undated) DEVICE — BRUSH CYTO RAP EXCHG 3MM

## (undated) DEVICE — TUBING SUCTION CONN 6FT STERILE

## (undated) DEVICE — POSITIONER FOAM HEAD CRADLE (PINK)

## (undated) DEVICE — SYR ALLIANCE II INFLATION 60ML

## (undated) DEVICE — SNARE POLYP SENS SM 13MM 240CM

## (undated) DEVICE — ELCTR GROUNDING PAD ADULT COVIDIEN

## (undated) DEVICE — PAPIL BILRTH II 6-5FRX0.035IN

## (undated) DEVICE — SPHINCTEROTOME CLEVERCUT WIRE 25MM  2.8MM X 170CM

## (undated) DEVICE — COLONOSCOPE 2416901: Type: DURABLE MEDICAL EQUIPMENT

## (undated) DEVICE — DEVICE GRASPING RAPTOR

## (undated) DEVICE — WARMING BLANKET FULL ADULT

## (undated) DEVICE — NDL INJ SCLERO INTERJECT 23G